# Patient Record
Sex: FEMALE | Race: WHITE | NOT HISPANIC OR LATINO | Employment: OTHER | URBAN - METROPOLITAN AREA
[De-identification: names, ages, dates, MRNs, and addresses within clinical notes are randomized per-mention and may not be internally consistent; named-entity substitution may affect disease eponyms.]

---

## 2018-12-01 ENCOUNTER — OFFICE VISIT (OUTPATIENT)
Dept: URGENT CARE | Facility: CLINIC | Age: 73
End: 2018-12-01
Payer: MEDICARE

## 2018-12-01 VITALS
WEIGHT: 135 LBS | BODY MASS INDEX: 19.99 KG/M2 | TEMPERATURE: 98 F | HEART RATE: 83 BPM | SYSTOLIC BLOOD PRESSURE: 131 MMHG | DIASTOLIC BLOOD PRESSURE: 76 MMHG | HEIGHT: 69 IN | OXYGEN SATURATION: 96 % | RESPIRATION RATE: 18 BRPM

## 2018-12-01 DIAGNOSIS — R35.0 URINARY FREQUENCY: Primary | ICD-10-CM

## 2018-12-01 LAB
BILIRUB UR QL STRIP: NEGATIVE
GLUCOSE UR QL STRIP: NEGATIVE
KETONES UR QL STRIP: NEGATIVE
LEUKOCYTE ESTERASE UR QL STRIP: NEGATIVE
PH, POC UA: 6 (ref 5–8)
POC BLOOD, URINE: NEGATIVE
POC NITRATES, URINE: NEGATIVE
PROT UR QL STRIP: NEGATIVE
SP GR UR STRIP: 1 (ref 1–1.03)
UROBILINOGEN UR STRIP-ACNC: NORMAL (ref 0.1–1.1)

## 2018-12-01 PROCEDURE — 81003 URINALYSIS AUTO W/O SCOPE: CPT | Mod: QW,S$GLB,, | Performed by: NURSE PRACTITIONER

## 2018-12-01 PROCEDURE — 99203 OFFICE O/P NEW LOW 30 MIN: CPT | Mod: 25,S$GLB,, | Performed by: NURSE PRACTITIONER

## 2018-12-01 RX ORDER — GLUCOSAMINE/CHONDRO SU A 500-400 MG
1 TABLET ORAL 3 TIMES DAILY
COMMUNITY

## 2018-12-01 NOTE — PROGRESS NOTES
"Subjective:       Patient ID: Cassidy Aguilar is a 73 y.o. female.    Vitals:  height is 5' 9" (1.753 m) and weight is 61.2 kg (135 lb). Her oral temperature is 97.9 °F (36.6 °C). Her blood pressure is 131/76 and her pulse is 83. Her respiration is 18 and oxygen saturation is 96%.     Chief Complaint: Urinary Frequency    Pt presents to clinic with c/o urinary frequency x2 days. Denies nausea, back pain, dysuria an hematuria.       Urinary Frequency    This is a new problem. The current episode started yesterday. The problem occurs intermittently. The problem has been waxing and waning. The quality of the pain is described as aching. The pain is at a severity of 0/10. The patient is experiencing no pain. There has been no fever. Associated symptoms include frequency. Pertinent negatives include no behavior changes, chills, discharge, flank pain, hematuria, hesitancy, nausea, urgency, vomiting, rash or withholding. She has tried nothing for the symptoms. The treatment provided no relief. Her past medical history is significant for hypertension and recurrent UTIs. There is no history of catheterization, genitourinary reflux or kidney stones.       Constitution: Negative for chills and fever.   Neck: Negative for painful lymph nodes.   Gastrointestinal: Negative for abdominal pain, nausea and vomiting.   Genitourinary: Positive for frequency. Negative for dysuria, urgency, urine decreased, flank pain, hematuria, history of kidney stones, painful menstruation, irregular menstruation, missed menses, heavy menstrual bleeding, ovarian cysts, genital trauma, vaginal pain, vaginal discharge, vaginal bleeding, vaginal sores, vaginal odor, painful intercourse, genital sore, painful ejaculation and pelvic pain.   Musculoskeletal: Negative for back pain.   Skin: Negative for rash and lesion.   Hematologic/Lymphatic: Negative for swollen lymph nodes.       Objective:      Physical Exam   Constitutional: She is oriented to person, " place, and time. She appears well-developed and well-nourished.   HENT:   Head: Normocephalic and atraumatic.   Right Ear: External ear normal.   Left Ear: External ear normal.   Nose: Nose normal.   Eyes: Lids are normal.   Neck: Trachea normal, normal range of motion and phonation normal. Neck supple.   Cardiovascular: Normal pulses.   Pulmonary/Chest: Effort normal.   Abdominal: Soft. Normal appearance and bowel sounds are normal. She exhibits no distension. There is no tenderness. There is no CVA tenderness.   Neurological: She is alert and oriented to person, place, and time.   Skin: Skin is warm, dry and intact.   Psychiatric: She has a normal mood and affect. Her speech is normal and behavior is normal. Cognition and memory are normal.   Nursing note and vitals reviewed.      Assessment:       1. Urinary frequency        Plan:         Urinary frequency  -     POCT Urinalysis, Dipstick, Automated, W/O Scope  -     Urine culture

## 2018-12-01 NOTE — PATIENT INSTRUCTIONS
Please arrange follow up with your primary medical clinic as soon as possible. You must understand that you've received an Urgent Care treatment only and that you may be released before all of your medical problems are known or treated. You, the patient, will arrange for follow up as instructed. If your symptoms worsen or fail to improve you should go to the Emergency Room.

## 2018-12-05 ENCOUNTER — TELEPHONE (OUTPATIENT)
Dept: URGENT CARE | Facility: CLINIC | Age: 73
End: 2018-12-05

## 2018-12-05 LAB
BACTERIA UR CULT: NO GROWTH
BACTERIA UR CULT: NORMAL

## 2018-12-05 NOTE — TELEPHONE ENCOUNTER
----- Message from Paty Kulkarni NP sent at 12/5/2018 10:54 AM CST -----  Please call the patient regarding her normal result. Urine culture negative. Follow up with PCP.

## 2021-04-26 ENCOUNTER — PATIENT MESSAGE (OUTPATIENT)
Dept: RESEARCH | Facility: HOSPITAL | Age: 76
End: 2021-04-26

## 2021-12-20 ENCOUNTER — TELEPHONE (OUTPATIENT)
Dept: HEMATOLOGY/ONCOLOGY | Facility: CLINIC | Age: 76
End: 2021-12-20
Payer: MEDICARE

## 2021-12-20 DIAGNOSIS — Z51.11 ENCOUNTER FOR ANTINEOPLASTIC CHEMOTHERAPY: Primary | ICD-10-CM

## 2021-12-22 ENCOUNTER — OFFICE VISIT (OUTPATIENT)
Dept: HEMATOLOGY/ONCOLOGY | Facility: CLINIC | Age: 76
End: 2021-12-22
Payer: MEDICARE

## 2021-12-22 ENCOUNTER — LAB VISIT (OUTPATIENT)
Dept: LAB | Facility: HOSPITAL | Age: 76
End: 2021-12-22
Attending: INTERNAL MEDICINE
Payer: MEDICARE

## 2021-12-22 ENCOUNTER — PATIENT MESSAGE (OUTPATIENT)
Dept: HEMATOLOGY/ONCOLOGY | Facility: CLINIC | Age: 76
End: 2021-12-22

## 2021-12-22 VITALS
WEIGHT: 119.5 LBS | OXYGEN SATURATION: 98 % | HEIGHT: 69 IN | SYSTOLIC BLOOD PRESSURE: 179 MMHG | RESPIRATION RATE: 18 BRPM | HEART RATE: 80 BPM | BODY MASS INDEX: 17.7 KG/M2 | DIASTOLIC BLOOD PRESSURE: 76 MMHG | TEMPERATURE: 98 F

## 2021-12-22 DIAGNOSIS — C91.10 CLL (CHRONIC LYMPHOCYTIC LEUKEMIA): ICD-10-CM

## 2021-12-22 DIAGNOSIS — Z51.11 ENCOUNTER FOR ANTINEOPLASTIC CHEMOTHERAPY: ICD-10-CM

## 2021-12-22 DIAGNOSIS — R59.0 LYMPHADENOPATHY, CERVICAL: ICD-10-CM

## 2021-12-22 DIAGNOSIS — K29.60 SUPERFICIAL GASTRITIS, PRESENCE OF BLEEDING UNSPECIFIED, UNSPECIFIED CHRONICITY: Primary | ICD-10-CM

## 2021-12-22 PROBLEM — D64.9 ANEMIA, UNSPECIFIED: Status: ACTIVE | Noted: 2021-12-22

## 2021-12-22 LAB
ALBUMIN SERPL BCP-MCNC: 4.4 G/DL (ref 3.5–5.2)
ALP SERPL-CCNC: 112 U/L (ref 55–135)
ALT SERPL W/O P-5'-P-CCNC: 24 U/L (ref 10–44)
ANION GAP SERPL CALC-SCNC: 6 MMOL/L (ref 8–16)
ANISOCYTOSIS BLD QL SMEAR: SLIGHT
AST SERPL-CCNC: 31 U/L (ref 10–40)
BASO STIPL BLD QL SMEAR: ABNORMAL
BASOPHILS NFR BLD: 0 % (ref 0–1.9)
BILIRUB SERPL-MCNC: 4.7 MG/DL (ref 0.1–1)
BUN SERPL-MCNC: 23 MG/DL (ref 8–23)
CALCIUM SERPL-MCNC: 9.8 MG/DL (ref 8.7–10.5)
CHLORIDE SERPL-SCNC: 96 MMOL/L (ref 95–110)
CO2 SERPL-SCNC: 26 MMOL/L (ref 23–29)
CREAT SERPL-MCNC: 0.7 MG/DL (ref 0.5–1.4)
DAT IGG-SP REAG RBC-IMP: NORMAL
DIFFERENTIAL METHOD: ABNORMAL
EOSINOPHIL NFR BLD: 0 % (ref 0–8)
ERYTHROCYTE [DISTWIDTH] IN BLOOD BY AUTOMATED COUNT: 21.9 % (ref 11.5–14.5)
EST. GFR  (AFRICAN AMERICAN): >60 ML/MIN/1.73 M^2
EST. GFR  (NON AFRICAN AMERICAN): >60 ML/MIN/1.73 M^2
GLUCOSE SERPL-MCNC: 160 MG/DL (ref 70–110)
HAPTOGLOB SERPL-MCNC: <10 MG/DL (ref 30–250)
HCT VFR BLD AUTO: 26.5 % (ref 37–48.5)
HGB BLD-MCNC: 8.9 G/DL (ref 12–16)
IMM GRANULOCYTES # BLD AUTO: ABNORMAL K/UL (ref 0–0.04)
IMM GRANULOCYTES NFR BLD AUTO: ABNORMAL % (ref 0–0.5)
LDH SERPL L TO P-CCNC: 448 U/L (ref 110–260)
LYMPHOCYTES NFR BLD: 88 % (ref 18–48)
MCH RBC QN AUTO: 33.2 PG (ref 27–31)
MCHC RBC AUTO-ENTMCNC: 33.6 G/DL (ref 32–36)
MCV RBC AUTO: 99 FL (ref 82–98)
MONOCYTES NFR BLD: 1 % (ref 4–15)
NEUTROPHILS NFR BLD: 11 % (ref 38–73)
NRBC BLD-RTO: 0 /100 WBC
PATH REV BLD -IMP: NORMAL
PLATELET # BLD AUTO: 288 K/UL (ref 150–450)
PLATELET BLD QL SMEAR: ABNORMAL
PMV BLD AUTO: 9.4 FL (ref 9.2–12.9)
POTASSIUM SERPL-SCNC: 4.8 MMOL/L (ref 3.5–5.1)
PROT SERPL-MCNC: 7.4 G/DL (ref 6–8.4)
RBC # BLD AUTO: 2.68 M/UL (ref 4–5.4)
SMUDGE CELLS BLD QL SMEAR: PRESENT
SODIUM SERPL-SCNC: 128 MMOL/L (ref 136–145)
WBC # BLD AUTO: 96.95 K/UL (ref 3.9–12.7)

## 2021-12-22 PROCEDURE — 99205 OFFICE O/P NEW HI 60 MIN: CPT | Mod: S$PBB,,, | Performed by: INTERNAL MEDICINE

## 2021-12-22 PROCEDURE — 86880 COOMBS TEST DIRECT: CPT | Performed by: INTERNAL MEDICINE

## 2021-12-22 PROCEDURE — 99999 PR PBB SHADOW E&M-EST. PATIENT-LVL IV: CPT | Mod: PBBFAC,,, | Performed by: INTERNAL MEDICINE

## 2021-12-22 PROCEDURE — 85007 BL SMEAR W/DIFF WBC COUNT: CPT | Performed by: INTERNAL MEDICINE

## 2021-12-22 PROCEDURE — 99214 OFFICE O/P EST MOD 30 MIN: CPT | Mod: PBBFAC | Performed by: INTERNAL MEDICINE

## 2021-12-22 PROCEDURE — 80053 COMPREHEN METABOLIC PANEL: CPT | Performed by: INTERNAL MEDICINE

## 2021-12-22 PROCEDURE — 83615 LACTATE (LD) (LDH) ENZYME: CPT | Performed by: INTERNAL MEDICINE

## 2021-12-22 PROCEDURE — 85027 COMPLETE CBC AUTOMATED: CPT | Performed by: INTERNAL MEDICINE

## 2021-12-22 PROCEDURE — 83010 ASSAY OF HAPTOGLOBIN QUANT: CPT | Performed by: INTERNAL MEDICINE

## 2021-12-22 PROCEDURE — 85060 BLOOD SMEAR INTERPRETATION: CPT | Mod: ,,, | Performed by: PATHOLOGY

## 2021-12-22 PROCEDURE — 36415 COLL VENOUS BLD VENIPUNCTURE: CPT | Performed by: INTERNAL MEDICINE

## 2021-12-22 PROCEDURE — 99205 PR OFFICE/OUTPT VISIT, NEW, LEVL V, 60-74 MIN: ICD-10-PCS | Mod: S$PBB,,, | Performed by: INTERNAL MEDICINE

## 2021-12-22 PROCEDURE — 85060 PATHOLOGIST REVIEW: ICD-10-PCS | Mod: ,,, | Performed by: PATHOLOGY

## 2021-12-22 PROCEDURE — 99999 PR PBB SHADOW E&M-EST. PATIENT-LVL IV: ICD-10-PCS | Mod: PBBFAC,,, | Performed by: INTERNAL MEDICINE

## 2021-12-22 RX ORDER — PANTOPRAZOLE SODIUM 20 MG/1
20 TABLET, DELAYED RELEASE ORAL DAILY
Qty: 30 TABLET | Refills: 1 | Status: SHIPPED | OUTPATIENT
Start: 2021-12-22 | End: 2021-12-27 | Stop reason: SINTOL

## 2021-12-22 RX ORDER — ATORVASTATIN CALCIUM 40 MG/1
TABLET, FILM COATED ORAL
COMMUNITY
Start: 2021-11-02

## 2021-12-22 RX ORDER — CHOLECALCIFEROL (VITAMIN D3) 25 MCG
1000 TABLET ORAL
COMMUNITY
End: 2023-05-31

## 2021-12-22 RX ORDER — ASPIRIN 81 MG/1
81 TABLET ORAL
COMMUNITY

## 2021-12-22 RX ORDER — PREDNISONE 20 MG/1
TABLET ORAL
COMMUNITY
Start: 2021-12-17 | End: 2022-02-07 | Stop reason: SDUPTHER

## 2021-12-22 RX ORDER — CLINDAMYCIN PHOSPHATE 10 MG/ML
SOLUTION TOPICAL 2 TIMES DAILY
COMMUNITY
Start: 2021-07-06 | End: 2023-05-31

## 2021-12-22 RX ORDER — LEVOTHYROXINE SODIUM 75 UG/1
TABLET ORAL
COMMUNITY
Start: 2021-12-01 | End: 2024-01-31 | Stop reason: SDUPTHER

## 2021-12-22 RX ORDER — ESTRADIOL 0.03 MG/D
PATCH, EXTENDED RELEASE TRANSDERMAL
COMMUNITY
Start: 2021-12-06

## 2021-12-22 RX ORDER — MULTIVITAMIN
1 TABLET ORAL DAILY
COMMUNITY

## 2021-12-22 RX ORDER — CARVEDILOL PHOSPHATE 20 MG/1
20 CAPSULE, EXTENDED RELEASE ORAL NIGHTLY
COMMUNITY
Start: 2021-11-03 | End: 2023-12-08 | Stop reason: DRUGHIGH

## 2021-12-22 RX ORDER — TELMISARTAN 80 MG/1
TABLET ORAL
COMMUNITY
Start: 2021-12-21 | End: 2024-01-03

## 2021-12-27 ENCOUNTER — PATIENT MESSAGE (OUTPATIENT)
Dept: HEMATOLOGY/ONCOLOGY | Facility: CLINIC | Age: 76
End: 2021-12-27

## 2021-12-27 ENCOUNTER — OFFICE VISIT (OUTPATIENT)
Dept: HEMATOLOGY/ONCOLOGY | Facility: CLINIC | Age: 76
End: 2021-12-27
Payer: MEDICARE

## 2021-12-27 ENCOUNTER — TELEPHONE (OUTPATIENT)
Dept: HEMATOLOGY/ONCOLOGY | Facility: CLINIC | Age: 76
End: 2021-12-27
Payer: MEDICARE

## 2021-12-27 ENCOUNTER — LAB VISIT (OUTPATIENT)
Dept: LAB | Facility: HOSPITAL | Age: 76
End: 2021-12-27
Payer: MEDICARE

## 2021-12-27 VITALS
OXYGEN SATURATION: 100 % | HEART RATE: 81 BPM | RESPIRATION RATE: 17 BRPM | WEIGHT: 122 LBS | DIASTOLIC BLOOD PRESSURE: 80 MMHG | BODY MASS INDEX: 18.07 KG/M2 | HEIGHT: 69 IN | SYSTOLIC BLOOD PRESSURE: 185 MMHG | TEMPERATURE: 99 F

## 2021-12-27 DIAGNOSIS — C91.10 CLL (CHRONIC LYMPHOCYTIC LEUKEMIA): ICD-10-CM

## 2021-12-27 DIAGNOSIS — D59.19 OTHER AUTOIMMUNE HEMOLYTIC ANEMIA: ICD-10-CM

## 2021-12-27 DIAGNOSIS — I10 HYPERTENSION, UNSPECIFIED TYPE: ICD-10-CM

## 2021-12-27 DIAGNOSIS — D84.9 IMMUNOSUPPRESSION: ICD-10-CM

## 2021-12-27 DIAGNOSIS — C91.10 CLL (CHRONIC LYMPHOCYTIC LEUKEMIA): Primary | ICD-10-CM

## 2021-12-27 DIAGNOSIS — Z79.52 LONG TERM SYSTEMIC STEROID USER: ICD-10-CM

## 2021-12-27 DIAGNOSIS — Z91.89 AT HIGH RISK OF TUMOR LYSIS SYNDROME: ICD-10-CM

## 2021-12-27 DIAGNOSIS — E87.1 HYPONATREMIA: ICD-10-CM

## 2021-12-27 LAB
ABO + RH BLD: NORMAL
ALBUMIN SERPL BCP-MCNC: 3.9 G/DL (ref 3.5–5.2)
ALP SERPL-CCNC: 104 U/L (ref 55–135)
ALT SERPL W/O P-5'-P-CCNC: 21 U/L (ref 10–44)
ANION GAP SERPL CALC-SCNC: 8 MMOL/L (ref 8–16)
ANISOCYTOSIS BLD QL SMEAR: SLIGHT
AST SERPL-CCNC: 19 U/L (ref 10–40)
BASO STIPL BLD QL SMEAR: ABNORMAL
BASOPHILS # BLD AUTO: ABNORMAL K/UL (ref 0–0.2)
BASOPHILS NFR BLD: 0 % (ref 0–1.9)
BILIRUB SERPL-MCNC: 3.5 MG/DL (ref 0.1–1)
BLD GP AB SCN CELLS X3 SERPL QL: NORMAL
BUN SERPL-MCNC: 19 MG/DL (ref 8–23)
CALCIUM SERPL-MCNC: 8.6 MG/DL (ref 8.7–10.5)
CHLORIDE SERPL-SCNC: 93 MMOL/L (ref 95–110)
CO2 SERPL-SCNC: 24 MMOL/L (ref 23–29)
CREAT SERPL-MCNC: 0.7 MG/DL (ref 0.5–1.4)
DIFFERENTIAL METHOD: ABNORMAL
EOSINOPHIL # BLD AUTO: ABNORMAL K/UL (ref 0–0.5)
EOSINOPHIL NFR BLD: 0 % (ref 0–8)
ERYTHROCYTE [DISTWIDTH] IN BLOOD BY AUTOMATED COUNT: 22.1 % (ref 11.5–14.5)
EST. GFR  (AFRICAN AMERICAN): >60 ML/MIN/1.73 M^2
EST. GFR  (NON AFRICAN AMERICAN): >60 ML/MIN/1.73 M^2
GLUCOSE SERPL-MCNC: 148 MG/DL (ref 70–110)
HAPTOGLOB SERPL-MCNC: <10 MG/DL (ref 30–250)
HCT VFR BLD AUTO: 25.3 % (ref 37–48.5)
HGB BLD-MCNC: 8.1 G/DL (ref 12–16)
IMM GRANULOCYTES # BLD AUTO: ABNORMAL K/UL (ref 0–0.04)
IMM GRANULOCYTES NFR BLD AUTO: ABNORMAL % (ref 0–0.5)
LDH SERPL L TO P-CCNC: 310 U/L (ref 110–260)
LDH SERPL L TO P-CCNC: 310 U/L (ref 110–260)
LYMPHOCYTES # BLD AUTO: ABNORMAL K/UL (ref 1–4.8)
LYMPHOCYTES NFR BLD: 83 % (ref 18–48)
MAGNESIUM SERPL-MCNC: 1.9 MG/DL (ref 1.6–2.6)
MCH RBC QN AUTO: 33.1 PG (ref 27–31)
MCHC RBC AUTO-ENTMCNC: 32 G/DL (ref 32–36)
MCV RBC AUTO: 103 FL (ref 82–98)
MONOCYTES # BLD AUTO: ABNORMAL K/UL (ref 0.3–1)
MONOCYTES NFR BLD: 2 % (ref 4–15)
NEUTROPHILS NFR BLD: 14 % (ref 38–73)
NEUTS BAND NFR BLD MANUAL: 1 %
NRBC BLD-RTO: 0 /100 WBC
PHOSPHATE SERPL-MCNC: 2.8 MG/DL (ref 2.7–4.5)
PLATELET # BLD AUTO: 249 K/UL (ref 150–450)
PMV BLD AUTO: 9.4 FL (ref 9.2–12.9)
POIKILOCYTOSIS BLD QL SMEAR: SLIGHT
POTASSIUM SERPL-SCNC: 4.2 MMOL/L (ref 3.5–5.1)
PROT SERPL-MCNC: 6.5 G/DL (ref 6–8.4)
RBC # BLD AUTO: 2.45 M/UL (ref 4–5.4)
SODIUM SERPL-SCNC: 125 MMOL/L (ref 136–145)
URATE SERPL-MCNC: 4.2 MG/DL (ref 2.4–5.7)
WBC # BLD AUTO: 106.8 K/UL (ref 3.9–12.7)

## 2021-12-27 PROCEDURE — 83735 ASSAY OF MAGNESIUM: CPT | Performed by: INTERNAL MEDICINE

## 2021-12-27 PROCEDURE — 99215 OFFICE O/P EST HI 40 MIN: CPT | Mod: S$PBB,,, | Performed by: INTERNAL MEDICINE

## 2021-12-27 PROCEDURE — 99213 OFFICE O/P EST LOW 20 MIN: CPT | Mod: PBBFAC | Performed by: INTERNAL MEDICINE

## 2021-12-27 PROCEDURE — 84100 ASSAY OF PHOSPHORUS: CPT | Performed by: INTERNAL MEDICINE

## 2021-12-27 PROCEDURE — 99999 PR PBB SHADOW E&M-EST. PATIENT-LVL III: CPT | Mod: PBBFAC,,, | Performed by: INTERNAL MEDICINE

## 2021-12-27 PROCEDURE — 86900 BLOOD TYPING SEROLOGIC ABO: CPT | Performed by: INTERNAL MEDICINE

## 2021-12-27 PROCEDURE — 80053 COMPREHEN METABOLIC PANEL: CPT | Performed by: INTERNAL MEDICINE

## 2021-12-27 PROCEDURE — 85007 BL SMEAR W/DIFF WBC COUNT: CPT | Performed by: INTERNAL MEDICINE

## 2021-12-27 PROCEDURE — 83010 ASSAY OF HAPTOGLOBIN QUANT: CPT | Performed by: INTERNAL MEDICINE

## 2021-12-27 PROCEDURE — 99215 PR OFFICE/OUTPT VISIT, EST, LEVL V, 40-54 MIN: ICD-10-PCS | Mod: S$PBB,,, | Performed by: INTERNAL MEDICINE

## 2021-12-27 PROCEDURE — 36415 COLL VENOUS BLD VENIPUNCTURE: CPT | Performed by: INTERNAL MEDICINE

## 2021-12-27 PROCEDURE — 99999 PR PBB SHADOW E&M-EST. PATIENT-LVL III: ICD-10-PCS | Mod: PBBFAC,,, | Performed by: INTERNAL MEDICINE

## 2021-12-27 PROCEDURE — 83615 LACTATE (LD) (LDH) ENZYME: CPT | Performed by: INTERNAL MEDICINE

## 2021-12-27 PROCEDURE — 84550 ASSAY OF BLOOD/URIC ACID: CPT | Performed by: INTERNAL MEDICINE

## 2021-12-27 PROCEDURE — 85027 COMPLETE CBC AUTOMATED: CPT | Performed by: INTERNAL MEDICINE

## 2021-12-27 RX ORDER — OMEPRAZOLE 20 MG/1
20 TABLET, DELAYED RELEASE ORAL DAILY
Qty: 30 TABLET | Refills: 2 | Status: SHIPPED | OUTPATIENT
Start: 2021-12-27 | End: 2023-12-06

## 2021-12-28 ENCOUNTER — TELEPHONE (OUTPATIENT)
Dept: HEMATOLOGY/ONCOLOGY | Facility: CLINIC | Age: 76
End: 2021-12-28
Payer: MEDICARE

## 2021-12-28 ENCOUNTER — PATIENT MESSAGE (OUTPATIENT)
Dept: HEMATOLOGY/ONCOLOGY | Facility: CLINIC | Age: 76
End: 2021-12-28
Payer: MEDICARE

## 2021-12-28 PROBLEM — I10 HYPERTENSION: Status: ACTIVE | Noted: 2021-12-28

## 2021-12-28 PROBLEM — D84.9 IMMUNOSUPPRESSION: Status: ACTIVE | Noted: 2021-12-28

## 2021-12-28 PROBLEM — Z91.89 AT HIGH RISK OF TUMOR LYSIS SYNDROME: Status: ACTIVE | Noted: 2021-12-28

## 2021-12-28 PROBLEM — E87.1 HYPONATREMIA: Status: ACTIVE | Noted: 2021-12-28

## 2021-12-28 RX ORDER — ATOVAQUONE 750 MG/5ML
1500 SUSPENSION ORAL DAILY
Qty: 300 ML | Refills: 3 | Status: SHIPPED | OUTPATIENT
Start: 2021-12-27 | End: 2022-02-07 | Stop reason: ALTCHOICE

## 2021-12-28 RX ORDER — ACYCLOVIR 400 MG/1
400 TABLET ORAL 2 TIMES DAILY
Qty: 60 TABLET | Refills: 11 | Status: SHIPPED | OUTPATIENT
Start: 2021-12-28 | End: 2023-01-06 | Stop reason: SDUPTHER

## 2021-12-28 RX ORDER — ALLOPURINOL 300 MG/1
300 TABLET ORAL 2 TIMES DAILY
Qty: 60 TABLET | Refills: 3 | Status: SHIPPED | OUTPATIENT
Start: 2021-12-28 | End: 2022-04-04

## 2021-12-29 DIAGNOSIS — C91.10 CLL (CHRONIC LYMPHOCYTIC LEUKEMIA): Primary | ICD-10-CM

## 2022-01-02 ENCOUNTER — PATIENT MESSAGE (OUTPATIENT)
Dept: HEMATOLOGY/ONCOLOGY | Facility: CLINIC | Age: 77
End: 2022-01-02
Payer: MEDICARE

## 2022-01-03 ENCOUNTER — LAB VISIT (OUTPATIENT)
Dept: LAB | Facility: HOSPITAL | Age: 77
End: 2022-01-03
Payer: MEDICARE

## 2022-01-03 ENCOUNTER — TELEPHONE (OUTPATIENT)
Dept: HEMATOLOGY/ONCOLOGY | Facility: CLINIC | Age: 77
End: 2022-01-03
Payer: MEDICARE

## 2022-01-03 DIAGNOSIS — C91.10 CLL (CHRONIC LYMPHOCYTIC LEUKEMIA): ICD-10-CM

## 2022-01-03 DIAGNOSIS — E87.1 HYPONATREMIA: ICD-10-CM

## 2022-01-03 LAB
ABO + RH BLD: NORMAL
ALBUMIN SERPL BCP-MCNC: 4.3 G/DL (ref 3.5–5.2)
ALP SERPL-CCNC: 98 U/L (ref 55–135)
ALT SERPL W/O P-5'-P-CCNC: 21 U/L (ref 10–44)
ANION GAP SERPL CALC-SCNC: 6 MMOL/L (ref 8–16)
ANISOCYTOSIS BLD QL SMEAR: SLIGHT
AST SERPL-CCNC: 21 U/L (ref 10–40)
BASOPHILS # BLD AUTO: ABNORMAL K/UL (ref 0–0.2)
BASOPHILS NFR BLD: 0 % (ref 0–1.9)
BILIRUB SERPL-MCNC: 4.7 MG/DL (ref 0.1–1)
BLD GP AB SCN CELLS X3 SERPL QL: NORMAL
BUN SERPL-MCNC: 18 MG/DL (ref 8–23)
CALCIUM SERPL-MCNC: 9.3 MG/DL (ref 8.7–10.5)
CHLORIDE SERPL-SCNC: 97 MMOL/L (ref 95–110)
CO2 SERPL-SCNC: 26 MMOL/L (ref 23–29)
CREAT SERPL-MCNC: 0.7 MG/DL (ref 0.5–1.4)
DIFFERENTIAL METHOD: ABNORMAL
EOSINOPHIL # BLD AUTO: ABNORMAL K/UL (ref 0–0.5)
EOSINOPHIL NFR BLD: 0 % (ref 0–8)
ERYTHROCYTE [DISTWIDTH] IN BLOOD BY AUTOMATED COUNT: 22.3 % (ref 11.5–14.5)
EST. GFR  (AFRICAN AMERICAN): >60 ML/MIN/1.73 M^2
EST. GFR  (NON AFRICAN AMERICAN): >60 ML/MIN/1.73 M^2
GLUCOSE SERPL-MCNC: 88 MG/DL (ref 70–110)
HAPTOGLOB SERPL-MCNC: <10 MG/DL (ref 30–250)
HBV CORE AB SERPL QL IA: NEGATIVE
HBV SURFACE AG SERPL QL IA: NEGATIVE
HCT VFR BLD AUTO: 28.4 % (ref 37–48.5)
HCV AB SERPL QL IA: NEGATIVE
HGB BLD-MCNC: 8.9 G/DL (ref 12–16)
HYPOCHROMIA BLD QL SMEAR: ABNORMAL
IMM GRANULOCYTES # BLD AUTO: ABNORMAL K/UL (ref 0–0.04)
IMM GRANULOCYTES NFR BLD AUTO: ABNORMAL % (ref 0–0.5)
LDH SERPL L TO P-CCNC: 292 U/L (ref 110–260)
LYMPHOCYTES # BLD AUTO: ABNORMAL K/UL (ref 1–4.8)
LYMPHOCYTES NFR BLD: 93 % (ref 18–48)
MAGNESIUM SERPL-MCNC: 1.9 MG/DL (ref 1.6–2.6)
MCH RBC QN AUTO: 33.5 PG (ref 27–31)
MCHC RBC AUTO-ENTMCNC: 31.3 G/DL (ref 32–36)
MCV RBC AUTO: 107 FL (ref 82–98)
MONOCYTES # BLD AUTO: ABNORMAL K/UL (ref 0.3–1)
MONOCYTES NFR BLD: 1 % (ref 4–15)
MYELOCYTES NFR BLD MANUAL: 1 %
NEUTROPHILS # BLD AUTO: ABNORMAL K/UL (ref 1.8–7.7)
NEUTROPHILS NFR BLD: 5 % (ref 38–73)
NRBC BLD-RTO: 0 /100 WBC
OSMOLALITY SERPL: 284 MOSM/KG (ref 275–295)
OVALOCYTES BLD QL SMEAR: ABNORMAL
PHOSPHATE SERPL-MCNC: 3.1 MG/DL (ref 2.7–4.5)
PLATELET # BLD AUTO: 244 K/UL (ref 150–450)
PMV BLD AUTO: 9.8 FL (ref 9.2–12.9)
POIKILOCYTOSIS BLD QL SMEAR: SLIGHT
POLYCHROMASIA BLD QL SMEAR: ABNORMAL
POTASSIUM SERPL-SCNC: 4.5 MMOL/L (ref 3.5–5.1)
PROT SERPL-MCNC: 6.9 G/DL (ref 6–8.4)
RBC # BLD AUTO: 2.66 M/UL (ref 4–5.4)
SODIUM SERPL-SCNC: 129 MMOL/L (ref 136–145)
URATE SERPL-MCNC: 4.3 MG/DL (ref 2.4–5.7)
WBC # BLD AUTO: 100.7 K/UL (ref 3.9–12.7)

## 2022-01-03 PROCEDURE — 85007 BL SMEAR W/DIFF WBC COUNT: CPT | Performed by: INTERNAL MEDICINE

## 2022-01-03 PROCEDURE — 83615 LACTATE (LD) (LDH) ENZYME: CPT | Performed by: INTERNAL MEDICINE

## 2022-01-03 PROCEDURE — 86803 HEPATITIS C AB TEST: CPT | Performed by: INTERNAL MEDICINE

## 2022-01-03 PROCEDURE — 83735 ASSAY OF MAGNESIUM: CPT | Performed by: INTERNAL MEDICINE

## 2022-01-03 PROCEDURE — 80053 COMPREHEN METABOLIC PANEL: CPT | Performed by: INTERNAL MEDICINE

## 2022-01-03 PROCEDURE — 85060 PATHOLOGIST REVIEW: ICD-10-PCS | Mod: ,,, | Performed by: PATHOLOGY

## 2022-01-03 PROCEDURE — 86901 BLOOD TYPING SEROLOGIC RH(D): CPT | Performed by: INTERNAL MEDICINE

## 2022-01-03 PROCEDURE — 83010 ASSAY OF HAPTOGLOBIN QUANT: CPT | Performed by: INTERNAL MEDICINE

## 2022-01-03 PROCEDURE — 83930 ASSAY OF BLOOD OSMOLALITY: CPT | Performed by: INTERNAL MEDICINE

## 2022-01-03 PROCEDURE — 84550 ASSAY OF BLOOD/URIC ACID: CPT | Performed by: INTERNAL MEDICINE

## 2022-01-03 PROCEDURE — 87340 HEPATITIS B SURFACE AG IA: CPT | Performed by: INTERNAL MEDICINE

## 2022-01-03 PROCEDURE — 85060 BLOOD SMEAR INTERPRETATION: CPT | Mod: ,,, | Performed by: PATHOLOGY

## 2022-01-03 PROCEDURE — 84100 ASSAY OF PHOSPHORUS: CPT | Performed by: INTERNAL MEDICINE

## 2022-01-03 PROCEDURE — 86704 HEP B CORE ANTIBODY TOTAL: CPT | Performed by: INTERNAL MEDICINE

## 2022-01-03 PROCEDURE — 85027 COMPLETE CBC AUTOMATED: CPT | Performed by: INTERNAL MEDICINE

## 2022-01-04 ENCOUNTER — TELEPHONE (OUTPATIENT)
Dept: HEMATOLOGY/ONCOLOGY | Facility: CLINIC | Age: 77
End: 2022-01-04
Payer: MEDICARE

## 2022-01-04 LAB — PATH REV BLD -IMP: NORMAL

## 2022-01-04 NOTE — TELEPHONE ENCOUNTER
I spoke to Ms. Aguilar regarding her concerns. I reviewed with her our indications for treatment by iwCLL criteria (steroid-refractory hemolytic anemia, rapid leukocytosis, weight loss). She verbalized understanding and agreement.    She has had side effects from high dose steroids and would like to decrease. We will decrease prednisone to 40mg daily (from 50mg daily). She still has evidence of hemolysis but hemoglobin is stable.       Bubba Wright MD  Hematology, Oncology, and Stem Cell Transplantation  [facility] and [facility]

## 2022-01-06 ENCOUNTER — PATIENT MESSAGE (OUTPATIENT)
Dept: HEMATOLOGY/ONCOLOGY | Facility: CLINIC | Age: 77
End: 2022-01-06
Payer: MEDICARE

## 2022-01-06 ENCOUNTER — TELEPHONE (OUTPATIENT)
Dept: HEMATOLOGY/ONCOLOGY | Facility: CLINIC | Age: 77
End: 2022-01-06
Payer: MEDICARE

## 2022-01-06 NOTE — TELEPHONE ENCOUNTER
Spoke with patient and reviewed appointments. Helped adjust appointment cycle 1 day 1 to the 11th per patient preference.

## 2022-01-06 NOTE — TELEPHONE ENCOUNTER
"----- Message from Gaby Haley sent at 1/6/2022  2:19 PM CST -----  Regarding: Consult/Advisory:  Name Of Caller: Cassidy    Contact Preference?: [phone]    What is the nature of the call?: pt is confused about infusion appts scheduled on 1/12, 1/13 and 1/19           Additional Notes:  "Thank you for all that you do for our patients'"     "

## 2022-01-10 ENCOUNTER — CLINICAL SUPPORT (OUTPATIENT)
Dept: HEMATOLOGY/ONCOLOGY | Facility: CLINIC | Age: 77
End: 2022-01-10
Payer: MEDICARE

## 2022-01-10 ENCOUNTER — OFFICE VISIT (OUTPATIENT)
Dept: HEMATOLOGY/ONCOLOGY | Facility: CLINIC | Age: 77
End: 2022-01-10
Payer: MEDICARE

## 2022-01-10 ENCOUNTER — LAB VISIT (OUTPATIENT)
Dept: LAB | Facility: HOSPITAL | Age: 77
End: 2022-01-10
Attending: INTERNAL MEDICINE
Payer: MEDICARE

## 2022-01-10 VITALS
OXYGEN SATURATION: 100 % | BODY MASS INDEX: 18.04 KG/M2 | SYSTOLIC BLOOD PRESSURE: 194 MMHG | RESPIRATION RATE: 16 BRPM | HEART RATE: 74 BPM | DIASTOLIC BLOOD PRESSURE: 82 MMHG | WEIGHT: 121.81 LBS | TEMPERATURE: 99 F | HEIGHT: 69 IN

## 2022-01-10 DIAGNOSIS — C91.10 CLL (CHRONIC LYMPHOCYTIC LEUKEMIA): Primary | ICD-10-CM

## 2022-01-10 DIAGNOSIS — Z91.89 AT HIGH RISK OF TUMOR LYSIS SYNDROME: ICD-10-CM

## 2022-01-10 DIAGNOSIS — C91.10 CLL (CHRONIC LYMPHOCYTIC LEUKEMIA): ICD-10-CM

## 2022-01-10 DIAGNOSIS — Z11.52 ENCOUNTER FOR SCREENING FOR COVID-19: ICD-10-CM

## 2022-01-10 DIAGNOSIS — D84.9 IMMUNOSUPPRESSION: ICD-10-CM

## 2022-01-10 DIAGNOSIS — Z79.52 LONG TERM SYSTEMIC STEROID USER: ICD-10-CM

## 2022-01-10 DIAGNOSIS — I10 HYPERTENSION, UNSPECIFIED TYPE: ICD-10-CM

## 2022-01-10 DIAGNOSIS — D59.19 OTHER AUTOIMMUNE HEMOLYTIC ANEMIA: ICD-10-CM

## 2022-01-10 DIAGNOSIS — E87.1 HYPONATREMIA: ICD-10-CM

## 2022-01-10 LAB
ABO + RH BLD: NORMAL
ALBUMIN SERPL BCP-MCNC: 4 G/DL (ref 3.5–5.2)
ALP SERPL-CCNC: 98 U/L (ref 55–135)
ALT SERPL W/O P-5'-P-CCNC: 24 U/L (ref 10–44)
ANION GAP SERPL CALC-SCNC: 6 MMOL/L (ref 8–16)
ANISOCYTOSIS BLD QL SMEAR: SLIGHT
AST SERPL-CCNC: 24 U/L (ref 10–40)
BASOPHILS # BLD AUTO: ABNORMAL K/UL (ref 0–0.2)
BASOPHILS NFR BLD: 0 % (ref 0–1.9)
BILIRUB SERPL-MCNC: 4.1 MG/DL (ref 0.1–1)
BLD GP AB SCN CELLS X3 SERPL QL: NORMAL
BUN SERPL-MCNC: 16 MG/DL (ref 8–23)
CALCIUM SERPL-MCNC: 9.2 MG/DL (ref 8.7–10.5)
CHLORIDE SERPL-SCNC: 97 MMOL/L (ref 95–110)
CO2 SERPL-SCNC: 26 MMOL/L (ref 23–29)
CREAT SERPL-MCNC: 0.7 MG/DL (ref 0.5–1.4)
DIFFERENTIAL METHOD: ABNORMAL
EOSINOPHIL # BLD AUTO: ABNORMAL K/UL (ref 0–0.5)
EOSINOPHIL NFR BLD: 0 % (ref 0–8)
ERYTHROCYTE [DISTWIDTH] IN BLOOD BY AUTOMATED COUNT: 21.7 % (ref 11.5–14.5)
EST. GFR  (AFRICAN AMERICAN): >60 ML/MIN/1.73 M^2
EST. GFR  (NON AFRICAN AMERICAN): >60 ML/MIN/1.73 M^2
GLUCOSE SERPL-MCNC: 109 MG/DL (ref 70–110)
HCT VFR BLD AUTO: 25.6 % (ref 37–48.5)
HGB BLD-MCNC: 8.4 G/DL (ref 12–16)
HYPOCHROMIA BLD QL SMEAR: ABNORMAL
IMM GRANULOCYTES # BLD AUTO: ABNORMAL K/UL (ref 0–0.04)
IMM GRANULOCYTES NFR BLD AUTO: ABNORMAL % (ref 0–0.5)
LDH SERPL L TO P-CCNC: 277 U/L (ref 110–260)
LYMPHOCYTES # BLD AUTO: ABNORMAL K/UL (ref 1–4.8)
LYMPHOCYTES NFR BLD: 92 % (ref 18–48)
MAGNESIUM SERPL-MCNC: 1.9 MG/DL (ref 1.6–2.6)
MCH RBC QN AUTO: 35.4 PG (ref 27–31)
MCHC RBC AUTO-ENTMCNC: 32.8 G/DL (ref 32–36)
MCV RBC AUTO: 108 FL (ref 82–98)
MONOCYTES # BLD AUTO: ABNORMAL K/UL (ref 0.3–1)
MONOCYTES NFR BLD: 0 % (ref 4–15)
NEUTROPHILS NFR BLD: 8 % (ref 38–73)
NRBC BLD-RTO: 0 /100 WBC
OVALOCYTES BLD QL SMEAR: ABNORMAL
PHOSPHATE SERPL-MCNC: 3.2 MG/DL (ref 2.7–4.5)
PLATELET # BLD AUTO: 215 K/UL (ref 150–450)
PMV BLD AUTO: 9.5 FL (ref 9.2–12.9)
POIKILOCYTOSIS BLD QL SMEAR: SLIGHT
POLYCHROMASIA BLD QL SMEAR: ABNORMAL
POTASSIUM SERPL-SCNC: 4.5 MMOL/L (ref 3.5–5.1)
PROT SERPL-MCNC: 6.5 G/DL (ref 6–8.4)
RBC # BLD AUTO: 2.37 M/UL (ref 4–5.4)
SARS-COV-2 RDRP RESP QL NAA+PROBE: NEGATIVE
SODIUM SERPL-SCNC: 129 MMOL/L (ref 136–145)
TARGETS BLD QL SMEAR: ABNORMAL
URATE SERPL-MCNC: 4 MG/DL (ref 2.4–5.7)
WBC # BLD AUTO: 83.99 K/UL (ref 3.9–12.7)

## 2022-01-10 PROCEDURE — 80053 COMPREHEN METABOLIC PANEL: CPT | Performed by: INTERNAL MEDICINE

## 2022-01-10 PROCEDURE — 85027 COMPLETE CBC AUTOMATED: CPT | Performed by: INTERNAL MEDICINE

## 2022-01-10 PROCEDURE — 99999 PR PBB SHADOW E&M-EST. PATIENT-LVL IV: ICD-10-PCS | Mod: PBBFAC,,, | Performed by: INTERNAL MEDICINE

## 2022-01-10 PROCEDURE — 99999 PR PBB SHADOW E&M-EST. PATIENT-LVL IV: CPT | Mod: PBBFAC,,, | Performed by: INTERNAL MEDICINE

## 2022-01-10 PROCEDURE — 83615 LACTATE (LD) (LDH) ENZYME: CPT | Performed by: INTERNAL MEDICINE

## 2022-01-10 PROCEDURE — 99214 OFFICE O/P EST MOD 30 MIN: CPT | Mod: PBBFAC | Performed by: INTERNAL MEDICINE

## 2022-01-10 PROCEDURE — 85007 BL SMEAR W/DIFF WBC COUNT: CPT | Performed by: INTERNAL MEDICINE

## 2022-01-10 PROCEDURE — 84100 ASSAY OF PHOSPHORUS: CPT | Performed by: INTERNAL MEDICINE

## 2022-01-10 PROCEDURE — U0002 COVID-19 LAB TEST NON-CDC: HCPCS | Performed by: INTERNAL MEDICINE

## 2022-01-10 PROCEDURE — 99215 PR OFFICE/OUTPT VISIT, EST, LEVL V, 40-54 MIN: ICD-10-PCS | Mod: S$PBB,,, | Performed by: INTERNAL MEDICINE

## 2022-01-10 PROCEDURE — 36415 COLL VENOUS BLD VENIPUNCTURE: CPT | Performed by: INTERNAL MEDICINE

## 2022-01-10 PROCEDURE — 83735 ASSAY OF MAGNESIUM: CPT | Performed by: INTERNAL MEDICINE

## 2022-01-10 PROCEDURE — 99215 OFFICE O/P EST HI 40 MIN: CPT | Mod: S$PBB,,, | Performed by: INTERNAL MEDICINE

## 2022-01-10 PROCEDURE — 86901 BLOOD TYPING SEROLOGIC RH(D): CPT | Performed by: INTERNAL MEDICINE

## 2022-01-10 PROCEDURE — 84550 ASSAY OF BLOOD/URIC ACID: CPT | Performed by: INTERNAL MEDICINE

## 2022-01-10 RX ORDER — ACETAMINOPHEN 325 MG/1
650 TABLET ORAL
Status: CANCELLED | OUTPATIENT
Start: 2022-01-11

## 2022-01-10 RX ORDER — HEPARIN 100 UNIT/ML
500 SYRINGE INTRAVENOUS
Status: CANCELLED | OUTPATIENT
Start: 2022-01-26

## 2022-01-10 RX ORDER — HEPARIN 100 UNIT/ML
500 SYRINGE INTRAVENOUS
Status: CANCELLED | OUTPATIENT
Start: 2022-01-19

## 2022-01-10 RX ORDER — ACETAMINOPHEN 325 MG/1
650 TABLET ORAL
Status: CANCELLED | OUTPATIENT
Start: 2022-01-26

## 2022-01-10 RX ORDER — SODIUM CHLORIDE 0.9 % (FLUSH) 0.9 %
10 SYRINGE (ML) INJECTION
Status: CANCELLED | OUTPATIENT
Start: 2022-01-26

## 2022-01-10 RX ORDER — HEPARIN 100 UNIT/ML
500 SYRINGE INTRAVENOUS
Status: CANCELLED | OUTPATIENT
Start: 2022-01-11

## 2022-01-10 RX ORDER — DIPHENHYDRAMINE HYDROCHLORIDE 50 MG/ML
50 INJECTION INTRAMUSCULAR; INTRAVENOUS ONCE AS NEEDED
Status: CANCELLED | OUTPATIENT
Start: 2022-01-26

## 2022-01-10 RX ORDER — EPINEPHRINE 0.3 MG/.3ML
0.3 INJECTION SUBCUTANEOUS ONCE AS NEEDED
Status: CANCELLED | OUTPATIENT
Start: 2022-01-12

## 2022-01-10 RX ORDER — EPINEPHRINE 0.3 MG/.3ML
0.3 INJECTION SUBCUTANEOUS ONCE AS NEEDED
Status: CANCELLED | OUTPATIENT
Start: 2022-01-19

## 2022-01-10 RX ORDER — SODIUM CHLORIDE 0.9 % (FLUSH) 0.9 %
10 SYRINGE (ML) INJECTION
Status: CANCELLED | OUTPATIENT
Start: 2022-01-11

## 2022-01-10 RX ORDER — ACETAMINOPHEN 325 MG/1
650 TABLET ORAL
Status: CANCELLED | OUTPATIENT
Start: 2022-01-12

## 2022-01-10 RX ORDER — ACETAMINOPHEN 325 MG/1
650 TABLET ORAL
Status: CANCELLED | OUTPATIENT
Start: 2022-01-19

## 2022-01-10 RX ORDER — DIPHENHYDRAMINE HYDROCHLORIDE 50 MG/ML
50 INJECTION INTRAMUSCULAR; INTRAVENOUS ONCE AS NEEDED
Status: CANCELLED | OUTPATIENT
Start: 2022-01-19

## 2022-01-10 RX ORDER — SODIUM CHLORIDE 0.9 % (FLUSH) 0.9 %
10 SYRINGE (ML) INJECTION
Status: CANCELLED | OUTPATIENT
Start: 2022-01-19

## 2022-01-10 RX ORDER — EPINEPHRINE 0.3 MG/.3ML
0.3 INJECTION SUBCUTANEOUS ONCE AS NEEDED
Status: CANCELLED | OUTPATIENT
Start: 2022-01-26

## 2022-01-10 RX ORDER — HEPARIN 100 UNIT/ML
500 SYRINGE INTRAVENOUS
Status: CANCELLED | OUTPATIENT
Start: 2022-01-12

## 2022-01-10 RX ORDER — SODIUM CHLORIDE 0.9 % (FLUSH) 0.9 %
10 SYRINGE (ML) INJECTION
Status: CANCELLED | OUTPATIENT
Start: 2022-01-12

## 2022-01-10 RX ORDER — DIPHENHYDRAMINE HYDROCHLORIDE 50 MG/ML
50 INJECTION INTRAMUSCULAR; INTRAVENOUS ONCE AS NEEDED
Status: CANCELLED | OUTPATIENT
Start: 2022-01-12

## 2022-01-10 NOTE — Clinical Note
1. Follow up on 1/26/22 with labs prior (CBC, CMP, Mg, phos, LDH, uric acid).  2. Please schedule infusion appointments for 2/9/22, 3/9/22, 4/6/22, 5/4/22, 6/1/22.

## 2022-01-10 NOTE — PROGRESS NOTES
Pharmacist Patient Education Note    Obinutizumab and venetoclax chemotherapy regimen was discussed with the patient and her son. Medication handouts for each agent were provided to the patient.    Administration instructions were discussed including obinutuzumab IV on day 1, 2, 8 and 15 of cycle 1 and then on day 1 of cycle 2 through 6 (28 days cycles); venetoclax ramp-up dosing PO daily with the first cycle followed by 400mg PO daily for an additional 11 cycles.    The following side effects were reviewed:   - myelosuppression including infection prevention and management  - fever   - fatigue  - infusion related reactions including chills, rigors, itching, flushing, headache, dizziness, and chest tightness/pain  - joint/bone and muscle pain  - tumor lysis syndrome including electrolyte changes and renal dysfunction  - mouth sores  - diarrhea/constipation  - edema  - decreased appetite    Education specifically for venetoclax will be completed by the Specialty Pharm[facility] prior to delivery to her home.    Drug-drug interactions:   - Obinutuzumab may increase the hypotensive effects of blood pressure medications. The patient was instructed to hold blood pressure medications prior to her obinutuzumab infusion if she experience hypotension during an infusion. Otherwise, it is safe to continue to take blood pressure medications as prescribed.  - Carvedilol increases the serum concentration of venetoclax. The recommendation is to decrease the venetoclax dose by at least 50% to reduce toxicity.    The patient concerns were addressed and all questions were answered.    Fouzia Pavon, PharmD, BCPS, [facility]  Clinical Pharmacy Specialist   BMT/Hematology Oncology  SpectraLink: [phone]

## 2022-01-10 NOTE — PROGRESS NOTES
Section of Hematology and Stem Cell Transplantation  Follow Up Visit     1/10/22    Referred by:  Shiv Watson MD    Primary Oncologic Diagnosis: CLL (chronic lymphocytic leukemia) [C91.10]    Oncologic History:    2016: First noted to have cervical lymphadenopathy. Excisional biopsy confirmed chronic lymphocytic leukemia. She did not have an indication for treatment; therefore, observation recommended by Dr. Kelley.    6/17/21: PET/CT noted enlarged bilateral cervical, supraclavicular, axillary, retroperitoneal, and pelvic lymph nodes. All nodes enlarged from the prior exam.   7/28/21: First noted to have mild anemia (Hgb ~11.5 to 10.8 g/dL). Continued monitoring.   8/3/21: Established care with Dr. Carnes.    11/1/21: On follow up with Dr. Carnes, she was noted to have worsening anemia - hemoglobin decreased to 8.6 g/dL. 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 Beta-2 microglobulin 3.19. CLL FISH revealed trisomy 12. Peripheral blood karyotype - 47,XX,+12[8]/47,idem,del(4)(p14p16)[3]/46,XX[9]. ZAP70 17% of cells.   12/14/21: She noted increased fatigue. Hemoglobin decreased to 6.2. Repeat labs confirmed decreased hemoglobin to 5.8 g/dL. Haptoglobin <1, . MICHELL Anti-IgG negative, MICHELL complement C3 positive.   12/17/21: Prednisone 60mg daily started in response to hemolytic anemia.   12/22/21: Initial visit at [facility] with Dr. Watson. WBC 96k, Hgb 8.9, Plts 288. MICHELL positive. , Hapto <10. Continued prednisone 60mg daily.    12/27/21: Initial visit with me. Prednisone decreased to 50mg daily.    1/4/22: Hemoglobin stable (~8.4 g/dL) with persistent hemolysis. Prednisone weaned to 40mg daily.     History of Present Ilness:   Cassidy Bryan) is a pleasant 76 y.o.female with a past medical history of hypertension and chronic lymphocytic leukemia who presents for follow up visit regarding CLL and steroid-refractory AIHA. She is doing well. She continues to play tennis. No recent fevers, chills,  night sweats, weight loss.    PAST MEDICAL HISTORY:   Past Medical History:   Diagnosis Date    CLL (chronic lymphocytic leukemia) 12/22/2021    Hypertension     Thyroid disease        PAST SURGICAL HISTORY:   Past Surgical History:   Procedure Laterality Date    ADENOIDECTOMY      COLONOSCOPY      HYSTERECTOMY      TONSILLECTOMY         PAST SOCIAL HISTORY:  Social History     Tobacco Use    Smoking status: Never Smoker    Smokeless tobacco: Never Used   Substance Use Topics    Alcohol use: Yes     Alcohol/week: 0.0 standard drinks     Comment: rare       FAMILY HISTORY:  History reviewed. No pertinent family history.    CURRENT MEDICATIONS:   Current Outpatient Medications   Medication Sig    acyclovir (ZOVIRAX) 400 MG tablet Take 1 tablet (400 mg total) by mouth 2 (two) times daily.    allopurinoL (ZYLOPRIM) 300 MG tablet Take 1 tablet (300 mg total) by mouth 2 (two) times daily.    amlodipine (NORVASC) 2.5 MG tablet Take 2.5 mg by mouth once daily.    aspirin (ECOTRIN) 81 MG EC tablet Take 81 mg by mouth.    atorvastatin (LIPITOR) 40 MG tablet TAKE ONE TABLET BY MOUTH once DAILY AT BEDTIME FOR cholesterol control    atovaquone (MEPRON) 750 mg/5 mL Susp Take 10 mLs (1,500 mg total) by mouth once daily.    CARVEDILOL PHOSPHATE 20 MG ORAL CM24 (COREG CR) 20 mg 24 hr capsule Take 20 mg by mouth nightly.    clindamycin (CLEOCIN T) 1 % Swab 2 (two) times a day.    ergocalciferol (ERGOCALCIFEROL) 50,000 unit Cap Take 50,000 Units by mouth every 7 days.    fluticasone (FLONASE) 50 mcg/actuation nasal spray 1 spray by Each Nare route once daily.    FLUZONE HIGH-DOSE 2018-19, PF, 180 mcg/0.5 mL vaccine ADM 0.5ML IM UTD    glucosamine-chondroitin 500-400 mg tablet Take 1 tablet by mouth 3 (three) times daily.    hydrocodone-acetaminophen 5-325mg (NORCO) 5-325 mg per tablet Take 1 or 2 tabs every 4 hours as needed.    multivitamin (THERAGRAN) per tablet Take 1 tablet by mouth once daily.     omeprazole (PRILOSEC OTC) 20 MG tablet Take 1 tablet (20 mg total) by mouth once daily.    predniSONE (DELTASONE) 20 MG tablet TAKE THREE TABLET (60mg) BY MOUTH daily    SYNTHROID 75 mcg tablet Take 1 tablet (75 mcg total) by mouth daily    telmisartan (MICARDIS) 80 MG Tab     telmisartan-hydrochlorothiazide (MICARDIS HCT) 40-12.5 mg per tablet Take 2 tablets by mouth once daily.     vitamin D (VITAMIN D3) 1000 units Tab Take 1,000 Units by mouth.    VIVELLE-DOT 0.025 mg/24 hr Place 1 patch onto the skin twice a week    venetoclax (VENCLEXTA) 100 mg Tab Take 400 mg by mouth once daily starting after completion of the starter pack. Take with food..    venetoclax 10 mg-50 mg- 100 mg DsPk Take 1 Package by mouth As instructed Take 20mg (two 10mg capsules) by mouth daily on days 22-28 of cycle 1. Take 50 mg by mouth daily on days 1-7 of cycle 2 Take 100 mg daily on days 8-14 of cycle 2 Take 200 mg daily on days 15-21 of cycle 2 Take 400 mg daily on days 22-28 of cycle 2 Take with food..     No current facility-administered medications for this visit.       ALLERGIES:   Review of patient's allergies indicates:   Allergen Reactions    Sulfa (sulfonamide antibiotics) Rash    [place] Diarrhea       Review of Systems:     Pertinent positives and negatives included in the HPI. Otherwise a complete review of systems is negative.    Physical Exam:     Vitals:    01/10/22 1051   BP: (!) 194/82   Pulse:    Resp:    Temp:      General: Appears well, NAD  HEENT: MMM, no OP lesions  Pulmonary: CTAB, no increased work of breathing, no W/R/C  Cardiovascular: S1S2 normal, RRR, no M/R/G  Abdominal: Soft, NT, ND, BS+, no HSM  Extremities: No C/C/E  Neurological: AAOx4, grossly normal, no focal deficits  Dermatologic: No appreciable rashes or lesions  Lymphatic: bilateral cervical adenopathy, no appreciable axillary or inguinal lymphadenopathy     ECOG Performance Status: (foot note - ECOG PS provided by [employer]cology [employer]) 1 - Symptomatic but completely ambulatory    Karnofsky Performance Score:  90%- Able to Carry on Normal Activity: Minor Symptoms of Disease    Labs:   Lab Results   Component Value Date    WBC 83.99 (HH) 01/10/2022    HGB 8.4 (L) 01/10/2022    HCT 25.6 (L) 01/10/2022     (H) 01/10/2022     01/10/2022       Lab Results   Component Value Date     (L) 01/10/2022    K 4.5 01/10/2022    CL 97 01/10/2022    CO2 26 01/10/2022    BUN 16 01/10/2022    CREATININE 0.7 01/10/2022    ALBUMIN 4.0 01/10/2022    BILITOT 4.1 (H) 01/10/2022    ALKPHOS 98 01/10/2022    AST 24 01/10/2022    ALT 24 01/10/2022       Imaging:   PET/CT 6/17/21  Luis Toussaint MD - 06/17/2021   COMPARISON: PET/CT 8/22/2016.   HISTORY: Lymphoma.   FINDINGS:   Head and neck: There is symmetric and physiologic distribution of radiotracer throughout the included brain parenchyma. There is no hemorrhage, hydrocephalus, or midline shift. There are innumerable bilateral enlarged FDG avid cervical lymph nodes. These have worsened from the prior exam. A representative left lower cervical lymph node best visualized on image 84 measures 19 mm compared with 10 mm previously with an SUV max of 1.8. There are enlarged left supraclavicular lymph nodes which were not present on the prior exam. A representative eran conglomerate measures 3.4 cm in diameter with an SUV max of 2.2.   CHEST: There are innumerable bilateral axillary and subpectoral lymph nodes which are not present on the prior exam. These demonstrate low-level FDG avidity. A representative left axillary nodule best visualized on image 114 measures 1.9 cm with an SUV max of 1.6. There are prominent paratracheal lymph nodes which are not pathologically enlarged by size criteria and demonstrate background FDG avidity, however were not present on the prior exam.   There is no FDG avid pulmonary nodule or mass. There is no pleural effusion. There is no pneumothorax.    Abdomen and pelvis: There is physiologic distribution of radiotracer throughout the liver, spleen, and collecting system. There are multiple enlarged bilateral iliac and periaortic retroperitoneal lymph nodes. A representative left periaortic lymph node best visualized on image 201 measures 2.0 cm in diameter with an SUV max of 2.0.   MUSCULOSKELETAL: There is no FDG avid lytic or blastic osseous lesion.     IMPRESSION:   Innumerable enlarged bilateral cervical, supraclavicular, axillary, retroperitoneal, and pelvic lymph nodes all which demonstrate low-level FDG avidity, however are enlarged from the prior exam and most consistent with recurrent disease.       Pathology:  Peripheral Blood Flow Cytometry (11/1/21):  Comment:      CD5+ B-CELL LYMPHOPROLIFERATIVE DISORDER (SEE COMMENT).     COMMENT: Clonal CD20+ B-cells are detected that coexpress   CD5, CD23, FMC7(dim) and moderate surface kappa light chain   and are negative for CD10, comprising 75% of all analyzed   cells.       Prognostication Tools:  CLL-IPI = 2, intermediate risk (79.4% survival after 5 years, 40% survival after 10 years, median  months)      Assessment and Plan:   Cassidy Bryan) is a pleasant 76 y.o.female with a past medical history of hypertension and chronic lymphocytic leukemia who presents for follow up visit regarding CLL.     1. Chronic lymphocytic leukemia, Binet stage C, Duarte stage III: She has had Duarte stage I disease since 2016. 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 By iwCLL criteria, her indications for treatment include steroid-refractory AIHA, lymphocytosis (>50% increase), and weight loss. After reviewing the various treatment options, we opted for obinutuzumab plus venetoclax because of the benefit of anti-CD20 MAB with AIHA plus fixed duration treatment (patient preference).   a. Proceed with cycle 1 of  obinutuzumab plus venetoclax.    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 We are slowly tapering off prednisone with plans for treatment with obinutuzumab in the near future (plus venetoclax).   a. Wean prednisone to 30mg daily starting 1/11/22. Will continue to wean 10mg per week as long as her hemoglobin remains stable.    3. Immunosuppression: She warrants PJP prophylaxis given prolonged steroid use and underlying hematologic malignancy. Sulfa allergy listed. Continue Mepron 1500mg daily for now until prednisone dose <20mg. Will start acyclovir 400mg BID with treatment.    4. At risk for tumor lysis syndrome: Will start prophylactic allopurinol 300mg BID with treatment.     5. Hyponatremia: Stable. Asymptomatic. Continue to monitor.     6. Hypertension: BP slightly elevated today likely due to steroids and stress. Will continue to wean prednisone. HTN managed by cardiology.    7. Follow up:   a. Labs then follow up on 1/26/22.  b. Will schedule remaining infusions.    Orders Placed:      Orders Placed This Encounter    venetoclax 10 mg-50 mg- 100 mg DsPk    venetoclax (VENCLEXTA) 100 mg Tab       Thank you for allowing me to partake in the care of this patient. If there are any questions, please do not hesitate to reach out.    Bubba Wright MD  Hematology, Oncology, and Stem Cell Transplantation  [facility] and [facility]

## 2022-01-11 ENCOUNTER — INFUSION (OUTPATIENT)
Dept: INFUSION THERAPY | Facility: HOSPITAL | Age: 77
End: 2022-01-11
Payer: MEDICARE

## 2022-01-11 VITALS
OXYGEN SATURATION: 100 % | DIASTOLIC BLOOD PRESSURE: 60 MMHG | TEMPERATURE: 98 F | HEART RATE: 61 BPM | SYSTOLIC BLOOD PRESSURE: 125 MMHG | RESPIRATION RATE: 18 BRPM | BODY MASS INDEX: 18.04 KG/M2 | WEIGHT: 121.81 LBS | HEIGHT: 69 IN

## 2022-01-11 DIAGNOSIS — C91.10 CLL (CHRONIC LYMPHOCYTIC LEUKEMIA): Primary | ICD-10-CM

## 2022-01-11 PROCEDURE — 96413 CHEMO IV INFUSION 1 HR: CPT

## 2022-01-11 PROCEDURE — 96415 CHEMO IV INFUSION ADDL HR: CPT

## 2022-01-11 PROCEDURE — 25000003 PHARM REV CODE 250: Performed by: INTERNAL MEDICINE

## 2022-01-11 PROCEDURE — 96367 TX/PROPH/DG ADDL SEQ IV INF: CPT

## 2022-01-11 PROCEDURE — 63600175 PHARM REV CODE 636 W HCPCS: Performed by: INTERNAL MEDICINE

## 2022-01-11 RX ORDER — ACETAMINOPHEN 325 MG/1
650 TABLET ORAL
Status: COMPLETED | OUTPATIENT
Start: 2022-01-11 | End: 2022-01-11

## 2022-01-11 RX ORDER — HEPARIN 100 UNIT/ML
500 SYRINGE INTRAVENOUS
Status: DISCONTINUED | OUTPATIENT
Start: 2022-01-11 | End: 2022-01-11 | Stop reason: HOSPADM

## 2022-01-11 RX ORDER — SODIUM CHLORIDE 0.9 % (FLUSH) 0.9 %
10 SYRINGE (ML) INJECTION
Status: DISCONTINUED | OUTPATIENT
Start: 2022-01-11 | End: 2022-01-11 | Stop reason: HOSPADM

## 2022-01-11 RX ADMIN — DIPHENHYDRAMINE HYDROCHLORIDE 50 MG: 50 INJECTION, SOLUTION INTRAMUSCULAR; INTRAVENOUS at 09:01

## 2022-01-11 RX ADMIN — SODIUM CHLORIDE: 0.9 INJECTION, SOLUTION INTRAVENOUS at 09:01

## 2022-01-11 RX ADMIN — OBINUTUZUMAB 100 MG: 1000 INJECTION, SOLUTION, CONCENTRATE INTRAVENOUS at 10:01

## 2022-01-11 RX ADMIN — DEXAMETHASONE SODIUM PHOSPHATE 20 MG: 4 INJECTION, SOLUTION INTRA-ARTICULAR; INTRALESIONAL; INTRAMUSCULAR; INTRAVENOUS; SOFT TISSUE at 10:01

## 2022-01-11 RX ADMIN — ACETAMINOPHEN 650 MG: 325 TABLET ORAL at 09:01

## 2022-01-11 NOTE — PLAN OF CARE
0900- Patient arrived to clinic for C1D1 Gazyva infusion. Labs and assessment appropriate for treatment. Orders reviewed and signed by MD Lucy. PIV started, blood return noted, flushes easily, NS infusing. Pre-meds started.

## 2022-01-12 ENCOUNTER — INFUSION (OUTPATIENT)
Dept: INFUSION THERAPY | Facility: HOSPITAL | Age: 77
End: 2022-01-12
Payer: MEDICARE

## 2022-01-12 ENCOUNTER — PATIENT MESSAGE (OUTPATIENT)
Dept: HEMATOLOGY/ONCOLOGY | Facility: CLINIC | Age: 77
End: 2022-01-12
Payer: MEDICARE

## 2022-01-12 VITALS — SYSTOLIC BLOOD PRESSURE: 127 MMHG | HEART RATE: 72 BPM | RESPIRATION RATE: 18 BRPM | DIASTOLIC BLOOD PRESSURE: 62 MMHG

## 2022-01-12 DIAGNOSIS — C91.10 CLL (CHRONIC LYMPHOCYTIC LEUKEMIA): Primary | ICD-10-CM

## 2022-01-12 PROCEDURE — 25000003 PHARM REV CODE 250: Performed by: INTERNAL MEDICINE

## 2022-01-12 PROCEDURE — 96367 TX/PROPH/DG ADDL SEQ IV INF: CPT

## 2022-01-12 PROCEDURE — 96415 CHEMO IV INFUSION ADDL HR: CPT

## 2022-01-12 PROCEDURE — 96413 CHEMO IV INFUSION 1 HR: CPT

## 2022-01-12 PROCEDURE — 63600175 PHARM REV CODE 636 W HCPCS: Mod: JG | Performed by: INTERNAL MEDICINE

## 2022-01-12 RX ORDER — HEPARIN 100 UNIT/ML
500 SYRINGE INTRAVENOUS
Status: DISCONTINUED | OUTPATIENT
Start: 2022-01-12 | End: 2022-01-12 | Stop reason: HOSPADM

## 2022-01-12 RX ORDER — DIPHENHYDRAMINE HYDROCHLORIDE 50 MG/ML
50 INJECTION INTRAMUSCULAR; INTRAVENOUS ONCE AS NEEDED
Status: DISCONTINUED | OUTPATIENT
Start: 2022-01-12 | End: 2022-01-12 | Stop reason: HOSPADM

## 2022-01-12 RX ORDER — ACETAMINOPHEN 325 MG/1
650 TABLET ORAL
Status: COMPLETED | OUTPATIENT
Start: 2022-01-12 | End: 2022-01-12

## 2022-01-12 RX ORDER — SODIUM CHLORIDE 0.9 % (FLUSH) 0.9 %
10 SYRINGE (ML) INJECTION
Status: DISCONTINUED | OUTPATIENT
Start: 2022-01-12 | End: 2022-01-12 | Stop reason: HOSPADM

## 2022-01-12 RX ORDER — EPINEPHRINE 0.3 MG/.3ML
0.3 INJECTION SUBCUTANEOUS ONCE AS NEEDED
Status: DISCONTINUED | OUTPATIENT
Start: 2022-01-12 | End: 2022-01-12 | Stop reason: HOSPADM

## 2022-01-12 RX ADMIN — DEXAMETHASONE SODIUM PHOSPHATE 20 MG: 4 INJECTION, SOLUTION INTRA-ARTICULAR; INTRALESIONAL; INTRAMUSCULAR; INTRAVENOUS; SOFT TISSUE at 11:01

## 2022-01-12 RX ADMIN — DIPHENHYDRAMINE HYDROCHLORIDE 50 MG: 50 INJECTION INTRAMUSCULAR; INTRAVENOUS at 12:01

## 2022-01-12 RX ADMIN — OBINUTUZUMAB 900 MG: 1000 INJECTION, SOLUTION, CONCENTRATE INTRAVENOUS at 12:01

## 2022-01-12 RX ADMIN — SODIUM CHLORIDE: 9 INJECTION, SOLUTION INTRAVENOUS at 11:01

## 2022-01-12 RX ADMIN — ACETAMINOPHEN 650 MG: 325 TABLET ORAL at 11:01

## 2022-01-12 NOTE — PLAN OF CARE
1750 Pt tolerated C1D2 Gazyva infusion well with no complaints or complications, VSS throughout treatment. Educated patient on medications administered including side effects, possible reactions, and chemotherapy precautions, verbalized understanding. Pt aware to call provider with any questions or concerns, aware of upcoming appts, ambulatory from clinic with steady gait, no distress noted.

## 2022-01-13 ENCOUNTER — SPECIALTY PHARMACY (OUTPATIENT)
Dept: PHARMACY | Facility: CLINIC | Age: 77
End: 2022-01-13
Payer: MEDICARE

## 2022-01-14 ENCOUNTER — PATIENT MESSAGE (OUTPATIENT)
Dept: HEMATOLOGY/ONCOLOGY | Facility: CLINIC | Age: 77
End: 2022-01-14
Payer: MEDICARE

## 2022-01-16 ENCOUNTER — PATIENT MESSAGE (OUTPATIENT)
Dept: HEMATOLOGY/ONCOLOGY | Facility: CLINIC | Age: 77
End: 2022-01-16
Payer: MEDICARE

## 2022-01-16 ENCOUNTER — NURSE TRIAGE (OUTPATIENT)
Dept: ADMINISTRATIVE | Facility: CLINIC | Age: 77
End: 2022-01-16
Payer: MEDICARE

## 2022-01-16 NOTE — TELEPHONE ENCOUNTER
"  Reason for Disposition   [1] Systolic BP  AND [2] taking blood pressure medications AND [3] NOT dizzy, lightheaded or weak    Additional Information   Negative: Shock suspected (e.g., cold/pale/clammy skin, too weak to stand, low BP, rapid pulse)   Negative: Fainted   Negative: Chest pain   Negative: Extra heart beats or heart is beating fast  (i.e., "palpitations")   Negative: Bleeding (e.g., vomiting blood, rectal bleeding or tarry stools, severe vaginal bleeding)(Exception: fainted from sight of small amount of blood; small cut or abrasion)   Negative: Started suddenly after an allergic medicine, an allergic food, or bee sting   Negative: Difficult to awaken or acting confused  (e.g., disoriented, slurred speech)   Negative: [1] Systolic BP < 90 AND [2] dizzy, lightheaded, or weak   Negative: Sounds like a life-threatening emergency to the triager   Negative: [1] Systolic BP < 80 AND [2] NOT dizzy, lightheaded or weak   Negative: Abdominal pain   Negative: Fever > 100.5 F (38.1 C)   Negative: Major surgery in the past month   Negative: [1] Drinking very little AND [2] dehydration suspected (e.g., no urine > 12 hours, very dry mouth, very lightheaded)   Negative: [1] Fall in systolic BP > 20 mm Hg from normal AND [2] dizzy, lightheaded, or weak   Negative: Patient sounds very sick or weak to the triager   Negative: [1] Systolic BP < 90 AND [2] NOT dizzy, lightheaded or weak    Answer Assessment - Initial Assessment Questions  1. BLOOD PRESSURE: "What is the blood pressure?" "Did you take at least two measurements 5 minutes apart?"    On BP meds.   2. ONSET: "When did you take your blood pressure?"  yest and today  3. HOW: "How did you obtain the blood pressure?" (e.g., visiting nurse, automatic home BP monitor)    Took infusion and concerned about side effects.   4. HISTORY: "Do you have a history of low blood pressure?" "What is your blood pressure normally?"     HBP   5. MEDICATIONS: " ""Are you taking any medications for blood pressure?" If yes: "Have they been changed recently?"    Took micardis and ER coreg at 0900  6. PULSE RATE: "Do you know what your pulse rate is?"      80 this afternoon.   7. OTHER SYMPTOMS: "Have you been sick recently?" "Have you had a recent injury?"    Pt states  Infusion for CLL. Caused anemia.    Protocols used:  LOW BLOOD PRESSURE-A-  pt called re possible side effects from infusion for CLL. pt states BP very low yest after playing tennis. Todays /56.    72 at   9am; then 117/59 69 at 9:30am.      pt states 127/57 HR=80 after lunch today. tried powerade   pt feels meds are causing pt to pass lot of urine. pt on pred. pt states once pred reduced to 20 mg , pt wants to know about renewing meds p Alovaquone. pt has enough for today but not sure if she has enough for the weekend. pt states its difficult to measure. nose blood on tissue went on most of the morning for about 3 hours.  127/57 HR= 80 at 230m . tried to contact cards pily bello heard back .spoke with dr gar re above. Warm transferred pt to speak with MD. Call back with questions     "

## 2022-01-16 NOTE — PROGRESS NOTES
Mrs. Aguilar called because of hypotension (80s/40s yesterday, 120s/50s today). She felt increased fatigue yesterday and lightheadedness. Today she feels back to her baseline. She is waiting to hear back from Dr. David (cardiology) regarding antihypertensive adjustments. I will defer management to Dr. David, but I agree she will likely need a dose reduction in her antihypertensive regimen.     I also addressed her questions earlier about a massage (ok from my standpoint), salmon (ok from my standpoint), and dental cleaning (ok as long as not cytopenic). She does not need to renew Atovaquone.

## 2022-01-18 ENCOUNTER — LAB VISIT (OUTPATIENT)
Dept: LAB | Facility: HOSPITAL | Age: 77
End: 2022-01-18
Attending: INTERNAL MEDICINE
Payer: MEDICARE

## 2022-01-18 DIAGNOSIS — C91.10 CLL (CHRONIC LYMPHOCYTIC LEUKEMIA): ICD-10-CM

## 2022-01-18 LAB
ABO + RH BLD: NORMAL
ALBUMIN SERPL BCP-MCNC: 3.6 G/DL (ref 3.5–5.2)
ALP SERPL-CCNC: 74 U/L (ref 55–135)
ALT SERPL W/O P-5'-P-CCNC: 20 U/L (ref 10–44)
ANION GAP SERPL CALC-SCNC: 5 MMOL/L (ref 8–16)
AST SERPL-CCNC: 17 U/L (ref 10–40)
BASOPHILS # BLD AUTO: 0.02 K/UL (ref 0–0.2)
BASOPHILS NFR BLD: 0.4 % (ref 0–1.9)
BILIRUB SERPL-MCNC: 3.8 MG/DL (ref 0.1–1)
BLD GP AB SCN CELLS X3 SERPL QL: NORMAL
BUN SERPL-MCNC: 16 MG/DL (ref 8–23)
CALCIUM SERPL-MCNC: 8.7 MG/DL (ref 8.7–10.5)
CHLORIDE SERPL-SCNC: 96 MMOL/L (ref 95–110)
CO2 SERPL-SCNC: 25 MMOL/L (ref 23–29)
CREAT SERPL-MCNC: 0.7 MG/DL (ref 0.5–1.4)
DIFFERENTIAL METHOD: ABNORMAL
EOSINOPHIL # BLD AUTO: 0.1 K/UL (ref 0–0.5)
EOSINOPHIL NFR BLD: 0.9 % (ref 0–8)
ERYTHROCYTE [DISTWIDTH] IN BLOOD BY AUTOMATED COUNT: 19.4 % (ref 11.5–14.5)
EST. GFR  (AFRICAN AMERICAN): >60 ML/MIN/1.73 M^2
EST. GFR  (NON AFRICAN AMERICAN): >60 ML/MIN/1.73 M^2
GLUCOSE SERPL-MCNC: 95 MG/DL (ref 70–110)
HCT VFR BLD AUTO: 23.4 % (ref 37–48.5)
HGB BLD-MCNC: 8 G/DL (ref 12–16)
IMM GRANULOCYTES # BLD AUTO: 0.09 K/UL (ref 0–0.04)
IMM GRANULOCYTES NFR BLD AUTO: 1.7 % (ref 0–0.5)
LDH SERPL L TO P-CCNC: 238 U/L (ref 110–260)
LYMPHOCYTES # BLD AUTO: 1.2 K/UL (ref 1–4.8)
LYMPHOCYTES NFR BLD: 22.9 % (ref 18–48)
MAGNESIUM SERPL-MCNC: 1.8 MG/DL (ref 1.6–2.6)
MCH RBC QN AUTO: 34.3 PG (ref 27–31)
MCHC RBC AUTO-ENTMCNC: 34.2 G/DL (ref 32–36)
MCV RBC AUTO: 100 FL (ref 82–98)
MONOCYTES # BLD AUTO: 0.2 K/UL (ref 0.3–1)
MONOCYTES NFR BLD: 4.2 % (ref 4–15)
NEUTROPHILS # BLD AUTO: 3.7 K/UL (ref 1.8–7.7)
NEUTROPHILS NFR BLD: 69.9 % (ref 38–73)
NRBC BLD-RTO: 0 /100 WBC
PHOSPHATE SERPL-MCNC: 2.8 MG/DL (ref 2.7–4.5)
PLATELET # BLD AUTO: 139 K/UL (ref 150–450)
PMV BLD AUTO: 9.1 FL (ref 9.2–12.9)
POTASSIUM SERPL-SCNC: 3.9 MMOL/L (ref 3.5–5.1)
PROT SERPL-MCNC: 6 G/DL (ref 6–8.4)
RBC # BLD AUTO: 2.33 M/UL (ref 4–5.4)
SODIUM SERPL-SCNC: 126 MMOL/L (ref 136–145)
URATE SERPL-MCNC: 1.9 MG/DL (ref 2.4–5.7)
WBC # BLD AUTO: 5.28 K/UL (ref 3.9–12.7)

## 2022-01-18 PROCEDURE — 80053 COMPREHEN METABOLIC PANEL: CPT | Performed by: INTERNAL MEDICINE

## 2022-01-18 PROCEDURE — 36415 COLL VENOUS BLD VENIPUNCTURE: CPT | Performed by: INTERNAL MEDICINE

## 2022-01-18 PROCEDURE — 83615 LACTATE (LD) (LDH) ENZYME: CPT | Performed by: INTERNAL MEDICINE

## 2022-01-18 PROCEDURE — 84100 ASSAY OF PHOSPHORUS: CPT | Performed by: INTERNAL MEDICINE

## 2022-01-18 PROCEDURE — 83735 ASSAY OF MAGNESIUM: CPT | Performed by: INTERNAL MEDICINE

## 2022-01-18 PROCEDURE — 84550 ASSAY OF BLOOD/URIC ACID: CPT | Performed by: INTERNAL MEDICINE

## 2022-01-18 PROCEDURE — 85025 COMPLETE CBC W/AUTO DIFF WBC: CPT | Performed by: INTERNAL MEDICINE

## 2022-01-18 PROCEDURE — 86901 BLOOD TYPING SEROLOGIC RH(D): CPT | Performed by: INTERNAL MEDICINE

## 2022-01-18 PROCEDURE — 86900 BLOOD TYPING SEROLOGIC ABO: CPT | Performed by: INTERNAL MEDICINE

## 2022-01-19 ENCOUNTER — INFUSION (OUTPATIENT)
Dept: INFUSION THERAPY | Facility: HOSPITAL | Age: 77
End: 2022-01-19
Payer: MEDICARE

## 2022-01-19 VITALS
DIASTOLIC BLOOD PRESSURE: 55 MMHG | HEIGHT: 69 IN | WEIGHT: 124.56 LBS | TEMPERATURE: 99 F | HEART RATE: 70 BPM | BODY MASS INDEX: 18.45 KG/M2 | SYSTOLIC BLOOD PRESSURE: 101 MMHG | RESPIRATION RATE: 18 BRPM

## 2022-01-19 DIAGNOSIS — C91.10 CLL (CHRONIC LYMPHOCYTIC LEUKEMIA): Primary | ICD-10-CM

## 2022-01-19 PROCEDURE — 25000003 PHARM REV CODE 250: Performed by: INTERNAL MEDICINE

## 2022-01-19 PROCEDURE — 96413 CHEMO IV INFUSION 1 HR: CPT

## 2022-01-19 PROCEDURE — 96415 CHEMO IV INFUSION ADDL HR: CPT

## 2022-01-19 PROCEDURE — 96367 TX/PROPH/DG ADDL SEQ IV INF: CPT

## 2022-01-19 PROCEDURE — 63600175 PHARM REV CODE 636 W HCPCS: Performed by: INTERNAL MEDICINE

## 2022-01-19 RX ORDER — DIPHENHYDRAMINE HYDROCHLORIDE 50 MG/ML
50 INJECTION INTRAMUSCULAR; INTRAVENOUS ONCE AS NEEDED
Status: DISCONTINUED | OUTPATIENT
Start: 2022-01-19 | End: 2022-01-19 | Stop reason: HOSPADM

## 2022-01-19 RX ORDER — EPINEPHRINE 0.3 MG/.3ML
0.3 INJECTION SUBCUTANEOUS ONCE AS NEEDED
Status: DISCONTINUED | OUTPATIENT
Start: 2022-01-19 | End: 2022-01-19 | Stop reason: HOSPADM

## 2022-01-19 RX ORDER — SODIUM CHLORIDE 0.9 % (FLUSH) 0.9 %
10 SYRINGE (ML) INJECTION
Status: DISCONTINUED | OUTPATIENT
Start: 2022-01-19 | End: 2022-01-19 | Stop reason: HOSPADM

## 2022-01-19 RX ORDER — ACETAMINOPHEN 325 MG/1
650 TABLET ORAL
Status: COMPLETED | OUTPATIENT
Start: 2022-01-19 | End: 2022-01-19

## 2022-01-19 RX ADMIN — DIPHENHYDRAMINE HYDROCHLORIDE 50 MG: 50 INJECTION, SOLUTION INTRAMUSCULAR; INTRAVENOUS at 09:01

## 2022-01-19 RX ADMIN — OBINUTUZUMAB 1000 MG: 1000 INJECTION, SOLUTION, CONCENTRATE INTRAVENOUS at 10:01

## 2022-01-19 RX ADMIN — SODIUM CHLORIDE: 0.9 INJECTION, SOLUTION INTRAVENOUS at 09:01

## 2022-01-19 RX ADMIN — ACETAMINOPHEN 650 MG: 325 TABLET ORAL at 09:01

## 2022-01-19 NOTE — NURSING
0915  Pt here for Gazyva infusion, accompanied by friend, no new complaints or concerns at present; discussed treatment plan for today, all questions answered and pt agrees to proceed

## 2022-01-19 NOTE — TELEPHONE ENCOUNTER
Therapy appropriate.     Venclexta requires a dose reduction due to DDI . Per PI, Venclexta should be dose reduced by 50% when prescribed concurrently with Carvedilol.      The new script will reflect the following:    Venclexta     Take 10 mg by mouth daily on days 22-28 of cycle 1.   Take 20 mg by mouth daily on days 1-7 of cycle 2.  Take 50 mg daily on days 8-14 of cycle 2. Take 100 mg daily on days 15-21 of cycle 2.   Take 200 mg daily on days 22-28 of cycle 2. Take with food.    Take 200 mg by mouth once daily starting after completion of the starter pack. Take with food.    Awaiting response. In the meantime, I will begin the PA process.     PA submitted for Venclexta 100 mg (Key: [id number])

## 2022-01-19 NOTE — PLAN OF CARE
1416  Infusion completed, pt tolerated well; pt instructed to remain well hydrated; discussed when to contact MD, when to report to ER; AVS declined, IB message sent to MD/ re: pt request for virtual MD visit on 1/26/22 at same time; pt verbalized understanding of all discussed

## 2022-01-20 ENCOUNTER — PATIENT MESSAGE (OUTPATIENT)
Dept: HEMATOLOGY/ONCOLOGY | Facility: CLINIC | Age: 77
End: 2022-01-20
Payer: MEDICARE

## 2022-01-20 DIAGNOSIS — D59.9 ACQUIRED HEMOLYTIC ANEMIA: Primary | ICD-10-CM

## 2022-01-20 RX ORDER — PREDNISONE 10 MG/1
20 TABLET ORAL DAILY
Qty: 90 TABLET | Refills: 0 | Status: SHIPPED | OUTPATIENT
Start: 2022-01-20 | End: 2022-02-07

## 2022-01-20 NOTE — TELEPHONE ENCOUNTER
PA approved Venclexta 100 mg copayment is 3,039.56  Venclexta starting pack is 973.25. PA reference number is [id number], approved  01/13/2022- until further notice    Current script may need to be adjusted due to DDI. Once the provider's office responds the [employer] message, then I will re adjudicate for a better estimated copayment.     Awaiting provider's response on dosage adjustment.

## 2022-01-21 ENCOUNTER — PATIENT MESSAGE (OUTPATIENT)
Dept: INFUSION THERAPY | Facility: HOSPITAL | Age: 77
End: 2022-01-21
Payer: MEDICARE

## 2022-01-24 ENCOUNTER — LAB VISIT (OUTPATIENT)
Dept: LAB | Facility: HOSPITAL | Age: 77
End: 2022-01-24
Attending: INTERNAL MEDICINE
Payer: MEDICARE

## 2022-01-24 DIAGNOSIS — C91.10 CLL (CHRONIC LYMPHOCYTIC LEUKEMIA): ICD-10-CM

## 2022-01-24 LAB
ALBUMIN SERPL BCP-MCNC: 3.8 G/DL (ref 3.5–5.2)
ALP SERPL-CCNC: 69 U/L (ref 55–135)
ALT SERPL W/O P-5'-P-CCNC: 26 U/L (ref 10–44)
ANION GAP SERPL CALC-SCNC: 7 MMOL/L (ref 8–16)
AST SERPL-CCNC: 29 U/L (ref 10–40)
BASOPHILS # BLD AUTO: 0.08 K/UL (ref 0–0.2)
BASOPHILS NFR BLD: 1.1 % (ref 0–1.9)
BILIRUB SERPL-MCNC: 3.2 MG/DL (ref 0.1–1)
BUN SERPL-MCNC: 13 MG/DL (ref 8–23)
CALCIUM SERPL-MCNC: 9.1 MG/DL (ref 8.7–10.5)
CHLORIDE SERPL-SCNC: 95 MMOL/L (ref 95–110)
CO2 SERPL-SCNC: 27 MMOL/L (ref 23–29)
CREAT SERPL-MCNC: 0.7 MG/DL (ref 0.5–1.4)
DIFFERENTIAL METHOD: ABNORMAL
EOSINOPHIL # BLD AUTO: 0.1 K/UL (ref 0–0.5)
EOSINOPHIL NFR BLD: 1 % (ref 0–8)
ERYTHROCYTE [DISTWIDTH] IN BLOOD BY AUTOMATED COUNT: 18.8 % (ref 11.5–14.5)
EST. GFR  (AFRICAN AMERICAN): >60 ML/MIN/1.73 M^2
EST. GFR  (NON AFRICAN AMERICAN): >60 ML/MIN/1.73 M^2
GLUCOSE SERPL-MCNC: 93 MG/DL (ref 70–110)
HCT VFR BLD AUTO: 27 % (ref 37–48.5)
HGB BLD-MCNC: 9.3 G/DL (ref 12–16)
IMM GRANULOCYTES # BLD AUTO: 0.06 K/UL (ref 0–0.04)
IMM GRANULOCYTES NFR BLD AUTO: 0.8 % (ref 0–0.5)
LDH SERPL L TO P-CCNC: 246 U/L (ref 110–260)
LYMPHOCYTES # BLD AUTO: 1.8 K/UL (ref 1–4.8)
LYMPHOCYTES NFR BLD: 24.6 % (ref 18–48)
MAGNESIUM SERPL-MCNC: 1.9 MG/DL (ref 1.6–2.6)
MCH RBC QN AUTO: 35 PG (ref 27–31)
MCHC RBC AUTO-ENTMCNC: 34.4 G/DL (ref 32–36)
MCV RBC AUTO: 102 FL (ref 82–98)
MONOCYTES # BLD AUTO: 0.5 K/UL (ref 0.3–1)
MONOCYTES NFR BLD: 7.3 % (ref 4–15)
NEUTROPHILS # BLD AUTO: 4.7 K/UL (ref 1.8–7.7)
NEUTROPHILS NFR BLD: 65.2 % (ref 38–73)
NRBC BLD-RTO: 0 /100 WBC
PHOSPHATE SERPL-MCNC: 4 MG/DL (ref 2.7–4.5)
PLATELET # BLD AUTO: 261 K/UL (ref 150–450)
PMV BLD AUTO: 8.4 FL (ref 9.2–12.9)
POTASSIUM SERPL-SCNC: 4.6 MMOL/L (ref 3.5–5.1)
PROT SERPL-MCNC: 6.2 G/DL (ref 6–8.4)
RBC # BLD AUTO: 2.66 M/UL (ref 4–5.4)
SODIUM SERPL-SCNC: 129 MMOL/L (ref 136–145)
URATE SERPL-MCNC: 2.1 MG/DL (ref 2.4–5.7)
WBC # BLD AUTO: 7.27 K/UL (ref 3.9–12.7)

## 2022-01-24 PROCEDURE — 84550 ASSAY OF BLOOD/URIC ACID: CPT | Performed by: INTERNAL MEDICINE

## 2022-01-24 PROCEDURE — 36415 COLL VENOUS BLD VENIPUNCTURE: CPT | Performed by: INTERNAL MEDICINE

## 2022-01-24 PROCEDURE — 80053 COMPREHEN METABOLIC PANEL: CPT | Performed by: INTERNAL MEDICINE

## 2022-01-24 PROCEDURE — 85025 COMPLETE CBC W/AUTO DIFF WBC: CPT | Performed by: INTERNAL MEDICINE

## 2022-01-24 PROCEDURE — 84100 ASSAY OF PHOSPHORUS: CPT | Performed by: INTERNAL MEDICINE

## 2022-01-24 PROCEDURE — 83615 LACTATE (LD) (LDH) ENZYME: CPT | Performed by: INTERNAL MEDICINE

## 2022-01-24 PROCEDURE — 83735 ASSAY OF MAGNESIUM: CPT | Performed by: INTERNAL MEDICINE

## 2022-01-25 ENCOUNTER — PATIENT MESSAGE (OUTPATIENT)
Dept: HEMATOLOGY/ONCOLOGY | Facility: CLINIC | Age: 77
End: 2022-01-25
Payer: MEDICARE

## 2022-01-25 NOTE — TELEPHONE ENCOUNTER
A total of 3 new scripts have been added to [facility] amb[facility] to reflect 50% dosage adjustment due to venclexta interaction with coreg.     Venclexta 10 mg     Take 10 mg by mouth daily on days 22-28 of cycle 1.   Take 20 mg by mouth daily on days 1-7 of cycle 2.  Take 50 mg daily on days 8-14 of cycle 2. Qty 56, 0 refills    Venclexta 100 mg   Take 100 mg daily on days 15-21 of cycle 2.   Take 200 mg daily on days 22-28 of cycle 2. Qty 21, 0 refills    Venclexta 100 mg   Take 200 mg by mouth once daily. Qty 60, 11 refills.       Venclexta PA approved. PA Case ID: [id number]  Adjudication indicates 319.06 copay for Venclexta 10 mg, $789.15 copay for Venclexta 100 mg.     Forwarding to  for review.

## 2022-01-26 ENCOUNTER — INFUSION (OUTPATIENT)
Dept: INFUSION THERAPY | Facility: HOSPITAL | Age: 77
End: 2022-01-26
Payer: MEDICARE

## 2022-01-26 ENCOUNTER — OFFICE VISIT (OUTPATIENT)
Dept: HEMATOLOGY/ONCOLOGY | Facility: CLINIC | Age: 77
End: 2022-01-26
Payer: MEDICARE

## 2022-01-26 VITALS
WEIGHT: 121.69 LBS | DIASTOLIC BLOOD PRESSURE: 65 MMHG | HEIGHT: 69 IN | RESPIRATION RATE: 18 BRPM | BODY MASS INDEX: 18.02 KG/M2 | TEMPERATURE: 98 F | HEART RATE: 77 BPM | SYSTOLIC BLOOD PRESSURE: 107 MMHG

## 2022-01-26 DIAGNOSIS — Z79.52 LONG TERM SYSTEMIC STEROID USER: ICD-10-CM

## 2022-01-26 DIAGNOSIS — E87.1 HYPONATREMIA: ICD-10-CM

## 2022-01-26 DIAGNOSIS — D84.9 IMMUNOSUPPRESSION: ICD-10-CM

## 2022-01-26 DIAGNOSIS — Z91.89 AT HIGH RISK OF TUMOR LYSIS SYNDROME: ICD-10-CM

## 2022-01-26 DIAGNOSIS — D59.9 ACQUIRED HEMOLYTIC ANEMIA: ICD-10-CM

## 2022-01-26 DIAGNOSIS — C91.10 CLL (CHRONIC LYMPHOCYTIC LEUKEMIA): Primary | ICD-10-CM

## 2022-01-26 DIAGNOSIS — I10 HYPERTENSION, UNSPECIFIED TYPE: ICD-10-CM

## 2022-01-26 DIAGNOSIS — D84.9 IMMUNOSUPPRESSED STATUS: ICD-10-CM

## 2022-01-26 PROCEDURE — 25000003 PHARM REV CODE 250: Performed by: INTERNAL MEDICINE

## 2022-01-26 PROCEDURE — 96415 CHEMO IV INFUSION ADDL HR: CPT

## 2022-01-26 PROCEDURE — 63600175 PHARM REV CODE 636 W HCPCS: Performed by: INTERNAL MEDICINE

## 2022-01-26 PROCEDURE — 99215 PR OFFICE/OUTPT VISIT, EST, LEVL V, 40-54 MIN: ICD-10-PCS | Mod: 95,,, | Performed by: INTERNAL MEDICINE

## 2022-01-26 PROCEDURE — 96367 TX/PROPH/DG ADDL SEQ IV INF: CPT

## 2022-01-26 PROCEDURE — 96413 CHEMO IV INFUSION 1 HR: CPT

## 2022-01-26 PROCEDURE — 99215 OFFICE O/P EST HI 40 MIN: CPT | Mod: 95,,, | Performed by: INTERNAL MEDICINE

## 2022-01-26 RX ORDER — SODIUM CHLORIDE 0.9 % (FLUSH) 0.9 %
10 SYRINGE (ML) INJECTION
Status: DISCONTINUED | OUTPATIENT
Start: 2022-01-26 | End: 2022-01-26 | Stop reason: HOSPADM

## 2022-01-26 RX ORDER — DIPHENHYDRAMINE HYDROCHLORIDE 50 MG/ML
50 INJECTION INTRAMUSCULAR; INTRAVENOUS ONCE AS NEEDED
Status: DISCONTINUED | OUTPATIENT
Start: 2022-01-26 | End: 2022-01-26 | Stop reason: HOSPADM

## 2022-01-26 RX ORDER — EPINEPHRINE 0.3 MG/.3ML
0.3 INJECTION SUBCUTANEOUS ONCE AS NEEDED
Status: DISCONTINUED | OUTPATIENT
Start: 2022-01-26 | End: 2022-01-26 | Stop reason: HOSPADM

## 2022-01-26 RX ORDER — ACETAMINOPHEN 325 MG/1
650 TABLET ORAL
Status: COMPLETED | OUTPATIENT
Start: 2022-01-26 | End: 2022-01-26

## 2022-01-26 RX ADMIN — DIPHENHYDRAMINE HYDROCHLORIDE 50 MG: 50 INJECTION, SOLUTION INTRAMUSCULAR; INTRAVENOUS at 11:01

## 2022-01-26 RX ADMIN — OBINUTUZUMAB 1000 MG: 1000 INJECTION, SOLUTION, CONCENTRATE INTRAVENOUS at 12:01

## 2022-01-26 RX ADMIN — ACETAMINOPHEN 650 MG: 325 TABLET ORAL at 11:01

## 2022-01-26 RX ADMIN — SODIUM CHLORIDE: 0.9 INJECTION, SOLUTION INTRAVENOUS at 11:01

## 2022-01-26 NOTE — PROGRESS NOTES
Section of Hematology and Stem Cell Transplantation  Follow Up Visit     The patient location is: [place], [place]  The chief complaint leading to consultation is: CLL    Visit type: audiovisual    Face to Face time with patient: 20  45 minutes of total time spent on the encounter, which includes face to face time and non-face to face time preparing to see the patient (eg, review of tests), Obtaining and/or reviewing separately obtained history, Documenting clinical information in the electronic or other health record, Independently interpreting results (not separately reported) and communicating results to the patient/family/caregiver, or Care coordination (not separately reported).    Each patient to whom he or she provides medical services by telemedicine is:  (1) informed of the relationship between the physician and patient and the respective role of any other health care provider with respect to management of the patient; and (2) notified that he or she may decline to receive medical services by telemedicine and may withdraw from such care at any time.    Notes:     Visit date: 1/26/22  Visit diagnosis: CLL (chronic lymphocytic leukemia) [C91.10]  Referred by:  Shiv Watson MD    Oncologic History:     Primary Oncologic Diagnosis: Chronic lymphocytic leukemia, Binet stage C, Duarte stage III     2016: First noted to have cervical lymphadenopathy. Excisional biopsy confirmed chronic lymphocytic leukemia. She did not have an indication for treatment; therefore, observation recommended by Dr. Kelley.    6/17/21: PET/CT noted enlarged bilateral cervical, supraclavicular, axillary, retroperitoneal, and pelvic lymph nodes. All nodes enlarged from the prior exam.   7/28/21: First noted to have mild anemia (Hgb ~11.5 to 10.8 g/dL). Continued monitoring.   8/3/21: Established care with Dr. Carnes.    11/1/21: On follow up with Dr. Carnes, she was noted to have worsening anemia - hemoglobin decreased to 8.6  g/dL. 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 Beta-2 microglobulin 3.19. CLL FISH revealed trisomy 12. Peripheral blood karyotype - 47,XX,+12[8]/47,idem,del(4)(p14p16)[3]/46,XX[9]. ZAP70 17% of cells.   12/14/21: She noted increased fatigue. Hemoglobin decreased to 6.2. Repeat labs confirmed decreased hemoglobin to 5.8 g/dL. Haptoglobin <1, . MICHELL Anti-IgG negative, MICHELL complement C3 positive.   12/17/21: Prednisone 60mg daily started in response to hemolytic anemia.   12/22/21: Initial visit at [facility] with Dr. Watson. WBC 96k, Hgb 8.9, Plts 288. MICHELL positive. , Hapto <10. Continued prednisone 60mg daily.    12/27/21: Initial visit with me. Prednisone decreased to 50mg daily.    1/4/22: Hemoglobin stable (~8.4 g/dL) with persistent hemolysis. Prednisone weaned to 40mg daily.    1/11/22: Cycle 1 day 1 of obinutuzumab plus venetoclax (starting day 22). Prednisone weaned to 30mg daily.    1/18/22: Hemoglobin improved. Prednisone weaned to 20mg daily.    History of Present Ilness:   Cassidy Aguilar (Cassidy) is a pleasant 76 y.o.female with a past medical history of hypertension and chronic lymphocytic leukemia who presents for follow up visit regarding CLL and steroid-refractory AIHA. She reports intermittent nose bleeds. Her BP has improved. She remains very active without significant limitations. No recent fevers, chills, night sweats, weight loss.    PAST MEDICAL HISTORY:   Past Medical History:   Diagnosis Date    CLL (chronic lymphocytic leukemia) 12/22/2021    Hypertension     Thyroid disease        PAST SURGICAL HISTORY:   Past Surgical History:   Procedure Laterality Date    ADENOIDECTOMY      COLONOSCOPY      HYSTERECTOMY      TONSILLECTOMY         PAST SOCIAL HISTORY:  Social History     Tobacco Use    Smoking status: Never Smoker    Smokeless tobacco: Never Used   Substance Use Topics    Alcohol use: Yes     Alcohol/week: 0.0 standard drinks     Comment: rare       FAMILY HISTORY:  History  reviewed. No pertinent family history.    CURRENT MEDICATIONS:   Current Outpatient Medications   Medication Sig    acyclovir (ZOVIRAX) 400 MG tablet Take 1 tablet (400 mg total) by mouth 2 (two) times daily.    allopurinoL (ZYLOPRIM) 300 MG tablet Take 1 tablet (300 mg total) by mouth 2 (two) times daily.    aspirin (ECOTRIN) 81 MG EC tablet Take 81 mg by mouth.    atorvastatin (LIPITOR) 40 MG tablet TAKE ONE TABLET BY MOUTH once DAILY AT BEDTIME FOR cholesterol control    atovaquone (MEPRON) 750 mg/5 mL Susp Take 10 mLs (1,500 mg total) by mouth once daily.    CARVEDILOL PHOSPHATE 20 MG ORAL CM24 (COREG CR) 20 mg 24 hr capsule Take 20 mg by mouth nightly.    clindamycin (CLEOCIN T) 1 % Swab 2 (two) times a day.    ergocalciferol (ERGOCALCIFEROL) 50,000 unit Cap Take 50,000 Units by mouth every 7 days.    fluticasone (FLONASE) 50 mcg/actuation nasal spray 1 spray by Each Nare route once daily.    FLUZONE HIGH-DOSE 2018-19, PF, 180 mcg/0.5 mL vaccine ADM 0.5ML IM UTD    glucosamine-chondroitin 500-400 mg tablet Take 1 tablet by mouth 3 (three) times daily.    hydrocodone-acetaminophen 5-325mg (NORCO) 5-325 mg per tablet Take 1 or 2 tabs every 4 hours as needed.    multivitamin (THERAGRAN) per tablet Take 1 tablet by mouth once daily.    omeprazole (PRILOSEC OTC) 20 MG tablet Take 1 tablet (20 mg total) by mouth once daily.    predniSONE (DELTASONE) 10 MG tablet Take 2 tablets (20 mg total) by mouth once daily.    predniSONE (DELTASONE) 20 MG tablet TAKE THREE TABLET (60mg) BY MOUTH daily    SYNTHROID 75 mcg tablet Take 1 tablet (75 mcg total) by mouth daily    telmisartan (MICARDIS) 80 MG Tab     telmisartan-hydrochlorothiazide (MICARDIS HCT) 40-12.5 mg per tablet Take 2 tablets by mouth once daily.     venetoclax (VENCLEXTA) 10 mg Tab Take 10 mg (1 tablet)  by mouth daily on days 22-28 of cycle 1.   Take 20 mg (2 tablets) by mouth daily on days 1-7 of cycle 2.  Take 50 mg (5 tablets) daily on  days 8-14 of cycle 2.    venetoclax (VENCLEXTA) 100 mg Tab Take 100 mg (1 tablet) by mouth daily  on days 15-21 of cycle 2.  Take 200 mg (2 tablets) by mouth daily on days 22-28 of cycle 2.    venetoclax (VENCLEXTA) 100 mg Tab Take 200 mg ( 2 tablets)  by mouth once daily.    vitamin D (VITAMIN D3) 1000 units Tab Take 1,000 Units by mouth.    VIVELLE-DOT 0.025 mg/24 hr Place 1 patch onto the skin twice a week     No current facility-administered medications for this visit.       ALLERGIES:   Review of patient's allergies indicates:   Allergen Reactions    Sulfa (sulfonamide antibiotics) Rash    [place] Diarrhea       Review of Systems:     Pertinent positives and negatives included in the HPI. Otherwise a complete review of systems is negative.    Physical Exam:     There were no vitals filed for this visit.    No vitals or exam - virtual visit.    ECOG Performance Status: (foot note - ECOG PS provided by Eastern Cooperative Oncology Group) 1 - Symptomatic but completely ambulatory    Karnofsky Performance Score:  90%- Able to Carry on Normal Activity: Minor Symptoms of Disease    Labs:   Lab Results   Component Value Date    WBC 7.27 01/24/2022    HGB 9.3 (L) 01/24/2022    HCT 27.0 (L) 01/24/2022     (H) 01/24/2022     01/24/2022       Lab Results   Component Value Date     (L) 01/24/2022    K 4.6 01/24/2022    CL 95 01/24/2022    CO2 27 01/24/2022    BUN 13 01/24/2022    CREATININE 0.7 01/24/2022    ALBUMIN 3.8 01/24/2022    BILITOT 3.2 (H) 01/24/2022    ALKPHOS 69 01/24/2022    AST 29 01/24/2022    ALT 26 01/24/2022       Imaging:   PET/CT 6/17/21  Luis Toussaint MD - 06/17/2021   COMPARISON: PET/CT 8/22/2016.   HISTORY: Lymphoma.   FINDINGS:   Head and neck: There is symmetric and physiologic distribution of radiotracer throughout the included brain parenchyma. There is no hemorrhage, hydrocephalus, or midline shift. There are innumerable bilateral enlarged FDG avid cervical  lymph nodes. These have worsened from the prior exam. A representative left lower cervical lymph node best visualized on image 84 measures 19 mm compared with 10 mm previously with an SUV max of 1.8. There are enlarged left supraclavicular lymph nodes which were not present on the prior exam. A representative eran conglomerate measures 3.4 cm in diameter with an SUV max of 2.2.   CHEST: There are innumerable bilateral axillary and subpectoral lymph nodes which are not present on the prior exam. These demonstrate low-level FDG avidity. A representative left axillary nodule best visualized on image 114 measures 1.9 cm with an SUV max of 1.6. There are prominent paratracheal lymph nodes which are not pathologically enlarged by size criteria and demonstrate background FDG avidity, however were not present on the prior exam.   There is no FDG avid pulmonary nodule or mass. There is no pleural effusion. There is no pneumothorax.   Abdomen and pelvis: There is physiologic distribution of radiotracer throughout the liver, spleen, and collecting system. There are multiple enlarged bilateral iliac and periaortic retroperitoneal lymph nodes. A representative left periaortic lymph node best visualized on image 201 measures 2.0 cm in diameter with an SUV max of 2.0.   MUSCULOSKELETAL: There is no FDG avid lytic or blastic osseous lesion.     IMPRESSION:   Innumerable enlarged bilateral cervical, supraclavicular, axillary, retroperitoneal, and pelvic lymph nodes all which demonstrate low-level FDG avidity, however are enlarged from the prior exam and most consistent with recurrent disease.       Pathology:  Peripheral Blood Flow Cytometry (11/1/21):  Comment:      CD5+ B-CELL LYMPHOPROLIFERATIVE DISORDER (SEE COMMENT).     COMMENT: Clonal CD20+ B-cells are detected that coexpress   CD5, CD23, FMC7(dim) and moderate surface kappa light chain   and are negative for CD10, comprising 75% of all analyzed   cells.        Prognostication Tools:  CLL-IPI = 2, intermediate risk (79.4% survival after 5 years, 40% survival after 10 years, median  months)      Assessment and Plan:   Cassidy Bryan) is a pleasant 76 y.o.female with a past medical history of hypertension and chronic lymphocytic leukemia who presents for follow up visit regarding CLL.     1. Chronic lymphocytic leukemia, Binet stage C, Duarte stage III: She has had Duarte stage I disease since 2016. 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 By iwCLL criteria, her indications for treatment include steroid-refractory AIHA, lymphocytosis (>50% increase), and weight loss. After reviewing the various treatment options, we opted for obinutuzumab plus venetoclax because of the benefit of anti-CD20 MAB with AIHA plus fixed duration treatment (patient preference).   a. Continue ramp up of cycle 1 of obinutuzumab plus venetoclax. Venetoclax will start on day 22.    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 We are slowly tapering off prednisone with plans for treatment with obinutuzumab in the near future (plus venetoclax). Prednisone last weaned to 20mg on 1/18/22. Hemoglobin improved since starting obinutuzumab.   a. Continue prednisone 20mg daily. If hemoglobin/hemolysis labs stable or improved on 1/31, will decrease prednisone to 10mg daily.   b. Continue to monitor LDH, haptoglobin, and retic with weekly labs.     3. Immunosuppression: Stopped PJP ppx as she is on lower doses of prednisone. Continue acyclovir 400mg BID with treatment.    4. At risk for tumor lysis syndrome: Allopurinol decreased to 300mg daily. Continue for now. Will consider stopping after cycle 2.    5. Hyponatremia: Stable. Asymptomatic. Continue to monitor.     6. Hypertension: She  reports improved BP control. Will continue to wean prednisone. HTN managed by cardiology.    7. Follow up: As below    Orders Placed:      Orders Placed This Encounter    Haptoglobin     Route Chart for Scheduling    BMT Chart Routing  Follow up with physician . 2/7/22 at 11am with labs just before (move lab appointment to 10 or 10:30)   Follow up with ERIKA    Labs CBC, CMP, LDH, uric acid and other   Lab interval:  Continue labs every Monday (CBC, CMP, Mg, Phos, uric acid, LDH, haptoglobin)   Imaging    Pharmacy appointment No pharmacy appointment needed      Other referrals No additional referrals needed       Treatment Plan Information   OP CLL VENETOCLAX OBINUTUZUMAB Q4W   Adonis Wright MD   Upcoming Treatment Dates - OP CLL VENETOCLAX OBINUTUZUMAB Q4W    1/26/2022       Pre-Medications       acetaminophen tablet 650 mg       diphenhydramine (BENADRYL) 50 mg in NS 50 mL IVPB       Chemotherapy       obinutuzumab (GAZYVA) 1,000 mg in sodium chloride 0.9% 250 mL chemo infusion  2/9/2022       Pre-Medications       ACETAMINOPHEN  325 MG ORAL TAB       DIPHENHYDRAMINE IV ORDERABLE       Chemotherapy       obinutuzumab (GAZYVA) 1,000 mg in sodium chloride 0.9% 250 mL chemo infusion  3/9/2022       Pre-Medications       ACETAMINOPHEN  325 MG ORAL TAB       DIPHENHYDRAMINE IV ORDERABLE       Chemotherapy       obinutuzumab (GAZYVA) 1,000 mg in sodium chloride 0.9% 250 mL chemo infusion  4/6/2022       Pre-Medications       ACETAMINOPHEN  325 MG ORAL TAB       DIPHENHYDRAMINE IV ORDERABLE       Chemotherapy       obinutuzumab (GAZYVA) 1,000 mg in sodium chloride 0.9% 250 mL chemo infusion    Thank you for allowing me to partake in the care of this patient. If there are any questions, please do not hesitate to reach out.    Bubba Wright MD  Hematology, Oncology, and Stem Cell Transplantation  [facility]

## 2022-01-26 NOTE — PLAN OF CARE
1604  Infusion completed, pt tolerated well w/ exception to intermittent leg cramps during treatment which were alleviated by ambulating around unit; pt instructed to increase hydration, to include electrolyte fluids; discussed when to contact MD, when to report to ER; AVS declined, pt verbalized understanding of all discussed and when to report next

## 2022-01-26 NOTE — NURSING
1105  Pt here for Gazyva infusion, no new complaints or concerns at present, reports episode of leg cramps immediately following last infusion, then resolved; discussed treatment plan for today, all questions answered and pt agrees to proceed

## 2022-01-27 ENCOUNTER — PATIENT MESSAGE (OUTPATIENT)
Dept: HEMATOLOGY/ONCOLOGY | Facility: CLINIC | Age: 77
End: 2022-01-27
Payer: MEDICARE

## 2022-01-31 ENCOUNTER — PATIENT MESSAGE (OUTPATIENT)
Dept: HEMATOLOGY/ONCOLOGY | Facility: CLINIC | Age: 77
End: 2022-01-31
Payer: MEDICARE

## 2022-01-31 ENCOUNTER — LAB VISIT (OUTPATIENT)
Dept: LAB | Facility: HOSPITAL | Age: 77
End: 2022-01-31
Payer: MEDICARE

## 2022-01-31 DIAGNOSIS — C91.10 CLL (CHRONIC LYMPHOCYTIC LEUKEMIA): ICD-10-CM

## 2022-01-31 DIAGNOSIS — D59.9 ACQUIRED HEMOLYTIC ANEMIA: ICD-10-CM

## 2022-01-31 LAB
ABO + RH BLD: NORMAL
ALBUMIN SERPL BCP-MCNC: 4 G/DL (ref 3.5–5.2)
ALP SERPL-CCNC: 71 U/L (ref 55–135)
ALT SERPL W/O P-5'-P-CCNC: 29 U/L (ref 10–44)
ANION GAP SERPL CALC-SCNC: 8 MMOL/L (ref 8–16)
AST SERPL-CCNC: 24 U/L (ref 10–40)
BASOPHILS # BLD AUTO: 0.08 K/UL (ref 0–0.2)
BASOPHILS NFR BLD: 1.2 % (ref 0–1.9)
BILIRUB SERPL-MCNC: 2.5 MG/DL (ref 0.1–1)
BLD GP AB SCN CELLS X3 SERPL QL: NORMAL
BUN SERPL-MCNC: 14 MG/DL (ref 8–23)
CALCIUM SERPL-MCNC: 9.3 MG/DL (ref 8.7–10.5)
CHLORIDE SERPL-SCNC: 99 MMOL/L (ref 95–110)
CO2 SERPL-SCNC: 28 MMOL/L (ref 23–29)
CREAT SERPL-MCNC: 0.6 MG/DL (ref 0.5–1.4)
DIFFERENTIAL METHOD: ABNORMAL
EOSINOPHIL # BLD AUTO: 0 K/UL (ref 0–0.5)
EOSINOPHIL NFR BLD: 0.4 % (ref 0–8)
ERYTHROCYTE [DISTWIDTH] IN BLOOD BY AUTOMATED COUNT: 17.3 % (ref 11.5–14.5)
EST. GFR  (AFRICAN AMERICAN): >60 ML/MIN/1.73 M^2
EST. GFR  (NON AFRICAN AMERICAN): >60 ML/MIN/1.73 M^2
GLUCOSE SERPL-MCNC: 74 MG/DL (ref 70–110)
HAPTOGLOB SERPL-MCNC: 43 MG/DL (ref 30–250)
HCT VFR BLD AUTO: 28.4 % (ref 37–48.5)
HGB BLD-MCNC: 10.2 G/DL (ref 12–16)
IMM GRANULOCYTES # BLD AUTO: 0.02 K/UL (ref 0–0.04)
IMM GRANULOCYTES NFR BLD AUTO: 0.3 % (ref 0–0.5)
LDH SERPL L TO P-CCNC: 208 U/L (ref 110–260)
LYMPHOCYTES # BLD AUTO: 2 K/UL (ref 1–4.8)
LYMPHOCYTES NFR BLD: 29.9 % (ref 18–48)
MAGNESIUM SERPL-MCNC: 2 MG/DL (ref 1.6–2.6)
MCH RBC QN AUTO: 38.5 PG (ref 27–31)
MCHC RBC AUTO-ENTMCNC: 35.9 G/DL (ref 32–36)
MCV RBC AUTO: 107 FL (ref 82–98)
MONOCYTES # BLD AUTO: 0.6 K/UL (ref 0.3–1)
MONOCYTES NFR BLD: 9 % (ref 4–15)
NEUTROPHILS # BLD AUTO: 4 K/UL (ref 1.8–7.7)
NEUTROPHILS NFR BLD: 59.2 % (ref 38–73)
NRBC BLD-RTO: 0 /100 WBC
PHOSPHATE SERPL-MCNC: 3.7 MG/DL (ref 2.7–4.5)
PLATELET # BLD AUTO: 238 K/UL (ref 150–450)
PMV BLD AUTO: 8.9 FL (ref 9.2–12.9)
POTASSIUM SERPL-SCNC: 3.9 MMOL/L (ref 3.5–5.1)
PROT SERPL-MCNC: 6.4 G/DL (ref 6–8.4)
RBC # BLD AUTO: 2.65 M/UL (ref 4–5.4)
SODIUM SERPL-SCNC: 135 MMOL/L (ref 136–145)
URATE SERPL-MCNC: 4.6 MG/DL (ref 2.4–5.7)
WBC # BLD AUTO: 6.76 K/UL (ref 3.9–12.7)

## 2022-01-31 PROCEDURE — 85025 COMPLETE CBC W/AUTO DIFF WBC: CPT | Performed by: INTERNAL MEDICINE

## 2022-01-31 PROCEDURE — 84550 ASSAY OF BLOOD/URIC ACID: CPT | Performed by: INTERNAL MEDICINE

## 2022-01-31 PROCEDURE — 86901 BLOOD TYPING SEROLOGIC RH(D): CPT | Performed by: INTERNAL MEDICINE

## 2022-01-31 PROCEDURE — 83010 ASSAY OF HAPTOGLOBIN QUANT: CPT | Performed by: INTERNAL MEDICINE

## 2022-01-31 PROCEDURE — 84100 ASSAY OF PHOSPHORUS: CPT | Performed by: INTERNAL MEDICINE

## 2022-01-31 PROCEDURE — 36415 COLL VENOUS BLD VENIPUNCTURE: CPT | Performed by: INTERNAL MEDICINE

## 2022-01-31 PROCEDURE — 83735 ASSAY OF MAGNESIUM: CPT | Performed by: INTERNAL MEDICINE

## 2022-01-31 PROCEDURE — 80053 COMPREHEN METABOLIC PANEL: CPT | Performed by: INTERNAL MEDICINE

## 2022-01-31 PROCEDURE — 83615 LACTATE (LD) (LDH) ENZYME: CPT | Performed by: INTERNAL MEDICINE

## 2022-02-01 ENCOUNTER — PATIENT MESSAGE (OUTPATIENT)
Dept: HEMATOLOGY/ONCOLOGY | Facility: CLINIC | Age: 77
End: 2022-02-01
Payer: MEDICARE

## 2022-02-03 DIAGNOSIS — D84.9 IMMUNOSUPPRESSED STATUS: ICD-10-CM

## 2022-02-03 NOTE — TELEPHONE ENCOUNTER
Received call from [facility] to check status. Pending BI and FA. copay $1100. Forwarding to team to expedite since pt needs to start therapy ASAP.

## 2022-02-05 DIAGNOSIS — D84.9 IMMUNOSUPPRESSED STATUS: ICD-10-CM

## 2022-02-07 ENCOUNTER — OFFICE VISIT (OUTPATIENT)
Dept: HEMATOLOGY/ONCOLOGY | Facility: CLINIC | Age: 77
End: 2022-02-07
Payer: MEDICARE

## 2022-02-07 ENCOUNTER — LAB VISIT (OUTPATIENT)
Dept: LAB | Facility: HOSPITAL | Age: 77
End: 2022-02-07
Attending: INTERNAL MEDICINE
Payer: MEDICARE

## 2022-02-07 VITALS
WEIGHT: 122.25 LBS | OXYGEN SATURATION: 98 % | BODY MASS INDEX: 18.11 KG/M2 | HEART RATE: 76 BPM | HEIGHT: 69 IN | TEMPERATURE: 99 F | SYSTOLIC BLOOD PRESSURE: 142 MMHG | DIASTOLIC BLOOD PRESSURE: 63 MMHG | RESPIRATION RATE: 16 BRPM

## 2022-02-07 DIAGNOSIS — D84.9 IMMUNOSUPPRESSION: ICD-10-CM

## 2022-02-07 DIAGNOSIS — R30.0 DYSURIA: ICD-10-CM

## 2022-02-07 DIAGNOSIS — I10 HYPERTENSION, UNSPECIFIED TYPE: ICD-10-CM

## 2022-02-07 DIAGNOSIS — R39.15 URGENCY OF URINATION: ICD-10-CM

## 2022-02-07 DIAGNOSIS — Z79.52 LONG TERM SYSTEMIC STEROID USER: ICD-10-CM

## 2022-02-07 DIAGNOSIS — C91.10 CLL (CHRONIC LYMPHOCYTIC LEUKEMIA): Primary | ICD-10-CM

## 2022-02-07 DIAGNOSIS — R30.0 DYSURIA: Primary | ICD-10-CM

## 2022-02-07 DIAGNOSIS — Z91.89 AT HIGH RISK OF TUMOR LYSIS SYNDROME: ICD-10-CM

## 2022-02-07 DIAGNOSIS — E87.1 HYPONATREMIA: ICD-10-CM

## 2022-02-07 DIAGNOSIS — C91.10 CLL (CHRONIC LYMPHOCYTIC LEUKEMIA): ICD-10-CM

## 2022-02-07 DIAGNOSIS — D59.9 ACQUIRED HEMOLYTIC ANEMIA: ICD-10-CM

## 2022-02-07 DIAGNOSIS — D84.9 IMMUNOSUPPRESSED STATUS: ICD-10-CM

## 2022-02-07 DIAGNOSIS — N30.00 ACUTE CYSTITIS WITHOUT HEMATURIA: ICD-10-CM

## 2022-02-07 LAB
ALBUMIN SERPL BCP-MCNC: 4 G/DL (ref 3.5–5.2)
ALP SERPL-CCNC: 75 U/L (ref 55–135)
ALT SERPL W/O P-5'-P-CCNC: 22 U/L (ref 10–44)
ANION GAP SERPL CALC-SCNC: 4 MMOL/L (ref 8–16)
AST SERPL-CCNC: 19 U/L (ref 10–40)
BACTERIA #/AREA URNS AUTO: ABNORMAL /HPF
BASOPHILS # BLD AUTO: 0.11 K/UL (ref 0–0.2)
BASOPHILS NFR BLD: 1.4 % (ref 0–1.9)
BILIRUB SERPL-MCNC: 2.5 MG/DL (ref 0.1–1)
BILIRUB UR QL STRIP: NEGATIVE
BUN SERPL-MCNC: 15 MG/DL (ref 8–23)
CALCIUM SERPL-MCNC: 9.4 MG/DL (ref 8.7–10.5)
CHLORIDE SERPL-SCNC: 98 MMOL/L (ref 95–110)
CLARITY UR REFRACT.AUTO: CLEAR
CO2 SERPL-SCNC: 30 MMOL/L (ref 23–29)
COLOR UR AUTO: YELLOW
CREAT SERPL-MCNC: 0.6 MG/DL (ref 0.5–1.4)
DIFFERENTIAL METHOD: ABNORMAL
EOSINOPHIL # BLD AUTO: 0.1 K/UL (ref 0–0.5)
EOSINOPHIL NFR BLD: 0.7 % (ref 0–8)
ERYTHROCYTE [DISTWIDTH] IN BLOOD BY AUTOMATED COUNT: 15.2 % (ref 11.5–14.5)
EST. GFR  (AFRICAN AMERICAN): >60 ML/MIN/1.73 M^2
EST. GFR  (NON AFRICAN AMERICAN): >60 ML/MIN/1.73 M^2
GLUCOSE SERPL-MCNC: 99 MG/DL (ref 70–110)
GLUCOSE UR QL STRIP: NEGATIVE
HAPTOGLOB SERPL-MCNC: 31 MG/DL (ref 30–250)
HCT VFR BLD AUTO: 30.5 % (ref 37–48.5)
HGB BLD-MCNC: 10.6 G/DL (ref 12–16)
HGB UR QL STRIP: ABNORMAL
IMM GRANULOCYTES # BLD AUTO: 0.03 K/UL (ref 0–0.04)
IMM GRANULOCYTES NFR BLD AUTO: 0.4 % (ref 0–0.5)
KETONES UR QL STRIP: NEGATIVE
LDH SERPL L TO P-CCNC: 213 U/L (ref 110–260)
LEUKOCYTE ESTERASE UR QL STRIP: ABNORMAL
LYMPHOCYTES # BLD AUTO: 0.7 K/UL (ref 1–4.8)
LYMPHOCYTES NFR BLD: 9.1 % (ref 18–48)
MAGNESIUM SERPL-MCNC: 1.9 MG/DL (ref 1.6–2.6)
MCH RBC QN AUTO: 35.7 PG (ref 27–31)
MCHC RBC AUTO-ENTMCNC: 34.8 G/DL (ref 32–36)
MCV RBC AUTO: 103 FL (ref 82–98)
MICROSCOPIC COMMENT: ABNORMAL
MONOCYTES # BLD AUTO: 0.5 K/UL (ref 0.3–1)
MONOCYTES NFR BLD: 6.7 % (ref 4–15)
NEUTROPHILS # BLD AUTO: 6.2 K/UL (ref 1.8–7.7)
NEUTROPHILS NFR BLD: 81.7 % (ref 38–73)
NITRITE UR QL STRIP: POSITIVE
NRBC BLD-RTO: 0 /100 WBC
PH UR STRIP: 7 [PH] (ref 5–8)
PHOSPHATE SERPL-MCNC: 4 MG/DL (ref 2.7–4.5)
PLATELET # BLD AUTO: 222 K/UL (ref 150–450)
PMV BLD AUTO: 9.2 FL (ref 9.2–12.9)
POTASSIUM SERPL-SCNC: 4.1 MMOL/L (ref 3.5–5.1)
PROT SERPL-MCNC: 6.5 G/DL (ref 6–8.4)
PROT UR QL STRIP: NEGATIVE
RBC # BLD AUTO: 2.97 M/UL (ref 4–5.4)
RBC #/AREA URNS AUTO: 2 /HPF (ref 0–4)
SODIUM SERPL-SCNC: 132 MMOL/L (ref 136–145)
SP GR UR STRIP: 1.01 (ref 1–1.03)
SQUAMOUS #/AREA URNS AUTO: 0 /HPF
URATE SERPL-MCNC: 2.5 MG/DL (ref 2.4–5.7)
URN SPEC COLLECT METH UR: ABNORMAL
WBC # BLD AUTO: 7.59 K/UL (ref 3.9–12.7)
WBC #/AREA URNS AUTO: 12 /HPF (ref 0–5)

## 2022-02-07 PROCEDURE — 87086 URINE CULTURE/COLONY COUNT: CPT | Performed by: INTERNAL MEDICINE

## 2022-02-07 PROCEDURE — 83735 ASSAY OF MAGNESIUM: CPT | Performed by: INTERNAL MEDICINE

## 2022-02-07 PROCEDURE — 84550 ASSAY OF BLOOD/URIC ACID: CPT | Performed by: INTERNAL MEDICINE

## 2022-02-07 PROCEDURE — 99215 OFFICE O/P EST HI 40 MIN: CPT | Mod: S$PBB,,, | Performed by: INTERNAL MEDICINE

## 2022-02-07 PROCEDURE — 84100 ASSAY OF PHOSPHORUS: CPT | Performed by: INTERNAL MEDICINE

## 2022-02-07 PROCEDURE — 80053 COMPREHEN METABOLIC PANEL: CPT | Performed by: INTERNAL MEDICINE

## 2022-02-07 PROCEDURE — 83010 ASSAY OF HAPTOGLOBIN QUANT: CPT | Performed by: INTERNAL MEDICINE

## 2022-02-07 PROCEDURE — 99999 PR PBB SHADOW E&M-EST. PATIENT-LVL IV: CPT | Mod: PBBFAC,,, | Performed by: INTERNAL MEDICINE

## 2022-02-07 PROCEDURE — 85025 COMPLETE CBC W/AUTO DIFF WBC: CPT | Performed by: INTERNAL MEDICINE

## 2022-02-07 PROCEDURE — 99215 PR OFFICE/OUTPT VISIT, EST, LEVL V, 40-54 MIN: ICD-10-PCS | Mod: S$PBB,,, | Performed by: INTERNAL MEDICINE

## 2022-02-07 PROCEDURE — 83615 LACTATE (LD) (LDH) ENZYME: CPT | Performed by: INTERNAL MEDICINE

## 2022-02-07 PROCEDURE — 81001 URINALYSIS AUTO W/SCOPE: CPT | Performed by: INTERNAL MEDICINE

## 2022-02-07 PROCEDURE — 99999 PR PBB SHADOW E&M-EST. PATIENT-LVL IV: ICD-10-PCS | Mod: PBBFAC,,, | Performed by: INTERNAL MEDICINE

## 2022-02-07 PROCEDURE — 87088 URINE BACTERIA CULTURE: CPT | Performed by: INTERNAL MEDICINE

## 2022-02-07 PROCEDURE — 99214 OFFICE O/P EST MOD 30 MIN: CPT | Mod: PBBFAC | Performed by: INTERNAL MEDICINE

## 2022-02-07 RX ORDER — ACETAMINOPHEN 325 MG/1
650 TABLET ORAL
Status: CANCELLED | OUTPATIENT
Start: 2022-02-09

## 2022-02-07 RX ORDER — SODIUM CHLORIDE 0.9 % (FLUSH) 0.9 %
10 SYRINGE (ML) INJECTION
Status: CANCELLED | OUTPATIENT
Start: 2022-02-09

## 2022-02-07 RX ORDER — PREDNISONE 10 MG/1
10 TABLET ORAL DAILY
Qty: 90 TABLET | Refills: 0 | Status: ON HOLD
Start: 2022-02-07 | End: 2022-04-27 | Stop reason: HOSPADM

## 2022-02-07 RX ORDER — DIPHENHYDRAMINE HYDROCHLORIDE 50 MG/ML
50 INJECTION INTRAMUSCULAR; INTRAVENOUS ONCE AS NEEDED
Status: CANCELLED | OUTPATIENT
Start: 2022-02-09

## 2022-02-07 RX ORDER — EPINEPHRINE 0.3 MG/.3ML
0.3 INJECTION SUBCUTANEOUS ONCE AS NEEDED
Status: CANCELLED | OUTPATIENT
Start: 2022-02-09

## 2022-02-07 RX ORDER — LEVOFLOXACIN 750 MG/1
750 TABLET ORAL DAILY
Qty: 5 TABLET | Refills: 0 | Status: SHIPPED | OUTPATIENT
Start: 2022-02-07 | End: 2022-02-12

## 2022-02-07 RX ORDER — HEPARIN 100 UNIT/ML
500 SYRINGE INTRAVENOUS
Status: CANCELLED | OUTPATIENT
Start: 2022-02-09

## 2022-02-07 NOTE — TELEPHONE ENCOUNTER
Notified patient that Venclexta is approved by insurance with a high copay and discussed FA options. Patient is aware total copay amount is $1100 through insurance. Patient  does not qualify for zara funding or  assistance and agreed that copay is affordable. Offered to look into CAGNO to possible reduce monthly copay amount. Patient voiced understanding and would like [facility] to look into CAGNO but she does not want this to delay her getting the medication and she will pay entire amount if not approved in time for shipment. Routing to assigned [facility] for initial consult.

## 2022-02-07 NOTE — TELEPHONE ENCOUNTER
[facility] to help reduce patient monthly copayment. Determination pending. Patient would like to pay full copay amount while CAGNO is pending.

## 2022-02-07 NOTE — TELEPHONE ENCOUNTER
BI COMPLETE: Venclexta 10 mg / 100 mg  MEDICARE PLAN: RX [employer]  Estimated copay: $319.06 / $789.15  Benefits Stage: Initial  [facility] IN NETWORK  Forwarding to FA

## 2022-02-08 ENCOUNTER — SPECIALTY PHARMACY (OUTPATIENT)
Dept: PHARMACY | Facility: CLINIC | Age: 77
End: 2022-02-08
Payer: MEDICARE

## 2022-02-08 DIAGNOSIS — D84.9 IMMUNOSUPPRESSED STATUS: ICD-10-CM

## 2022-02-08 NOTE — PROGRESS NOTES
Section of Hematology and Stem Cell Transplantation  Follow Up Visit     Visit date: 2/7/22  Visit diagnosis: Acute cystitis without hematuria [N30.00]  Referred by:  Shiv Watson MD    Oncologic History:     Primary Oncologic Diagnosis: Chronic lymphocytic leukemia, Binet stage C, Duarte stage III     2016: First noted to have cervical lymphadenopathy. Excisional biopsy confirmed chronic lymphocytic leukemia. She did not have an indication for treatment; therefore, observation recommended by Dr. Kelley.    6/17/21: PET/CT noted enlarged bilateral cervical, supraclavicular, axillary, retroperitoneal, and pelvic lymph nodes. All nodes enlarged from the prior exam.   7/28/21: First noted to have mild anemia (Hgb ~11.5 to 10.8 g/dL). Continued monitoring.   8/3/21: Established care with Dr. Carnes.    11/1/21: On follow up with Dr. Carnes, she was noted to have worsening anemia - hemoglobin decreased to 8.6 g/dL. 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 Beta-2 microglobulin 3.19. CLL FISH revealed trisomy 12. Peripheral blood karyotype - 47,XX,+12[8]/47,idem,del(4)(p14p16)[3]/46,XX[9]. ZAP70 17% of cells.   12/14/21: She noted increased fatigue. Hemoglobin decreased to 6.2. Repeat labs confirmed decreased hemoglobin to 5.8 g/dL. Haptoglobin <1, . MICHELL Anti-IgG negative, MICHELL complement C3 positive.   12/17/21: Prednisone 60mg daily started in response to hemolytic anemia.   12/22/21: Initial visit at [facility] with Dr. Watson. WBC 96k, Hgb 8.9, Plts 288. MICHELL positive. , Hapto <10. Continued prednisone 60mg daily.    12/27/21: Initial visit with me. Prednisone decreased to 50mg daily.    1/4/22: Hemoglobin stable (~8.4 g/dL) with persistent hemolysis. Prednisone weaned to 40mg daily.    1/11/22: Cycle 1 day 1 of obinutuzumab plus venetoclax (starting day 22). Prednisone weaned to 30mg daily.    1/18/22: Hemoglobin improved. Prednisone weaned to 20mg daily.   1/31/22: Prednisone weaned to 15mg  daily.    History of Present Ilness:   Cassidy Aguilar (Cassidy) is a pleasant 77 y.o.female with a past medical history of hypertension and chronic lymphocytic leukemia who presents for follow up visit regarding CLL and steroid-refractory AIHA. She is doing well today. She has not started venetoclax as she is waiting for FA. Endorses dysuria since this weekend.    PAST MEDICAL HISTORY:   Past Medical History:   Diagnosis Date    CLL (chronic lymphocytic leukemia) 12/22/2021    Hypertension     Thyroid disease        PAST SURGICAL HISTORY:   Past Surgical History:   Procedure Laterality Date    ADENOIDECTOMY      COLONOSCOPY      HYSTERECTOMY      TONSILLECTOMY         PAST SOCIAL HISTORY:  Social History     Tobacco Use    Smoking status: Never Smoker    Smokeless tobacco: Never Used   Substance Use Topics    Alcohol use: Yes     Alcohol/week: 0.0 standard drinks     Comment: rare       FAMILY HISTORY:  History reviewed. No pertinent family history.    CURRENT MEDICATIONS:   Current Outpatient Medications   Medication Sig    acyclovir (ZOVIRAX) 400 MG tablet Take 1 tablet (400 mg total) by mouth 2 (two) times daily.    allopurinoL (ZYLOPRIM) 300 MG tablet Take 1 tablet (300 mg total) by mouth 2 (two) times daily.    aspirin (ECOTRIN) 81 MG EC tablet Take 81 mg by mouth.    atorvastatin (LIPITOR) 40 MG tablet TAKE ONE TABLET BY MOUTH once DAILY AT BEDTIME FOR cholesterol control    CARVEDILOL PHOSPHATE 20 MG ORAL CM24 (COREG CR) 20 mg 24 hr capsule Take 20 mg by mouth nightly.    clindamycin (CLEOCIN T) 1 % Swab 2 (two) times a day.    ergocalciferol (ERGOCALCIFEROL) 50,000 unit Cap Take 50,000 Units by mouth every 7 days.    fluticasone (FLONASE) 50 mcg/actuation nasal spray 1 spray by Each Nare route once daily.    FLUZONE HIGH-DOSE 2018-19, PF, 180 mcg/0.5 mL vaccine ADM 0.5ML IM UTD    glucosamine-chondroitin 500-400 mg tablet Take 1 tablet by mouth 3 (three) times daily.     hydrocodone-acetaminophen 5-325mg (NORCO) 5-325 mg per tablet Take 1 or 2 tabs every 4 hours as needed.    multivitamin (THERAGRAN) per tablet Take 1 tablet by mouth once daily.    omeprazole (PRILOSEC OTC) 20 MG tablet Take 1 tablet (20 mg total) by mouth once daily.    SYNTHROID 75 mcg tablet Take 1 tablet (75 mcg total) by mouth daily    telmisartan (MICARDIS) 80 MG Tab     telmisartan-hydrochlorothiazide (MICARDIS HCT) 40-12.5 mg per tablet Take 2 tablets by mouth once daily.     venetoclax (VENCLEXTA) 10 mg Tab Take 10 mg (1 tablet)  by mouth daily on days 22-28 of cycle 1.   Take 20 mg (2 tablets) by mouth daily on days 1-7 of cycle 2.  Take 50 mg (5 tablets) daily on days 8-14 of cycle 2.    venetoclax (VENCLEXTA) 100 mg Tab Take 100 mg (1 tablet) by mouth daily  on days 15-21 of cycle 2.  Take 200 mg (2 tablets) by mouth daily on days 22-28 of cycle 2.    venetoclax (VENCLEXTA) 100 mg Tab Take 200 mg ( 2 tablets)  by mouth once daily.    vitamin D (VITAMIN D3) 1000 units Tab Take 1,000 Units by mouth.    VIVELLE-DOT 0.025 mg/24 hr Place 1 patch onto the skin twice a week    levoFLOXacin (LEVAQUIN) 750 MG tablet Take 1 tablet (750 mg total) by mouth once daily. for 5 days    predniSONE (DELTASONE) 10 MG tablet Take 1 tablet (10 mg total) by mouth once daily.     No current facility-administered medications for this visit.       ALLERGIES:   Review of patient's allergies indicates:   Allergen Reactions    Sulfa (sulfonamide antibiotics) Rash    [place] Diarrhea       Review of Systems:     Pertinent positives and negatives included in the HPI. Otherwise a complete review of systems is negative.    Physical Exam:     Vitals:    02/07/22 1110   BP: (!) 142/63   Pulse: 76   Resp: 16   Temp: 98.7 °F (37.1 °C)     General: Appears well, NAD  HEENT: MMM, no OP lesions  Pulmonary: CTAB, no increased work of breathing, no W/R/C  Cardiovascular: S1S2 normal, RRR, no M/R/G  Abdominal: Soft, NT, ND, BS+, no  HSM  Extremities: No C/C/E  Neurological: AAOx4, grossly normal, no focal deficits  Dermatologic: No appreciable rashes or lesions  Lymphatic: small bilateral cervical adenopathy, no appreciable axillary or inguinal lymphadenopathy     ECOG Performance Status: (foot note - ECOG PS provided by Eastern Cooperative Oncology Group) 1 - Symptomatic but completely ambulatory    Karnofsky Performance Score:  90%- Able to Carry on Normal Activity: Minor Symptoms of Disease    Labs:   Lab Results   Component Value Date    WBC 7.59 02/07/2022    HGB 10.6 (L) 02/07/2022    HCT 30.5 (L) 02/07/2022     (H) 02/07/2022     02/07/2022       Lab Results   Component Value Date     (L) 02/07/2022    K 4.1 02/07/2022    CL 98 02/07/2022    CO2 30 (H) 02/07/2022    BUN 15 02/07/2022    CREATININE 0.6 02/07/2022    ALBUMIN 4.0 02/07/2022    BILITOT 2.5 (H) 02/07/2022    ALKPHOS 75 02/07/2022    AST 19 02/07/2022    ALT 22 02/07/2022       Imaging:   PET/CT 6/17/21  Luis Toussaint MD - 06/17/2021   COMPARISON: PET/CT 8/22/2016.   HISTORY: Lymphoma.   FINDINGS:   Head and neck: There is symmetric and physiologic distribution of radiotracer throughout the included brain parenchyma. There is no hemorrhage, hydrocephalus, or midline shift. There are innumerable bilateral enlarged FDG avid cervical lymph nodes. These have worsened from the prior exam. A representative left lower cervical lymph node best visualized on image 84 measures 19 mm compared with 10 mm previously with an SUV max of 1.8. There are enlarged left supraclavicular lymph nodes which were not present on the prior exam. A representative eran conglomerate measures 3.4 cm in diameter with an SUV max of 2.2.   CHEST: There are innumerable bilateral axillary and subpectoral lymph nodes which are not present on the prior exam. These demonstrate low-level FDG avidity. A representative left axillary nodule best visualized on image 114 measures 1.9 cm with  an SUV max of 1.6. There are prominent paratracheal lymph nodes which are not pathologically enlarged by size criteria and demonstrate background FDG avidity, however were not present on the prior exam.   There is no FDG avid pulmonary nodule or mass. There is no pleural effusion. There is no pneumothorax.   Abdomen and pelvis: There is physiologic distribution of radiotracer throughout the liver, spleen, and collecting system. There are multiple enlarged bilateral iliac and periaortic retroperitoneal lymph nodes. A representative left periaortic lymph node best visualized on image 201 measures 2.0 cm in diameter with an SUV max of 2.0.   MUSCULOSKELETAL: There is no FDG avid lytic or blastic osseous lesion.     IMPRESSION:   Innumerable enlarged bilateral cervical, supraclavicular, axillary, retroperitoneal, and pelvic lymph nodes all which demonstrate low-level FDG avidity, however are enlarged from the prior exam and most consistent with recurrent disease.       Pathology:  Peripheral Blood Flow Cytometry (11/1/21):  Comment:      CD5+ B-CELL LYMPHOPROLIFERATIVE DISORDER (SEE COMMENT).     COMMENT: Clonal CD20+ B-cells are detected that coexpress   CD5, CD23, FMC7(dim) and moderate surface kappa light chain   and are negative for CD10, comprising 75% of all analyzed   cells.       Prognostication Tools:  CLL-IPI = 2, intermediate risk (79.4% survival after 5 years, 40% survival after 10 years, median  months)      Assessment and Plan:   Cassidy Bryan) is a pleasant 77 y.o.female with a past medical history of hypertension and chronic lymphocytic leukemia who presents for follow up visit regarding CLL.     1. Chronic lymphocytic leukemia, Binet stage C, Duarte stage III: She has had Duarte stage I disease since 2016. 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 By iwCLL criteria, her indications for  treatment include steroid-refractory AIHA, lymphocytosis (>50% increase), and weight loss. After reviewing the various treatment options, we opted for obinutuzumab plus venetoclax because of the benefit of anti-CD20 MAB with AIHA plus fixed duration treatment (patient preference).   a. Will proceed with cycle 2 of obinutuzumab. Start venetoclax as soon as she receives it.    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 We are slowly tapering off prednisone with plans for treatment with obinutuzumab in the near future (plus venetoclax). Prednisone last weaned to 15mg on 1/31/22. Hemoglobin improved and haptoglobin normalized since starting obinutuzumab.   a. Decrease prednisone to 10mg daily today.  b. Continue to monitor LDH, haptoglobin, and retic with weekly labs.     3. Immunosuppression: Continue acyclovir 400mg BID until 1 year post-treatment.    4. Acute cystitis: Dysuria started this weekend. Urinalysis concerning for UTI. Will start levofloxacin 750mg daily x5 days. Follow up culture and sensitivities.     5. At risk for tumor lysis syndrome: Allopurinol decreased to 300mg daily. Holding for now until she starts venetoclax. Will consider stopping after cycle 2.    6. Hyponatremia: Resolved. Continue to monitor.     7. Hypertension: She reports improved BP control. Will continue to wean prednisone. HTN managed by cardiology.    8. Follow up: As below    Orders Placed:      Orders Placed This Encounter    Urine culture    Urinalysis Microscopic    predniSONE (DELTASONE) 10 MG tablet    levoFLOXacin (LEVAQUIN) 750 MG tablet     Route Chart for Scheduling    BMT Chart Routing  Follow up with physician . Follow up prior to cycle 3 (3/7/22). Schedule chemo as listed below.   Follow up with ERIKA    Labs CBC, CMP, LDH, uric acid and other   Lab interval:  Continue labs every Monday (CBC, CMP, Mg, Phos, uric  acid, LDH, haptoglobin)   Imaging    Pharmacy appointment No pharmacy appointment needed      Other referrals No additional referrals needed       Treatment Plan Information   OP CLL VENETOCLAX OBINUTUZUMAB Q4W   Adonis Wright MD   Upcoming Treatment Dates - OP CLL VENETOCLAX OBINUTUZUMAB Q4W    2/9/2022       Pre-Medications       acetaminophen tablet 650 mg       diphenhydramine (BENADRYL) 50 mg in NS 50 mL IVPB       Chemotherapy       obinutuzumab (GAZYVA) 1,000 mg in sodium chloride 0.9% 250 mL chemo infusion  3/9/2022       Pre-Medications       acetaminophen tablet 650 mg       diphenhydramine (BENADRYL) 50 mg in NS 50 mL IVPB       Chemotherapy       obinutuzumab (GAZYVA) 1,000 mg in sodium chloride 0.9% 250 mL chemo infusion  4/6/2022       Pre-Medications       acetaminophen tablet 650 mg       diphenhydramine (BENADRYL) 50 mg in NS 50 mL IVPB       Chemotherapy       obinutuzumab (GAZYVA) 1,000 mg in sodium chloride 0.9% 250 mL chemo infusion  5/4/2022       Pre-Medications       acetaminophen tablet 650 mg       diphenhydramine (BENADRYL) 50 mg in NS 50 mL IVPB       Chemotherapy       obinutuzumab (GAZYVA) 1,000 mg in sodium chloride 0.9% 250 mL chemo infusion      Bubba Wright MD  Hematology, Oncology, and Stem Cell Transplantation  [facility]

## 2022-02-08 NOTE — TELEPHONE ENCOUNTER
Outgoing call to pt for initial consultation. Pt did not answer. LVM    If patient calls back and I am unavailable, then please schedule a good time when the patient will be available to speak with me.

## 2022-02-09 ENCOUNTER — PATIENT MESSAGE (OUTPATIENT)
Dept: HEMATOLOGY/ONCOLOGY | Facility: CLINIC | Age: 77
End: 2022-02-09
Payer: MEDICARE

## 2022-02-09 ENCOUNTER — INFUSION (OUTPATIENT)
Dept: INFUSION THERAPY | Facility: HOSPITAL | Age: 77
End: 2022-02-09
Payer: MEDICARE

## 2022-02-09 VITALS
WEIGHT: 123.44 LBS | SYSTOLIC BLOOD PRESSURE: 116 MMHG | HEART RATE: 70 BPM | BODY MASS INDEX: 18.28 KG/M2 | RESPIRATION RATE: 18 BRPM | HEIGHT: 69 IN | DIASTOLIC BLOOD PRESSURE: 68 MMHG | OXYGEN SATURATION: 100 % | TEMPERATURE: 98 F

## 2022-02-09 DIAGNOSIS — C91.10 CLL (CHRONIC LYMPHOCYTIC LEUKEMIA): Primary | ICD-10-CM

## 2022-02-09 PROCEDURE — 63600175 PHARM REV CODE 636 W HCPCS: Performed by: INTERNAL MEDICINE

## 2022-02-09 PROCEDURE — 25000003 PHARM REV CODE 250: Performed by: INTERNAL MEDICINE

## 2022-02-09 PROCEDURE — 96367 TX/PROPH/DG ADDL SEQ IV INF: CPT

## 2022-02-09 PROCEDURE — 96413 CHEMO IV INFUSION 1 HR: CPT

## 2022-02-09 PROCEDURE — 96415 CHEMO IV INFUSION ADDL HR: CPT

## 2022-02-09 RX ORDER — DIPHENHYDRAMINE HYDROCHLORIDE 50 MG/ML
50 INJECTION INTRAMUSCULAR; INTRAVENOUS ONCE AS NEEDED
Status: DISCONTINUED | OUTPATIENT
Start: 2022-02-09 | End: 2022-02-09 | Stop reason: HOSPADM

## 2022-02-09 RX ORDER — SODIUM CHLORIDE 0.9 % (FLUSH) 0.9 %
10 SYRINGE (ML) INJECTION
Status: DISCONTINUED | OUTPATIENT
Start: 2022-02-09 | End: 2022-02-09 | Stop reason: HOSPADM

## 2022-02-09 RX ORDER — EPINEPHRINE 0.3 MG/.3ML
0.3 INJECTION SUBCUTANEOUS ONCE AS NEEDED
Status: DISCONTINUED | OUTPATIENT
Start: 2022-02-09 | End: 2022-02-09 | Stop reason: HOSPADM

## 2022-02-09 RX ORDER — ACETAMINOPHEN 325 MG/1
650 TABLET ORAL
Status: COMPLETED | OUTPATIENT
Start: 2022-02-09 | End: 2022-02-09

## 2022-02-09 RX ORDER — HEPARIN 100 UNIT/ML
500 SYRINGE INTRAVENOUS
Status: DISCONTINUED | OUTPATIENT
Start: 2022-02-09 | End: 2022-02-09 | Stop reason: HOSPADM

## 2022-02-09 RX ADMIN — OBINUTUZUMAB 1000 MG: 1000 INJECTION, SOLUTION, CONCENTRATE INTRAVENOUS at 12:02

## 2022-02-09 RX ADMIN — ACETAMINOPHEN 650 MG: 325 TABLET ORAL at 11:02

## 2022-02-09 RX ADMIN — DIPHENHYDRAMINE HYDROCHLORIDE 50 MG: 50 INJECTION, SOLUTION INTRAMUSCULAR; INTRAVENOUS at 11:02

## 2022-02-09 RX ADMIN — SODIUM CHLORIDE: 9 INJECTION, SOLUTION INTRAVENOUS at 11:02

## 2022-02-09 NOTE — PLAN OF CARE
Pt admitted for C2 Gazyva. Labs reviewed and assessment performed. ( Pt had long conversation with specialty pharmacist to clarify po venetoclax to be taken at home). Pt offered no complaints. Pt tolerated treatment well. Plan of care reviewed and Pt discharged @ 15:40

## 2022-02-09 NOTE — TELEPHONE ENCOUNTER
LORETO approved AR in the amount of $125 for Venclexta copay. AR from received. Patient will be responsible for remaining copay amount of $975 and she confirmed that she is able to pay copay. Assigned [facility] notified.    [facility]O will be able to assist with one more fill after this month at the same amount. OCI may be an option to assist with future fills, however OCI is $1000 per lifetime and will be exhausted quickly. Will discuss option with patient in future if needed.

## 2022-02-09 NOTE — TELEPHONE ENCOUNTER
I  Specialty Pharmacy - Initial Clinical Assessment    Specialty Medication Orders Linked to Encounter    Flowsheet Row Most Recent Value   Medication #1 venetoclax (VENCLEXTA) 10 mg Tab (Order#[id number], Rx#[id number])   Medication #2 venetoclax (VENCLEXTA) 100 mg Tab (Order#[id number], Rx#[id number])   Medication #3 venetoclax (VENCLEXTA) 100 mg Tab (Order#[id number], Rx#[id number])        Subjective    Cassidy Aguilar is a 77 y.o. female, who is followed by the specialty pharmacy service for management and education.    Recent Encounters     Date Type Provider Description    02/08/2022 Specialty Pharmacy SHAMA CHAPA, Ashkan Initial Clinical Assessment    01/13/2022 Specialty Pharmacy SHAMA CHAPA, Ashkan Referral Authorization        Clinical call attempts since last clinical assessment   No call attempts found.     Today she received education before her first fill with [facility].    Current Outpatient Medications   Medication Sig Note    acyclovir (ZOVIRAX) 400 MG tablet Take 1 tablet (400 mg total) by mouth 2 (two) times daily.     allopurinoL (ZYLOPRIM) 300 MG tablet Take 1 tablet (300 mg total) by mouth 2 (two) times daily.     aspirin (ECOTRIN) 81 MG EC tablet Take 81 mg by mouth.     atorvastatin (LIPITOR) 40 MG tablet TAKE ONE TABLET BY MOUTH once DAILY AT BEDTIME FOR cholesterol control     CARVEDILOL PHOSPHATE 20 MG ORAL CM24 (COREG CR) 20 mg 24 hr capsule Take 20 mg by mouth nightly.     clindamycin (CLEOCIN T) 1 % Swab 2 (two) times a day. 2/9/2022: As needed    ergocalciferol (ERGOCALCIFEROL) 50,000 unit Cap Take 50,000 Units by mouth every 7 days. 2/9/2022: No longer taking    fluticasone (FLONASE) 50 mcg/actuation nasal spray 1 spray by Each Nare route once daily. 2/9/2022: As needed    FLUZONE HIGH-DOSE 2018-19, PF, 180 mcg/0.5 mL vaccine ADM 0.5ML IM UTD     glucosamine-chondroitin 500-400 mg tablet Take 1 tablet by mouth 3 (three) times daily.      hydrocodone-acetaminophen 5-325mg (NORCO) 5-325 mg per tablet Take 1 or 2 tabs every 4 hours as needed. 2/9/2022: No longer taking    levoFLOXacin (LEVAQUIN) 750 MG tablet Take 1 tablet (750 mg total) by mouth once daily. for 5 days     multivitamin (THERAGRAN) per tablet Take 1 tablet by mouth once daily.     omeprazole (PRILOSEC OTC) 20 MG tablet Take 1 tablet (20 mg total) by mouth once daily. 2/9/2022: No longer taking    predniSONE (DELTASONE) 10 MG tablet Take 1 tablet (10 mg total) by mouth once daily.     SYNTHROID 75 mcg tablet Take 1 tablet (75 mcg total) by mouth daily     telmisartan (MICARDIS) 80 MG Tab      telmisartan-hydrochlorothiazide (MICARDIS HCT) 40-12.5 mg per tablet Take 2 tablets by mouth once daily.  2/9/2022: No longer taking    venetoclax (VENCLEXTA) 10 mg Tab Take 10 mg (1 tablet)  by mouth daily on days 1-7 of cycle 2.   Take 20 mg (2 tablets) by mouth daily on days 8-14 of cycle 2.  Take 50 mg (5 tablets) daily on days 15-21 of cycle 2.     venetoclax (VENCLEXTA) 100 mg Tab Take 100 mg (1 tablet) by mouth daily  on days 22-28 of cycle 2.  Take 200 mg (2 tablets) by mouth daily on days 1-7 of cycle 3.     venetoclax (VENCLEXTA) 100 mg Tab Take 200 mg ( 2 tablets)  by mouth once daily.     vitamin D (VITAMIN D3) 1000 units Tab Take 1,000 Units by mouth.     VIVELLE-DOT 0.025 mg/24 hr Place 1 patch onto the skin twice a week    Last reviewed on 2/9/2022  1:08 PM by Jojo Patrick, PharmD    Review of patient's allergies indicates:   Allergen Reactions    Sulfa (sulfonamide antibiotics) Rash    [place] Diarrhea   Last reviewed on  2/9/2022 1:03 PM by Jojo Patrick    Drug Interactions    Drug interactions evaluated: yes  Clinically relevant drug interactions identified: yes   Interactions list:  Venetoclax / P-glycoprotein/ABCB1 Inhibitors- Carvedilol    Risk Rating D: Consider therapy modification  P-glycoprotein/ABCB1 Inhibitors may increase the serum concentration of  Venetoclax.   Reduce the venetoclax dose by at least 50% in patients requiring concomitant treatment with P-glycoprotein (P-gp) inhibitors.   Monitor patients receiving these combinations closely for evidence of excessive venetoclax effects (eg, hematologic toxicity, tumor lysis syndrome). Resume the previous venetoclax dose 2 to 3 days after P-gp inhibitor discontinuation.   Drug management plan:  Venetoclax / P-glycoprotein/ABCB1 Inhibitors- Carvedilol    Risk Rating D: Consider therapy modification  P-glycoprotein/ABCB1 Inhibitors may increase the serum concentration of Venetoclax.   Reduce the venetoclax dose by at least 50% in patients requiring concomitant treatment with P-glycoprotein (P-gp) inhibitors.   Monitor patients receiving these combinations closely for evidence of excessive venetoclax effects (eg, hematologic toxicity, tumor lysis syndrome). Resume the previous venetoclax dose 2 to 3 days after P-gp inhibitor discontinuation.   Provided the patient with educational material regarding drug interactions: not applicable         Adverse Effects    *All other systems reviewed and are negative       Assessment Questions - Documented Responses    Flowsheet Row Most Recent Value   Assessment    Medication Reconciliation completed for patient No   During the past 4 weeks, has patient missed any activities due to condition or medication? No   During the past 4 weeks, did patient have any of the following urgent care visits? None   Goals of Therapy Status Discussed (new start)   Welcome packet contents reviewed and discussed with patient? Yes   Assesment completed? Yes   Plan Therapy being initiated   Do you need to open a clinical intervention (i-vent)? No   Do you want to schedule first shipment? Yes   Medication #1 Assessment Info    Patient status New medication, New to [facility]   Is this medication appropriate for the patient? Yes   Is this medication effective? Not yet started   Medication #2 Assessment Info   "  Patient status New medication, New to [facility]   Is this medication appropriate for the patient? Yes   Is this medication effective? Not yet started   Medication #3 Assessment Info    Patient status New medication, New to [facility]   Is this medication appropriate for the patient? Yes   Is this medication effective? Not yet started        Refill Questions - Documented Responses    Flowsheet Row Most Recent Value   Patient Availability and HIPAA Verification    Does patient want to proceed with activity? Yes   HIPAA/medical authority confirmed? Yes   Relationship to patient of person spoken to? Self   Refill Screening Questions    When does the patient need to receive the medication? 02/10/22   Refill Delivery Questions    How will the patient receive the medication? Pickup   When does the patient need to receive the medication? 02/10/22   Shipping Address Home   Address in [facility] confirmed and updated if neccessary? Yes   Expected Copay ($) 983.21   Is the patient able to afford the medication copay? Yes   Payment Method new CC added to file   Days supply of Refill 28   Supplies needed? No supplies needed   Refill activity completed? Yes   Refill activity plan Refill scheduled   Shipment/Pickup Date: 02/09/22          Objective    She has a past medical history of CLL (chronic lymphocytic leukemia) (12/22/2021), Hypertension, and Thyroid disease.    Tried/failed medications: none    BP Readings from Last 4 Encounters:   02/09/22 132/68   02/07/22 (!) 142/63   01/26/22 107/65   01/19/22 (!) 101/55     Ht Readings from Last 4 Encounters:   02/09/22 5' 9" (1.753 m)   02/07/22 5' 9" (1.753 m)   01/26/22 5' 9" (1.753 m)   01/19/22 5' 9" (1.753 m)     Wt Readings from Last 4 Encounters:   02/09/22 56 kg (123 lb 7.3 oz)   02/07/22 55.4 kg (122 lb 3.9 oz)   01/26/22 55.2 kg (121 lb 11.1 oz)   01/19/22 56.5 kg (124 lb 9 oz)     Recent Labs   Lab Result Units 02/07/22  1017 01/31/22  1008 01/24/22  0948 01/24/22  0948 " 01/18/22  0921 01/18/22  0921   RBC M/uL 2.97 L 2.65 L  --  2.66 L  --  2.33 L   Hemoglobin g/dL 10.6 L 10.2 L  --  9.3 L  --  8.0 L   Hematocrit % 30.5 L 28.4 L  --  27.0 L  --  23.4 L   WBC K/uL 7.59 6.76  --  7.27  --  5.28   Gran # (ANC) K/uL 6.2 4.0   < > 4.7   < > 3.7   Gran % % 81.7 H 59.2  --  65.2   < > 69.9   Platelets K/uL 222 238  --  261  --  139 L   Sodium mmol/L 132 L 135 L  --  129 L  --  126 L   Potassium mmol/L 4.1 3.9  --  4.6  --  3.9   Chloride mmol/L 98 99  --  95  --  96   Glucose mg/dL 99 74  --  93  --  95   BUN mg/dL 15 14  --  13  --  16   Creatinine mg/dL 0.6 0.6  --  0.7  --  0.7   Calcium mg/dL 9.4 9.3  --  9.1  --  8.7   Total Protein g/dL 6.5 6.4  --  6.2  --  6.0   Albumin g/dL 4.0 4.0  --  3.8  --  3.6   Total Bilirubin mg/dL 2.5 H 2.5 H  --  3.2 H  --  3.8 H   Alkaline Phosphatase U/L 75 71  --  69  --  74   AST U/L 19 24  --  29  --  17   ALT U/L 22 29  --  26  --  20    < > = values in this interval not displayed.     The goals of cancer treatment include:  · Achieving remission of cancer, if possible  · Reducing tumor size and spread of cancer, if remission is not possible  · Minimizing pain and symptoms of the cancer  · Preventing infection and other complications of treatment  · Promoting adequate nutrition  · Encouraging proper hydration  · Improving or maintaining quality of life  · Maintaining optimal therapy adherence  · Minimizing and managing side effects    Goals of Therapy Status: Discussed (new start)    Assessment/Plan  Patient plans to start therapy on 02/10/22      Indication, dosage, appropriateness, effectiveness, safety and convenience of her specialty medication(s) were reviewed today.     Patient Counseling    Counseled the patient on the following: doses and administration discussed, safe handling, storage, and disposal discussed, possible adverse effects and management discussed, possible drug and prescription drug interactions discussed, possible drug and  OTC drug and food interactions discussed, lab monitoring and follow-up discussed, therapeutic rationale discussed, cost of medications and cost implications discussed, adherence and missed doses discussed, pharmacy contact information discussed       nitial Venclexta consult completed on 2/9/2022 . Patient plans to have someone  Venclexta from [facility] on today. She is currently receiving an infusion. $1108.21 copay, JONAHNO approved $125 of copay, so patient is responsible for $983.21. Patient plans to start Venclexta on 2/9/2022.     Dr. Wright would like you to start ramp up dosing on as soon as patient receives Venclexta. Today is day 1 of cycle 2.      Patient was counseled on the administration directions:    Venclexta 10 mg tablet:   Take 10 mg (1 tablet) by mouth daily on days 1-7 of cycle 2.   Take 20 mg (2 tablets) by mouth daily on days 8-14 of cycle 2.   Take 50 mg (5 tablets) daily on days 15-21 of cycle 2.       Venclexta 100 mg:   Take 100 mg (1 tablet) by mouth daily on days 22-28 of cycle 2.   Take 200 mg (2 tablets) by mouth daily on days 1-7 of cycle 3.      Venclexta 100 mg:   Take 200 mg (2 tablets) by mouth once daily.     Take with a meal and water at approximately the same time each day.      Avoid grapefruit products, [place] oranges or starfruit.    Do not chew, crush, or break the tablets.   If possible, patient was instructed tip the tablets from the RX bottle to the cap, and take directly from the cap to the mouth.  Patient may handle the medication with their hands if they wear with a latex or nitrile glove and wash their hands before and after handling the tablets.    Missed dose: Instruct patient to take a missed dose as soon as possible if it is less than 8 hours since the scheduled dose. If more than 8 hours have passed, skip the missed dose. If vomiting occurs after the dose, do not repeat the dose and resume a normal schedule.     Vomited dose: Do not administer a replacement  dose; wait until the next scheduled dose is due     Storage: Store at RT. Keep away from moisture and heat. Keep out of reach of others.     Patient was counseled on the following possible side effects, which include, but are not limited to:  Diarrhea, nausea, upper respiratory infection, fatigue, pneumonia, fatigue, peripheral edema, electrolyte abnormalities, increased risk of infection and bleeding.  Patient educated to prevent TLS (fast breakdown of cancer cells leading to irregular heartbeat and kidney failure) by increased amounts of water consumption - goal 6-8 glasses (about 56 oz/day).    DDIs:  Medication list reviewed, The follow interaction was discussed:      Venetoclax / P-glycoprotein/ABCB1 Inhibitors- Carvedilol    Risk Rating D: Consider therapy modification  P-glycoprotein/ABCB1 Inhibitors may increase the serum concentration of Venetoclax.   Reduce the venetoclax dose by at least 50% in patients requiring concomitant treatment with P-glycoprotein (P-gp) inhibitors.   Monitor patients receiving these combinations closely for evidence of excessive venetoclax effects (eg, hematologic toxicity, tumor lysis syndrome). Resume the previous venetoclax dose 2 to 3 days after P-gp inhibitor discontinuation.    Patient understands to report any medication changes to [facility] and provider    Patient was given an oral chemotherapy handout regarding safe disposal and handling of oral chemotherapy. Venclexta should never be flushed or thrown in the trash. Patient should contact [facility] or his provider regarding safe disposal of oral chemotherapy.       Patient confirmed understanding. Patient did not have additional questions.  Patient plans to start on 2/9/2022.     I will f/u with  in 1 week from start, [facility] to contact patient in 3 weeks for refills      Tasks added this encounter   No tasks added.   Tasks due within next 3 months   2/7/2022 - Clinical - Initial Assessment     SHAMA CHAPA, PharmD  [facility]  [address] [facility] [address]  Phone: [phone]  Fax: [phone]

## 2022-02-09 NOTE — TELEPHONE ENCOUNTER
Outgoing call to pt for initial consult. LVM     If patient calls back and I am unavailable, then please schedule a good time when the patient will be available to speak with me.

## 2022-02-12 LAB — BACTERIA UR CULT: ABNORMAL

## 2022-02-14 ENCOUNTER — TELEPHONE (OUTPATIENT)
Dept: HEMATOLOGY/ONCOLOGY | Facility: CLINIC | Age: 77
End: 2022-02-14
Payer: MEDICARE

## 2022-02-14 ENCOUNTER — LAB VISIT (OUTPATIENT)
Dept: LAB | Facility: HOSPITAL | Age: 77
End: 2022-02-14
Attending: INTERNAL MEDICINE
Payer: MEDICARE

## 2022-02-14 DIAGNOSIS — C91.10 CLL (CHRONIC LYMPHOCYTIC LEUKEMIA): ICD-10-CM

## 2022-02-14 DIAGNOSIS — D59.9 ACQUIRED HEMOLYTIC ANEMIA: ICD-10-CM

## 2022-02-14 DIAGNOSIS — R39.15 URGENCY OF URINATION: ICD-10-CM

## 2022-02-14 DIAGNOSIS — R30.0 DYSURIA: Primary | ICD-10-CM

## 2022-02-14 LAB
ALBUMIN SERPL BCP-MCNC: 3.9 G/DL (ref 3.5–5.2)
ALP SERPL-CCNC: 86 U/L (ref 55–135)
ALT SERPL W/O P-5'-P-CCNC: 20 U/L (ref 10–44)
ANION GAP SERPL CALC-SCNC: 8 MMOL/L (ref 8–16)
AST SERPL-CCNC: 20 U/L (ref 10–40)
BASOPHILS # BLD AUTO: 0.06 K/UL (ref 0–0.2)
BASOPHILS NFR BLD: 0.9 % (ref 0–1.9)
BILIRUB SERPL-MCNC: 1.8 MG/DL (ref 0.1–1)
BUN SERPL-MCNC: 16 MG/DL (ref 8–23)
CALCIUM SERPL-MCNC: 9.1 MG/DL (ref 8.7–10.5)
CHLORIDE SERPL-SCNC: 100 MMOL/L (ref 95–110)
CO2 SERPL-SCNC: 27 MMOL/L (ref 23–29)
CREAT SERPL-MCNC: 0.6 MG/DL (ref 0.5–1.4)
DIFFERENTIAL METHOD: ABNORMAL
EOSINOPHIL # BLD AUTO: 0 K/UL (ref 0–0.5)
EOSINOPHIL NFR BLD: 0.2 % (ref 0–8)
ERYTHROCYTE [DISTWIDTH] IN BLOOD BY AUTOMATED COUNT: 14.3 % (ref 11.5–14.5)
EST. GFR  (AFRICAN AMERICAN): >60 ML/MIN/1.73 M^2
EST. GFR  (NON AFRICAN AMERICAN): >60 ML/MIN/1.73 M^2
GLUCOSE SERPL-MCNC: 132 MG/DL (ref 70–110)
HAPTOGLOB SERPL-MCNC: 34 MG/DL (ref 30–250)
HCT VFR BLD AUTO: 28.9 % (ref 37–48.5)
HGB BLD-MCNC: 10.6 G/DL (ref 12–16)
IMM GRANULOCYTES # BLD AUTO: 0.04 K/UL (ref 0–0.04)
IMM GRANULOCYTES NFR BLD AUTO: 0.6 % (ref 0–0.5)
LDH SERPL L TO P-CCNC: 200 U/L (ref 110–260)
LYMPHOCYTES # BLD AUTO: 0.5 K/UL (ref 1–4.8)
LYMPHOCYTES NFR BLD: 7.7 % (ref 18–48)
MAGNESIUM SERPL-MCNC: 1.9 MG/DL (ref 1.6–2.6)
MCH RBC QN AUTO: 37.5 PG (ref 27–31)
MCHC RBC AUTO-ENTMCNC: 36.7 G/DL (ref 32–36)
MCV RBC AUTO: 102 FL (ref 82–98)
MONOCYTES # BLD AUTO: 0.2 K/UL (ref 0.3–1)
MONOCYTES NFR BLD: 2.3 % (ref 4–15)
NEUTROPHILS # BLD AUTO: 5.8 K/UL (ref 1.8–7.7)
NEUTROPHILS NFR BLD: 88.3 % (ref 38–73)
NRBC BLD-RTO: 0 /100 WBC
PHOSPHATE SERPL-MCNC: 4.3 MG/DL (ref 2.7–4.5)
PLATELET # BLD AUTO: 237 K/UL (ref 150–450)
PMV BLD AUTO: 9.2 FL (ref 9.2–12.9)
POTASSIUM SERPL-SCNC: 4.3 MMOL/L (ref 3.5–5.1)
PROT SERPL-MCNC: 6.4 G/DL (ref 6–8.4)
RBC # BLD AUTO: 2.83 M/UL (ref 4–5.4)
SODIUM SERPL-SCNC: 135 MMOL/L (ref 136–145)
URATE SERPL-MCNC: 2.7 MG/DL (ref 2.4–5.7)
WBC # BLD AUTO: 6.53 K/UL (ref 3.9–12.7)

## 2022-02-14 PROCEDURE — 85025 COMPLETE CBC W/AUTO DIFF WBC: CPT | Performed by: INTERNAL MEDICINE

## 2022-02-14 PROCEDURE — 83615 LACTATE (LD) (LDH) ENZYME: CPT | Performed by: INTERNAL MEDICINE

## 2022-02-14 PROCEDURE — 84550 ASSAY OF BLOOD/URIC ACID: CPT | Performed by: INTERNAL MEDICINE

## 2022-02-14 PROCEDURE — 80053 COMPREHEN METABOLIC PANEL: CPT | Performed by: INTERNAL MEDICINE

## 2022-02-14 PROCEDURE — 83010 ASSAY OF HAPTOGLOBIN QUANT: CPT | Performed by: INTERNAL MEDICINE

## 2022-02-14 PROCEDURE — 83735 ASSAY OF MAGNESIUM: CPT | Performed by: INTERNAL MEDICINE

## 2022-02-14 PROCEDURE — 84100 ASSAY OF PHOSPHORUS: CPT | Performed by: INTERNAL MEDICINE

## 2022-02-14 NOTE — TELEPHONE ENCOUNTER
"----- Message from Navya Rueda sent at 2/14/2022  8:54 AM CST -----  Regarding: Consult/Advisory:  Name Of Caller:  Cassidy    Contact Preference?:  [phone]           What is the nature of the call?:  Patient would like to know if some orders can be sent in for her to drop off a urine sample when she comes for her labs because she had a UTI and wanted to see if it had cleared up.             Additional Notes:  "Thank you for all that you do for our patients'"     "

## 2022-02-15 ENCOUNTER — PATIENT MESSAGE (OUTPATIENT)
Dept: HEMATOLOGY/ONCOLOGY | Facility: CLINIC | Age: 77
End: 2022-02-15
Payer: MEDICARE

## 2022-02-16 ENCOUNTER — INFUSION (OUTPATIENT)
Dept: INFUSION THERAPY | Facility: HOSPITAL | Age: 77
End: 2022-02-16
Attending: INTERNAL MEDICINE
Payer: MEDICARE

## 2022-02-16 VITALS
RESPIRATION RATE: 16 BRPM | TEMPERATURE: 98 F | OXYGEN SATURATION: 99 % | DIASTOLIC BLOOD PRESSURE: 76 MMHG | HEART RATE: 69 BPM | SYSTOLIC BLOOD PRESSURE: 106 MMHG

## 2022-02-16 DIAGNOSIS — D84.9 IMMUNOSUPPRESSION: ICD-10-CM

## 2022-02-16 DIAGNOSIS — D84.9 IMMUNOSUPPRESSED STATUS: Primary | ICD-10-CM

## 2022-02-16 PROCEDURE — M0220 HC INJECTION ADMIN, TIXAGEVIMAB-CILGAVIMAB, INCL POST ADMIN MONIT: HCPCS | Performed by: INTERNAL MEDICINE

## 2022-02-16 PROCEDURE — 63600175 PHARM REV CODE 636 W HCPCS: Performed by: INTERNAL MEDICINE

## 2022-02-16 RX ORDER — DIPHENHYDRAMINE HCL 25 MG
25 CAPSULE ORAL ONCE AS NEEDED
Status: CANCELLED | OUTPATIENT
Start: 2022-02-16

## 2022-02-16 RX ORDER — DIPHENHYDRAMINE HCL 25 MG
25 CAPSULE ORAL ONCE AS NEEDED
Status: DISCONTINUED | OUTPATIENT
Start: 2022-02-16 | End: 2022-02-16 | Stop reason: HOSPADM

## 2022-02-16 RX ORDER — ACETAMINOPHEN 325 MG/1
650 TABLET ORAL ONCE AS NEEDED
Status: DISCONTINUED | OUTPATIENT
Start: 2022-02-16 | End: 2022-02-16 | Stop reason: HOSPADM

## 2022-02-16 RX ORDER — EPINEPHRINE 0.3 MG/.3ML
0.3 INJECTION SUBCUTANEOUS
Status: CANCELLED | OUTPATIENT
Start: 2022-02-16

## 2022-02-16 RX ORDER — ONDANSETRON 4 MG/1
4 TABLET, ORALLY DISINTEGRATING ORAL ONCE AS NEEDED
Status: CANCELLED | OUTPATIENT
Start: 2022-02-16

## 2022-02-16 RX ORDER — ACETAMINOPHEN 325 MG/1
650 TABLET ORAL ONCE AS NEEDED
Status: CANCELLED | OUTPATIENT
Start: 2022-02-16

## 2022-02-16 RX ORDER — ONDANSETRON 4 MG/1
4 TABLET, ORALLY DISINTEGRATING ORAL ONCE AS NEEDED
Status: DISCONTINUED | OUTPATIENT
Start: 2022-02-16 | End: 2022-02-16 | Stop reason: HOSPADM

## 2022-02-16 RX ORDER — PREDNISONE 20 MG/1
40 TABLET ORAL ONCE AS NEEDED
Status: DISCONTINUED | OUTPATIENT
Start: 2022-02-16 | End: 2022-02-16 | Stop reason: HOSPADM

## 2022-02-16 RX ORDER — ALBUTEROL SULFATE 90 UG/1
2 AEROSOL, METERED RESPIRATORY (INHALATION) ONCE AS NEEDED
Status: CANCELLED | OUTPATIENT
Start: 2022-02-16

## 2022-02-16 RX ORDER — PREDNISONE 20 MG/1
40 TABLET ORAL ONCE AS NEEDED
Status: CANCELLED | OUTPATIENT
Start: 2022-02-16

## 2022-02-16 RX ORDER — ALBUTEROL SULFATE 90 UG/1
2 AEROSOL, METERED RESPIRATORY (INHALATION) ONCE AS NEEDED
Status: DISCONTINUED | OUTPATIENT
Start: 2022-02-16 | End: 2022-02-16 | Stop reason: HOSPADM

## 2022-02-16 RX ORDER — EPINEPHRINE 0.3 MG/.3ML
0.3 INJECTION SUBCUTANEOUS
Status: DISCONTINUED | OUTPATIENT
Start: 2022-02-16 | End: 2022-02-16 | Stop reason: HOSPADM

## 2022-02-16 RX ADMIN — Medication 150 MG: at 11:02

## 2022-02-17 ENCOUNTER — PATIENT MESSAGE (OUTPATIENT)
Dept: HEMATOLOGY/ONCOLOGY | Facility: CLINIC | Age: 77
End: 2022-02-17
Payer: MEDICARE

## 2022-02-21 ENCOUNTER — LAB VISIT (OUTPATIENT)
Dept: LAB | Facility: HOSPITAL | Age: 77
End: 2022-02-21
Attending: INTERNAL MEDICINE
Payer: MEDICARE

## 2022-02-21 ENCOUNTER — PATIENT MESSAGE (OUTPATIENT)
Dept: HEMATOLOGY/ONCOLOGY | Facility: CLINIC | Age: 77
End: 2022-02-21
Payer: MEDICARE

## 2022-02-21 DIAGNOSIS — C91.10 CLL (CHRONIC LYMPHOCYTIC LEUKEMIA): ICD-10-CM

## 2022-02-21 DIAGNOSIS — D59.9 ACQUIRED HEMOLYTIC ANEMIA: ICD-10-CM

## 2022-02-21 LAB
ALBUMIN SERPL BCP-MCNC: 4 G/DL (ref 3.5–5.2)
ALP SERPL-CCNC: 94 U/L (ref 55–135)
ALT SERPL W/O P-5'-P-CCNC: 21 U/L (ref 10–44)
ANION GAP SERPL CALC-SCNC: 7 MMOL/L (ref 8–16)
AST SERPL-CCNC: 21 U/L (ref 10–40)
BASOPHILS # BLD AUTO: 0.07 K/UL (ref 0–0.2)
BASOPHILS NFR BLD: 1.1 % (ref 0–1.9)
BILIRUB SERPL-MCNC: 2.6 MG/DL (ref 0.1–1)
BUN SERPL-MCNC: 15 MG/DL (ref 8–23)
CALCIUM SERPL-MCNC: 9.2 MG/DL (ref 8.7–10.5)
CHLORIDE SERPL-SCNC: 96 MMOL/L (ref 95–110)
CO2 SERPL-SCNC: 28 MMOL/L (ref 23–29)
CREAT SERPL-MCNC: 0.6 MG/DL (ref 0.5–1.4)
DIFFERENTIAL METHOD: ABNORMAL
EOSINOPHIL # BLD AUTO: 0.1 K/UL (ref 0–0.5)
EOSINOPHIL NFR BLD: 1.7 % (ref 0–8)
ERYTHROCYTE [DISTWIDTH] IN BLOOD BY AUTOMATED COUNT: 13.2 % (ref 11.5–14.5)
EST. GFR  (AFRICAN AMERICAN): >60 ML/MIN/1.73 M^2
EST. GFR  (NON AFRICAN AMERICAN): >60 ML/MIN/1.73 M^2
GLUCOSE SERPL-MCNC: 72 MG/DL (ref 70–110)
HAPTOGLOB SERPL-MCNC: 20 MG/DL (ref 30–250)
HCT VFR BLD AUTO: 33.4 % (ref 37–48.5)
HGB BLD-MCNC: 11.4 G/DL (ref 12–16)
IMM GRANULOCYTES # BLD AUTO: 0.03 K/UL (ref 0–0.04)
IMM GRANULOCYTES NFR BLD AUTO: 0.5 % (ref 0–0.5)
LDH SERPL L TO P-CCNC: 229 U/L (ref 110–260)
LYMPHOCYTES # BLD AUTO: 0.7 K/UL (ref 1–4.8)
LYMPHOCYTES NFR BLD: 10.1 % (ref 18–48)
MAGNESIUM SERPL-MCNC: 1.9 MG/DL (ref 1.6–2.6)
MCH RBC QN AUTO: 35 PG (ref 27–31)
MCHC RBC AUTO-ENTMCNC: 34.1 G/DL (ref 32–36)
MCV RBC AUTO: 103 FL (ref 82–98)
MONOCYTES # BLD AUTO: 0.6 K/UL (ref 0.3–1)
MONOCYTES NFR BLD: 9 % (ref 4–15)
NEUTROPHILS # BLD AUTO: 5.2 K/UL (ref 1.8–7.7)
NEUTROPHILS NFR BLD: 77.6 % (ref 38–73)
NRBC BLD-RTO: 0 /100 WBC
PHOSPHATE SERPL-MCNC: 4 MG/DL (ref 2.7–4.5)
PLATELET # BLD AUTO: 213 K/UL (ref 150–450)
PMV BLD AUTO: 9.7 FL (ref 9.2–12.9)
POTASSIUM SERPL-SCNC: 4 MMOL/L (ref 3.5–5.1)
PROT SERPL-MCNC: 6.5 G/DL (ref 6–8.4)
RBC # BLD AUTO: 3.26 M/UL (ref 4–5.4)
SODIUM SERPL-SCNC: 131 MMOL/L (ref 136–145)
URATE SERPL-MCNC: 2.9 MG/DL (ref 2.4–5.7)
WBC # BLD AUTO: 6.64 K/UL (ref 3.9–12.7)

## 2022-02-21 PROCEDURE — 84550 ASSAY OF BLOOD/URIC ACID: CPT | Performed by: INTERNAL MEDICINE

## 2022-02-21 PROCEDURE — 83010 ASSAY OF HAPTOGLOBIN QUANT: CPT | Performed by: INTERNAL MEDICINE

## 2022-02-21 PROCEDURE — 36415 COLL VENOUS BLD VENIPUNCTURE: CPT | Performed by: INTERNAL MEDICINE

## 2022-02-21 PROCEDURE — 84100 ASSAY OF PHOSPHORUS: CPT | Performed by: INTERNAL MEDICINE

## 2022-02-21 PROCEDURE — 83735 ASSAY OF MAGNESIUM: CPT | Performed by: INTERNAL MEDICINE

## 2022-02-21 PROCEDURE — 85025 COMPLETE CBC W/AUTO DIFF WBC: CPT | Performed by: INTERNAL MEDICINE

## 2022-02-21 PROCEDURE — 80053 COMPREHEN METABOLIC PANEL: CPT | Performed by: INTERNAL MEDICINE

## 2022-02-21 PROCEDURE — 83615 LACTATE (LD) (LDH) ENZYME: CPT | Performed by: INTERNAL MEDICINE

## 2022-02-24 ENCOUNTER — TELEPHONE (OUTPATIENT)
Dept: HEMATOLOGY/ONCOLOGY | Facility: CLINIC | Age: 77
End: 2022-02-24
Payer: MEDICARE

## 2022-02-24 ENCOUNTER — PATIENT MESSAGE (OUTPATIENT)
Dept: HEMATOLOGY/ONCOLOGY | Facility: CLINIC | Age: 77
End: 2022-02-24
Payer: MEDICARE

## 2022-02-24 DIAGNOSIS — M25.472 ANKLE SWELLING, LEFT: Primary | ICD-10-CM

## 2022-02-24 DIAGNOSIS — I82.492 ACUTE EMBOLISM AND THROMBOSIS OF OTHER SPECIFIED DEEP VEIN OF LEFT LOWER EXTREMITY: ICD-10-CM

## 2022-02-24 DIAGNOSIS — I82.4Z2 DEEP VEIN THROMBOSIS (DVT) OF DISTAL VEIN OF LEFT LOWER EXTREMITY, UNSPECIFIED CHRONICITY: ICD-10-CM

## 2022-02-24 NOTE — TELEPHONE ENCOUNTER
Spoke with pt and she is calling in regards to the photos that she sent in because Dr. Wright is requesting her to get an US to rule out a clot. Pt states that she is going to [place] this weekend and there is really no way she can make it for that time. She truly believes this is not a clot because she does not have any redness, warmth, or tenderness. Advised pt to monitor for redness/warmth/tenderness and let us know if it worsens with the swelling. She will elevate legs and use compression stockings to determine if that helps. Scheduled pt for Wednesday 3/2 for a potential ultrasound in the event symptoms worsen. Pt states that the photos really looked worse than the swelling actually is the swelling is really not that much and is mostly her bones. Pt also states that it is slightly swollen on both sides and really isn't unilateral. Notified pt that Dr. Wright also said the slight swelling bilaterally could be from the Prednisone.   Pt will continue to monitor and will let us know if anything changes or worsens. Advised pt that even if she is in [place] and it worsens she will need to make a visit if she develops any DVT symptoms. Pt verbalized understanding and will continue to monitor and keep us posted.

## 2022-02-24 NOTE — TELEPHONE ENCOUNTER
"----- Message from Cynthia Mcfarland sent at 2/24/2022  4:11 PM CST -----         Consult/Advisory:      Name Of Caller:Cassidy  Contact Preference?:[phone]      Provider Name: Adriana  Does patient feel the need to be seen today?no      What is the nature of the call?:She wants to speak with nurse but she did not want to give details      Additional Notes:  "Thank you for all that you do for our patients'"                           "

## 2022-03-02 ENCOUNTER — HOSPITAL ENCOUNTER (OUTPATIENT)
Dept: RADIOLOGY | Facility: OTHER | Age: 77
Discharge: HOME | End: 2022-03-02
Attending: INTERNAL MEDICINE
Payer: MEDICARE

## 2022-03-02 ENCOUNTER — PATIENT MESSAGE (OUTPATIENT)
Dept: HEMATOLOGY/ONCOLOGY | Facility: CLINIC | Age: 77
End: 2022-03-02
Payer: MEDICARE

## 2022-03-02 DIAGNOSIS — I82.492 ACUTE EMBOLISM AND THROMBOSIS OF OTHER SPECIFIED DEEP VEIN OF LEFT LOWER EXTREMITY: ICD-10-CM

## 2022-03-02 DIAGNOSIS — I82.4Z2 DEEP VEIN THROMBOSIS (DVT) OF DISTAL VEIN OF LEFT LOWER EXTREMITY, UNSPECIFIED CHRONICITY: ICD-10-CM

## 2022-03-02 PROCEDURE — 93971 EXTREMITY STUDY: CPT | Mod: 26,LT,, | Performed by: RADIOLOGY

## 2022-03-02 PROCEDURE — 93971 EXTREMITY STUDY: CPT | Mod: TC,LT

## 2022-03-02 PROCEDURE — 93971 US LOWER EXTREMITY VEINS LEFT: ICD-10-PCS | Mod: 26,LT,, | Performed by: RADIOLOGY

## 2022-03-07 ENCOUNTER — PATIENT MESSAGE (OUTPATIENT)
Dept: HEMATOLOGY/ONCOLOGY | Facility: CLINIC | Age: 77
End: 2022-03-07

## 2022-03-07 ENCOUNTER — LAB VISIT (OUTPATIENT)
Dept: LAB | Facility: HOSPITAL | Age: 77
End: 2022-03-07
Attending: INTERNAL MEDICINE
Payer: MEDICARE

## 2022-03-07 ENCOUNTER — OFFICE VISIT (OUTPATIENT)
Dept: HEMATOLOGY/ONCOLOGY | Facility: CLINIC | Age: 77
End: 2022-03-07
Payer: MEDICARE

## 2022-03-07 VITALS
RESPIRATION RATE: 16 BRPM | OXYGEN SATURATION: 100 % | BODY MASS INDEX: 18.43 KG/M2 | HEART RATE: 64 BPM | DIASTOLIC BLOOD PRESSURE: 69 MMHG | HEIGHT: 69 IN | SYSTOLIC BLOOD PRESSURE: 152 MMHG | WEIGHT: 124.44 LBS

## 2022-03-07 DIAGNOSIS — C91.10 CLL (CHRONIC LYMPHOCYTIC LEUKEMIA): ICD-10-CM

## 2022-03-07 DIAGNOSIS — M62.89 PELVIC FLOOR INSTABILITY: ICD-10-CM

## 2022-03-07 DIAGNOSIS — D59.19 OTHER AUTOIMMUNE HEMOLYTIC ANEMIA: ICD-10-CM

## 2022-03-07 DIAGNOSIS — D59.9 ACQUIRED HEMOLYTIC ANEMIA: ICD-10-CM

## 2022-03-07 DIAGNOSIS — C91.10 CLL (CHRONIC LYMPHOCYTIC LEUKEMIA): Primary | ICD-10-CM

## 2022-03-07 DIAGNOSIS — Z79.52 LONG TERM SYSTEMIC STEROID USER: ICD-10-CM

## 2022-03-07 DIAGNOSIS — D84.9 IMMUNOSUPPRESSION: ICD-10-CM

## 2022-03-07 DIAGNOSIS — E87.1 HYPONATREMIA: ICD-10-CM

## 2022-03-07 LAB
ALBUMIN SERPL BCP-MCNC: 4.1 G/DL (ref 3.5–5.2)
ALP SERPL-CCNC: 82 U/L (ref 55–135)
ALT SERPL W/O P-5'-P-CCNC: 19 U/L (ref 10–44)
ANION GAP SERPL CALC-SCNC: 10 MMOL/L (ref 8–16)
AST SERPL-CCNC: 23 U/L (ref 10–40)
BASOPHILS # BLD AUTO: 0.01 K/UL (ref 0–0.2)
BASOPHILS NFR BLD: 0.2 % (ref 0–1.9)
BILIRUB SERPL-MCNC: 3.4 MG/DL (ref 0.1–1)
BUN SERPL-MCNC: 14 MG/DL (ref 8–23)
CALCIUM SERPL-MCNC: 8.9 MG/DL (ref 8.7–10.5)
CHLORIDE SERPL-SCNC: 96 MMOL/L (ref 95–110)
CO2 SERPL-SCNC: 26 MMOL/L (ref 23–29)
CREAT SERPL-MCNC: 0.6 MG/DL (ref 0.5–1.4)
DIFFERENTIAL METHOD: ABNORMAL
EOSINOPHIL # BLD AUTO: 0.1 K/UL (ref 0–0.5)
EOSINOPHIL NFR BLD: 0.9 % (ref 0–8)
ERYTHROCYTE [DISTWIDTH] IN BLOOD BY AUTOMATED COUNT: 12.8 % (ref 11.5–14.5)
EST. GFR  (AFRICAN AMERICAN): >60 ML/MIN/1.73 M^2
EST. GFR  (NON AFRICAN AMERICAN): >60 ML/MIN/1.73 M^2
GLUCOSE SERPL-MCNC: 82 MG/DL (ref 70–110)
HAPTOGLOB SERPL-MCNC: <10 MG/DL (ref 30–250)
HCT VFR BLD AUTO: 31 % (ref 37–48.5)
HGB BLD-MCNC: 10.5 G/DL (ref 12–16)
IMM GRANULOCYTES # BLD AUTO: 0.08 K/UL (ref 0–0.04)
IMM GRANULOCYTES NFR BLD AUTO: 1.4 % (ref 0–0.5)
LDH SERPL L TO P-CCNC: 227 U/L (ref 110–260)
LYMPHOCYTES # BLD AUTO: 1.1 K/UL (ref 1–4.8)
LYMPHOCYTES NFR BLD: 19.8 % (ref 18–48)
MAGNESIUM SERPL-MCNC: 1.9 MG/DL (ref 1.6–2.6)
MCH RBC QN AUTO: 33.7 PG (ref 27–31)
MCHC RBC AUTO-ENTMCNC: 33.9 G/DL (ref 32–36)
MCV RBC AUTO: 99 FL (ref 82–98)
MONOCYTES # BLD AUTO: 0.7 K/UL (ref 0.3–1)
MONOCYTES NFR BLD: 12.3 % (ref 4–15)
NEUTROPHILS # BLD AUTO: 3.8 K/UL (ref 1.8–7.7)
NEUTROPHILS NFR BLD: 65.4 % (ref 38–73)
NRBC BLD-RTO: 0 /100 WBC
PHOSPHATE SERPL-MCNC: 4.1 MG/DL (ref 2.7–4.5)
PLATELET # BLD AUTO: 287 K/UL (ref 150–450)
PMV BLD AUTO: 9.7 FL (ref 9.2–12.9)
POTASSIUM SERPL-SCNC: 4.1 MMOL/L (ref 3.5–5.1)
PROT SERPL-MCNC: 6.5 G/DL (ref 6–8.4)
RBC # BLD AUTO: 3.12 M/UL (ref 4–5.4)
SODIUM SERPL-SCNC: 132 MMOL/L (ref 136–145)
URATE SERPL-MCNC: 3.1 MG/DL (ref 2.4–5.7)
WBC # BLD AUTO: 5.76 K/UL (ref 3.9–12.7)

## 2022-03-07 PROCEDURE — 99999 PR PBB SHADOW E&M-EST. PATIENT-LVL V: CPT | Mod: PBBFAC,,, | Performed by: INTERNAL MEDICINE

## 2022-03-07 PROCEDURE — 83615 LACTATE (LD) (LDH) ENZYME: CPT | Performed by: INTERNAL MEDICINE

## 2022-03-07 PROCEDURE — 84550 ASSAY OF BLOOD/URIC ACID: CPT | Performed by: INTERNAL MEDICINE

## 2022-03-07 PROCEDURE — 99999 PR PBB SHADOW E&M-EST. PATIENT-LVL V: ICD-10-PCS | Mod: PBBFAC,,, | Performed by: INTERNAL MEDICINE

## 2022-03-07 PROCEDURE — 85025 COMPLETE CBC W/AUTO DIFF WBC: CPT | Performed by: INTERNAL MEDICINE

## 2022-03-07 PROCEDURE — 99215 PR OFFICE/OUTPT VISIT, EST, LEVL V, 40-54 MIN: ICD-10-PCS | Mod: S$PBB,,, | Performed by: INTERNAL MEDICINE

## 2022-03-07 PROCEDURE — 99215 OFFICE O/P EST HI 40 MIN: CPT | Mod: S$PBB,,, | Performed by: INTERNAL MEDICINE

## 2022-03-07 PROCEDURE — 36415 COLL VENOUS BLD VENIPUNCTURE: CPT | Performed by: INTERNAL MEDICINE

## 2022-03-07 PROCEDURE — 84100 ASSAY OF PHOSPHORUS: CPT | Performed by: INTERNAL MEDICINE

## 2022-03-07 PROCEDURE — 99215 OFFICE O/P EST HI 40 MIN: CPT | Mod: PBBFAC | Performed by: INTERNAL MEDICINE

## 2022-03-07 PROCEDURE — 80053 COMPREHEN METABOLIC PANEL: CPT | Performed by: INTERNAL MEDICINE

## 2022-03-07 PROCEDURE — 83010 ASSAY OF HAPTOGLOBIN QUANT: CPT | Performed by: INTERNAL MEDICINE

## 2022-03-07 PROCEDURE — 83735 ASSAY OF MAGNESIUM: CPT | Performed by: INTERNAL MEDICINE

## 2022-03-07 RX ORDER — SODIUM CHLORIDE 0.9 % (FLUSH) 0.9 %
10 SYRINGE (ML) INJECTION
Status: CANCELLED | OUTPATIENT
Start: 2022-03-09

## 2022-03-07 RX ORDER — ACETAMINOPHEN 325 MG/1
650 TABLET ORAL
Status: CANCELLED | OUTPATIENT
Start: 2022-03-09

## 2022-03-07 RX ORDER — DIPHENHYDRAMINE HYDROCHLORIDE 50 MG/ML
50 INJECTION INTRAMUSCULAR; INTRAVENOUS ONCE AS NEEDED
Status: CANCELLED | OUTPATIENT
Start: 2022-03-09

## 2022-03-07 RX ORDER — EPINEPHRINE 0.3 MG/.3ML
0.3 INJECTION SUBCUTANEOUS ONCE AS NEEDED
Status: CANCELLED | OUTPATIENT
Start: 2022-03-09

## 2022-03-07 RX ORDER — HEPARIN 100 UNIT/ML
500 SYRINGE INTRAVENOUS
Status: CANCELLED | OUTPATIENT
Start: 2022-03-09

## 2022-03-07 NOTE — PROGRESS NOTES
Section of Hematology and Stem Cell Transplantation  Follow Up Visit     Visit date: 3/7/22  Visit diagnosis: CLL (chronic lymphocytic leukemia) [C91.10]  Referred by:  Shiv Watson MD    Oncologic History:     Primary Oncologic Diagnosis: Chronic lymphocytic leukemia, Binet stage C, Duarte stage III     2016: First noted to have cervical lymphadenopathy. Excisional biopsy confirmed chronic lymphocytic leukemia. She did not have an indication for treatment; therefore, observation recommended by Dr. Kelley.    6/17/21: PET/CT noted enlarged bilateral cervical, supraclavicular, axillary, retroperitoneal, and pelvic lymph nodes. All nodes enlarged from the prior exam.   7/28/21: First noted to have mild anemia (Hgb ~11.5 to 10.8 g/dL). Continued monitoring.   8/3/21: Established care with Dr. Carnes.    11/1/21: On follow up with Dr. Carnes, she was noted to have worsening anemia - hemoglobin decreased to 8.6 g/dL. 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 Beta-2 microglobulin 3.19. CLL FISH revealed trisomy 12. Peripheral blood karyotype - 47,XX,+12[8]/47,idem,del(4)(p14p16)[3]/46,XX[9]. ZAP70 17% of cells.   12/14/21: She noted increased fatigue. Hemoglobin decreased to 6.2. Repeat labs confirmed decreased hemoglobin to 5.8 g/dL. Haptoglobin <1, . MICHELL Anti-IgG negative, MICHELL complement C3 positive.   12/17/21: Prednisone 60mg daily started in response to hemolytic anemia.   12/22/21: Initial visit at [facility] with Dr. Watson. WBC 96k, Hgb 8.9, Plts 288. MICHELL positive. , Hapto <10. Continued prednisone 60mg daily.    12/27/21: Initial visit with me. Prednisone decreased to 50mg daily.    1/4/22: Hemoglobin stable (~8.4 g/dL) with persistent hemolysis. Prednisone weaned to 40mg daily.    1/11/22: Cycle 1 day 1 of obinutuzumab plus venetoclax (starting day 22). Prednisone weaned to 30mg daily.    1/18/22: Hemoglobin improved. Prednisone weaned to 20mg daily.   1/31/22: Prednisone weaned to 15mg daily.  Tapered to 10mg one week later.    2/15/22: Prednisone weaned to 5mg daily.     History of Present Ilness:   Cassidy Aguilar (Cassidy) is a pleasant 77 y.o.female with a past medical history of hypertension and chronic lymphocytic leukemia who presents for follow up visit regarding CLL and steroid-refractory AIHA. She reports increased muscle cramps recently. She is having some anxiety. She denies recent fevers, chills, night sweats, weight loss.    PAST MEDICAL HISTORY:   Past Medical History:   Diagnosis Date    CLL (chronic lymphocytic leukemia) 12/22/2021    Hypertension     Thyroid disease        PAST SURGICAL HISTORY:   Past Surgical History:   Procedure Laterality Date    ADENOIDECTOMY      COLONOSCOPY      HYSTERECTOMY      TONSILLECTOMY         PAST SOCIAL HISTORY:  Social History     Tobacco Use    Smoking status: Never Smoker    Smokeless tobacco: Never Used   Substance Use Topics    Alcohol use: Yes     Alcohol/week: 0.0 standard drinks     Comment: rare       FAMILY HISTORY:  History reviewed. No pertinent family history.    CURRENT MEDICATIONS:   Current Outpatient Medications   Medication Sig    acyclovir (ZOVIRAX) 400 MG tablet Take 1 tablet (400 mg total) by mouth 2 (two) times daily.    allopurinoL (ZYLOPRIM) 300 MG tablet Take 1 tablet (300 mg total) by mouth 2 (two) times daily.    aspirin (ECOTRIN) 81 MG EC tablet Take 81 mg by mouth.    atorvastatin (LIPITOR) 40 MG tablet TAKE ONE TABLET BY MOUTH once DAILY AT BEDTIME FOR cholesterol control    CARVEDILOL PHOSPHATE 20 MG ORAL CM24 (COREG CR) 20 mg 24 hr capsule Take 20 mg by mouth nightly.    clindamycin (CLEOCIN T) 1 % Swab 2 (two) times a day.    ergocalciferol (ERGOCALCIFEROL) 50,000 unit Cap Take 50,000 Units by mouth every 7 days.    fluticasone (FLONASE) 50 mcg/actuation nasal spray 1 spray by Each Nare route once daily.    FLUZONE HIGH-DOSE 2018-19, PF, 180 mcg/0.5 mL vaccine ADM 0.5ML IM UTD    glucosamine-chondroitin  500-400 mg tablet Take 1 tablet by mouth 3 (three) times daily.    multivitamin (THERAGRAN) per tablet Take 1 tablet by mouth once daily.    predniSONE (DELTASONE) 10 MG tablet Take 1 tablet (10 mg total) by mouth once daily.    SYNTHROID 75 mcg tablet Take 1 tablet (75 mcg total) by mouth daily    telmisartan (MICARDIS) 80 MG Tab     venetoclax (VENCLEXTA) 10 mg Tab Take 10 mg (1 tablet)  by mouth daily on days 1-7 of cycle 2.   Take 20 mg (2 tablets) by mouth daily on days 8-14 of cycle 2.  Take 50 mg (5 tablets) daily on days 15-21 of cycle 2.    venetoclax (VENCLEXTA) 100 mg Tab Take 100 mg (1 tablet) by mouth daily  on days 22-28 of cycle 2.  Take 200 mg (2 tablets) by mouth daily on days 1-7 of cycle 3.    venetoclax (VENCLEXTA) 100 mg Tab Take 200 mg ( 2 tablets)  by mouth once daily.    vitamin D (VITAMIN D3) 1000 units Tab Take 1,000 Units by mouth.    VIVELLE-DOT 0.025 mg/24 hr Place 1 patch onto the skin twice a week    hydrocodone-acetaminophen 5-325mg (NORCO) 5-325 mg per tablet Take 1 or 2 tabs every 4 hours as needed. (Patient not taking: Reported on 3/7/2022)    omeprazole (PRILOSEC OTC) 20 MG tablet Take 1 tablet (20 mg total) by mouth once daily. (Patient not taking: Reported on 3/7/2022)    telmisartan-hydrochlorothiazide (MICARDIS HCT) 40-12.5 mg per tablet Take 2 tablets by mouth once daily.      No current facility-administered medications for this visit.       ALLERGIES:   Review of patient's allergies indicates:   Allergen Reactions    Sulfa (sulfonamide antibiotics) Rash    [place] Diarrhea       Review of Systems:     Pertinent positives and negatives included in the HPI. Otherwise a complete review of systems is negative.    Physical Exam:     Vitals:    03/07/22 1114   BP: (!) 152/69   Pulse: 64   Resp: 16     General: Appears well, NAD  HEENT: MMM, no OP lesions  Pulmonary: CTAB, no increased work of breathing, no W/R/C  Cardiovascular: S1S2 normal, RRR, no M/R/G  Abdominal:  Soft, NT, ND, BS+, no HSM  Extremities: No C/C/E  Neurological: AAOx4, grossly normal, no focal deficits  Dermatologic: No appreciable rashes or lesions  Lymphatic: small bilateral cervical adenopathy, no appreciable axillary or inguinal lymphadenopathy     ECOG Performance Status: (foot note - ECOG PS provided by Eastern Cooperative Oncology Group) 1 - Symptomatic but completely ambulatory    Karnofsky Performance Score:  90%- Able to Carry on Normal Activity: Minor Symptoms of Disease    Labs:   Lab Results   Component Value Date    WBC 5.76 03/07/2022    HGB 10.5 (L) 03/07/2022    HCT 31.0 (L) 03/07/2022    MCV 99 (H) 03/07/2022     03/07/2022       Lab Results   Component Value Date     (L) 03/07/2022    K 4.1 03/07/2022    CL 96 03/07/2022    CO2 26 03/07/2022    BUN 14 03/07/2022    CREATININE 0.6 03/07/2022    ALBUMIN 4.1 03/07/2022    BILITOT 3.4 (H) 03/07/2022    ALKPHOS 82 03/07/2022    AST 23 03/07/2022    ALT 19 03/07/2022       Imaging:   PET/CT 6/17/21  Luis Toussaint MD - 06/17/2021   COMPARISON: PET/CT 8/22/2016.   HISTORY: Lymphoma.   FINDINGS:   Head and neck: There is symmetric and physiologic distribution of radiotracer throughout the included brain parenchyma. There is no hemorrhage, hydrocephalus, or midline shift. There are innumerable bilateral enlarged FDG avid cervical lymph nodes. These have worsened from the prior exam. A representative left lower cervical lymph node best visualized on image 84 measures 19 mm compared with 10 mm previously with an SUV max of 1.8. There are enlarged left supraclavicular lymph nodes which were not present on the prior exam. A representative eran conglomerate measures 3.4 cm in diameter with an SUV max of 2.2.   CHEST: There are innumerable bilateral axillary and subpectoral lymph nodes which are not present on the prior exam. These demonstrate low-level FDG avidity. A representative left axillary nodule best visualized on image 114  measures 1.9 cm with an SUV max of 1.6. There are prominent paratracheal lymph nodes which are not pathologically enlarged by size criteria and demonstrate background FDG avidity, however were not present on the prior exam.   There is no FDG avid pulmonary nodule or mass. There is no pleural effusion. There is no pneumothorax.   Abdomen and pelvis: There is physiologic distribution of radiotracer throughout the liver, spleen, and collecting system. There are multiple enlarged bilateral iliac and periaortic retroperitoneal lymph nodes. A representative left periaortic lymph node best visualized on image 201 measures 2.0 cm in diameter with an SUV max of 2.0.   MUSCULOSKELETAL: There is no FDG avid lytic or blastic osseous lesion.     IMPRESSION:   Innumerable enlarged bilateral cervical, supraclavicular, axillary, retroperitoneal, and pelvic lymph nodes all which demonstrate low-level FDG avidity, however are enlarged from the prior exam and most consistent with recurrent disease.       Pathology:  Peripheral Blood Flow Cytometry (11/1/21):  Comment:      CD5+ B-CELL LYMPHOPROLIFERATIVE DISORDER (SEE COMMENT).     COMMENT: Clonal CD20+ B-cells are detected that coexpress   CD5, CD23, FMC7(dim) and moderate surface kappa light chain   and are negative for CD10, comprising 75% of all analyzed   cells.       Prognostication Tools:  CLL-IPI = 2, intermediate risk (79.4% survival after 5 years, 40% survival after 10 years, median  months)      Assessment and Plan:   Cassidy Bryan) is a pleasant 77 y.o.female with a past medical history of hypertension and chronic lymphocytic leukemia who presents for follow up visit regarding CLL.     1. Chronic lymphocytic leukemia, Binet stage C, Duarte stage III: She has had Duarte stage I disease since 2016. 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 By iwCLL criteria,  her indications for treatment include steroid-refractory AIHA, lymphocytosis (>50% increase over 2 months - starting 11/2021), and weight loss. After reviewing the various treatment options, we opted for obinutuzumab plus venetoclax because of the benefit of anti-CD20 MAB with AIHA plus fixed duration treatment (patient preference).   a. Will proceed with cycle 3 of obinutuzumab. Continue venetoclax ramp up.    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 We are slowly tapering off prednisone with plans for treatment with obinutuzumab in the near future (plus venetoclax). Prednisone last weaned to 15mg on 1/31/22. Hemoglobin improved and haptoglobin normalized after starting obinutuzumab. Hemoglobin remains stable but haptoglobin declining. I will monitor for now and only increase prednisone if hemoglobin starts to decline.   a. Continue prednisone 5mg daily.   b. Continue to monitor LDH, haptoglobin, and retic with weekly labs.     3. Immunosuppression: Continue acyclovir 400mg BID until 1 year post-treatment.    4. At risk for tumor lysis syndrome: Stopped allopurinol. Continue to monitor uric acid.    5. Hyponatremia: Stable. Continue to monitor.     6. Hypertension: She reports improved BP control. HTN managed by cardiology.    7. Pelvic floor instability: Chronic issue for her. She was seeing PT at [facility] for pelvic floor strengthening, but her PT is retiring. Will place referral to [facility] for pelvic floor training.     8. Follow up: As below    Orders Placed:      Orders Placed This Encounter    Ambulatory referral/consult to Physical/Occupational Therapy     Route Chart for Scheduling    BMT Chart Routing      Follow up with physician . Follow up 4/4/22. Please schedule chemo as listed below.   Follow up with ERIKA    Labs CBC, CMP, LDH, uric acid and other   Lab interval:  Continue labs every Monday  (CBC, CMP, Mg, Phos, uric acid, LDH, haptoglobin, retic)   Imaging    Pharmacy appointment No pharmacy appointment needed      Other referrals No additional referrals needed       Treatment Plan Information   OP CLL VENETOCLAX OBINUTUZUMAB Q4W   Adonis Wright MD   Upcoming Treatment Dates - OP CLL VENETOCLAX OBINUTUZUMAB Q4W    3/9/2022       Pre-Medications       acetaminophen tablet 650 mg       diphenhydramine (BENADRYL) 50 mg in NS 50 mL IVPB       Chemotherapy       obinutuzumab (GAZYVA) 1,000 mg in sodium chloride 0.9% 250 mL chemo infusion  4/6/2022       Pre-Medications       acetaminophen tablet 650 mg       diphenhydramine (BENADRYL) 50 mg in NS 50 mL IVPB       Chemotherapy       obinutuzumab (GAZYVA) 1,000 mg in sodium chloride 0.9% 250 mL chemo infusion  5/4/2022       Pre-Medications       acetaminophen tablet 650 mg       diphenhydramine (BENADRYL) 50 mg in NS 50 mL IVPB       Chemotherapy       obinutuzumab (GAZYVA) 1,000 mg in sodium chloride 0.9% 250 mL chemo infusion  6/1/2022       Pre-Medications       acetaminophen tablet 650 mg       diphenhydramine (BENADRYL) 50 mg in NS 50 mL IVPB       Chemotherapy       obinutuzumab (GAZYVA) 1,000 mg in sodium chloride 0.9% 250 mL chemo infusion    Therapy Plan Information  diphenhydrAMINE capsule 25 mg  25 mg, Oral, PRN  predniSONE tablet 40 mg  40 mg, Oral, PRN  EPINEPHrine (EPIPEN) 0.3 mg/0.3 mL pen injection 0.3 mg  0.3 mg, Intramuscular, PRN  ondansetron disintegrating tablet 4 mg  4 mg, Oral, PRN  acetaminophen tablet 650 mg  650 mg, Oral, PRN  albuterol inhaler 2 puff  2 puff, Inhalation, PRN      Bubba Wright MD  Hematology, Oncology, and Stem Cell Transplantation  [facility]

## 2022-03-08 ENCOUNTER — SPECIALTY PHARMACY (OUTPATIENT)
Dept: PHARMACY | Facility: CLINIC | Age: 77
End: 2022-03-08
Payer: MEDICARE

## 2022-03-08 NOTE — TELEPHONE ENCOUNTER
Faxed AR request to [employer] to see if they can assist with $125 towards patients $1488.85 copay amount for her Venclexta refill. Determination pending.

## 2022-03-08 NOTE — TELEPHONE ENCOUNTER
Specialty Pharmacy - Refill Coordination    Specialty Medication Orders Linked to Encounter    Flowsheet Row Most Recent Value   Medication #1 venetoclax (VENCLEXTA) 100 mg Tab (Order#[id number], Rx#[id number])          Refill Questions - Documented Responses    Flowsheet Row Most Recent Value   Patient Availability and HIPAA Verification    Does patient want to proceed with activity? Yes   HIPAA/medical authority confirmed? Yes   Relationship to patient of person spoken to? Self   Refill Screening Questions    Changes to allergies? No   Changes to medications? No   New conditions since last clinic visit? No   Unplanned office visit, urgent care, ED, or hospital admission in the last 4 weeks? No   How does patient/caregiver feel medication is working? Good   Financial problems or insurance changes? No   How many doses of your specialty medications were missed in the last 4 weeks? 0   Would patient like to speak to a pharmacist? No   When does the patient need to receive the medication? 03/15/22   Refill Delivery Questions    How will the patient receive the medication? Delivery Nadya   When does the patient need to receive the medication? 03/15/22   Shipping Address Home   Address in [facility] confirmed and updated if neccessary? Yes   Expected Copay ($) 1488.85   Is the patient able to afford the medication copay? Yes   Payment Method CC on file   Days supply of Refill 30   Supplies needed? No supplies needed   Refill activity completed? Yes   Refill activity plan Refill scheduled   Shipment/Pickup Date: 03/11/22          Current Outpatient Medications   Medication Sig    acyclovir (ZOVIRAX) 400 MG tablet Take 1 tablet (400 mg total) by mouth 2 (two) times daily.    allopurinoL (ZYLOPRIM) 300 MG tablet Take 1 tablet (300 mg total) by mouth 2 (two) times daily.    aspirin (ECOTRIN) 81 MG EC tablet Take 81 mg by mouth.    atorvastatin (LIPITOR) 40 MG tablet TAKE ONE TABLET BY MOUTH once DAILY AT BEDTIME  FOR cholesterol control    CARVEDILOL PHOSPHATE 20 MG ORAL CM24 (COREG CR) 20 mg 24 hr capsule Take 20 mg by mouth nightly.    clindamycin (CLEOCIN T) 1 % Swab 2 (two) times a day.    ergocalciferol (ERGOCALCIFEROL) 50,000 unit Cap Take 50,000 Units by mouth every 7 days.    fluticasone (FLONASE) 50 mcg/actuation nasal spray 1 spray by Each Nare route once daily.    FLUZONE HIGH-DOSE 2018-19, PF, 180 mcg/0.5 mL vaccine ADM 0.5ML IM UTD    glucosamine-chondroitin 500-400 mg tablet Take 1 tablet by mouth 3 (three) times daily.    hydrocodone-acetaminophen 5-325mg (NORCO) 5-325 mg per tablet Take 1 or 2 tabs every 4 hours as needed. (Patient not taking: Reported on 3/7/2022)    multivitamin (THERAGRAN) per tablet Take 1 tablet by mouth once daily.    omeprazole (PRILOSEC OTC) 20 MG tablet Take 1 tablet (20 mg total) by mouth once daily. (Patient not taking: Reported on 3/7/2022)    predniSONE (DELTASONE) 10 MG tablet Take 1 tablet (10 mg total) by mouth once daily.    SYNTHROID 75 mcg tablet Take 1 tablet (75 mcg total) by mouth daily    telmisartan (MICARDIS) 80 MG Tab     telmisartan-hydrochlorothiazide (MICARDIS HCT) 40-12.5 mg per tablet Take 2 tablets by mouth once daily.     venetoclax (VENCLEXTA) 10 mg Tab Take 10 mg (1 tablet)  by mouth daily on days 1-7 of cycle 2.   Take 20 mg (2 tablets) by mouth daily on days 8-14 of cycle 2.  Take 50 mg (5 tablets) daily on days 15-21 of cycle 2.    venetoclax (VENCLEXTA) 100 mg Tab Take 100 mg (1 tablet) by mouth daily  on days 22-28 of cycle 2.  Take 200 mg (2 tablets) by mouth daily on days 1-7 of cycle 3.    venetoclax (VENCLEXTA) 100 mg Tab Take 200 mg ( 2 tablets)  by mouth once daily.    vitamin D (VITAMIN D3) 1000 units Tab Take 1,000 Units by mouth.    VIVELLE-DOT 0.025 mg/24 hr Place 1 patch onto the skin twice a week   Last reviewed on 3/7/2022 11:43 AM by Adonis Wright MD    Review of patient's allergies indicates:   Allergen Reactions     Sulfa (sulfonamide antibiotics) Rash    [place] Diarrhea    Last reviewed on  3/7/2022 11:43 AM by Adonis Wright      Tasks added this encounter   4/7/2022 - Refill Call (Auto Added)   Tasks due within next 3 months   5/3/2022 - Clinical - Follow Up Assesement (90 day)  2/17/2022 - 7 Day Post Start Touchbase     Zaida Chowdary - Specialty Pharmacy  [facility] [address]  [facility] [address]  Phone: [phone]  Fax: [phone]

## 2022-03-09 ENCOUNTER — PATIENT MESSAGE (OUTPATIENT)
Dept: HEMATOLOGY/ONCOLOGY | Facility: CLINIC | Age: 77
End: 2022-03-09
Payer: MEDICARE

## 2022-03-09 ENCOUNTER — INFUSION (OUTPATIENT)
Dept: INFUSION THERAPY | Facility: HOSPITAL | Age: 77
End: 2022-03-09
Payer: MEDICARE

## 2022-03-09 VITALS — HEART RATE: 72 BPM | SYSTOLIC BLOOD PRESSURE: 94 MMHG | RESPIRATION RATE: 18 BRPM | DIASTOLIC BLOOD PRESSURE: 52 MMHG

## 2022-03-09 DIAGNOSIS — C91.10 CLL (CHRONIC LYMPHOCYTIC LEUKEMIA): Primary | ICD-10-CM

## 2022-03-09 PROCEDURE — 96367 TX/PROPH/DG ADDL SEQ IV INF: CPT

## 2022-03-09 PROCEDURE — 96413 CHEMO IV INFUSION 1 HR: CPT

## 2022-03-09 PROCEDURE — 63600175 PHARM REV CODE 636 W HCPCS: Mod: JG | Performed by: INTERNAL MEDICINE

## 2022-03-09 PROCEDURE — 96415 CHEMO IV INFUSION ADDL HR: CPT

## 2022-03-09 PROCEDURE — 25000003 PHARM REV CODE 250: Performed by: INTERNAL MEDICINE

## 2022-03-09 RX ORDER — DIPHENHYDRAMINE HYDROCHLORIDE 50 MG/ML
50 INJECTION INTRAMUSCULAR; INTRAVENOUS ONCE AS NEEDED
Status: DISCONTINUED | OUTPATIENT
Start: 2022-03-09 | End: 2022-03-09 | Stop reason: HOSPADM

## 2022-03-09 RX ORDER — ACETAMINOPHEN 325 MG/1
650 TABLET ORAL
Status: COMPLETED | OUTPATIENT
Start: 2022-03-09 | End: 2022-03-09

## 2022-03-09 RX ORDER — EPINEPHRINE 0.3 MG/.3ML
0.3 INJECTION SUBCUTANEOUS ONCE AS NEEDED
Status: DISCONTINUED | OUTPATIENT
Start: 2022-03-09 | End: 2022-03-09 | Stop reason: HOSPADM

## 2022-03-09 RX ORDER — SODIUM CHLORIDE 0.9 % (FLUSH) 0.9 %
10 SYRINGE (ML) INJECTION
Status: DISCONTINUED | OUTPATIENT
Start: 2022-03-09 | End: 2022-03-09 | Stop reason: HOSPADM

## 2022-03-09 RX ORDER — HEPARIN 100 UNIT/ML
500 SYRINGE INTRAVENOUS
Status: DISCONTINUED | OUTPATIENT
Start: 2022-03-09 | End: 2022-03-09 | Stop reason: HOSPADM

## 2022-03-09 RX ADMIN — OBINUTUZUMAB 1000 MG: 1000 INJECTION, SOLUTION, CONCENTRATE INTRAVENOUS at 12:03

## 2022-03-09 RX ADMIN — SODIUM CHLORIDE: 0.9 INJECTION, SOLUTION INTRAVENOUS at 11:03

## 2022-03-09 RX ADMIN — DIPHENHYDRAMINE HYDROCHLORIDE 50 MG: 50 INJECTION, SOLUTION INTRAMUSCULAR; INTRAVENOUS at 11:03

## 2022-03-09 RX ADMIN — ACETAMINOPHEN 650 MG: 325 TABLET ORAL at 11:03

## 2022-03-09 NOTE — PLAN OF CARE
5718  Pt tolerated Gazyva infusion well with no complaints or complications, VSS throughout treatment. Educated patient on medications administered including side effects, possible reactions, and chemotherapy precautions, verbalized understanding. Pt aware to call provider with any questions or concerns, aware of upcoming appts, ambulatory from clinic with steady gait, no distress noted.

## 2022-03-10 ENCOUNTER — PATIENT MESSAGE (OUTPATIENT)
Dept: HEMATOLOGY/ONCOLOGY | Facility: CLINIC | Age: 77
End: 2022-03-10
Payer: MEDICARE

## 2022-03-14 ENCOUNTER — PATIENT MESSAGE (OUTPATIENT)
Dept: HEMATOLOGY/ONCOLOGY | Facility: CLINIC | Age: 77
End: 2022-03-14
Payer: MEDICARE

## 2022-03-14 ENCOUNTER — LAB VISIT (OUTPATIENT)
Dept: LAB | Facility: HOSPITAL | Age: 77
End: 2022-03-14
Attending: INTERNAL MEDICINE
Payer: MEDICARE

## 2022-03-14 DIAGNOSIS — D59.9 ACQUIRED HEMOLYTIC ANEMIA: ICD-10-CM

## 2022-03-14 DIAGNOSIS — C91.10 CLL (CHRONIC LYMPHOCYTIC LEUKEMIA): ICD-10-CM

## 2022-03-14 LAB
ALBUMIN SERPL BCP-MCNC: 4.1 G/DL (ref 3.5–5.2)
ALP SERPL-CCNC: 73 U/L (ref 55–135)
ALT SERPL W/O P-5'-P-CCNC: 16 U/L (ref 10–44)
ANION GAP SERPL CALC-SCNC: 7 MMOL/L (ref 8–16)
AST SERPL-CCNC: 20 U/L (ref 10–40)
BASOPHILS # BLD AUTO: 0.01 K/UL (ref 0–0.2)
BASOPHILS NFR BLD: 0.2 % (ref 0–1.9)
BILIRUB SERPL-MCNC: 3.1 MG/DL (ref 0.1–1)
BUN SERPL-MCNC: 13 MG/DL (ref 8–23)
CALCIUM SERPL-MCNC: 9.4 MG/DL (ref 8.7–10.5)
CHLORIDE SERPL-SCNC: 96 MMOL/L (ref 95–110)
CO2 SERPL-SCNC: 30 MMOL/L (ref 23–29)
CREAT SERPL-MCNC: 0.6 MG/DL (ref 0.5–1.4)
DIFFERENTIAL METHOD: ABNORMAL
EOSINOPHIL # BLD AUTO: 0 K/UL (ref 0–0.5)
EOSINOPHIL NFR BLD: 0.2 % (ref 0–8)
ERYTHROCYTE [DISTWIDTH] IN BLOOD BY AUTOMATED COUNT: 13.1 % (ref 11.5–14.5)
EST. GFR  (AFRICAN AMERICAN): >60 ML/MIN/1.73 M^2
EST. GFR  (NON AFRICAN AMERICAN): >60 ML/MIN/1.73 M^2
GLUCOSE SERPL-MCNC: 64 MG/DL (ref 70–110)
HAPTOGLOB SERPL-MCNC: <10 MG/DL (ref 30–250)
HCT VFR BLD AUTO: 29.2 % (ref 37–48.5)
HGB BLD-MCNC: 10.1 G/DL (ref 12–16)
IMM GRANULOCYTES # BLD AUTO: 0.15 K/UL (ref 0–0.04)
IMM GRANULOCYTES NFR BLD AUTO: 2.5 % (ref 0–0.5)
LDH SERPL L TO P-CCNC: 204 U/L (ref 110–260)
LYMPHOCYTES # BLD AUTO: 0.8 K/UL (ref 1–4.8)
LYMPHOCYTES NFR BLD: 12.8 % (ref 18–48)
MAGNESIUM SERPL-MCNC: 1.8 MG/DL (ref 1.6–2.6)
MCH RBC QN AUTO: 34.7 PG (ref 27–31)
MCHC RBC AUTO-ENTMCNC: 34.6 G/DL (ref 32–36)
MCV RBC AUTO: 100 FL (ref 82–98)
MONOCYTES # BLD AUTO: 0.8 K/UL (ref 0.3–1)
MONOCYTES NFR BLD: 13.8 % (ref 4–15)
NEUTROPHILS # BLD AUTO: 4.2 K/UL (ref 1.8–7.7)
NEUTROPHILS NFR BLD: 70.5 % (ref 38–73)
NRBC BLD-RTO: 0 /100 WBC
PHOSPHATE SERPL-MCNC: 4.2 MG/DL (ref 2.7–4.5)
PLATELET # BLD AUTO: 255 K/UL (ref 150–450)
PMV BLD AUTO: 10.2 FL (ref 9.2–12.9)
POTASSIUM SERPL-SCNC: 3.9 MMOL/L (ref 3.5–5.1)
PROT SERPL-MCNC: 6.4 G/DL (ref 6–8.4)
RBC # BLD AUTO: 2.91 M/UL (ref 4–5.4)
SODIUM SERPL-SCNC: 133 MMOL/L (ref 136–145)
URATE SERPL-MCNC: 4.3 MG/DL (ref 2.4–5.7)
WBC # BLD AUTO: 5.96 K/UL (ref 3.9–12.7)

## 2022-03-14 PROCEDURE — 84100 ASSAY OF PHOSPHORUS: CPT | Performed by: INTERNAL MEDICINE

## 2022-03-14 PROCEDURE — 83735 ASSAY OF MAGNESIUM: CPT | Performed by: INTERNAL MEDICINE

## 2022-03-14 PROCEDURE — 85025 COMPLETE CBC W/AUTO DIFF WBC: CPT | Performed by: INTERNAL MEDICINE

## 2022-03-14 PROCEDURE — 83010 ASSAY OF HAPTOGLOBIN QUANT: CPT | Performed by: INTERNAL MEDICINE

## 2022-03-14 PROCEDURE — 83615 LACTATE (LD) (LDH) ENZYME: CPT | Performed by: INTERNAL MEDICINE

## 2022-03-14 PROCEDURE — 84550 ASSAY OF BLOOD/URIC ACID: CPT | Performed by: INTERNAL MEDICINE

## 2022-03-14 PROCEDURE — 80053 COMPREHEN METABOLIC PANEL: CPT | Performed by: INTERNAL MEDICINE

## 2022-03-14 PROCEDURE — 36415 COLL VENOUS BLD VENIPUNCTURE: CPT | Performed by: INTERNAL MEDICINE

## 2022-03-15 NOTE — TELEPHONE ENCOUNTER
[employer] approved AR in the amount of $125 towards $1488.85 copay amount for patients Verzenio refill. AR form received.

## 2022-03-16 ENCOUNTER — INFUSION (OUTPATIENT)
Dept: INFUSION THERAPY | Facility: HOSPITAL | Age: 77
End: 2022-03-16
Attending: INTERNAL MEDICINE
Payer: MEDICARE

## 2022-03-16 VITALS
RESPIRATION RATE: 20 BRPM | OXYGEN SATURATION: 100 % | TEMPERATURE: 98 F | SYSTOLIC BLOOD PRESSURE: 102 MMHG | HEART RATE: 72 BPM | DIASTOLIC BLOOD PRESSURE: 76 MMHG

## 2022-03-16 DIAGNOSIS — D84.9 IMMUNOSUPPRESSION: Primary | ICD-10-CM

## 2022-03-16 PROCEDURE — M0220 HC INJECTION ADMIN, TIXAGEVIMAB-CILGAVIMAB, INCL POST ADMIN MONIT: HCPCS | Performed by: INTERNAL MEDICINE

## 2022-03-16 PROCEDURE — 63600175 PHARM REV CODE 636 W HCPCS: Performed by: INTERNAL MEDICINE

## 2022-03-16 RX ORDER — EPINEPHRINE 0.3 MG/.3ML
0.3 INJECTION SUBCUTANEOUS
Status: CANCELLED | OUTPATIENT
Start: 2022-03-16

## 2022-03-16 RX ORDER — PREDNISONE 20 MG/1
40 TABLET ORAL ONCE AS NEEDED
Status: DISCONTINUED | OUTPATIENT
Start: 2022-03-16 | End: 2022-03-16 | Stop reason: HOSPADM

## 2022-03-16 RX ORDER — ONDANSETRON 4 MG/1
4 TABLET, ORALLY DISINTEGRATING ORAL ONCE AS NEEDED
Status: CANCELLED | OUTPATIENT
Start: 2022-03-16

## 2022-03-16 RX ORDER — ACETAMINOPHEN 325 MG/1
650 TABLET ORAL ONCE AS NEEDED
Status: DISCONTINUED | OUTPATIENT
Start: 2022-03-16 | End: 2022-03-16 | Stop reason: HOSPADM

## 2022-03-16 RX ORDER — EPINEPHRINE 0.3 MG/.3ML
0.3 INJECTION SUBCUTANEOUS
Status: DISCONTINUED | OUTPATIENT
Start: 2022-03-16 | End: 2022-03-16 | Stop reason: HOSPADM

## 2022-03-16 RX ORDER — DIPHENHYDRAMINE HCL 25 MG
25 CAPSULE ORAL ONCE AS NEEDED
Status: CANCELLED | OUTPATIENT
Start: 2022-03-16

## 2022-03-16 RX ORDER — ONDANSETRON 4 MG/1
4 TABLET, ORALLY DISINTEGRATING ORAL ONCE AS NEEDED
Status: DISCONTINUED | OUTPATIENT
Start: 2022-03-16 | End: 2022-03-16 | Stop reason: HOSPADM

## 2022-03-16 RX ORDER — ACETAMINOPHEN 325 MG/1
650 TABLET ORAL ONCE AS NEEDED
Status: CANCELLED | OUTPATIENT
Start: 2022-03-16

## 2022-03-16 RX ORDER — DIPHENHYDRAMINE HCL 25 MG
25 CAPSULE ORAL ONCE AS NEEDED
Status: DISCONTINUED | OUTPATIENT
Start: 2022-03-16 | End: 2022-03-16 | Stop reason: HOSPADM

## 2022-03-16 RX ORDER — ALBUTEROL SULFATE 90 UG/1
2 AEROSOL, METERED RESPIRATORY (INHALATION) ONCE AS NEEDED
Status: DISCONTINUED | OUTPATIENT
Start: 2022-03-16 | End: 2022-03-16 | Stop reason: HOSPADM

## 2022-03-16 RX ORDER — PREDNISONE 20 MG/1
40 TABLET ORAL ONCE AS NEEDED
Status: CANCELLED | OUTPATIENT
Start: 2022-03-16

## 2022-03-16 RX ORDER — ALBUTEROL SULFATE 90 UG/1
2 AEROSOL, METERED RESPIRATORY (INHALATION) ONCE AS NEEDED
Status: CANCELLED | OUTPATIENT
Start: 2022-03-16

## 2022-03-16 RX ADMIN — Medication 150 MG: at 11:03

## 2022-03-21 ENCOUNTER — LAB VISIT (OUTPATIENT)
Dept: LAB | Facility: HOSPITAL | Age: 77
End: 2022-03-21
Attending: INTERNAL MEDICINE
Payer: MEDICARE

## 2022-03-21 DIAGNOSIS — C91.10 CLL (CHRONIC LYMPHOCYTIC LEUKEMIA): ICD-10-CM

## 2022-03-21 DIAGNOSIS — D59.9 ACQUIRED HEMOLYTIC ANEMIA: ICD-10-CM

## 2022-03-21 LAB
ALBUMIN SERPL BCP-MCNC: 4 G/DL (ref 3.5–5.2)
ALP SERPL-CCNC: 68 U/L (ref 55–135)
ALT SERPL W/O P-5'-P-CCNC: 17 U/L (ref 10–44)
ANION GAP SERPL CALC-SCNC: 7 MMOL/L (ref 8–16)
AST SERPL-CCNC: 19 U/L (ref 10–40)
BASOPHILS # BLD AUTO: 0 K/UL (ref 0–0.2)
BASOPHILS NFR BLD: 0 % (ref 0–1.9)
BILIRUB SERPL-MCNC: 2.9 MG/DL (ref 0.1–1)
BUN SERPL-MCNC: 14 MG/DL (ref 8–23)
CALCIUM SERPL-MCNC: 9.6 MG/DL (ref 8.7–10.5)
CHLORIDE SERPL-SCNC: 97 MMOL/L (ref 95–110)
CO2 SERPL-SCNC: 28 MMOL/L (ref 23–29)
CREAT SERPL-MCNC: 0.6 MG/DL (ref 0.5–1.4)
DIFFERENTIAL METHOD: ABNORMAL
EOSINOPHIL # BLD AUTO: 0 K/UL (ref 0–0.5)
EOSINOPHIL NFR BLD: 0 % (ref 0–8)
ERYTHROCYTE [DISTWIDTH] IN BLOOD BY AUTOMATED COUNT: 13.3 % (ref 11.5–14.5)
EST. GFR  (AFRICAN AMERICAN): >60 ML/MIN/1.73 M^2
EST. GFR  (NON AFRICAN AMERICAN): >60 ML/MIN/1.73 M^2
GLUCOSE SERPL-MCNC: 73 MG/DL (ref 70–110)
HAPTOGLOB SERPL-MCNC: <10 MG/DL (ref 30–250)
HCT VFR BLD AUTO: 29.9 % (ref 37–48.5)
HGB BLD-MCNC: 10 G/DL (ref 12–16)
IMM GRANULOCYTES # BLD AUTO: 0.05 K/UL (ref 0–0.04)
IMM GRANULOCYTES NFR BLD AUTO: 1.2 % (ref 0–0.5)
LDH SERPL L TO P-CCNC: 206 U/L (ref 110–260)
LYMPHOCYTES # BLD AUTO: 1.2 K/UL (ref 1–4.8)
LYMPHOCYTES NFR BLD: 29.1 % (ref 18–48)
MAGNESIUM SERPL-MCNC: 1.9 MG/DL (ref 1.6–2.6)
MCH RBC QN AUTO: 34.6 PG (ref 27–31)
MCHC RBC AUTO-ENTMCNC: 33.4 G/DL (ref 32–36)
MCV RBC AUTO: 104 FL (ref 82–98)
MONOCYTES # BLD AUTO: 0.9 K/UL (ref 0.3–1)
MONOCYTES NFR BLD: 21.5 % (ref 4–15)
NEUTROPHILS # BLD AUTO: 2 K/UL (ref 1.8–7.7)
NEUTROPHILS NFR BLD: 48.2 % (ref 38–73)
NRBC BLD-RTO: 0 /100 WBC
PHOSPHATE SERPL-MCNC: 4 MG/DL (ref 2.7–4.5)
PLATELET # BLD AUTO: 315 K/UL (ref 150–450)
PMV BLD AUTO: 9.8 FL (ref 9.2–12.9)
POTASSIUM SERPL-SCNC: 4 MMOL/L (ref 3.5–5.1)
PROT SERPL-MCNC: 6.3 G/DL (ref 6–8.4)
RBC # BLD AUTO: 2.89 M/UL (ref 4–5.4)
SODIUM SERPL-SCNC: 132 MMOL/L (ref 136–145)
URATE SERPL-MCNC: 4.1 MG/DL (ref 2.4–5.7)
WBC # BLD AUTO: 4.09 K/UL (ref 3.9–12.7)

## 2022-03-21 PROCEDURE — 85025 COMPLETE CBC W/AUTO DIFF WBC: CPT | Performed by: INTERNAL MEDICINE

## 2022-03-21 PROCEDURE — 80053 COMPREHEN METABOLIC PANEL: CPT | Performed by: INTERNAL MEDICINE

## 2022-03-21 PROCEDURE — 84550 ASSAY OF BLOOD/URIC ACID: CPT | Performed by: INTERNAL MEDICINE

## 2022-03-21 PROCEDURE — 83735 ASSAY OF MAGNESIUM: CPT | Performed by: INTERNAL MEDICINE

## 2022-03-21 PROCEDURE — 83010 ASSAY OF HAPTOGLOBIN QUANT: CPT | Performed by: INTERNAL MEDICINE

## 2022-03-21 PROCEDURE — 84100 ASSAY OF PHOSPHORUS: CPT | Performed by: INTERNAL MEDICINE

## 2022-03-21 PROCEDURE — 83615 LACTATE (LD) (LDH) ENZYME: CPT | Performed by: INTERNAL MEDICINE

## 2022-03-28 ENCOUNTER — LAB VISIT (OUTPATIENT)
Dept: LAB | Facility: HOSPITAL | Age: 77
End: 2022-03-28
Attending: INTERNAL MEDICINE
Payer: MEDICARE

## 2022-03-28 DIAGNOSIS — C91.10 CLL (CHRONIC LYMPHOCYTIC LEUKEMIA): ICD-10-CM

## 2022-03-28 DIAGNOSIS — D59.9 ACQUIRED HEMOLYTIC ANEMIA: ICD-10-CM

## 2022-03-28 LAB
ALBUMIN SERPL BCP-MCNC: 3.8 G/DL (ref 3.5–5.2)
ALP SERPL-CCNC: 83 U/L (ref 55–135)
ALT SERPL W/O P-5'-P-CCNC: 17 U/L (ref 10–44)
ANION GAP SERPL CALC-SCNC: 8 MMOL/L (ref 8–16)
AST SERPL-CCNC: 19 U/L (ref 10–40)
BASOPHILS # BLD AUTO: 0.02 K/UL (ref 0–0.2)
BASOPHILS NFR BLD: 0.7 % (ref 0–1.9)
BILIRUB SERPL-MCNC: 2.6 MG/DL (ref 0.1–1)
BUN SERPL-MCNC: 11 MG/DL (ref 8–23)
CALCIUM SERPL-MCNC: 9 MG/DL (ref 8.7–10.5)
CHLORIDE SERPL-SCNC: 99 MMOL/L (ref 95–110)
CO2 SERPL-SCNC: 29 MMOL/L (ref 23–29)
CREAT SERPL-MCNC: 0.6 MG/DL (ref 0.5–1.4)
DIFFERENTIAL METHOD: ABNORMAL
EOSINOPHIL # BLD AUTO: 0 K/UL (ref 0–0.5)
EOSINOPHIL NFR BLD: 0 % (ref 0–8)
ERYTHROCYTE [DISTWIDTH] IN BLOOD BY AUTOMATED COUNT: 13.2 % (ref 11.5–14.5)
EST. GFR  (AFRICAN AMERICAN): >60 ML/MIN/1.73 M^2
EST. GFR  (NON AFRICAN AMERICAN): >60 ML/MIN/1.73 M^2
GLUCOSE SERPL-MCNC: 78 MG/DL (ref 70–110)
HAPTOGLOB SERPL-MCNC: 10 MG/DL (ref 30–250)
HCT VFR BLD AUTO: 30.3 % (ref 37–48.5)
HGB BLD-MCNC: 10.4 G/DL (ref 12–16)
IMM GRANULOCYTES # BLD AUTO: 0.06 K/UL (ref 0–0.04)
IMM GRANULOCYTES NFR BLD AUTO: 2 % (ref 0–0.5)
LDH SERPL L TO P-CCNC: 185 U/L (ref 110–260)
LYMPHOCYTES # BLD AUTO: 0.6 K/UL (ref 1–4.8)
LYMPHOCYTES NFR BLD: 20.9 % (ref 18–48)
MAGNESIUM SERPL-MCNC: 1.8 MG/DL (ref 1.6–2.6)
MCH RBC QN AUTO: 34 PG (ref 27–31)
MCHC RBC AUTO-ENTMCNC: 34.3 G/DL (ref 32–36)
MCV RBC AUTO: 99 FL (ref 82–98)
MONOCYTES # BLD AUTO: 0.6 K/UL (ref 0.3–1)
MONOCYTES NFR BLD: 18.6 % (ref 4–15)
NEUTROPHILS # BLD AUTO: 1.8 K/UL (ref 1.8–7.7)
NEUTROPHILS NFR BLD: 57.8 % (ref 38–73)
NRBC BLD-RTO: 0 /100 WBC
PHOSPHATE SERPL-MCNC: 4.2 MG/DL (ref 2.7–4.5)
PLATELET # BLD AUTO: 239 K/UL (ref 150–450)
PMV BLD AUTO: 9.5 FL (ref 9.2–12.9)
POTASSIUM SERPL-SCNC: 3.8 MMOL/L (ref 3.5–5.1)
PROT SERPL-MCNC: 6.1 G/DL (ref 6–8.4)
RBC # BLD AUTO: 3.06 M/UL (ref 4–5.4)
SODIUM SERPL-SCNC: 136 MMOL/L (ref 136–145)
URATE SERPL-MCNC: 4 MG/DL (ref 2.4–5.7)
WBC # BLD AUTO: 3.06 K/UL (ref 3.9–12.7)

## 2022-03-28 PROCEDURE — 83615 LACTATE (LD) (LDH) ENZYME: CPT | Performed by: INTERNAL MEDICINE

## 2022-03-28 PROCEDURE — 36415 COLL VENOUS BLD VENIPUNCTURE: CPT | Performed by: INTERNAL MEDICINE

## 2022-03-28 PROCEDURE — 85025 COMPLETE CBC W/AUTO DIFF WBC: CPT | Performed by: INTERNAL MEDICINE

## 2022-03-28 PROCEDURE — 83735 ASSAY OF MAGNESIUM: CPT | Performed by: INTERNAL MEDICINE

## 2022-03-28 PROCEDURE — 84100 ASSAY OF PHOSPHORUS: CPT | Performed by: INTERNAL MEDICINE

## 2022-03-28 PROCEDURE — 83010 ASSAY OF HAPTOGLOBIN QUANT: CPT | Performed by: INTERNAL MEDICINE

## 2022-03-28 PROCEDURE — 84550 ASSAY OF BLOOD/URIC ACID: CPT | Performed by: INTERNAL MEDICINE

## 2022-03-28 PROCEDURE — 80053 COMPREHEN METABOLIC PANEL: CPT | Performed by: INTERNAL MEDICINE

## 2022-03-31 ENCOUNTER — CLINICAL SUPPORT (OUTPATIENT)
Dept: REHABILITATION | Facility: OTHER | Age: 77
End: 2022-03-31
Attending: INTERNAL MEDICINE
Payer: MEDICARE

## 2022-03-31 DIAGNOSIS — M62.89 PELVIC FLOOR INSTABILITY: ICD-10-CM

## 2022-03-31 DIAGNOSIS — R27.9 LACK OF COORDINATION: ICD-10-CM

## 2022-03-31 DIAGNOSIS — R53.1 WEAKNESS: ICD-10-CM

## 2022-03-31 PROCEDURE — 97161 PT EVAL LOW COMPLEX 20 MIN: CPT | Mod: PN

## 2022-03-31 PROCEDURE — 97110 THERAPEUTIC EXERCISES: CPT | Mod: PN

## 2022-04-04 ENCOUNTER — LAB VISIT (OUTPATIENT)
Dept: LAB | Facility: HOSPITAL | Age: 77
End: 2022-04-04
Attending: INTERNAL MEDICINE
Payer: MEDICARE

## 2022-04-04 ENCOUNTER — OFFICE VISIT (OUTPATIENT)
Dept: HEMATOLOGY/ONCOLOGY | Facility: CLINIC | Age: 77
End: 2022-04-04
Payer: MEDICARE

## 2022-04-04 VITALS
RESPIRATION RATE: 16 BRPM | DIASTOLIC BLOOD PRESSURE: 63 MMHG | BODY MASS INDEX: 18.26 KG/M2 | SYSTOLIC BLOOD PRESSURE: 132 MMHG | TEMPERATURE: 99 F | HEIGHT: 69 IN | WEIGHT: 123.25 LBS | OXYGEN SATURATION: 100 % | HEART RATE: 70 BPM

## 2022-04-04 DIAGNOSIS — C91.10 CLL (CHRONIC LYMPHOCYTIC LEUKEMIA): Primary | ICD-10-CM

## 2022-04-04 DIAGNOSIS — C91.10 CLL (CHRONIC LYMPHOCYTIC LEUKEMIA): ICD-10-CM

## 2022-04-04 DIAGNOSIS — Z79.52 LONG TERM SYSTEMIC STEROID USER: ICD-10-CM

## 2022-04-04 DIAGNOSIS — D59.19 OTHER AUTOIMMUNE HEMOLYTIC ANEMIA: ICD-10-CM

## 2022-04-04 DIAGNOSIS — D59.9 ACQUIRED HEMOLYTIC ANEMIA: ICD-10-CM

## 2022-04-04 DIAGNOSIS — D84.9 IMMUNOSUPPRESSION: ICD-10-CM

## 2022-04-04 LAB
ALBUMIN SERPL BCP-MCNC: 4.2 G/DL (ref 3.5–5.2)
ALP SERPL-CCNC: 71 U/L (ref 55–135)
ALT SERPL W/O P-5'-P-CCNC: 20 U/L (ref 10–44)
ANION GAP SERPL CALC-SCNC: 8 MMOL/L (ref 8–16)
AST SERPL-CCNC: 20 U/L (ref 10–40)
BASOPHILS # BLD AUTO: 0.04 K/UL (ref 0–0.2)
BASOPHILS NFR BLD: 0.8 % (ref 0–1.9)
BILIRUB SERPL-MCNC: 2.8 MG/DL (ref 0.1–1)
BUN SERPL-MCNC: 14 MG/DL (ref 8–23)
CALCIUM SERPL-MCNC: 9.3 MG/DL (ref 8.7–10.5)
CHLORIDE SERPL-SCNC: 97 MMOL/L (ref 95–110)
CO2 SERPL-SCNC: 30 MMOL/L (ref 23–29)
CREAT SERPL-MCNC: 0.6 MG/DL (ref 0.5–1.4)
DIFFERENTIAL METHOD: ABNORMAL
EOSINOPHIL # BLD AUTO: 0 K/UL (ref 0–0.5)
EOSINOPHIL NFR BLD: 0 % (ref 0–8)
ERYTHROCYTE [DISTWIDTH] IN BLOOD BY AUTOMATED COUNT: 12.9 % (ref 11.5–14.5)
EST. GFR  (AFRICAN AMERICAN): >60 ML/MIN/1.73 M^2
EST. GFR  (NON AFRICAN AMERICAN): >60 ML/MIN/1.73 M^2
GLUCOSE SERPL-MCNC: 85 MG/DL (ref 70–110)
HAPTOGLOB SERPL-MCNC: <10 MG/DL (ref 30–250)
HCT VFR BLD AUTO: 35.3 % (ref 37–48.5)
HGB BLD-MCNC: 12.1 G/DL (ref 12–16)
IMM GRANULOCYTES # BLD AUTO: 0.06 K/UL (ref 0–0.04)
IMM GRANULOCYTES NFR BLD AUTO: 1.2 % (ref 0–0.5)
LDH SERPL L TO P-CCNC: 199 U/L (ref 110–260)
LYMPHOCYTES # BLD AUTO: 1.1 K/UL (ref 1–4.8)
LYMPHOCYTES NFR BLD: 21.8 % (ref 18–48)
MAGNESIUM SERPL-MCNC: 2 MG/DL (ref 1.6–2.6)
MCH RBC QN AUTO: 34.4 PG (ref 27–31)
MCHC RBC AUTO-ENTMCNC: 34.3 G/DL (ref 32–36)
MCV RBC AUTO: 100 FL (ref 82–98)
MONOCYTES # BLD AUTO: 0.7 K/UL (ref 0.3–1)
MONOCYTES NFR BLD: 13.9 % (ref 4–15)
NEUTROPHILS # BLD AUTO: 3.1 K/UL (ref 1.8–7.7)
NEUTROPHILS NFR BLD: 62.3 % (ref 38–73)
NRBC BLD-RTO: 0 /100 WBC
PHOSPHATE SERPL-MCNC: 4.2 MG/DL (ref 2.7–4.5)
PLATELET # BLD AUTO: 206 K/UL (ref 150–450)
PMV BLD AUTO: 9.8 FL (ref 9.2–12.9)
POTASSIUM SERPL-SCNC: 4.1 MMOL/L (ref 3.5–5.1)
PROT SERPL-MCNC: 6.6 G/DL (ref 6–8.4)
RBC # BLD AUTO: 3.52 M/UL (ref 4–5.4)
SODIUM SERPL-SCNC: 135 MMOL/L (ref 136–145)
URATE SERPL-MCNC: 4.6 MG/DL (ref 2.4–5.7)
WBC # BLD AUTO: 4.9 K/UL (ref 3.9–12.7)

## 2022-04-04 PROCEDURE — 83010 ASSAY OF HAPTOGLOBIN QUANT: CPT | Performed by: INTERNAL MEDICINE

## 2022-04-04 PROCEDURE — 36415 COLL VENOUS BLD VENIPUNCTURE: CPT | Performed by: INTERNAL MEDICINE

## 2022-04-04 PROCEDURE — 99999 PR PBB SHADOW E&M-EST. PATIENT-LVL V: ICD-10-PCS | Mod: PBBFAC,,, | Performed by: INTERNAL MEDICINE

## 2022-04-04 PROCEDURE — 84550 ASSAY OF BLOOD/URIC ACID: CPT | Performed by: INTERNAL MEDICINE

## 2022-04-04 PROCEDURE — 85025 COMPLETE CBC W/AUTO DIFF WBC: CPT | Performed by: INTERNAL MEDICINE

## 2022-04-04 PROCEDURE — 99215 OFFICE O/P EST HI 40 MIN: CPT | Mod: PBBFAC | Performed by: INTERNAL MEDICINE

## 2022-04-04 PROCEDURE — 99215 PR OFFICE/OUTPT VISIT, EST, LEVL V, 40-54 MIN: ICD-10-PCS | Mod: S$PBB,,, | Performed by: INTERNAL MEDICINE

## 2022-04-04 PROCEDURE — 83735 ASSAY OF MAGNESIUM: CPT | Performed by: INTERNAL MEDICINE

## 2022-04-04 PROCEDURE — 83615 LACTATE (LD) (LDH) ENZYME: CPT | Performed by: INTERNAL MEDICINE

## 2022-04-04 PROCEDURE — 80053 COMPREHEN METABOLIC PANEL: CPT | Performed by: INTERNAL MEDICINE

## 2022-04-04 PROCEDURE — 99215 OFFICE O/P EST HI 40 MIN: CPT | Mod: S$PBB,,, | Performed by: INTERNAL MEDICINE

## 2022-04-04 PROCEDURE — 84100 ASSAY OF PHOSPHORUS: CPT | Performed by: INTERNAL MEDICINE

## 2022-04-04 PROCEDURE — 99999 PR PBB SHADOW E&M-EST. PATIENT-LVL V: CPT | Mod: PBBFAC,,, | Performed by: INTERNAL MEDICINE

## 2022-04-04 RX ORDER — HEPARIN 100 UNIT/ML
500 SYRINGE INTRAVENOUS
Status: CANCELLED | OUTPATIENT
Start: 2022-04-06

## 2022-04-04 RX ORDER — EPINEPHRINE 0.3 MG/.3ML
0.3 INJECTION SUBCUTANEOUS ONCE AS NEEDED
Status: CANCELLED | OUTPATIENT
Start: 2022-04-06

## 2022-04-04 RX ORDER — ACETAMINOPHEN 325 MG/1
650 TABLET ORAL
Status: CANCELLED | OUTPATIENT
Start: 2022-04-06

## 2022-04-04 RX ORDER — DIPHENHYDRAMINE HYDROCHLORIDE 50 MG/ML
50 INJECTION INTRAMUSCULAR; INTRAVENOUS ONCE AS NEEDED
Status: CANCELLED | OUTPATIENT
Start: 2022-04-06

## 2022-04-04 RX ORDER — SODIUM CHLORIDE 0.9 % (FLUSH) 0.9 %
10 SYRINGE (ML) INJECTION
Status: CANCELLED | OUTPATIENT
Start: 2022-04-06

## 2022-04-04 NOTE — PROGRESS NOTES
Section of Hematology and Stem Cell Transplantation  Follow Up Visit     Visit date: 4/4/22  Visit diagnosis: CLL (chronic lymphocytic leukemia) [C91.10]  Referred by:  Shiv Watson MD    Oncologic History:     Primary Oncologic Diagnosis: Chronic lymphocytic leukemia, Binet stage C, Duarte stage III     2016: First noted to have cervical lymphadenopathy. Excisional biopsy confirmed chronic lymphocytic leukemia. She did not have an indication for treatment; therefore, observation recommended by Dr. Kelley.    6/17/21: PET/CT noted enlarged bilateral cervical, supraclavicular, axillary, retroperitoneal, and pelvic lymph nodes. All nodes enlarged from the prior exam.   7/28/21: First noted to have mild anemia (Hgb ~11.5 to 10.8 g/dL). Continued monitoring.   8/3/21: Established care with Dr. Carnes.    11/1/21: On follow up with Dr. Carnes, she was noted to have worsening anemia - hemoglobin decreased to 8.6 g/dL. 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 Beta-2 microglobulin 3.19. CLL FISH revealed trisomy 12. Peripheral blood karyotype - 47,XX,+12[8]/47,idem,del(4)(p14p16)[3]/46,XX[9]. ZAP70 17% of cells.   12/14/21: She noted increased fatigue. Hemoglobin decreased to 6.2. Repeat labs confirmed decreased hemoglobin to 5.8 g/dL. Haptoglobin <1, . MICHELL Anti-IgG negative, MICHELL complement C3 positive.   12/17/21: Prednisone 60mg daily started in response to hemolytic anemia.   12/22/21: Initial visit at [facility] with Dr. Watson. WBC 96k, Hgb 8.9, Plts 288. MICHELL positive. , Hapto <10. Continued prednisone 60mg daily.    12/27/21: Initial visit with me. Prednisone decreased to 50mg daily.    1/4/22: Hemoglobin stable (~8.4 g/dL) with persistent hemolysis. Prednisone weaned to 40mg daily.    1/11/22: Cycle 1 day 1 of obinutuzumab plus venetoclax (starting day 22). Prednisone weaned to 30mg daily.    1/18/22: Hemoglobin improved. Prednisone weaned to 20mg daily.   1/31/22: Prednisone weaned to 15mg daily.  Tapered to 10mg one week later.    2/15/22: Prednisone weaned to 5mg daily.    4/4/22: Prednisone to 5mg every other day x2 weeks then stop.    History of Present Ilness:   Cassidy Aguilar (Cassidy) is a pleasant 77 y.o.female with a past medical history of hypertension and chronic lymphocytic leukemia who presents for follow up visit regarding CLL and steroid-refractory AIHA.  She reports she is doing well today.  She does not had any recent fevers, chills, night sweats, weight loss.    PAST MEDICAL HISTORY:   Past Medical History:   Diagnosis Date    CLL (chronic lymphocytic leukemia) 12/22/2021    Hypertension     Thyroid disease        PAST SURGICAL HISTORY:   Past Surgical History:   Procedure Laterality Date    ADENOIDECTOMY      COLONOSCOPY      HYSTERECTOMY      TONSILLECTOMY         PAST SOCIAL HISTORY:  Social History     Tobacco Use    Smoking status: Never Smoker    Smokeless tobacco: Never Used   Substance Use Topics    Alcohol use: Yes     Alcohol/week: 0.0 standard drinks     Comment: rare       FAMILY HISTORY:  History reviewed. No pertinent family history.    CURRENT MEDICATIONS:   Current Outpatient Medications   Medication Sig    acyclovir (ZOVIRAX) 400 MG tablet Take 1 tablet (400 mg total) by mouth 2 (two) times daily.    aspirin (ECOTRIN) 81 MG EC tablet Take 81 mg by mouth.    atorvastatin (LIPITOR) 40 MG tablet TAKE ONE TABLET BY MOUTH once DAILY AT BEDTIME FOR cholesterol control    calcium polycarbophil (FIBERCON ORAL) Take by mouth.    CARVEDILOL PHOSPHATE 20 MG ORAL CM24 (COREG CR) 20 mg 24 hr capsule Take 20 mg by mouth nightly.    ergocalciferol (ERGOCALCIFEROL) 50,000 unit Cap Take 50,000 Units by mouth every 7 days.    glucosamine-chondroitin 500-400 mg tablet Take 1 tablet by mouth 3 (three) times daily.    multivitamin (THERAGRAN) per tablet Take 1 tablet by mouth once daily.    predniSONE (DELTASONE) 10 MG tablet Take 1 tablet (10 mg total) by mouth once daily.     SYNTHROID 75 mcg tablet Take 1 tablet (75 mcg total) by mouth daily    telmisartan (MICARDIS) 80 MG Tab     venetoclax (VENCLEXTA) 100 mg Tab Take 200 mg (2 tablets)  by mouth once daily.    vitamin D (VITAMIN D3) 1000 units Tab Take 1,000 Units by mouth.    VIVELLE-DOT 0.025 mg/24 hr Place 1 patch onto the skin twice a week    clindamycin (CLEOCIN T) 1 % Swab 2 (two) times a day.    fluticasone (FLONASE) 50 mcg/actuation nasal spray 1 spray by Each Nare route once daily.    FLUZONE HIGH-DOSE 2018-19, PF, 180 mcg/0.5 mL vaccine ADM 0.5ML IM UTD    omeprazole (PRILOSEC OTC) 20 MG tablet Take 1 tablet (20 mg total) by mouth once daily. (Patient not taking: No sig reported)    venetoclax (VENCLEXTA) 10 mg Tab Take 10 mg (1 tablet)  by mouth daily on days 1-7 of cycle 2.   Take 20 mg (2 tablets) by mouth daily on days 8-14 of cycle 2.  Take 50 mg (5 tablets) daily on days 15-21 of cycle 2. (Patient not taking: Reported on 4/4/2022)    venetoclax (VENCLEXTA) 100 mg Tab Take 100 mg (1 tablet) by mouth daily  on days 22-28 of cycle 2.  Take 200 mg (2 tablets) by mouth daily on days 1-7 of cycle 3. (Patient not taking: Reported on 4/4/2022.)     No current facility-administered medications for this visit.       ALLERGIES:   Review of patient's allergies indicates:   Allergen Reactions    Sulfa (sulfonamide antibiotics) Rash    [place] Diarrhea       Review of Systems:     Pertinent positives and negatives included in the HPI. Otherwise a complete review of systems is negative.    Physical Exam:     Vitals:    04/04/22 1054   BP: 132/63   Pulse: 70   Resp: 16   Temp: 98.9 °F (37.2 °C)     General: Appears well, NAD  HEENT: MMM, no OP lesions  Pulmonary: CTAB, no increased work of breathing, no W/R/C  Cardiovascular: S1S2 normal, RRR, no M/R/G  Abdominal: Soft, NT, ND, BS+, no HSM  Extremities: No C/C/E  Neurological: AAOx4, grossly normal, no focal deficits  Dermatologic: No appreciable rashes or  lesions  Lymphatic: No appreciable cervical, axillary, or inguinal lymphadenopathy     ECOG Performance Status: (foot note - ECOG PS provided by Eastern Cooperative Oncology Group) 1 - Symptomatic but completely ambulatory    Karnofsky Performance Score:  90%- Able to Carry on Normal Activity: Minor Symptoms of Disease    Labs:   Lab Results   Component Value Date    WBC 4.90 04/04/2022    HGB 12.1 04/04/2022    HCT 35.3 (L) 04/04/2022     (H) 04/04/2022     04/04/2022       Lab Results   Component Value Date     (L) 04/04/2022    K 4.1 04/04/2022    CL 97 04/04/2022    CO2 30 (H) 04/04/2022    BUN 14 04/04/2022    CREATININE 0.6 04/04/2022    ALBUMIN 4.2 04/04/2022    BILITOT 2.8 (H) 04/04/2022    ALKPHOS 71 04/04/2022    AST 20 04/04/2022    ALT 20 04/04/2022       Imaging:   PET/CT 6/17/21  COMPARISON: PET/CT 8/22/2016.   HISTORY: Lymphoma.   FINDINGS:   Head and neck: There is symmetric and physiologic distribution of radiotracer throughout the included brain parenchyma. There is no hemorrhage, hydrocephalus, or midline shift. There are innumerable bilateral enlarged FDG avid cervical lymph nodes. These have worsened from the prior exam. A representative left lower cervical lymph node best visualized on image 84 measures 19 mm compared with 10 mm previously with an SUV max of 1.8. There are enlarged left supraclavicular lymph nodes which were not present on the prior exam. A representative eran conglomerate measures 3.4 cm in diameter with an SUV max of 2.2.   CHEST: There are innumerable bilateral axillary and subpectoral lymph nodes which are not present on the prior exam. These demonstrate low-level FDG avidity. A representative left axillary nodule best visualized on image 114 measures 1.9 cm with an SUV max of 1.6. There are prominent paratracheal lymph nodes which are not pathologically enlarged by size criteria and demonstrate background FDG avidity, however were not present on the  prior exam.   There is no FDG avid pulmonary nodule or mass. There is no pleural effusion. There is no pneumothorax.   Abdomen and pelvis: There is physiologic distribution of radiotracer throughout the liver, spleen, and collecting system. There are multiple enlarged bilateral iliac and periaortic retroperitoneal lymph nodes. A representative left periaortic lymph node best visualized on image 201 measures 2.0 cm in diameter with an SUV max of 2.0.   MUSCULOSKELETAL: There is no FDG avid lytic or blastic osseous lesion.     IMPRESSION:   Innumerable enlarged bilateral cervical, supraclavicular, axillary, retroperitoneal, and pelvic lymph nodes all which demonstrate low-level FDG avidity, however are enlarged from the prior exam and most consistent with recurrent disease.       Pathology:  Peripheral Blood Flow Cytometry (11/1/21):  Comment:      CD5+ B-CELL LYMPHOPROLIFERATIVE DISORDER (SEE COMMENT).     COMMENT: Clonal CD20+ B-cells are detected that coexpress   CD5, CD23, FMC7(dim) and moderate surface kappa light chain   and are negative for CD10, comprising 75% of all analyzed   cells.       Prognostication Tools:  CLL-IPI = 2, intermediate risk (79.4% survival after 5 years, 40% survival after 10 years, median  months)    Assessment and Plan:   Cassidy Bryan) is a pleasant 77 y.o.female with a past medical history of hypertension and chronic lymphocytic leukemia who presents for follow up visit regarding CLL.     1. Chronic lymphocytic leukemia, Binet stage C, Duarte stage III: She has had Duarte stage I disease since 2016. 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 By iwCLL criteria, her indications for treatment include steroid-refractory AIHA, lymphocytosis (>50% increase over 2 months - starting 11/2021), and weight loss. After reviewing the various treatment options, we opted for obinutuzumab  plus venetoclax because of the benefit of anti-CD20 MAB with AIHA plus fixed duration treatment (patient preference).   a. Will proceed with cycle 4 of obinutuzumab. Continue venetoclax ramp up.  b. Referral to psych oncology for supportive care.    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 Prednisone last weaned to 5mg on 2/15/22. Hemoglobin continues to improve, although haptoglobin remains low/undetectable.  Will continue to monitor closely for now and wean prednisone.  a. Decrease prednisone to 5mg every other day 14 days then stop.  b. Continue to monitor LDH, haptoglobin, and retic with weekly labs.     3. Immunosuppression: Continue acyclovir 400mg BID until 1 year post-treatment. She received 3 COVID vaccinations and 2 Evusheld infusions.  We discussed her 4th COVID vaccine which is indicated at this time.     4. Hyponatremia: Stable. Continue to monitor.     5. Hypertension: She reports improved BP control. HTN managed by cardiology.    6. Follow up: As below    Orders Placed:      Orders Placed This Encounter    Ambulatory referral/consult to Hematology/Oncology/Psychology     Route Chart for Scheduling    BMT Chart Routing      Follow up with physician 1 month. Follow up 5/1/22. Please schedule chemo for 5/4/22 and 6/1/22.   Follow up with ERIKA    Labs CBC, CMP, LDH, uric acid and other   Lab interval:  Continue labs every Monday (CBC, CMP, Mg, Phos, uric acid, LDH, haptoglobin, retic)   Imaging    Pharmacy appointment No pharmacy appointment needed      Other referrals No additional referrals needed       Treatment Plan Information   OP CLL VENETOCLAX OBINUTUZUMAB Q4W   Adonis Wright MD   Upcoming Treatment Dates - OP CLL VENETOCLAX OBINUTUZUMAB Q4W    4/6/2022       Pre-Medications       acetaminophen tablet 650 mg       diphenhydramine (BENADRYL) 50 mg in NS 50 mL IVPB       Chemotherapy        obinutuzumab (GAZYVA) 1,000 mg in sodium chloride 0.9% 250 mL chemo infusion  5/4/2022       Pre-Medications       acetaminophen tablet 650 mg       diphenhydramine (BENADRYL) 50 mg in NS 50 mL IVPB       Chemotherapy       obinutuzumab (GAZYVA) 1,000 mg in sodium chloride 0.9% 250 mL chemo infusion  6/1/2022       Pre-Medications       acetaminophen tablet 650 mg       diphenhydramine (BENADRYL) 50 mg in NS 50 mL IVPB       Chemotherapy       obinutuzumab (GAZYVA) 1,000 mg in sodium chloride 0.9% 250 mL chemo infusion    Therapy Plan Information  EVUSHELD (TIXAGEVIMAB/CILGAVIMAB)  diphenhydrAMINE capsule 25 mg  25 mg, Oral, PRN  predniSONE tablet 40 mg  40 mg, Oral, PRN  EPINEPHrine (EPIPEN) 0.3 mg/0.3 mL pen injection 0.3 mg  0.3 mg, Intramuscular, PRN  ondansetron disintegrating tablet 4 mg  4 mg, Oral, PRN  acetaminophen tablet 650 mg  650 mg, Oral, PRN  albuterol inhaler 2 puff  2 puff, Inhalation, PRN    EVUSHELD (TIXAGEVIMAB/CILGAVIMAB)  diphenhydrAMINE capsule 25 mg  25 mg, Oral, PRN  predniSONE tablet 40 mg  40 mg, Oral, PRN  EPINEPHrine (EPIPEN) 0.3 mg/0.3 mL pen injection 0.3 mg  0.3 mg, Intramuscular, PRN  ondansetron disintegrating tablet 4 mg  4 mg, Oral, PRN  acetaminophen tablet 650 mg  650 mg, Oral, PRN  albuterol inhaler 2 puff  2 puff, Inhalation, PRN      Bubba Wright MD  Hematology, Oncology, and Stem Cell Transplantation  [facility] and [facility]

## 2022-04-05 NOTE — PLAN OF CARE
[facility]   Pelvic Health Physical Therapy Initial Evaluation     Date: 3/31/2022   Name: Cassidy Aguilar  Clinic Number: [id number]    Therapy Diagnosis:   Encounter Diagnoses   Name Primary?    Pelvic floor instability     Weakness     Lack of coordination      Physician: Adonis Wright MD    Physician Orders: PT Eval and Treat   Medical Diagnosis from Referral: M62.89 (ICD-10-CM) - Pelvic floor instability   Evaluation Date: 3/31/2022  Authorization Period Expiration: 3/7/2023  Plan of Care Expiration: 2022  Progress Note Due: 2022  Visit # / Visits authorized:    FOTO: Issued Visit #: 1     Precautions: Standard and cancer    Time In: 3:15 pm (late arrival)  Time Out: 4:00 pm  Total Appointment Time (timed & untimed codes): 45 minutes    SUBJECTIVE     Date of onset: 6 months ago    History of current condition - Cassidy reports: She had prolapse in  and had a hysterectomy. Things were fine for 5 years and then she started having some issues with urination and leakage. She started using pessary. Still had ADITHYA with cough/sneeze. Resolved itself overtime. A couple years ago she started having urinary urgency. She has eliminated some things from her diet which helped. She started having urgency and frequency again 6 months ago. She didn't feel like she was completely emptying and has to return to toilet soon after voiding. Sometimes urge comes on so suddenly she barely makes it to the toilet. She started taking more fiber to help with urgent bowel movements. Colonoscopy revealed she could benefit from PF strengthening.     OB/GYN History:  vaginally  Sexually active? No  Pain with vaginal exams, intercourse or tampon use? No  Regular menstrual cycle: No  Birth Control: none    Bladder/Bowel History:    Frequency of urination:   Daytime: over 8x/day           Nighttime: 1-3x/night   Difficulty initiating urine stream: No   Urine stream: strong 90% of the  time   Complete emptying: No   Bladder leakage: Yes   Frequency of incidents: rarely   Amount leaked (urine): stream starts before she can get her pants down   Form of protection: panty liner   Number of pads required in 24 hours: 1-2x/day due to fecal matter   Urinary Urgency: Yes, Able to delay the urge for at least 5 minute(s).   Frequency of bowel movements: 3-4 times a day   Difficulty initiating BM: No   Quality/Shape of BM: [place] Stool Chart type type 3-4 and sometimes type 1   Does Patient Feel Empty after BM? No   Fiber Supplements or Laxative Use? Yes- fibercon (2 tsp 2x/day)   Colon leakage: No   Frequency of incidents: n/a   Amount leaked (bowels): none    Pain: no pain  Location: pelvic floor   Current 0/10, worst 0/10, best 0/10   Description: n/a  Aggravating Factors/Activities that cause symptoms: n/a   Easing Factors: n/a    Imaging none    Prior Therapy: 2 bouts of pelvic PT  Social History: lives alone  Current exercise: plays tennis 3-4 times per week  Occupation: Patient works as an  and mostly works sitting at desk.  Prior Level of Function: I with ADLs  Current Level of Function: experiences urinary urgency and frequency    Types of fluid intake: 64oz of water, 1 cup of coffee with milk, 1 cup of juice, 1 glass of milk at night  Diet: well rounded diet   Habitus: well developed, well nourished  Abuse/Neglect: No     Patients goals: To improve urinary urgency and frequency     Medical History: Cassidy  has a past medical history of CLL (chronic lymphocytic leukemia) (12/22/2021), Hypertension, and Thyroid disease.     Surgical History: Cassidy Aguilar  has a past surgical history that includes Hysterectomy; Colonoscopy; Adenoidectomy; and Tonsillectomy.    Medications: Cassidy has a current medication list which includes the following prescription(s): acyclovir, allopurinol, aspirin, atorvastatin, carvedilol phosphate 20 mg oral cm24, clindamycin, ergocalciferol, fluticasone  propionate, fluzone high-dose 2018-19 (pf), glucosamine-chondroitin, hydrocodone-acetaminophen, multivitamin, omeprazole, prednisone, synthroid, telmisartan, telmisartan-hydrochlorothiazide, venclexta, venetoclax, venetoclax, vitamin d, and vivelle-dot.    Allergies:   Review of patient's allergies indicates:   Allergen Reactions    Sulfa (sulfonamide antibiotics) Rash    [place] Diarrhea        OBJECTIVE     See EMR under MEDIA for written consent provided 3/31/2022  Chaperone: declined    ORTHO SCREEN-deferred secondary to time constraints    VAGINAL PELVIC FLOOR EXAM    EXTERNAL ASSESSMENT  Introitus: gaping  Skin condition: WNL  Scarring: none   Sensation: WNL   Pain: none  Voluntary contraction: visible lift  Voluntary relaxation: visible drop  Involuntary contraction: bulge  Bearing down: bulge  Perineal descent: present  Comments: Cystocele present at introitus      INTERNAL ASSESSMENT  Pain: none   Sensation: able to localized pressure appropriately   Vaginal vault: roomy   Muscle Bulk: atrophy   Muscle Power: 2+/5  Muscle Endurance: 10 sec  # Reps To Fatigue: 4    Fast Contractions in 10 seconds: 5 (several more with incomplete relaxation)     Quality of contraction: slow relaxation   Specificity: WNL   Coordination: WNL   Prolapse check: Grade 2 cystocele  Comments: pessary present during internal exam    Limitation/Restriction for FOTO Urinary Problem Survey    Therapist reviewed FOTO scores for Cassidy Aguilar on 3/31/2022.   FOTO documents entered into EPIC - see Media section.    Limitation Score: 40%       TREATMENT     Total Treatment time (time-based codes) separate from Evaluation: 10 minutes     Therapeutic Exercise to improve endurance for 10 minutes including:  Discussed double voiding techniques and bladder irritants      PATIENT EDUCATION AND HOME EXERCISES     Education provided:   general anatomy/physiology of urinary/ bowel  system, benefits of treatment and risks of treatment were discussed  with the patient. Additionally, bladder irritants, anatomy/physiology of pelvic floor and double voiding techniques were reviewed.     Written Home Exercises provided: no exercises provided today.  Exercises were reviewed and Cassidy was able to demonstrate them prior to the end of the session. Cassidy demonstrated good  understanding of the education provided. See EMR under Patient Instructions for exercises provided during therapy sessions.    ASSESSMENT     Cassidy is a 77 y.o. female referred to outpatient Physical Therapy with a medical diagnosis of M62.89 (ICD-10-CM) - Pelvic floor instability. Pt presents s/s consistent with urinary urgency and frequency and urinary incontinence. Pt's impairments include poor coordination of pelvic floor muscles and pelvic floor muscle weakness. Due to these impairments pt experiences urinary urgency and frequency secondary to incomplete voiding. Pt will benefit from skilled PT focused on improving pelvic floor muscle strength and coordination so that she no longer experiences urinary incontinence.    Patient prognosis is Good.   Patient will benefit from skilled outpatient Physical Therapy to address the deficits stated above and in the chart below, provide patient/family education, and to maximize patient's level of independence.     Plan of care discussed with patient: Yes  Patient's spiritual, cultural and educational needs considered and patient is agreeable to the plan of care and goals as stated below:     Anticipated Barriers for therapy: none    Medical Necessity is demonstrated by the following:  History  Co-morbidities and personal factors that may impact the plan of care Co-morbidities   cancer, HTN, thyroid disease    Personal Factors  no deficits     low   Examination  Body structures and functions, activity limitations and participation restrictions that may impact the plan of care Body Regions/Systems/Functions:  decreased pelvic muscle strength, decreased endurance  "of the pelvic muscles, increased frequency of urination and poor coordination of pelvic floor muscles during ADL's leading to urinary or fecal leakage     Activity Limitations:  delaying urge to urinate, full bladder emptying and incontinence with ADLs    Participation Restrictions:  all ADLs/iADLs uninterrupted by urinary incontinence/urgency/frequency and social activities with friends/family    Activity limitations:   Learning and applying knowledge  no deficits    General Tasks and Commands  no deficits    Communication  no deficits    Mobility  no deficits    Self care  no deficits    Domestic Life  no deficits    Interactions/Relationships  no deficits    Life Areas  no deficits    Community and Social Life  community life  recreation and leisure       low   Clinical Presentation Stable with unchanging characteristics low   Decision Making/ Complexity Score: low     Goals:  Short Term Goals: 6 weeks   Pt to perform "the knack" prior to coughing, laughing or sneezing to decrease risk of incontinence.  Pt to report a decrease in pad usage to no more than 1 a day to demonstrate improving pelvic floor function needed for continence.  Pt to demonstrate being able to correctly and consistently perform a kegel which is needed  to increase pelvic floor muscle coordination and strength needed for continence.  Pt to report being able to transfer from sitting to standing without leakage of urine.  Pt to report being able to sneeze without incontinence demonstrating improved pelvic floor strength and coordination to improve confidence in social situations    Long Term Goals: 12 weeks   Pt to be discharged with home plan for carry over after discharge.    Pt to be able to perform a 10 second kegel x 10 reps to demonstrate improving strength and endurance needed for continence.  Pt to report a decrease in pad usage to 0 pads a day to demonstrate improving pelvic floor muscle controls as evidenced by decreased episodes of " incontinence needed to improve confidence in social situations.  Pt to report elimination of incontinence with ADLs to demonstrate improved pelvic floor muscle strength and coordination.  Pt to be able to delay the urge to urinate at least 30 minutes with a strong urge to urinate in order to make it to the bathroom without leaking.  Pt to report no longer feeling the need to urinate just in case when shopping or participating in social activities to demonstrate improving pelvic floor and bladder control.    PLAN     Plan of care Certification: 3/31/2022 to 6/23/2022.    Outpatient Physical Therapy 1 time(s) every week(s) for 12 weeks to include the following interventions: Manual Therapy, Moist Heat/ Ice, Neuromuscular Re-ed, Patient Education, Self Care, Therapeutic Activities, Therapeutic Exercise, Ultrasound and Biofeedback.     Next visit: urge suppression techniques, review kegels, proximal hip and core strengthening    Sarina Saravia, PT      I CERTIFY THE NEED FOR THESE SERVICES FURNISHED UNDER THIS PLAN OF TREATMENT AND WHILE UNDER MY CARE   Physician's comments:     Physician's Signature: ___________________________________________________

## 2022-04-05 NOTE — PATIENT INSTRUCTIONS
Home Exercise Program: 04/05/2022      BLADDER HEALTH      WHAT IS CONSIDERED NORMAL?  The average bladder can hold about 2 cups of urine before it needs to be emptied.  The normal range of voiding urine is 6 to 8 times during a 24 hour period, or every 3-4 hours. As we get older, our bladder capacity can get smaller and we may need to pass urine more frequently but usually not more than every 2 hours.  Urine should flow easily without discomfort in a good, steady stream until the bladder is empty. No pushing or straining is necessary to empty the bladder.  An urge is a normal signal that you feel as the bladder stretches to fill with urine. Urges can be felt even if the bladder is not full. Urges are not commands to go to the toilet, merely a signal and can be controlled.    WHAT ARE GOOD BLADDER HABITS?  Take your time when emptying your bladder. Dont strain or push to empty your bladder. Make sure you empty your bladder completely each time you pass urine.  Dont wait too long - consistently ignoring the urge to go (waiting more than 4 hours between toileting) or urinating too infrequently may be convenient but not healthy for your bladder. You can actually overstretch your bladder beyond its normal size.   Dont go too often - avoid going to the toilet just in case (more often than every 2 hours). It is usually not necessary to go when you feel the first urge. Try to go only when your bladder is full. Dont let your bladder control your life.    DIET CAN AFFECT THE BLADDER  Maintain a healthy fluid intake. Many people decrease their intake of liquids in hopes that they will need to urinate less frequently or have less urinary leakage. While a decrease in liquid intake does result in a decrease in the volume of urine, the smaller amount of urine may be more highly concentrated. Highly concentrated, dark yellow urine is irritating to the bladder surface and may actually cause you to go to the bathroom more  frequently. It also encourages the growth of bacteria, which may lead to infections resulting in incontinence.  Depending on your body size and environment, drink 4-8 cups (8 oz each) of fluid per day, spread throughout the entire day, unless otherwise advised by your doctor.    Avoid of use the following acidic food/beverages in moderation:  Alcoholic beverages    Tomato-based products  Vinegar  Coffee (regular and decaf)  Tea (regular and decaf)  Citrus fruits and juices  Spicy foods, jackson  Caffeinated beverages & soda   Milk  Food colorings and flavorings  Artificial sweeteners  Chocolate    Try using these dietary substitutions:  Water: grape juice, apple juice  Fruit: pears, apricots, papaya, watermelon  Coffee: KAVA®, Postum®, Yamel®, Kaffree Lorida®  Tea: Non-citrus herbal, sun-brewed tea  Vitamin C: Calcium carbonate co-buffered with calcium ascorbate    Avoid constipation by maintaining a balanced diet of dietary fiber. Typical dietary recommendations for fiber are between 25-35 grams per day. You should discuss your fiber needs with your physician, pharmacist or nutritionist.    Stop smoking. Smoking is irritating to the bladder surface and is associated with bladder cancer. In addition, the coughing associated with smoking may lead to increased incontinent episodes.       DOUBLE VOIDING - Double voiding is a technique that may assist the bladder to empty more effectively when  urine is left in the bladder. It involves passing  urine more than once each time that you go to  the toilet. This makes sure that the bladder is  completely empty.    Here are 3 strategies you can try to fully empty your bladder.  You do not have to do all of these things every single time. Find which ones work best for you.     Check to make sure your pelvic floor is relaxed   Do a body scan - make sure your legs, buttocks, and abdominals are relaxed.  Take a couple deep, slow breaths to encourage your pelvic floor muscles to DROP  (ie. Try Diaphragmatic Breathing).  Gently apply pressure over your bladder.  Change your pelvic position: lean forward, rock your pelvis forward and backward, stand up then sit back down.     Wait at least 30 seconds to 1 minute to see if a second urine stream begins.     Do not stop your urine stream or push/strain!

## 2022-04-06 ENCOUNTER — PATIENT MESSAGE (OUTPATIENT)
Dept: HEMATOLOGY/ONCOLOGY | Facility: CLINIC | Age: 77
End: 2022-04-06
Payer: MEDICARE

## 2022-04-06 ENCOUNTER — INFUSION (OUTPATIENT)
Dept: INFUSION THERAPY | Facility: HOSPITAL | Age: 77
End: 2022-04-06
Payer: MEDICARE

## 2022-04-06 VITALS
DIASTOLIC BLOOD PRESSURE: 52 MMHG | RESPIRATION RATE: 18 BRPM | TEMPERATURE: 99 F | SYSTOLIC BLOOD PRESSURE: 96 MMHG | OXYGEN SATURATION: 99 % | HEART RATE: 66 BPM

## 2022-04-06 DIAGNOSIS — C91.10 CLL (CHRONIC LYMPHOCYTIC LEUKEMIA): Primary | ICD-10-CM

## 2022-04-06 PROCEDURE — 96367 TX/PROPH/DG ADDL SEQ IV INF: CPT

## 2022-04-06 PROCEDURE — 63600175 PHARM REV CODE 636 W HCPCS: Mod: JG | Performed by: INTERNAL MEDICINE

## 2022-04-06 PROCEDURE — 25000003 PHARM REV CODE 250: Performed by: INTERNAL MEDICINE

## 2022-04-06 PROCEDURE — 96413 CHEMO IV INFUSION 1 HR: CPT

## 2022-04-06 PROCEDURE — 96415 CHEMO IV INFUSION ADDL HR: CPT

## 2022-04-06 RX ORDER — EPINEPHRINE 0.3 MG/.3ML
0.3 INJECTION SUBCUTANEOUS ONCE AS NEEDED
Status: DISCONTINUED | OUTPATIENT
Start: 2022-04-06 | End: 2022-04-06 | Stop reason: HOSPADM

## 2022-04-06 RX ORDER — ACETAMINOPHEN 325 MG/1
650 TABLET ORAL
Status: COMPLETED | OUTPATIENT
Start: 2022-04-06 | End: 2022-04-06

## 2022-04-06 RX ORDER — SODIUM CHLORIDE 0.9 % (FLUSH) 0.9 %
10 SYRINGE (ML) INJECTION
Status: DISCONTINUED | OUTPATIENT
Start: 2022-04-06 | End: 2022-04-06 | Stop reason: HOSPADM

## 2022-04-06 RX ORDER — HEPARIN 100 UNIT/ML
500 SYRINGE INTRAVENOUS
Status: DISCONTINUED | OUTPATIENT
Start: 2022-04-06 | End: 2022-04-06 | Stop reason: HOSPADM

## 2022-04-06 RX ORDER — DIPHENHYDRAMINE HYDROCHLORIDE 50 MG/ML
50 INJECTION INTRAMUSCULAR; INTRAVENOUS ONCE AS NEEDED
Status: DISCONTINUED | OUTPATIENT
Start: 2022-04-06 | End: 2022-04-06 | Stop reason: HOSPADM

## 2022-04-06 RX ADMIN — SODIUM CHLORIDE: 0.9 INJECTION, SOLUTION INTRAVENOUS at 11:04

## 2022-04-06 RX ADMIN — ACETAMINOPHEN 650 MG: 325 TABLET ORAL at 11:04

## 2022-04-06 RX ADMIN — DIPHENHYDRAMINE HYDROCHLORIDE 50 MG: 50 INJECTION, SOLUTION INTRAMUSCULAR; INTRAVENOUS at 11:04

## 2022-04-06 RX ADMIN — OBINUTUZUMAB 1000 MG: 1000 INJECTION, SOLUTION, CONCENTRATE INTRAVENOUS at 12:04

## 2022-04-06 NOTE — PLAN OF CARE
1615 Pt tolerated Gazyva infusion well today, no complaints or complications,. VSS through duration of treatment. Pt aware to call provider with any questions or concerns, plan of care reviewed, aware of upcoming appts. Pt ambulatory from clinic with steady gait, no distress noted.

## 2022-04-07 ENCOUNTER — CLINICAL SUPPORT (OUTPATIENT)
Dept: REHABILITATION | Facility: OTHER | Age: 77
End: 2022-04-07
Payer: MEDICARE

## 2022-04-07 DIAGNOSIS — R27.9 LACK OF COORDINATION: ICD-10-CM

## 2022-04-07 DIAGNOSIS — R53.1 WEAKNESS: Primary | ICD-10-CM

## 2022-04-07 PROCEDURE — 97110 THERAPEUTIC EXERCISES: CPT | Mod: PN

## 2022-04-07 PROCEDURE — 97530 THERAPEUTIC ACTIVITIES: CPT | Mod: PN

## 2022-04-07 PROCEDURE — 97140 MANUAL THERAPY 1/> REGIONS: CPT | Mod: PN

## 2022-04-07 NOTE — PROGRESS NOTES
"  Pelvic Health Physical Therapy   Treatment Note     Name: Cassidy Aguilar  Clinic Number: [id number]    Therapy Diagnosis:   Encounter Diagnoses   Name Primary?    Weakness Yes    Lack of coordination      Physician: Adonis Wright MD    Visit Date: 4/7/2022    Physician Orders: PT Eval and Treat   Medical Diagnosis from Referral: M62.89 (ICD-10-CM) - Pelvic floor instability   Evaluation Date: 3/31/2022  Authorization Period Expiration: 6/1/2022  Plan of Care Expiration: 6/23/2022  Progress Note Due: 4/30/2022  Visit # / Visits authorized: 1/ 20 (2 total visits)  FOTO: Issued Visit #: 1     Time In: 2:05 pm  Time Out: 3:00 pm  Total Billable Time: 55 minutes    Precautions: Standard and cancer    Subjective     Pt reports: Her leakage has been pretty good except for one day when she had a coughing attack. She had a decent amount of leakage when it happened (enough to soak through pad). Her pessary keeps slipping out of place. Her OBGYN has ordered another size. She plans to follow up to find out if it has come in yet.  She was compliant with home exercise program.  Response to previous treatment: no adverse response reported  Functional change: less urinary leakage    Pain: 0/10  Location: pelvic floor    Objective     Cassidy received therapeutic exercises to develop  strength for 35 minutes including:   Hip adduction with kegel 2x10 5"  Hip abduction with kegel GTB 2x10 5"  Bridges 2x10 B  Clamshells 2x10 B  SLR 2x10 B    Cassidy received the following manual therapy techniques: to develop extensibility for 00 minutes including:       Cassidy participated in neuromuscular re-education activities to develop Coordination for 10 minutes including:   Pt given verbal feedback from PT via internal palpation for kegels    Cassidy participated in dynamic functional therapeutic activities to improve functional performance for 10  minutes, including:  Pt educated on urge suppression techniques- practiced in clinic      Home " "Exercises Provided and Patient Education Provided     Education provided:   - urge suppression techniques  Discussed progression of plan of care with patient; educated pt in activity modification; reviewed HEP with pt. Pt demonstrated and verbalized understanding of all instruction and was provided with a handout of HEP (see Patient Instructions).  - updated HEP    Written Home Exercises Provided: yes.  Exercises were reviewed and Cassidy was able to demonstrate them prior to the end of the session.  Cassidy demonstrated good  understanding of the education provided.     See EMR under Patient Instructions for exercises provided 4/7/2022.    Assessment     Pt presents to PT with improved urinary leakage. Pt demonstrated good ability to perform a kegel. Pt practiced urge suppression techniques in the clinic with good success. Pt required verbal cueing to fully drop pelvic floor when performing quick flicks. Pt will continue to benefit from skilled PT focused in improving pelvic floor muscle coordination and strength so that she can make it to restroom without leakage.  Cassidy Is progressing well towards her goals.   Pt prognosis is Good.     Pt will continue to benefit from skilled outpatient physical therapy to address the deficits listed in the problem list box on initial evaluation, provide pt/family education and to maximize pt's level of independence in the home and community environment.     Pt's spiritual, cultural and educational needs considered and pt agreeable to plan of care and goals.     Anticipated barriers to physical therapy: none    Goals:   Short Term Goals: 6 weeks   Pt to perform "the knack" prior to coughing, laughing or sneezing to decrease risk of incontinence. (progressing, not met)  Pt to report a decrease in pad usage to no more than 1 a day to demonstrate improving pelvic floor function needed for continence. (progressing, not met)  Pt to demonstrate being able to correctly and consistently " perform a kegel which is needed  to increase pelvic floor muscle coordination and strength needed for continence. (progressing, not met)  Pt to report being able to transfer from sitting to standing without leakage of urine. (progressing, not met)  Pt to report being able to sneeze without incontinence demonstrating improved pelvic floor strength and coordination to improve confidence in social situations. (progressing, not met)     Long Term Goals: 12 weeks   Pt to be discharged with home plan for carry over after discharge. (progressing, not met)    Pt to be able to perform a 10 second kegel x 10 reps to demonstrate improving strength and endurance needed for continence. (progressing, not met)  Pt to report a decrease in pad usage to 0 pads a day to demonstrate improving pelvic floor muscle controls as evidenced by decreased episodes of incontinence needed to improve confidence in social situations. (progressing, not met)  Pt to report elimination of incontinence with ADLs to demonstrate improved pelvic floor muscle strength and coordination. (progressing, not met)  Pt to be able to delay the urge to urinate at least 30 minutes with a strong urge to urinate in order to make it to the bathroom without leaking. (progressing, not met)  Pt to report no longer feeling the need to urinate just in case when shopping or participating in social activities to demonstrate improving pelvic floor and bladder control. (progressing, not met)    Plan     Next visit: ask about success of urge suppression technique, continue to progress strengthening exercises as tolerated    Sarina Saravia, PT

## 2022-04-07 NOTE — PATIENT INSTRUCTIONS
CONTROLLING URINARY / FECAL URGENCY    What to do when you experience a strong urge to urinate or defecate:     FIRST  Stop activity, stand quietly or sit down. Try to stay very still to maintain control. Avoid rushing to the toilet.    SECOND Begin Quick Flicks (1 second LIFT of pelvic floor muscles, 4 second DROP). Pelvic floor contractions send a message to the bladder to relax and hold urine.     THIRD Relax. Do not rush to the toilet. Take a deep belly or diaphragmatic breath and let it out slowly. Let the urge to urinate pass by using distraction techniques and positive thoughts. Try not to think about going to the bathroom.     FINALLY If the urge returns, repeat the above steps to regain control. When you feel the urge subside, walk normally to the bathroom. You can urinate once the urge has subsided.        The urge feeling strikes!   Stop, breathe, and be still and then begin Quick Flicks     Do Not rush to the toilet.     Think positively and distract yourself.           Clamshells   - Start laying on your side, feet together and knees bent.   - Keep your feet together as you rotate your hip to bring your knee up. Don't let your pelvis rock forward.   - Repeat 2 sets of 10 on each side.     Kegels - start this laying down so that your pelvic floor doesn't have to work against gravity. Start by taking a deep breath in, then as you exhale draw your pelvic floor closed and lift like you were stopping the flow of urine. Hold this contraction as strong as you can for 3 seconds. Release to a fully relaxed position. Exhale like you are blowing out birthday candles while performing a Kegel to avoid holding your breath and increasing pressure on your abdomen/pelvic floor  Perform 2 sets of 10 reps, 1 times per day.      ADDUCTION    Sit or lie down with hips and knees bent with feet flat on the floor. Place a rolled up towel, ball, or pillow between your knees and press your knees together so that you squeeze the  object firmly. Do 50% of a full contraction.    Hold for 5 seconds. Repeat 10 times. Do 2 set per day.      ABDUCTION / EXTERNAL ROTATION    Sit or lie down on your back with your knees bent and feet together flat on the floor. Place an elastic band around your knees. Separate your knees, then bring them back together slowly.    Hold for 5 seconds. Repeat 10 times. Do 2 sets per day.        Lay on mat with knees bent. Tuck pelvis and lift hips up off table.  Do 2 times and perform 10 sets.     STRAIGHT LEG RAISE - SLR    While lying on your back, raise up your leg with a straight knee.  Keep the opposite knee bent with the foot planted on the ground.  Complete 2 sets of 10 on each side.

## 2022-04-08 ENCOUNTER — SPECIALTY PHARMACY (OUTPATIENT)
Dept: PHARMACY | Facility: CLINIC | Age: 77
End: 2022-04-08
Payer: MEDICARE

## 2022-04-08 NOTE — TELEPHONE ENCOUNTER
Specialty Pharmacy - Refill Coordination    Specialty Medication Orders Linked to Encounter    Flowsheet Row Most Recent Value   Medication #1 venetoclax (VENCLEXTA) 100 mg Tab (Order#[id number], Rx#[id number])          Refill Questions - Documented Responses    Flowsheet Row Most Recent Value   Patient Availability and HIPAA Verification    Does patient want to proceed with activity? Yes   HIPAA/medical authority confirmed? Yes   Relationship to patient of person spoken to? Self   Refill Screening Questions    Changes to allergies? No   Changes to medications? No   New conditions since last clinic visit? No   Unplanned office visit, urgent care, ED, or hospital admission in the last 4 weeks? No   How does patient/caregiver feel medication is working? Good   Financial problems or insurance changes? No   How many doses of your specialty medications were missed in the last 4 weeks? 0   Would patient like to speak to a pharmacist? No   When does the patient need to receive the medication? 04/15/22   Refill Delivery Questions    How will the patient receive the medication? Delivery Nadya   When does the patient need to receive the medication? 04/15/22   Shipping Address Home   Address in [facility] confirmed and updated if neccessary? Yes   Expected Copay ($) 341.57   Is the patient able to afford the medication copay? Yes   Payment Method CC on file   Days supply of Refill 30   Supplies needed? No supplies needed   Refill activity completed? Yes   Refill activity plan Refill scheduled   Shipment/Pickup Date: 04/12/22          Current Outpatient Medications   Medication Sig    acyclovir (ZOVIRAX) 400 MG tablet Take 1 tablet (400 mg total) by mouth 2 (two) times daily.    aspirin (ECOTRIN) 81 MG EC tablet Take 81 mg by mouth.    atorvastatin (LIPITOR) 40 MG tablet TAKE ONE TABLET BY MOUTH once DAILY AT BEDTIME FOR cholesterol control    calcium polycarbophil (FIBERCON ORAL) Take by mouth.    CARVEDILOL  PHOSPHATE 20 MG ORAL CM24 (COREG CR) 20 mg 24 hr capsule Take 20 mg by mouth nightly.    clindamycin (CLEOCIN T) 1 % Swab 2 (two) times a day.    ergocalciferol (ERGOCALCIFEROL) 50,000 unit Cap Take 50,000 Units by mouth every 7 days.    fluticasone (FLONASE) 50 mcg/actuation nasal spray 1 spray by Each Nare route once daily.    FLUZONE HIGH-DOSE 2018-19, PF, 180 mcg/0.5 mL vaccine ADM 0.5ML IM UTD    glucosamine-chondroitin 500-400 mg tablet Take 1 tablet by mouth 3 (three) times daily.    multivitamin (THERAGRAN) per tablet Take 1 tablet by mouth once daily.    omeprazole (PRILOSEC OTC) 20 MG tablet Take 1 tablet (20 mg total) by mouth once daily. (Patient not taking: No sig reported)    predniSONE (DELTASONE) 10 MG tablet Take 1 tablet (10 mg total) by mouth once daily.    SYNTHROID 75 mcg tablet Take 1 tablet (75 mcg total) by mouth daily    telmisartan (MICARDIS) 80 MG Tab     venetoclax (VENCLEXTA) 10 mg Tab Take 10 mg (1 tablet)  by mouth daily on days 1-7 of cycle 2.   Take 20 mg (2 tablets) by mouth daily on days 8-14 of cycle 2.  Take 50 mg (5 tablets) daily on days 15-21 of cycle 2. (Patient not taking: Reported on 4/4/2022)    venetoclax (VENCLEXTA) 100 mg Tab Take 100 mg (1 tablet) by mouth daily  on days 22-28 of cycle 2.  Take 200 mg (2 tablets) by mouth daily on days 1-7 of cycle 3. (Patient not taking: Reported on 4/4/2022.)    venetoclax (VENCLEXTA) 100 mg Tab Take 200 mg (2 tablets)  by mouth once daily.    vitamin D (VITAMIN D3) 1000 units Tab Take 1,000 Units by mouth.    VIVELLE-DOT 0.025 mg/24 hr Place 1 patch onto the skin twice a week   Last reviewed on 4/4/2022  1:56 PM by Adonis Wright MD    Review of patient's allergies indicates:   Allergen Reactions    Sulfa (sulfonamide antibiotics) Rash    [place] Diarrhea    Last reviewed on  4/6/2022 11:16 AM by Iris Giordano      Tasks added this encounter   5/8/2022 - Refill Call (Auto Added)   Tasks due within next 3  months   5/3/2022 - Clinical - Follow Up Assesement (90 day)     [facility]  [address]  [facility] [address]  Phone: [phone]  Fax: [phone]

## 2022-04-08 NOTE — TELEPHONE ENCOUNTER
Outgoing call regarding Venclexta. Pt stated she just wanted us to let her know when we can set up her refill and charge her card then give her notigication when its coming. I explained to pt we have to get her permission every time. She stated Im giving you my permission to do it automatically every month and I informed her ill inform the [facility] and my supervisor and she said okay.

## 2022-04-11 ENCOUNTER — LAB VISIT (OUTPATIENT)
Dept: LAB | Facility: HOSPITAL | Age: 77
End: 2022-04-11
Attending: INTERNAL MEDICINE
Payer: MEDICARE

## 2022-04-11 DIAGNOSIS — D59.9 ACQUIRED HEMOLYTIC ANEMIA: ICD-10-CM

## 2022-04-11 DIAGNOSIS — C91.10 CLL (CHRONIC LYMPHOCYTIC LEUKEMIA): ICD-10-CM

## 2022-04-11 LAB
ALBUMIN SERPL BCP-MCNC: 4.2 G/DL (ref 3.5–5.2)
ALP SERPL-CCNC: 72 U/L (ref 55–135)
ALT SERPL W/O P-5'-P-CCNC: 20 U/L (ref 10–44)
ANION GAP SERPL CALC-SCNC: 8 MMOL/L (ref 8–16)
AST SERPL-CCNC: 21 U/L (ref 10–40)
BASOPHILS # BLD AUTO: 0.03 K/UL (ref 0–0.2)
BASOPHILS NFR BLD: 1 % (ref 0–1.9)
BILIRUB SERPL-MCNC: 2.4 MG/DL (ref 0.1–1)
BUN SERPL-MCNC: 12 MG/DL (ref 8–23)
CALCIUM SERPL-MCNC: 9.4 MG/DL (ref 8.7–10.5)
CHLORIDE SERPL-SCNC: 100 MMOL/L (ref 95–110)
CO2 SERPL-SCNC: 29 MMOL/L (ref 23–29)
CREAT SERPL-MCNC: 0.6 MG/DL (ref 0.5–1.4)
DIFFERENTIAL METHOD: ABNORMAL
EOSINOPHIL # BLD AUTO: 0 K/UL (ref 0–0.5)
EOSINOPHIL NFR BLD: 0.3 % (ref 0–8)
ERYTHROCYTE [DISTWIDTH] IN BLOOD BY AUTOMATED COUNT: 12.6 % (ref 11.5–14.5)
EST. GFR  (AFRICAN AMERICAN): >60 ML/MIN/1.73 M^2
EST. GFR  (NON AFRICAN AMERICAN): >60 ML/MIN/1.73 M^2
GLUCOSE SERPL-MCNC: 77 MG/DL (ref 70–110)
HAPTOGLOB SERPL-MCNC: <10 MG/DL (ref 30–250)
HCT VFR BLD AUTO: 33.4 % (ref 37–48.5)
HGB BLD-MCNC: 11.6 G/DL (ref 12–16)
IMM GRANULOCYTES # BLD AUTO: 0 K/UL (ref 0–0.04)
IMM GRANULOCYTES NFR BLD AUTO: 0 % (ref 0–0.5)
LDH SERPL L TO P-CCNC: 172 U/L (ref 110–260)
LYMPHOCYTES # BLD AUTO: 1.3 K/UL (ref 1–4.8)
LYMPHOCYTES NFR BLD: 46.2 % (ref 18–48)
MAGNESIUM SERPL-MCNC: 1.9 MG/DL (ref 1.6–2.6)
MCH RBC QN AUTO: 34.4 PG (ref 27–31)
MCHC RBC AUTO-ENTMCNC: 34.7 G/DL (ref 32–36)
MCV RBC AUTO: 99 FL (ref 82–98)
MONOCYTES # BLD AUTO: 0.6 K/UL (ref 0.3–1)
MONOCYTES NFR BLD: 19.3 % (ref 4–15)
NEUTROPHILS # BLD AUTO: 1 K/UL (ref 1.8–7.7)
NEUTROPHILS NFR BLD: 33.2 % (ref 38–73)
NRBC BLD-RTO: 0 /100 WBC
PHOSPHATE SERPL-MCNC: 4 MG/DL (ref 2.7–4.5)
PLATELET # BLD AUTO: 157 K/UL (ref 150–450)
PMV BLD AUTO: 10 FL (ref 9.2–12.9)
POTASSIUM SERPL-SCNC: 4.2 MMOL/L (ref 3.5–5.1)
PROT SERPL-MCNC: 6.5 G/DL (ref 6–8.4)
RBC # BLD AUTO: 3.37 M/UL (ref 4–5.4)
SODIUM SERPL-SCNC: 137 MMOL/L (ref 136–145)
URATE SERPL-MCNC: 4.1 MG/DL (ref 2.4–5.7)
WBC # BLD AUTO: 2.9 K/UL (ref 3.9–12.7)

## 2022-04-11 PROCEDURE — 84550 ASSAY OF BLOOD/URIC ACID: CPT | Performed by: INTERNAL MEDICINE

## 2022-04-11 PROCEDURE — 36415 COLL VENOUS BLD VENIPUNCTURE: CPT | Performed by: INTERNAL MEDICINE

## 2022-04-11 PROCEDURE — 83010 ASSAY OF HAPTOGLOBIN QUANT: CPT | Performed by: INTERNAL MEDICINE

## 2022-04-11 PROCEDURE — 80053 COMPREHEN METABOLIC PANEL: CPT | Performed by: INTERNAL MEDICINE

## 2022-04-11 PROCEDURE — 83735 ASSAY OF MAGNESIUM: CPT | Performed by: INTERNAL MEDICINE

## 2022-04-11 PROCEDURE — 83615 LACTATE (LD) (LDH) ENZYME: CPT | Performed by: INTERNAL MEDICINE

## 2022-04-11 PROCEDURE — 85025 COMPLETE CBC W/AUTO DIFF WBC: CPT | Performed by: INTERNAL MEDICINE

## 2022-04-11 PROCEDURE — 84100 ASSAY OF PHOSPHORUS: CPT | Performed by: INTERNAL MEDICINE

## 2022-04-12 ENCOUNTER — TELEPHONE (OUTPATIENT)
Dept: PHARMACY | Facility: CLINIC | Age: 77
End: 2022-04-12
Payer: MEDICARE

## 2022-04-12 NOTE — TELEPHONE ENCOUNTER
Incoming call from patient requesting to speak to a supervisor in regards to recurring refill and shipping issues for Venclexta.     Patient states that she has informed [facility] to bill credit card each month, but she continues to receive numerous calls from [facility] as well as asked several refill questions each time of which none of the information had changed since the last fill.    Patient also reports that delivery instructions for delivery jeanette were not transmitted for last delivery. Advised that supervisors are unavailable at the moment. Patient requested a return call at the preferred number on file.

## 2022-04-13 ENCOUNTER — PATIENT MESSAGE (OUTPATIENT)
Dept: HEMATOLOGY/ONCOLOGY | Facility: CLINIC | Age: 77
End: 2022-04-13
Payer: MEDICARE

## 2022-04-18 ENCOUNTER — LAB VISIT (OUTPATIENT)
Dept: LAB | Facility: HOSPITAL | Age: 77
End: 2022-04-18
Attending: INTERNAL MEDICINE
Payer: MEDICARE

## 2022-04-18 ENCOUNTER — PATIENT MESSAGE (OUTPATIENT)
Dept: HEMATOLOGY/ONCOLOGY | Facility: CLINIC | Age: 77
End: 2022-04-18
Payer: MEDICARE

## 2022-04-18 ENCOUNTER — PATIENT MESSAGE (OUTPATIENT)
Dept: ADMINISTRATIVE | Facility: OTHER | Age: 77
End: 2022-04-18
Payer: MEDICARE

## 2022-04-18 DIAGNOSIS — D59.9 ACQUIRED HEMOLYTIC ANEMIA: ICD-10-CM

## 2022-04-18 DIAGNOSIS — C91.10 CLL (CHRONIC LYMPHOCYTIC LEUKEMIA): ICD-10-CM

## 2022-04-18 LAB
ALBUMIN SERPL BCP-MCNC: 3.9 G/DL (ref 3.5–5.2)
ALP SERPL-CCNC: 68 U/L (ref 55–135)
ALT SERPL W/O P-5'-P-CCNC: 22 U/L (ref 10–44)
ANION GAP SERPL CALC-SCNC: 8 MMOL/L (ref 8–16)
AST SERPL-CCNC: 20 U/L (ref 10–40)
BASOPHILS # BLD AUTO: 0.05 K/UL (ref 0–0.2)
BASOPHILS NFR BLD: 1.4 % (ref 0–1.9)
BILIRUB SERPL-MCNC: 2.5 MG/DL (ref 0.1–1)
BUN SERPL-MCNC: 12 MG/DL (ref 8–23)
CALCIUM SERPL-MCNC: 9.2 MG/DL (ref 8.7–10.5)
CHLORIDE SERPL-SCNC: 99 MMOL/L (ref 95–110)
CO2 SERPL-SCNC: 28 MMOL/L (ref 23–29)
CREAT SERPL-MCNC: 0.6 MG/DL (ref 0.5–1.4)
DIFFERENTIAL METHOD: ABNORMAL
EOSINOPHIL # BLD AUTO: 0 K/UL (ref 0–0.5)
EOSINOPHIL NFR BLD: 0.3 % (ref 0–8)
ERYTHROCYTE [DISTWIDTH] IN BLOOD BY AUTOMATED COUNT: 12.3 % (ref 11.5–14.5)
EST. GFR  (AFRICAN AMERICAN): >60 ML/MIN/1.73 M^2
EST. GFR  (NON AFRICAN AMERICAN): >60 ML/MIN/1.73 M^2
GLUCOSE SERPL-MCNC: 81 MG/DL (ref 70–110)
HAPTOGLOB SERPL-MCNC: <10 MG/DL (ref 30–250)
HCT VFR BLD AUTO: 29.6 % (ref 37–48.5)
HGB BLD-MCNC: 10.1 G/DL (ref 12–16)
IMM GRANULOCYTES # BLD AUTO: 0.01 K/UL (ref 0–0.04)
IMM GRANULOCYTES NFR BLD AUTO: 0.3 % (ref 0–0.5)
LDH SERPL L TO P-CCNC: 226 U/L (ref 110–260)
LYMPHOCYTES # BLD AUTO: 1.6 K/UL (ref 1–4.8)
LYMPHOCYTES NFR BLD: 46.2 % (ref 18–48)
MAGNESIUM SERPL-MCNC: 1.9 MG/DL (ref 1.6–2.6)
MCH RBC QN AUTO: 33.1 PG (ref 27–31)
MCHC RBC AUTO-ENTMCNC: 34.1 G/DL (ref 32–36)
MCV RBC AUTO: 97 FL (ref 82–98)
MONOCYTES # BLD AUTO: 0.8 K/UL (ref 0.3–1)
MONOCYTES NFR BLD: 23.9 % (ref 4–15)
NEUTROPHILS # BLD AUTO: 1 K/UL (ref 1.8–7.7)
NEUTROPHILS NFR BLD: 27.9 % (ref 38–73)
NRBC BLD-RTO: 0 /100 WBC
PHOSPHATE SERPL-MCNC: 4 MG/DL (ref 2.7–4.5)
PLATELET # BLD AUTO: 230 K/UL (ref 150–450)
PMV BLD AUTO: 9.8 FL (ref 9.2–12.9)
POTASSIUM SERPL-SCNC: 4.1 MMOL/L (ref 3.5–5.1)
PROT SERPL-MCNC: 6.3 G/DL (ref 6–8.4)
RBC # BLD AUTO: 3.05 M/UL (ref 4–5.4)
SODIUM SERPL-SCNC: 135 MMOL/L (ref 136–145)
URATE SERPL-MCNC: 3.8 MG/DL (ref 2.4–5.7)
WBC # BLD AUTO: 3.51 K/UL (ref 3.9–12.7)

## 2022-04-18 PROCEDURE — 83615 LACTATE (LD) (LDH) ENZYME: CPT | Performed by: INTERNAL MEDICINE

## 2022-04-18 PROCEDURE — 80053 COMPREHEN METABOLIC PANEL: CPT | Performed by: INTERNAL MEDICINE

## 2022-04-18 PROCEDURE — 83010 ASSAY OF HAPTOGLOBIN QUANT: CPT | Performed by: INTERNAL MEDICINE

## 2022-04-18 PROCEDURE — 83735 ASSAY OF MAGNESIUM: CPT | Performed by: INTERNAL MEDICINE

## 2022-04-18 PROCEDURE — 84550 ASSAY OF BLOOD/URIC ACID: CPT | Performed by: INTERNAL MEDICINE

## 2022-04-18 PROCEDURE — 84100 ASSAY OF PHOSPHORUS: CPT | Performed by: INTERNAL MEDICINE

## 2022-04-18 PROCEDURE — 85025 COMPLETE CBC W/AUTO DIFF WBC: CPT | Performed by: INTERNAL MEDICINE

## 2022-04-18 NOTE — PROGRESS NOTES
"  Pelvic Health Physical Therapy   Treatment Note     Name: Cassidy Aguilar  Clinic Number: [id number]    Therapy Diagnosis:   Encounter Diagnoses   Name Primary?    Weakness Yes    Lack of coordination      Physician: Adonis Wright MD    Visit Date: 4/19/2022    Physician Orders: PT Eval and Treat   Medical Diagnosis from Referral: M62.89 (ICD-10-CM) - Pelvic floor instability   Evaluation Date: 3/31/2022  Authorization Period Expiration: 6/1/2022  Plan of Care Expiration: 6/23/2022  Progress Note Due: 4/30/2022  Visit # / Visits authorized: 2/ 20  FOTO: Issued Visit #: 1     Time In: 1410  Time Out: 1500  Total Billable Time: 50 minutes    Precautions: Standard and cancer    Subjective     Pt reports: previously seeing Martha at [facility] for UI. She states that ADITHYA and urinary urgency have improved significantly. Pt reports that she is trying to use urge suppression strategies. Pt states that she picked up new pessary and that it feels more supportive but continues to slip into a vertical position.    She was compliant with home exercise program.  Response to previous treatment: no adverse response reported  Functional change: less urinary leakage, decreased urgency     Pain: 0/10  Location: pelvic floor    Objective     Cassidy received therapeutic exercises to develop  strength for 19 minutes including:   Hip adduction ball squeeze w/ kegel 2x10 5"  Hip abduction w/ GTB 2x10 5"  Bridges 2x10 B  Clamshells 2x10 B  SLR 2x10 B  Sit to stand w/ kegel - 2x10     Cassidy participated in neuromuscular re-education activities to develop Coordination for 23 minutes including:   Kegel edu and practice.  Increased time spent on edu on proper technique.  Pt improves with practice and verbal cues.    Cassidy participated in dynamic functional therapeutic activities to improve functional performance for 8 minutes, including:  Reviewed urge suppression strategies with pt demonstrating in clinic    Home Exercises Provided and " "Patient Education Provided     Education provided:   - urge suppression techniques  Discussed progression of plan of care with patient; educated pt in activity modification; reviewed HEP with pt. Pt demonstrated and verbalized understanding of all instruction and was provided with a handout of HEP (see Patient Instructions).  - updated HEP    Written Home Exercises Provided: yes.  Exercises were reviewed and Cassidy was able to demonstrate them prior to the end of the session.  Cassidy demonstrated good  understanding of the education provided.     See EMR under Patient Instructions for exercises provided 4/19/22.    Assessment     Pt presents to PT with primary complaint of urinary urgency and prolapse. Reviewed urge suppression strategies with pt demonstrating good understanding of technique. Intravaginal assessment reveals good PFM strength, however decreased endurance and coordination as evidenced by poor ability to relax fully after performing a contraction. Noted poor positioning of pessary with visible cystocele during treatment. Pt requires verbal cueing for correct performance of exercise. Discussed importance of compliance with HEP in order to maximize therapeutic outcomes with pt verbalizing understanding.    Cassidy Is progressing well towards her goals.   Pt prognosis is Good.     Pt will continue to benefit from skilled outpatient physical therapy to address the deficits listed in the problem list box on initial evaluation, provide pt/family education and to maximize pt's level of independence in the home and community environment.     Pt's spiritual, cultural and educational needs considered and pt agreeable to plan of care and goals.     Anticipated barriers to physical therapy: none    Goals:   Short Term Goals: 6 weeks   Pt to perform "the knack" prior to coughing, laughing or sneezing to decrease risk of incontinence. (progressing, not met)  Pt to report a decrease in pad usage to no more than 1 a day " to demonstrate improving pelvic floor function needed for continence. (progressing, not met)  Pt to demonstrate being able to correctly and consistently perform a kegel which is needed  to increase pelvic floor muscle coordination and strength needed for continence. (progressing, not met)  Pt to report being able to transfer from sitting to standing without leakage of urine. (progressing, not met)  Pt to report being able to sneeze without incontinence demonstrating improved pelvic floor strength and coordination to improve confidence in social situations. (progressing, not met)     Long Term Goals: 12 weeks   Pt to be discharged with home plan for carry over after discharge. (progressing, not met)    Pt to be able to perform a 10 second kegel x 10 reps to demonstrate improving strength and endurance needed for continence. (progressing, not met)  Pt to report a decrease in pad usage to 0 pads a day to demonstrate improving pelvic floor muscle controls as evidenced by decreased episodes of incontinence needed to improve confidence in social situations. (progressing, not met)  Pt to report elimination of incontinence with ADLs to demonstrate improved pelvic floor muscle strength and coordination. (progressing, not met)  Pt to be able to delay the urge to urinate at least 30 minutes with a strong urge to urinate in order to make it to the bathroom without leaking. (progressing, not met)  Pt to report no longer feeling the need to urinate just in case when shopping or participating in social activities to demonstrate improving pelvic floor and bladder control. (progressing, not met)    Plan   Plan of care Certification: 3/31/2022 to 6/23/2022.     Outpatient Physical Therapy 1 time(s) every week(s) for 12 weeks to include the following interventions: Manual Therapy, Moist Heat/ Ice, Neuromuscular Re-ed, Patient Education, Self Care, Therapeutic Activities, Therapeutic Exercise, Ultrasound and Biofeedback.    Continue to  progress strengthening exercises as tolerated    Anna Kimura, PT

## 2022-04-19 ENCOUNTER — CLINICAL SUPPORT (OUTPATIENT)
Dept: REHABILITATION | Facility: OTHER | Age: 77
End: 2022-04-19
Payer: MEDICARE

## 2022-04-19 DIAGNOSIS — R27.9 LACK OF COORDINATION: ICD-10-CM

## 2022-04-19 DIAGNOSIS — R53.1 WEAKNESS: Primary | ICD-10-CM

## 2022-04-19 PROCEDURE — 97530 THERAPEUTIC ACTIVITIES: CPT

## 2022-04-19 PROCEDURE — 97112 NEUROMUSCULAR REEDUCATION: CPT

## 2022-04-19 PROCEDURE — 97110 THERAPEUTIC EXERCISES: CPT

## 2022-04-19 NOTE — PATIENT INSTRUCTIONS
"CONTROLLING URINARY / FECAL URGENCY    What to do when you experience a strong urge to urinate or defecate:     FIRST  Stop activity, stand quietly or sit down. Try to stay very still to maintain control. Avoid rushing to the toilet.    SECOND Begin Quick Flicks (1 second LIFT of pelvic floor muscles, 4 second DROP). Pelvic floor contractions send a message to the bladder to relax and hold urine.     THIRD Relax. Do not rush to the toilet. Take a deep belly or diaphragmatic breath and let it out slowly. Let the urge to urinate pass by using distraction techniques and positive thoughts. Try not to think about going to the bathroom.     FINALLY If the urge returns, repeat the above steps to regain control. When you feel the urge subside, walk normally to the bathroom. You can urinate once the urge has subsided.        The urge feeling strikes!   Stop, breathe, and be still and then begin Quick Flicks     Do Not rush to the toilet.     Think positively and distract yourself.       KEGELS    1. Sit or lie comfortably with legs and buttocks relaxed.  2. Squeeze and LIFT the muscles that stop the flow of urine and passage of gas.  Keep legs and buttock muscles quiet.  3. Hold lift for 5 seconds without holding breath.  4. Release and DROP the pelvic floor muscles for 10 seconds.  5. Repeat 15 times, 2 sets per day. Spread throughout the day.    No Kegels while urinating!      HIP ADDUCTION KNEE SQUEEZE     With ball or folded pillow between knees, tighten deep core then squeeze knees together and hold 5 seconds. Do 2 sets of 10.    BRIDGING W/ KEGEL    While lying on your back with knees bent, tighten your pelvic floor, squeeze your buttocks and then raise your buttocks off the floor/bed as creating a "Bridge" with your body. Hold for 3-5 seconds and then lower your back flat on the ground. Release the Kegel. Repeat 10 times, twice a day.    HIP ABDUCTION WITH BAND    Lie down on your back with your knees bent. Place an " elastic band around your knees and then pull your knees apart. Return your knees together and repeat. Perform 2 sets of 10 repetitions.     CLAMSHELLS    Lie on side with knees bent and top of pelvic rolled slightly forward. Keeping ankles together lift top knee without pelvis rolling backwards. Do 2 sets of 10 on each side.     STRAIGHT LEG RAISE - SLR    While lying on your back, raise up your leg with a straight knee.  Keep the opposite knee bent with the foot planted on the ground. Complete 2 sets of 10 on each side.    SIT TO STAND W/ KEGEL    Start by scooting close to the front of the chair. Next, initiate a Kegel and then lean forward at your trunk and rise to standing without using your hands to push off from the chair or other object. Release the Kegel and return to seated. Repeat 10 times, twice a day.

## 2022-04-25 NOTE — PROGRESS NOTES
"  Pelvic Health Physical Therapy   Treatment Note     Name: Cassidy Aguilar  Clinic Number: [id number]    Therapy Diagnosis:   Encounter Diagnoses   Name Primary?    Weakness Yes    Lack of coordination      Physician: Adonis Wright MD    Visit Date: 4/28/2022    Physician Orders: PT Eval and Treat   Medical Diagnosis from Referral: M62.89 (ICD-10-CM) - Pelvic floor instability   Evaluation Date: 3/31/2022  Authorization Period Expiration: 6/1/2022  Plan of Care Expiration: 6/23/2022  Visit # / Visits authorized: 3/ 20  FOTO: Issued Visit #: 1    Time In: 1303  Time Out: 1348  Total Billable Time: 45 minutes    Precautions: Standard and cancer    Subjective     Pt reports: having GI bug on Tuesday and was running a fever. She reports UI increased while staying overnight at the hospital and having saline drip. She states that she was still able to practice kegels despite this.     She was compliant with home exercise program.  Response to previous treatment: no adverse response reported  Functional change: decreased UI, decreased urgency     Pain: 0/10  Location: pelvic floor    Objective     Cassidy received therapeutic exercises to develop strength for 18 minutes including:   Hip adduction ball squeeze w/ kegel 2x10 5"  Hip abduction w/ GTB 2x10 5"  Bridges 2x10 B  Clamshells 2x10 B  SLR 2x10 B  Sit to stand w/ kegel - 2x10     Cassidy participated in neuromuscular re-education activities to develop Coordination for 27 minutes including:   Kegel edu and practice.  Increased time spent on edu on proper technique. Practiced kegel endurance holds and quick flicks. Pt improves with practice and verbal cues.    Cassidy participated in dynamic functional therapeutic activities to improve functional performance for 00 minutes, including:  Reviewed urge suppression strategies with pt demonstrating in clinic    Home Exercises Provided and Patient Education Provided     Education provided:   - bladder retraining and urge " "suppression techniques  Discussed progression of plan of care with patient; educated pt in activity modification; reviewed HEP with pt. Pt demonstrated and verbalized understanding of all instruction and was provided with a handout of HEP (see Patient Instructions).  - updated HEP    Written Home Exercises Provided: Patient instructed to cont prior HEP.  Exercises were reviewed and Cassidy was able to demonstrate them prior to the end of the session.  Cassidy demonstrated good  understanding of the education provided.     See EMR under Patient Instructions for exercises provided prior visit.    Assessment     Pt demonstrates improved coordination with complete activation/deactivation of PFM this visit. Noted poor positioning of pessary with visible cystocele during treatment. Pt able to remove and reinsert pessary into proper positioning. Continued previous exercise program due to pt being unable to practice HEP because of hospitalization. Pt reports feeling well at end of session.     Cassidy Is progressing well towards her goals.   Pt prognosis is Good.     Pt will continue to benefit from skilled outpatient physical therapy to address the deficits listed in the problem list box on initial evaluation, provide pt/family education and to maximize pt's level of independence in the home and community environment.     Pt's spiritual, cultural and educational needs considered and pt agreeable to plan of care and goals.     Anticipated barriers to physical therapy: none    Goals:   Short Term Goals: 6 weeks   Pt to perform "the knack" prior to coughing, laughing or sneezing to decrease risk of incontinence. (progressing, not met)  Pt to report a decrease in pad usage to no more than 1 a day to demonstrate improving pelvic floor function needed for continence. (progressing, not met)  Pt to demonstrate being able to correctly and consistently perform a kegel which is needed  to increase pelvic floor muscle coordination and " strength needed for continence. (progressing, not met)  Pt to report being able to transfer from sitting to standing without leakage of urine. (progressing, not met)  Pt to report being able to sneeze without incontinence demonstrating improved pelvic floor strength and coordination to improve confidence in social situations. (progressing, not met)     Long Term Goals: 12 weeks   Pt to be discharged with home plan for carry over after discharge. (progressing, not met)    Pt to be able to perform a 10 second kegel x 10 reps to demonstrate improving strength and endurance needed for continence. (progressing, not met)  Pt to report a decrease in pad usage to 0 pads a day to demonstrate improving pelvic floor muscle controls as evidenced by decreased episodes of incontinence needed to improve confidence in social situations. (progressing, not met)  Pt to report elimination of incontinence with ADLs to demonstrate improved pelvic floor muscle strength and coordination. (progressing, not met)  Pt to be able to delay the urge to urinate at least 30 minutes with a strong urge to urinate in order to make it to the bathroom without leaking. (progressing, not met)  Pt to report no longer feeling the need to urinate just in case when shopping or participating in social activities to demonstrate improving pelvic floor and bladder control. (progressing, not met)    Plan   Plan of care Certification: 3/31/2022 to 6/23/2022.     Outpatient Physical Therapy 1 time(s) every week(s) for 12 weeks to include the following interventions: Manual Therapy, Moist Heat/ Ice, Neuromuscular Re-ed, Patient Education, Self Care, Therapeutic Activities, Therapeutic Exercise, Ultrasound and Biofeedback.    Continue to progress strengthening exercises as tolerated    Anna Kimura, PT

## 2022-04-26 ENCOUNTER — HOSPITAL ENCOUNTER (INPATIENT)
Facility: HOSPITAL | Age: 77
LOS: 1 days | Discharge: HOME | DRG: 392 | End: 2022-04-27
Attending: EMERGENCY MEDICINE | Admitting: INTERNAL MEDICINE
Payer: MEDICARE

## 2022-04-26 ENCOUNTER — TELEPHONE (OUTPATIENT)
Dept: HEMATOLOGY/ONCOLOGY | Facility: CLINIC | Age: 77
End: 2022-04-26
Payer: MEDICARE

## 2022-04-26 DIAGNOSIS — E87.1 HYPONATREMIA: Primary | ICD-10-CM

## 2022-04-26 DIAGNOSIS — R11.0 NAUSEA: ICD-10-CM

## 2022-04-26 DIAGNOSIS — R50.9 FEVER: ICD-10-CM

## 2022-04-26 DIAGNOSIS — C91.10 CLL (CHRONIC LYMPHOCYTIC LEUKEMIA): Primary | ICD-10-CM

## 2022-04-26 DIAGNOSIS — R07.9 CHEST PAIN: ICD-10-CM

## 2022-04-26 PROBLEM — R17 ELEVATED BILIRUBIN: Status: ACTIVE | Noted: 2022-04-26

## 2022-04-26 PROBLEM — E03.9 HYPOTHYROIDISM: Status: ACTIVE | Noted: 2022-04-26

## 2022-04-26 PROBLEM — R19.7 DIARRHEA: Status: ACTIVE | Noted: 2022-04-26

## 2022-04-26 LAB
BACTERIA #/AREA URNS AUTO: ABNORMAL /HPF
BILIRUB DIRECT SERPL-MCNC: 0.4 MG/DL (ref 0.1–0.3)
BILIRUB UR QL STRIP: NEGATIVE
CLARITY UR REFRACT.AUTO: ABNORMAL
COLOR UR AUTO: YELLOW
CTP QC/QA: YES
CTP QC/QA: YES
GLUCOSE UR QL STRIP: NEGATIVE
HGB UR QL STRIP: ABNORMAL
KETONES UR QL STRIP: NEGATIVE
LACTATE SERPL-SCNC: 0.9 MMOL/L (ref 0.5–2.2)
LACTATE SERPL-SCNC: 1.8 MMOL/L (ref 0.5–2.2)
LEUKOCYTE ESTERASE UR QL STRIP: NEGATIVE
MICROSCOPIC COMMENT: ABNORMAL
NITRITE UR QL STRIP: NEGATIVE
OSMOLALITY SERPL: 271 MOSM/KG (ref 275–295)
PH UR STRIP: 6 [PH] (ref 5–8)
POC MOLECULAR INFLUENZA A AGN: NEGATIVE
POC MOLECULAR INFLUENZA B AGN: NEGATIVE
PROCALCITONIN SERPL IA-MCNC: 0.15 NG/ML
PROT UR QL STRIP: NEGATIVE
RBC #/AREA URNS AUTO: 5 /HPF (ref 0–4)
SARS-COV-2 RDRP RESP QL NAA+PROBE: NEGATIVE
SODIUM SERPL-SCNC: 125 MMOL/L (ref 136–145)
SODIUM UR-SCNC: 71 MMOL/L (ref 20–250)
SP GR UR STRIP: 1.01 (ref 1–1.03)
SQUAMOUS #/AREA URNS AUTO: 3 /HPF
TSH SERPL DL<=0.005 MIU/L-ACNC: 0.89 UIU/ML (ref 0.4–4)
URN SPEC COLLECT METH UR: ABNORMAL
WBC #/AREA URNS AUTO: 2 /HPF (ref 0–5)

## 2022-04-26 PROCEDURE — 84300 ASSAY OF URINE SODIUM: CPT | Performed by: PHYSICIAN ASSISTANT

## 2022-04-26 PROCEDURE — 96365 THER/PROPH/DIAG IV INF INIT: CPT

## 2022-04-26 PROCEDURE — 83930 ASSAY OF BLOOD OSMOLALITY: CPT | Performed by: EMERGENCY MEDICINE

## 2022-04-26 PROCEDURE — 25000003 PHARM REV CODE 250: Performed by: PHYSICIAN ASSISTANT

## 2022-04-26 PROCEDURE — 87040 BLOOD CULTURE FOR BACTERIA: CPT | Mod: 59 | Performed by: PHYSICIAN ASSISTANT

## 2022-04-26 PROCEDURE — U0002 COVID-19 LAB TEST NON-CDC: HCPCS | Performed by: PHYSICIAN ASSISTANT

## 2022-04-26 PROCEDURE — 27000207 HC ISOLATION

## 2022-04-26 PROCEDURE — 84295 ASSAY OF SERUM SODIUM: CPT | Mod: 59 | Performed by: EMERGENCY MEDICINE

## 2022-04-26 PROCEDURE — 20600001 HC STEP DOWN PRIVATE ROOM

## 2022-04-26 PROCEDURE — 81001 URINALYSIS AUTO W/SCOPE: CPT | Performed by: PHYSICIAN ASSISTANT

## 2022-04-26 PROCEDURE — 96361 HYDRATE IV INFUSION ADD-ON: CPT | Mod: 59

## 2022-04-26 PROCEDURE — 99285 EMERGENCY DEPT VISIT HI MDM: CPT | Mod: CS,,, | Performed by: PHYSICIAN ASSISTANT

## 2022-04-26 PROCEDURE — 84145 PROCALCITONIN (PCT): CPT | Performed by: PHYSICIAN ASSISTANT

## 2022-04-26 PROCEDURE — 96360 HYDRATION IV INFUSION INIT: CPT | Mod: 59

## 2022-04-26 PROCEDURE — 96366 THER/PROPH/DIAG IV INF ADDON: CPT

## 2022-04-26 PROCEDURE — 83605 ASSAY OF LACTIC ACID: CPT | Performed by: PHYSICIAN ASSISTANT

## 2022-04-26 PROCEDURE — 93010 ELECTROCARDIOGRAM REPORT: CPT | Mod: ,,, | Performed by: INTERNAL MEDICINE

## 2022-04-26 PROCEDURE — 93005 ELECTROCARDIOGRAM TRACING: CPT

## 2022-04-26 PROCEDURE — 93010 EKG 12-LEAD: ICD-10-PCS | Mod: ,,, | Performed by: INTERNAL MEDICINE

## 2022-04-26 PROCEDURE — 96375 TX/PRO/DX INJ NEW DRUG ADDON: CPT

## 2022-04-26 PROCEDURE — 25000003 PHARM REV CODE 250: Performed by: STUDENT IN AN ORGANIZED HEALTH CARE EDUCATION/TRAINING PROGRAM

## 2022-04-26 PROCEDURE — 99285 PR EMERGENCY DEPT VISIT,LEVEL V: ICD-10-PCS | Mod: CS,,, | Performed by: PHYSICIAN ASSISTANT

## 2022-04-26 PROCEDURE — 99285 EMERGENCY DEPT VISIT HI MDM: CPT | Mod: 25

## 2022-04-26 PROCEDURE — 82248 BILIRUBIN DIRECT: CPT | Performed by: EMERGENCY MEDICINE

## 2022-04-26 PROCEDURE — 63600175 PHARM REV CODE 636 W HCPCS: Performed by: STUDENT IN AN ORGANIZED HEALTH CARE EDUCATION/TRAINING PROGRAM

## 2022-04-26 PROCEDURE — 87502 INFLUENZA DNA AMP PROBE: CPT

## 2022-04-26 PROCEDURE — 63600175 PHARM REV CODE 636 W HCPCS: Performed by: PHYSICIAN ASSISTANT

## 2022-04-26 PROCEDURE — 83935 ASSAY OF URINE OSMOLALITY: CPT | Performed by: PHYSICIAN ASSISTANT

## 2022-04-26 PROCEDURE — 84443 ASSAY THYROID STIM HORMONE: CPT | Performed by: EMERGENCY MEDICINE

## 2022-04-26 RX ORDER — CARVEDILOL 6.25 MG/1
6.25 TABLET ORAL 2 TIMES DAILY
Status: DISCONTINUED | OUTPATIENT
Start: 2022-04-26 | End: 2022-04-27 | Stop reason: HOSPADM

## 2022-04-26 RX ORDER — KETOROLAC TROMETHAMINE 30 MG/ML
10 INJECTION, SOLUTION INTRAMUSCULAR; INTRAVENOUS
Status: COMPLETED | OUTPATIENT
Start: 2022-04-26 | End: 2022-04-26

## 2022-04-26 RX ORDER — NALOXONE HCL 0.4 MG/ML
0.02 VIAL (ML) INJECTION
Status: DISCONTINUED | OUTPATIENT
Start: 2022-04-26 | End: 2022-04-27 | Stop reason: HOSPADM

## 2022-04-26 RX ORDER — ONDANSETRON 8 MG/1
8 TABLET, ORALLY DISINTEGRATING ORAL EVERY 8 HOURS PRN
Status: DISCONTINUED | OUTPATIENT
Start: 2022-04-26 | End: 2022-04-27 | Stop reason: HOSPADM

## 2022-04-26 RX ORDER — LEVOTHYROXINE SODIUM 75 UG/1
75 TABLET ORAL
Status: DISCONTINUED | OUTPATIENT
Start: 2022-04-27 | End: 2022-04-27 | Stop reason: HOSPADM

## 2022-04-26 RX ORDER — ERGOCALCIFEROL 1.25 MG/1
50000 CAPSULE ORAL
Status: DISCONTINUED | OUTPATIENT
Start: 2022-04-27 | End: 2022-04-27 | Stop reason: HOSPADM

## 2022-04-26 RX ORDER — SODIUM CHLORIDE 0.9 % (FLUSH) 0.9 %
10 SYRINGE (ML) INJECTION EVERY 12 HOURS PRN
Status: DISCONTINUED | OUTPATIENT
Start: 2022-04-26 | End: 2022-04-27 | Stop reason: HOSPADM

## 2022-04-26 RX ORDER — ASPIRIN 81 MG/1
81 TABLET ORAL DAILY
Status: DISCONTINUED | OUTPATIENT
Start: 2022-04-27 | End: 2022-04-27 | Stop reason: HOSPADM

## 2022-04-26 RX ORDER — VANCOMYCIN HCL IN 5 % DEXTROSE 1G/250ML
20 PLASTIC BAG, INJECTION (ML) INTRAVENOUS
Status: COMPLETED | OUTPATIENT
Start: 2022-04-26 | End: 2022-04-26

## 2022-04-26 RX ORDER — ONDANSETRON 8 MG/1
8 TABLET, ORALLY DISINTEGRATING ORAL EVERY 12 HOURS PRN
Qty: 30 TABLET | Refills: 1 | Status: SHIPPED | OUTPATIENT
Start: 2022-04-26 | End: 2023-04-26

## 2022-04-26 RX ORDER — CEFEPIME HYDROCHLORIDE 1 G/50ML
2 INJECTION, SOLUTION INTRAVENOUS
Status: DISCONTINUED | OUTPATIENT
Start: 2022-04-27 | End: 2022-04-27

## 2022-04-26 RX ORDER — CEFEPIME HYDROCHLORIDE 1 G/50ML
2 INJECTION, SOLUTION INTRAVENOUS
Status: COMPLETED | OUTPATIENT
Start: 2022-04-26 | End: 2022-04-26

## 2022-04-26 RX ORDER — GLUCAGON 1 MG
1 KIT INJECTION
Status: DISCONTINUED | OUTPATIENT
Start: 2022-04-26 | End: 2022-04-27 | Stop reason: HOSPADM

## 2022-04-26 RX ORDER — TALC
6 POWDER (GRAM) TOPICAL NIGHTLY PRN
Status: DISCONTINUED | OUTPATIENT
Start: 2022-04-26 | End: 2022-04-27 | Stop reason: HOSPADM

## 2022-04-26 RX ORDER — ACYCLOVIR 200 MG/1
400 CAPSULE ORAL 2 TIMES DAILY
Status: DISCONTINUED | OUTPATIENT
Start: 2022-04-26 | End: 2022-04-27 | Stop reason: HOSPADM

## 2022-04-26 RX ORDER — ENOXAPARIN SODIUM 100 MG/ML
40 INJECTION SUBCUTANEOUS EVERY 24 HOURS
Status: DISCONTINUED | OUTPATIENT
Start: 2022-04-26 | End: 2022-04-27 | Stop reason: HOSPADM

## 2022-04-26 RX ORDER — IBUPROFEN 200 MG
16 TABLET ORAL
Status: DISCONTINUED | OUTPATIENT
Start: 2022-04-26 | End: 2022-04-27 | Stop reason: HOSPADM

## 2022-04-26 RX ORDER — MAGNESIUM SULFATE HEPTAHYDRATE 40 MG/ML
2 INJECTION, SOLUTION INTRAVENOUS ONCE
Status: COMPLETED | OUTPATIENT
Start: 2022-04-26 | End: 2022-04-27

## 2022-04-26 RX ORDER — IBUPROFEN 200 MG
24 TABLET ORAL
Status: DISCONTINUED | OUTPATIENT
Start: 2022-04-26 | End: 2022-04-27 | Stop reason: HOSPADM

## 2022-04-26 RX ORDER — VALSARTAN 160 MG/1
320 TABLET ORAL DAILY
Status: DISCONTINUED | OUTPATIENT
Start: 2022-04-27 | End: 2022-04-27 | Stop reason: HOSPADM

## 2022-04-26 RX ORDER — ACETAMINOPHEN 325 MG/1
650 TABLET ORAL
Status: COMPLETED | OUTPATIENT
Start: 2022-04-26 | End: 2022-04-26

## 2022-04-26 RX ORDER — ATORVASTATIN CALCIUM 20 MG/1
40 TABLET, FILM COATED ORAL NIGHTLY
Status: DISCONTINUED | OUTPATIENT
Start: 2022-04-26 | End: 2022-04-27 | Stop reason: HOSPADM

## 2022-04-26 RX ADMIN — CARVEDILOL 6.25 MG: 6.25 TABLET, FILM COATED ORAL at 09:04

## 2022-04-26 RX ADMIN — CEFEPIME HYDROCHLORIDE 2 G: 2 INJECTION, SOLUTION INTRAVENOUS at 07:04

## 2022-04-26 RX ADMIN — ACETAMINOPHEN 650 MG: 325 TABLET ORAL at 05:04

## 2022-04-26 RX ADMIN — SODIUM CHLORIDE, SODIUM LACTATE, POTASSIUM CHLORIDE, AND CALCIUM CHLORIDE 1686 ML: .6; .31; .03; .02 INJECTION, SOLUTION INTRAVENOUS at 05:04

## 2022-04-26 RX ADMIN — ATORVASTATIN CALCIUM 40 MG: 40 TABLET, FILM COATED ORAL at 09:04

## 2022-04-26 RX ADMIN — KETOROLAC TROMETHAMINE 10 MG: 30 INJECTION, SOLUTION INTRAMUSCULAR at 07:04

## 2022-04-26 RX ADMIN — VANCOMYCIN HYDROCHLORIDE 1000 MG: 1 INJECTION, POWDER, LYOPHILIZED, FOR SOLUTION INTRAVENOUS at 05:04

## 2022-04-26 RX ADMIN — ACYCLOVIR 400 MG: 200 CAPSULE ORAL at 09:04

## 2022-04-26 RX ADMIN — ENOXAPARIN SODIUM 40 MG: 100 INJECTION SUBCUTANEOUS at 11:04

## 2022-04-26 RX ADMIN — MAGNESIUM SULFATE 2 G: 2 INJECTION INTRAVENOUS at 11:04

## 2022-04-26 NOTE — TELEPHONE ENCOUNTER
"----- Message from Cynthia Mcfarland sent at 4/26/2022 10:05 AM CDT -----         Consult/Advisory:      Name Of Caller:Cassidy  Contact Preference?:[phone]      Provider Name:Adriana   Does patient feel the need to be seen today?no      What is the nature of the call?:She states that she started vomiting after she spoke with you. Please call       Additional Notes:  "Thank you for all that you do for our patients'"                           "

## 2022-04-26 NOTE — TELEPHONE ENCOUNTER
"----- Message from Cynthia Mcfarland sent at 4/26/2022  9:21 AM CDT -----         Consult/Advisory:      Name Of Caller:Cassidy  Contact Preference?:[phone]      Provider Name: Adriana   Does patient feel the need to be seen today?unsure      What is the nature of the call?:Pt states that she has diarrhea and she do not feel good. Please call      Additional Notes:  "Thank you for all that you do for our patients'"                           "

## 2022-04-26 NOTE — TELEPHONE ENCOUNTER
pudding consistency- hard and loose- started last night- 4 or 5 times- no stomach cramping- no fever- home covid test- a couple of times that felt like she might throw up but the bathroom made it feel better- no claminess or cold sweats-  hasnt taken medication- just got home from traveling- had food in airport lounge and on airplane- stomach was upset yesterday after having noodles- from [facility], no recent antibiotic use- drinking powerade and diluting with water. Going to try to eat small breakfast now.

## 2022-04-26 NOTE — TELEPHONE ENCOUNTER
----- Message from Kelley Salamanca sent at 4/26/2022 12:55 PM CDT -----  Regarding: PT Advice  Type:  Needs Medical Advice    Who Called: pt  Would the patient rather a call back or a response via [facility]? #call  Best Call Back Number: [phone]  Additional Information: pt has fever

## 2022-04-26 NOTE — TELEPHONE ENCOUNTER
Spoke with patient. Advised nausea medication was called in. Patient stated she threw up and is now feeling a little better.  Denies fever on phone

## 2022-04-26 NOTE — ED PROVIDER NOTES
Encounter Date: 4/26/2022       History     Chief Complaint   Patient presents with    Fever Post Chemo     Sent from clinic     77-year-old female past medical history CLL on chemotherapy, hypertension thyroid disease who presents to the emergency department with chief complaint of fever, nausea, vomiting, diarrhea that began last night.  Reports multiple episodes on non bloody diarrhea and non bloody emesis. Called her oncology office and was instructed to come to the ED for evaluation. Denies HA, nasal congestion, sore throat, cough, cp, sob, abd pain, dysuria, hematuria. Does endorse recent travel over the weekend but denies known sick contacts. Vaccinated for COVID. Denies other worsening or alleviating factors.         Review of patient's allergies indicates:   Allergen Reactions    Sulfa (sulfonamide antibiotics) Rash    [place] Diarrhea     Past Medical History:   Diagnosis Date    CLL (chronic lymphocytic leukemia) 12/22/2021    Hypertension     Thyroid disease      Past Surgical History:   Procedure Laterality Date    ADENOIDECTOMY      COLONOSCOPY      HYSTERECTOMY      TONSILLECTOMY       No family history on file.  Social History     Tobacco Use    Smoking status: Never Smoker    Smokeless tobacco: Never Used   Substance Use Topics    Alcohol use: Yes     Alcohol/week: 0.0 standard drinks     Comment: rare     Review of Systems   Constitutional: Positive for fever.   HENT: Negative for sore throat.    Respiratory: Negative for shortness of breath.    Cardiovascular: Negative for chest pain.   Gastrointestinal: Positive for diarrhea, nausea and vomiting.   Genitourinary: Negative for dysuria.   Musculoskeletal: Negative for back pain.   Skin: Negative for rash.   Allergic/Immunologic: Positive for immunocompromised state.   Neurological: Negative for weakness.   Hematological: Does not bruise/bleed easily.       Physical Exam     Initial Vitals [04/26/22 1623]   BP Pulse Resp Temp SpO2   (!)  122/59 81 18 (!) 101.7 °F (38.7 °C) 97 %      MAP       --         Physical Exam    Nursing note and vitals reviewed.  Constitutional: She appears well-developed and well-nourished. She is not diaphoretic. No distress.   HENT:   Head: Normocephalic and atraumatic.   Mouth/Throat: Oropharynx is clear and moist.   Eyes: Conjunctivae and EOM are normal. Pupils are equal, round, and reactive to light.   Neck: Neck supple.   Normal range of motion.  Cardiovascular: Normal rate, regular rhythm, normal heart sounds and intact distal pulses. Exam reveals no gallop and no friction rub.    No murmur heard.  Pulmonary/Chest: Breath sounds normal. She has no wheezes. She has no rhonchi. She has no rales.   Abdominal: Abdomen is soft. Bowel sounds are normal. There is no abdominal tenderness. There is no rebound and no guarding.   Musculoskeletal:         General: Normal range of motion.      Cervical back: Normal range of motion and neck supple.     Neurological: She is alert and oriented to person, place, and time. She has normal strength. No cranial nerve deficit or sensory deficit. GCS score is 15. GCS eye subscore is 4. GCS verbal subscore is 5. GCS motor subscore is 6.   Skin: Skin is warm and dry. Capillary refill takes less than 2 seconds.   Psychiatric: She has a normal mood and affect. Her behavior is normal. Judgment and thought content normal.         ED Course   Procedures  Labs Reviewed   URINALYSIS, REFLEX TO URINE CULTURE - Abnormal; Notable for the following components:       Result Value    Appearance, UA Hazy (*)     Occult Blood UA 1+ (*)     All other components within normal limits    Narrative:     Specimen Source->Urine   URINALYSIS MICROSCOPIC - Abnormal; Notable for the following components:    RBC, UA 5 (*)     All other components within normal limits    Narrative:     Specimen Source->Urine   CULTURE, BLOOD   CULTURE, BLOOD   LACTIC ACID, PLASMA   PROCALCITONIN   LACTIC ACID, PLASMA   SARS-COV-2 RDRP  GENE   POCT INFLUENZA A/B MOLECULAR     EKG Readings: (Independently Interpreted)   Initial Reading: No STEMI. Rhythm: Normal Sinus Rhythm. Heart Rate: 79.       Imaging Results          X-Ray Chest AP Portable (Final result)  Result time 04/26/22 17:27:13    Final result by Vincent Henriquez MD (04/26/22 17:27:13)                 Impression:      1. No acute cardiopulmonary process.      Electronically signed by: Vincent Henriquez MD  Date:    04/26/2022  Time:    17:27             Narrative:    EXAMINATION:  XR CHEST AP PORTABLE    CLINICAL HISTORY:  Sepsis;    TECHNIQUE:  Single frontal view of the chest was performed.    COMPARISON:  None    FINDINGS:  The cardiomediastinal silhouette is not enlarged noting calcification of the aorta..  There is no pleural effusion.  The trachea is midline.  The lungs are symmetrically expanded bilaterally without evidence of acute parenchymal process. No large focal consolidation seen.  There is no pneumothorax.  The osseous structures are remarkable for degenerative changes.  The stomach is distended with gas..                              X-Rays:   Independently Interpreted Readings:   Chest X-Ray: Normal heart size.  No infiltrates.  No acute abnormalities.     Medications   lactated ringers bolus 1,686 mL (0 mL/kg × 56.2 kg Intravenous Stopped 4/26/22 1914)   vancomycin in dextrose 5 % 1 gram/250 mL IVPB 1,000 mg (0 mg/kg × 56.2 kg Intravenous Stopped 4/26/22 1922)   cefepime in dextrose 5 % IVPB 2 g (2 g Intravenous New Bag 4/26/22 1922)   acetaminophen tablet 650 mg (650 mg Oral Given 4/26/22 1731)   ketorolac injection 9.999 mg (9.999 mg Intravenous Given 4/26/22 1919)     Medical Decision Making:   History:   Old Medical Records: I decided to obtain old medical records.  Initial Assessment:   Emergent evaluation of a 77-year-old female who presents to the emergency department with chief complaint of fever, nausea, vomiting, diarrhea that began yesterday.  Patient is  afebrile, hemodynamically stable, nontoxic appearing.  Patient has a history of CLL and is currently on chemotherapy.  Will initiate septic workup to include labs, imaging, EKG, fluids, broad-spectrum antibiotics, and continue to monitor.  Differential Diagnosis:   Differential diagnosis includes but isn't limited to gastroenteritis, colitis, COVID-19, influenza, pneumonia, urinary tract infection, pyelonephritis.  Independently Interpreted Test(s):   I have ordered and independently interpreted X-rays - see prior notes.  I have ordered and independently interpreted EKG Reading(s) - see prior notes  Clinical Tests:   Lab Tests: Ordered and Reviewed  Radiological Study: Ordered and Reviewed  Medical Tests: Ordered and Reviewed  ED Management:  Patient had lab work done prior to arrival to the emergency department.  There were no hematologic derangements.  She was hyponatremic to 127, which is a significant decrease from labs drawn 8 days prior.  COVID swab negative.  Influenza negative.  Lactate 1.8.  Procalcitonin 0.15.  UA without infection.  No clear source of infection thus far. Patient's temperature has increased to 102.4 despite 650 mg Tylenol. Will administer Toradol. Will consult heme/onc for admission. Patient will be admitted to their service. Discussed plan with patient. All questions answered.   The patient's history, physical exam, and plan of care was discussed with and agreed upon with my supervising physician.                         Clinical Impression:   Final diagnoses:  [R50.9] Fever  [E87.1] Hyponatremia (Primary)          ED Disposition Condition    Admit               Vicki Qureshi PA-C  04/26/22 2021

## 2022-04-27 ENCOUNTER — TELEPHONE (OUTPATIENT)
Dept: HEMATOLOGY/ONCOLOGY | Facility: CLINIC | Age: 77
End: 2022-04-27
Payer: MEDICARE

## 2022-04-27 VITALS
BODY MASS INDEX: 18.94 KG/M2 | TEMPERATURE: 99 F | HEART RATE: 72 BPM | SYSTOLIC BLOOD PRESSURE: 116 MMHG | WEIGHT: 127.88 LBS | HEIGHT: 69 IN | RESPIRATION RATE: 18 BRPM | DIASTOLIC BLOOD PRESSURE: 59 MMHG | OXYGEN SATURATION: 95 %

## 2022-04-27 PROBLEM — D59.10 AUTOIMMUNE HEMOLYTIC ANEMIA: Status: ACTIVE | Noted: 2021-12-27

## 2022-04-27 LAB
ALBUMIN SERPL BCP-MCNC: 3.5 G/DL (ref 3.5–5.2)
ALP SERPL-CCNC: 55 U/L (ref 55–135)
ALT SERPL W/O P-5'-P-CCNC: 16 U/L (ref 10–44)
ANION GAP SERPL CALC-SCNC: 10 MMOL/L (ref 8–16)
AST SERPL-CCNC: 28 U/L (ref 10–40)
BASOPHILS # BLD AUTO: 0.01 K/UL (ref 0–0.2)
BASOPHILS NFR BLD: 0.3 % (ref 0–1.9)
BILIRUB SERPL-MCNC: 4.4 MG/DL (ref 0.1–1)
BUN SERPL-MCNC: 9 MG/DL (ref 8–23)
CALCIUM SERPL-MCNC: 8.5 MG/DL (ref 8.7–10.5)
CHLORIDE SERPL-SCNC: 100 MMOL/L (ref 95–110)
CO2 SERPL-SCNC: 23 MMOL/L (ref 23–29)
CREAT SERPL-MCNC: 0.6 MG/DL (ref 0.5–1.4)
DAT IGG-SP REAG RBC-IMP: NORMAL
DIFFERENTIAL METHOD: ABNORMAL
EOSINOPHIL # BLD AUTO: 0 K/UL (ref 0–0.5)
EOSINOPHIL NFR BLD: 0 % (ref 0–8)
ERYTHROCYTE [DISTWIDTH] IN BLOOD BY AUTOMATED COUNT: 12.9 % (ref 11.5–14.5)
EST. GFR  (AFRICAN AMERICAN): >60 ML/MIN/1.73 M^2
EST. GFR  (NON AFRICAN AMERICAN): >60 ML/MIN/1.73 M^2
GLUCOSE SERPL-MCNC: 116 MG/DL (ref 70–110)
HCT VFR BLD AUTO: 28.6 % (ref 37–48.5)
HGB BLD-MCNC: 10.1 G/DL (ref 12–16)
IGA SERPL-MCNC: 46 MG/DL (ref 40–350)
IGG SERPL-MCNC: 771 MG/DL (ref 650–1600)
IGM SERPL-MCNC: 127 MG/DL (ref 50–300)
IMM GRANULOCYTES # BLD AUTO: 0 K/UL (ref 0–0.04)
IMM GRANULOCYTES NFR BLD AUTO: 0 % (ref 0–0.5)
LDH SERPL L TO P-CCNC: 210 U/L (ref 110–260)
LYMPHOCYTES # BLD AUTO: 0.6 K/UL (ref 1–4.8)
LYMPHOCYTES NFR BLD: 17 % (ref 18–48)
MAGNESIUM SERPL-MCNC: 2.1 MG/DL (ref 1.6–2.6)
MCH RBC QN AUTO: 33.7 PG (ref 27–31)
MCHC RBC AUTO-ENTMCNC: 35.3 G/DL (ref 32–36)
MCV RBC AUTO: 95 FL (ref 82–98)
MONOCYTES # BLD AUTO: 0.4 K/UL (ref 0.3–1)
MONOCYTES NFR BLD: 9.7 % (ref 4–15)
NEUTROPHILS # BLD AUTO: 2.7 K/UL (ref 1.8–7.7)
NEUTROPHILS NFR BLD: 73 % (ref 38–73)
NRBC BLD-RTO: 0 /100 WBC
OSMOLALITY UR: 131 MOSM/KG (ref 50–1200)
OSMOLALITY UR: 606 MOSM/KG (ref 50–1200)
PHOSPHATE SERPL-MCNC: 3.3 MG/DL (ref 2.7–4.5)
PLATELET # BLD AUTO: 207 K/UL (ref 150–450)
PMV BLD AUTO: 9.5 FL (ref 9.2–12.9)
POTASSIUM SERPL-SCNC: 4.1 MMOL/L (ref 3.5–5.1)
PROT SERPL-MCNC: 5.4 G/DL (ref 6–8.4)
RBC # BLD AUTO: 3 M/UL (ref 4–5.4)
RETICS/RBC NFR AUTO: 4.5 % (ref 0.5–2.5)
SODIUM SERPL-SCNC: 133 MMOL/L (ref 136–145)
SODIUM UR-SCNC: 20 MMOL/L (ref 20–250)
WBC # BLD AUTO: 3.71 K/UL (ref 3.9–12.7)
WBC #/AREA STL HPF: NORMAL /[HPF]

## 2022-04-27 PROCEDURE — 94761 N-INVAS EAR/PLS OXIMETRY MLT: CPT

## 2022-04-27 PROCEDURE — 85045 AUTOMATED RETICULOCYTE COUNT: CPT | Performed by: NURSE PRACTITIONER

## 2022-04-27 PROCEDURE — 80053 COMPREHEN METABOLIC PANEL: CPT | Performed by: STUDENT IN AN ORGANIZED HEALTH CARE EDUCATION/TRAINING PROGRAM

## 2022-04-27 PROCEDURE — 25000003 PHARM REV CODE 250: Performed by: STUDENT IN AN ORGANIZED HEALTH CARE EDUCATION/TRAINING PROGRAM

## 2022-04-27 PROCEDURE — 36415 COLL VENOUS BLD VENIPUNCTURE: CPT | Performed by: NURSE PRACTITIONER

## 2022-04-27 PROCEDURE — 84300 ASSAY OF URINE SODIUM: CPT | Performed by: STUDENT IN AN ORGANIZED HEALTH CARE EDUCATION/TRAINING PROGRAM

## 2022-04-27 PROCEDURE — 36415 COLL VENOUS BLD VENIPUNCTURE: CPT | Performed by: STUDENT IN AN ORGANIZED HEALTH CARE EDUCATION/TRAINING PROGRAM

## 2022-04-27 PROCEDURE — 97165 OT EVAL LOW COMPLEX 30 MIN: CPT

## 2022-04-27 PROCEDURE — 84100 ASSAY OF PHOSPHORUS: CPT | Performed by: STUDENT IN AN ORGANIZED HEALTH CARE EDUCATION/TRAINING PROGRAM

## 2022-04-27 PROCEDURE — 99223 PR INITIAL HOSPITAL CARE,LEVL III: ICD-10-PCS | Mod: AI,,, | Performed by: INTERNAL MEDICINE

## 2022-04-27 PROCEDURE — 86157 COLD AGGLUTININ TITER: CPT | Performed by: NURSE PRACTITIONER

## 2022-04-27 PROCEDURE — 87209 SMEAR COMPLEX STAIN: CPT | Performed by: STUDENT IN AN ORGANIZED HEALTH CARE EDUCATION/TRAINING PROGRAM

## 2022-04-27 PROCEDURE — 89055 LEUKOCYTE ASSESSMENT FECAL: CPT | Performed by: STUDENT IN AN ORGANIZED HEALTH CARE EDUCATION/TRAINING PROGRAM

## 2022-04-27 PROCEDURE — 82784 ASSAY IGA/IGD/IGG/IGM EACH: CPT | Performed by: NURSE PRACTITIONER

## 2022-04-27 PROCEDURE — 83615 LACTATE (LD) (LDH) ENZYME: CPT | Performed by: NURSE PRACTITIONER

## 2022-04-27 PROCEDURE — 99223 1ST HOSP IP/OBS HIGH 75: CPT | Mod: AI,,, | Performed by: INTERNAL MEDICINE

## 2022-04-27 PROCEDURE — 63600175 PHARM REV CODE 636 W HCPCS: Performed by: STUDENT IN AN ORGANIZED HEALTH CARE EDUCATION/TRAINING PROGRAM

## 2022-04-27 PROCEDURE — 25000003 PHARM REV CODE 250: Performed by: NURSE PRACTITIONER

## 2022-04-27 PROCEDURE — 97161 PT EVAL LOW COMPLEX 20 MIN: CPT

## 2022-04-27 PROCEDURE — 86880 COOMBS TEST DIRECT: CPT | Performed by: NURSE PRACTITIONER

## 2022-04-27 PROCEDURE — 83735 ASSAY OF MAGNESIUM: CPT | Performed by: STUDENT IN AN ORGANIZED HEALTH CARE EDUCATION/TRAINING PROGRAM

## 2022-04-27 PROCEDURE — 85025 COMPLETE CBC W/AUTO DIFF WBC: CPT | Performed by: STUDENT IN AN ORGANIZED HEALTH CARE EDUCATION/TRAINING PROGRAM

## 2022-04-27 PROCEDURE — 83935 ASSAY OF URINE OSMOLALITY: CPT | Performed by: STUDENT IN AN ORGANIZED HEALTH CARE EDUCATION/TRAINING PROGRAM

## 2022-04-27 RX ORDER — FOLIC ACID 1 MG/1
1 TABLET ORAL DAILY
Status: DISCONTINUED | OUTPATIENT
Start: 2022-04-27 | End: 2022-04-27 | Stop reason: HOSPADM

## 2022-04-27 RX ORDER — LANOLIN ALCOHOL/MO/W.PET/CERES
800 CREAM (GRAM) TOPICAL ONCE
Status: COMPLETED | OUTPATIENT
Start: 2022-04-27 | End: 2022-04-27

## 2022-04-27 RX ORDER — FOLIC ACID 1 MG/1
1 TABLET ORAL DAILY
Qty: 30 TABLET | Refills: 3 | Status: SHIPPED | OUTPATIENT
Start: 2022-04-28 | End: 2022-04-28 | Stop reason: SDUPTHER

## 2022-04-27 RX ADMIN — ASPIRIN 81 MG: 81 TABLET, COATED ORAL at 09:04

## 2022-04-27 RX ADMIN — ACYCLOVIR 400 MG: 200 CAPSULE ORAL at 09:04

## 2022-04-27 RX ADMIN — LEVOTHYROXINE SODIUM 75 MCG: 75 TABLET ORAL at 05:04

## 2022-04-27 RX ADMIN — FOLIC ACID 1 MG: 1 TABLET ORAL at 11:04

## 2022-04-27 RX ADMIN — VALSARTAN 320 MG: 160 TABLET, FILM COATED ORAL at 09:04

## 2022-04-27 RX ADMIN — CARVEDILOL 6.25 MG: 6.25 TABLET, FILM COATED ORAL at 09:04

## 2022-04-27 RX ADMIN — Medication 800 MG: at 11:04

## 2022-04-27 RX ADMIN — ERGOCALCIFEROL 50000 UNITS: 1.25 CAPSULE ORAL at 09:04

## 2022-04-27 RX ADMIN — CEFEPIME HYDROCHLORIDE 2 G: 2 INJECTION, SOLUTION INTRAVENOUS at 03:04

## 2022-04-27 NOTE — ED NOTES
Telemetry Verification   Patient placed on Telemetry Box  Verified with War Room  Box # [id number]   Monitor Tech    Rate 73   Rhythm Normal sinus

## 2022-04-27 NOTE — PLAN OF CARE
Plan of care reviewed with patient this shift. VS stable and remained afebrile through the night. Maintaining saturations on room air. IV Cefepime and IV Magnesium. Stool sample collected for stool studies. Telemetry audible. Good, yellow output. All questions and concerns addressed. Will continue to monitor.

## 2022-04-27 NOTE — ASSESSMENT & PLAN NOTE
- Na+ 127 on admission (baseline 132-137) in the setting of diarrhea, vomiting.    - urine studies normal  - TSH normal  - received 1.6L LR sepsis IVF resuscitation   - Na+ improved to 133 today with resolution of vomiting and diarrhea

## 2022-04-27 NOTE — ASSESSMENT & PLAN NOTE
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    - continue acyclovir ppx   - given evidence on hemolysis on labs, may need to consider resuming steroids on an outpatient basis

## 2022-04-27 NOTE — SUBJECTIVE & OBJECTIVE
Subjective:     Interval History: Admitted yesterday evening with fever, nausea and vomiting, and diarrhea. Temp of 102.4 in the ED. Was in [place] over the weekend for a [employer] and was around many people including children. Infection w/u neg thus far. Suspect viral gastroenteritis. Labs suggestive of hemolysis. 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 Will check LDH, reticulocytes, direct deepali, and cold agglutinin titer. May need to consider restarting steroids on an outpatient basis. Also checked immunoglobulins and wnl. Starting folic acid supplement. Discussed starting PJP ppx given CLL treatment with venetoclax. Given sulfa allergy, will defer medication choice to Dr. Wright. Was started on cefepime on admission, but stopped as viral source for symptoms is suspected at this time. Will discharge home later today if she continues to be afebrile off abx and infection w/u continues to be neg. Patient states that she is feeling much better today.    Objective:     Vital Signs (Most Recent):  Temp: 98.7 °F (37.1 °C) (04/27/22 1102)  Pulse: 72 (04/27/22 1102)  Resp: 18 (04/27/22 1102)  BP: 134/60 (04/27/22 1102)  SpO2: 95 % (04/27/22 1102) Vital Signs (24h Range):  Temp:  [97.8 °F (36.6 °C)-102.4 °F (39.1 °C)] 98.7 °F (37.1 °C)  Pulse:  [64-86] 72  Resp:  [14-21] 18  SpO2:  [95 %-99 %] 95 %  BP: (118-166)/(56-70) 134/60     Weight: 58 kg (127 lb 13.9 oz)  Body mass index is 18.88 kg/m².  Body surface area is 1.68 meters squared.    ECOG SCORE           [unfilled]    Intake/Output - Last 3 Shifts         04/25 0700 04/26 0659 04/26 0700 04/27 0659 04/27 0700 04/28 0659    P.O.  350 120    Total Intake(mL/kg)  350 (6) 120 (2.1)    Urine (mL/kg/hr)  1600 600 (1.8)    Stool  0     Total Output  1600 600    Net  -1250 -480           Stool Occurrence  2 x             Physical Exam  Constitutional:       Appearance: She is well-developed.   HENT:      Head: Normocephalic and  atraumatic.      Mouth/Throat:      Pharynx: No oropharyngeal exudate.   Eyes:      Conjunctiva/sclera: Conjunctivae normal.      Pupils: Pupils are equal, round, and reactive to light.   Cardiovascular:      Rate and Rhythm: Normal rate and regular rhythm.      Heart sounds: Normal heart sounds. No murmur heard.  Pulmonary:      Effort: Pulmonary effort is normal.      Breath sounds: Normal breath sounds.   Abdominal:      General: Bowel sounds are normal. There is no distension.      Palpations: Abdomen is soft.      Tenderness: There is no abdominal tenderness.   Musculoskeletal:         General: No deformity. Normal range of motion.      Cervical back: Normal range of motion and neck supple.   Skin:     General: Skin is warm and dry.      Findings: No erythema or rash.   Neurological:      Mental Status: She is alert and oriented to person, place, and time.   Psychiatric:         Behavior: Behavior normal.         Thought Content: Thought content normal.         Judgment: Judgment normal.       Significant Labs:   CBC:   Recent Labs   Lab 04/26/22  1544 04/27/22  0742   WBC 7.81 3.71*   HGB 11.1* 10.1*   HCT 32.3* 28.6*    207    and CMP:   Recent Labs   Lab 04/26/22  1544 04/26/22  2203 04/27/22  0742   * 125* 133*   K 3.7  --  4.1   CL 93*  --  100   CO2 26  --  23   *  --  116*   BUN 12  --  9   CREATININE 0.6  --  0.6   CALCIUM 8.5*  --  8.5*   PROT 6.0  --  5.4*   ALBUMIN 3.8  --  3.5   BILITOT 3.5*  --  4.4*   ALKPHOS 64  --  55   AST 22  --  28   ALT 16  --  16   ANIONGAP 8  --  10   EGFRNONAA >60.0  --  >60.0       Diagnostic Results:  I have reviewed all pertinent imaging results/findings within the past 24 hours.

## 2022-04-27 NOTE — CONSULTS
BMT Service  Consult Note    Consult received and acknowledged.  Patient will be admitted to BMT for further evaluation.  Full H&P to follow.    Lance Mccray MD  Internal Medicine PGY2  BMT Service

## 2022-04-27 NOTE — PT/OT/SLP EVAL
"Occupational Therapy   Evaluation and Discharge Note    Name: Cassidy Aguilar  MRN: [id number]  Admitting Diagnosis:  Fever   Recent Surgery: * No surgery found *      Recommendations:     Discharge Recommendations: home  Discharge Equipment Recommendations:  none  Barriers to discharge:  None    Assessment:     Cassidy Aguilar is a 77 y.o. female with a medical diagnosis of Fever. At this time, patient is functioning at their prior level of function and does not require further acute OT services.     Plan:     During this hospitalization, patient does not require further acute OT services.  Please re-consult if situation changes.    · Plan of Care Reviewed with: patient    Subjective     Chief Complaint: "I don't really need OT"  "Tell the PT Martha how much I appreciate her getting me the paper"   Patient/Family Comments/goals: Get better, go home    Occupational Profile:  Living Environment: Pt lives alone in 2SH home  Previous level of function: Independent in ADLs/functional mobility  Equipment Used at home:  none  Assistance upon Discharge: community    Pain/Comfort:  · Pain Rating 1: 0/10  · Pain Rating Post-Intervention 1: 0/10    Patients cultural, spiritual, Moravian conflicts given the current situation: no    Objective:     Communicated with: SOULEYMANE Lara prior to session.  Patient found HOB elevated with no active lines upon OT entry to room.    General Precautions: Standard, fall   Orthopedic Precautions:N/A   Braces: N/A  Respiratory Status: Room air     Occupational Performance:    Bed Mobility:    · Independent across all bed mobility     Functional Mobility/Transfers:  · Independent in transfers across a variety of surfaces in hospital room  · Functional mobility: Pt engaging in functional mobility to simulate household/community distances utilizing modified independence in order to maximize functional activity tolerance and standing balance required for engagement in occupations of choice.     Activities " of Daily Living:  · Declined this date    Cognitive/Visual Perceptual:  Cognitive/Psychosocial Skills:     -       Oriented to: AOx4   -       Follows Commands/attention:Follows multistep  commands  -       Communication: clear/fluent  -       Memory: No Deficits noted  -       Safety awareness/insight to disability: intact   -       Mood/Affect/Coping skills/emotional control: Appropriate to situation  Visual/Perceptual:      -Intact      Physical Exam:  Balance:    -       Intact  Postural examination/scapula alignment:    -       No postural abnormalities identified  Skin integrity: Visible skin intact  Edema:  None noted  Sensation:    -       Intact  Motor Planning:    -       Intact  Dominant hand:    -       Right  Upper Extremity Range of Motion:   WFL  Upper Extremity Strength:  WFL   Strength:  WFL  Fine Motor Coordination:    -       Intact  Gross motor coordination:   WFL  Neurological:    -       Intact    AMPAC 6 Click ADL:  AMPAC Total Score: 24    Treatment & Education:   Pt educated on role of OT   Pt/caregiver verbalized understanding and expressed no further concerns/questions.  Education:    Patient left HOB elevated with call button in reach and RN notified    GOALS:   Multidisciplinary Problems     Occupational Therapy Goals     Not on file                History:     Past Medical History:   Diagnosis Date    CLL (chronic lymphocytic leukemia) 12/22/2021    Hypertension     Hypothyroidism 4/26/2022    Thyroid disease        Past Surgical History:   Procedure Laterality Date    ADENOIDECTOMY      COLONOSCOPY      HYSTERECTOMY      TONSILLECTOMY         Time Tracking:     OT Date of Treatment: 04/27/22  OT Start Time: 1143  OT Stop Time: 1149  OT Total Time (min): 6 min    Billable Minutes:Evaluation 6    4/27/2022

## 2022-04-27 NOTE — ASSESSMENT & PLAN NOTE
Na 127 on admit (baseline 132-137) in the setting of diarrhea, vomiting.      - serum osm, urine osm, urine sodium ordered  - TSH ordered  - received 1.6L LR sepsis IVF resuscitation   - sodium q4h

## 2022-04-27 NOTE — H&P
[facility] Dept  Hematology/Oncology  H&P    Patient Name: Cassidy Aguilar  MRN: [id number]  Admission Date: 4/26/2022  Code Status: Full Code   Attending Provider: Sally Ayala MD  Primary Care Physician: Didier Ramirez MD  Principal Problem:Fever    Subjective:     HPI: Ms. Cassidy Aguilar is a 77 y.o. female with CLL, steroid-refractory AIHA, HTN who presents today with fever, nausea, vomiting, diarrhea.  Followed by Dr Wright.  Reports fever of 100 starting last night while at home.  Associated with nausea, 1 episode of NBNB vomiting, and 4-5 episodes of NB diarrhea.  Also with chills, fatigue, general weakness.  Poor appetite, has been forcing herself to drink gatorade.  Recently returned from [place], [place] for family [employer] over the weekend, was around a few children but no known sick contacts.  No new foods, medications.  Denies recent antibiotic usage.  Denies night sweats, headache, chest pain, cough, shortness of breath, abdominal pain, dysuria, urinary frequency, changes in urination.  Denies tobacco, recreational drug use; rare alcohol use. Lives alone.      Oncology History  · 2016: First noted to have cervical lymphadenopathy. Excisional biopsy confirmed chronic lymphocytic leukemia. She did not have an indication for treatment; therefore, observation recommended by Dr. Kelley.   · 6/17/21: PET/CT noted enlarged bilateral cervical, supraclavicular, axillary, retroperitoneal, and pelvic lymph nodes. All nodes enlarged from the prior exam.  · 7/28/21: First noted to have mild anemia (Hgb ~11.5 to 10.8 g/dL). Continued monitoring.  · 8/3/21: Established care with Dr. Carnes.   · 11/1/21: On follow up with Dr. Carnes, she was noted to have worsening anemia - hemoglobin decreased to 8.6 g/dL. 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 Beta-2 microglobulin 3.19. CLL FISH revealed trisomy 12. Peripheral blood karyotype - 47,XX,+12[8]/47,idem,del(4)(p14p16)[3]/46,XX[9]. ZAP70 17% of cells.  · 12/14/21: She  noted increased fatigue. Hemoglobin decreased to 6.2. Repeat labs confirmed decreased hemoglobin to 5.8 g/dL. Haptoglobin <1, . MICHELL Anti-IgG negative, MICHELL complement C3 positive.  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  · 12/22/21: Initial visit at [facility] with Dr. Watson. WBC 96k, Hgb 8.9, Plts 288. MICHELL positive. , Hapto <10. Continued prednisone 60mg daily.   · 12/27/21: Initial visit with me. Prednisone decreased to 50mg daily.   · 1/4/22: Hemoglobin stable (~8.4 g/dL) with persistent hemolysis. Prednisone weaned to 40mg daily.   · 1/11/22: Cycle 1 day 1 of obinutuzumab plus venetoclax (starting day 22). Prednisone weaned to 30mg daily.   · 1/18/22: Hemoglobin improved. Prednisone weaned to 20mg daily.  · 1/31/22: Prednisone weaned to 15mg daily. Tapered to 10mg one week later.   · 2/15/22: Prednisone weaned to 5mg daily.   · 4/4/22: Prednisone to 5mg every other day x2 weeks then stop.       Oncology Treatment Plan:   OP CLL VENETOCLAX OBINUTUZUMAB Q4W    Medications:  Continuous Infusions:  Scheduled Meds:   acyclovir  400 mg Oral BID    [START ON 4/27/2022] aspirin  81 mg Oral Daily    atorvastatin  40 mg Oral QHS    carvediloL  6.25 mg Oral BID    enoxaparin  40 mg Subcutaneous Daily    [START ON 4/27/2022] ergocalciferol  50,000 Units Oral Q7 Days    [START ON 4/27/2022] levothyroxine  75 mcg Oral Before breakfast    [START ON 4/27/2022] valsartan  320 mg Oral Daily     PRN Meds:dextrose 10%, dextrose 10%, glucagon (human recombinant), glucose, glucose, melatonin, naloxone, ondansetron, sodium chloride 0.9%     Review of patient's allergies indicates:   Allergen Reactions    Sulfa (sulfonamide antibiotics) Rash    [place] Diarrhea        Past Medical History:   Diagnosis Date    CLL (chronic lymphocytic leukemia) 12/22/2021    Hypertension     Thyroid disease      Past Surgical History:   Procedure Laterality Date    ADENOIDECTOMY       COLONOSCOPY      HYSTERECTOMY      TONSILLECTOMY       Family History    None       Tobacco Use    Smoking status: Never Smoker    Smokeless tobacco: Never Used   Substance and Sexual Activity    Alcohol use: Yes     Alcohol/week: 0.0 standard drinks     Comment: rare    Drug use: Not on file    Sexual activity: Not on file       Review of Systems   Constitutional:  Positive for activity change, appetite change, fatigue and fever. Negative for unexpected weight change.   HENT:  Negative for congestion and sore throat.    Eyes:  Negative for visual disturbance.   Respiratory:  Negative for cough, shortness of breath, wheezing and stridor.    Cardiovascular:  Negative for chest pain and palpitations.   Gastrointestinal:  Positive for diarrhea, nausea and vomiting. Negative for abdominal pain, anal bleeding, blood in stool and constipation.   Genitourinary:  Negative for dysuria and frequency.   Musculoskeletal:  Negative for arthralgias.   Neurological:  Positive for weakness. Negative for dizziness and headaches.   Psychiatric/Behavioral:  Negative for agitation.    Objective:     Vital Signs (Most Recent):  Temp: 98.8 °F (37.1 °C) (04/26/22 2047)  Pulse: 70 (04/26/22 2032)  Resp: 14 (04/26/22 2032)  BP: (!) 122/56 (04/26/22 2032)  SpO2: 97 % (04/26/22 2032)   Vital Signs (24h Range):  Temp:  [98.8 °F (37.1 °C)-102.4 °F (39.1 °C)] 98.8 °F (37.1 °C)  Pulse:  [70-86] 70  Resp:  [14-21] 14  SpO2:  [95 %-99 %] 97 %  BP: (122-166)/(56-70) 122/56     Weight: 56.2 kg (124 lb)  Body mass index is 18.31 kg/m².  Body surface area is 1.65 meters squared.    No intake or output data in the 24 hours ending 04/26/22 2150    Physical Exam  Constitutional:       General: She is not in acute distress.     Appearance: Normal appearance. She is ill-appearing. She is not toxic-appearing.   HENT:      Nose: Nose normal.      Mouth/Throat:      Mouth: Mucous membranes are dry.   Eyes:      Pupils: Pupils are equal, round, and  reactive to light.   Cardiovascular:      Rate and Rhythm: Normal rate and regular rhythm.      Pulses: Normal pulses.      Heart sounds: No murmur heard.    No friction rub. No gallop.   Pulmonary:      Effort: Pulmonary effort is normal. No respiratory distress.      Breath sounds: Normal breath sounds. No stridor. No wheezing, rhonchi or rales.   Abdominal:      General: Abdomen is flat. Bowel sounds are normal. There is no distension.      Palpations: Abdomen is soft. There is no mass.      Tenderness: There is abdominal tenderness (mild generalized). There is no guarding or rebound.   Musculoskeletal:      Cervical back: Normal range of motion.      Right lower leg: No edema.      Left lower leg: No edema.   Skin:     Capillary Refill: Capillary refill takes less than 2 seconds.      Findings: No lesion or rash.   Neurological:      General: No focal deficit present.      Mental Status: She is alert and oriented to person, place, and time.   Psychiatric:         Mood and Affect: Mood normal.         Behavior: Behavior normal.       Significant Labs:   CBC:   Recent Labs   Lab 04/26/22  1544   WBC 7.81   HGB 11.1*   HCT 32.3*       and CMP:   Recent Labs   Lab 04/26/22  1544   *   K 3.7   CL 93*   CO2 26   *   BUN 12   CREATININE 0.6   CALCIUM 8.5*   PROT 6.0   ALBUMIN 3.8   BILITOT 3.5*   ALKPHOS 64   AST 22   ALT 16   ANIONGAP 8   EGFRNONAA >60.0       Diagnostic Results:  I have reviewed all pertinent imaging results/findings within the past 24 hours.    Assessment/Plan:     * Fever  77F CLL, steroid-refractory AIHA, HTN who presents today with fever, multiple episodes of diarrhea, nausea, vomiting.  No known sick contacts, but recent travel to [place] for family gathering.  Poor PO intake.  Tmax 102.4 in ED, mildly hypertensive but otherwise HDS.  Labs remarkable for 7.81, hgb 11.1, Na 127, Cl 93.  Lactate, procal wnl, flu/covid negative.  CXR without acute process.  UA unremarkable.      - received one dose vanc/cefepime in ED  - will continue cefepime  - received 1.6L LR, hold further resuscitation until repeat sodium  - follow up blood cultures  - follow up stool studies, c diff  - contact precautions for possible c diff    Diarrhea  Multiple episodes of loose/watery diarrhea over 24h.  No abx use, no sick contacts.      - c diff, stool wbc, o&p ordered  - contact precautions    Hyponatremia  Na 127 on admit (baseline 132-137) in the setting of diarrhea, vomiting.      - serum osm, urine osm, urine sodium ordered  - TSH ordered  - received 1.6L LR sepsis IVF resuscitation   - sodium q4h    Hypothyroidism  - continue home synthroid 75 mcg    Nausea  Improved on admission.    - zofran prn    Weakness  - PT/OT consulted    Hypertension  On home coreg 20 mg qd, telmisartan 80 mg.  -150 on admit.    - continue home antihypertensives    Long term systemic steroid user  Previously on steroid taper for AIHA, last dose approximately 10 days ago.      - continue acyclovir ppx     CLL (chronic lymphocytic leukemia)  S/p 4 cycles venetoclax obinutuzumab.  Cycle 5 on 5/4/2022.  See above for oncologic history.     - hold venetoclax for now, resume in AM if appropriate      Lance Mccray MD  Hematology/Oncology  [facility]

## 2022-04-27 NOTE — ASSESSMENT & PLAN NOTE
Multiple episodes of loose/watery diarrhea over 24h.  No abx use, no sick contacts.      - c diff, stool wbc, o&p ordered  - contact precautions

## 2022-04-27 NOTE — NURSING
Patient prepared for discharge . PIVs removed and wrapped . AVS reviewed with patient Folic acid will be delivered by [facility] pharmacy delivery. Friend will pick her up to transport home . Waiting on  Wheel chair transport at his time , VSS and afebrile

## 2022-04-27 NOTE — ASSESSMENT & PLAN NOTE
From Dr. Wright's most recent clinic note:  · 2016: First noted to have cervical lymphadenopathy. Excisional biopsy confirmed chronic lymphocytic leukemia. She did not have an indication for treatment; therefore, observation recommended by Dr. Kelley.   · 6/17/21: PET/CT noted enlarged bilateral cervical, supraclavicular, axillary, retroperitoneal, and pelvic lymph nodes. All nodes enlarged from the prior exam.  · 7/28/21: First noted to have mild anemia (Hgb ~11.5 to 10.8 g/dL). Continued monitoring.  · 8/3/21: Established care with Dr. Carnes.   · 11/1/21: On follow up with Dr. Carnes, she was noted to have worsening anemia - hemoglobin decreased to 8.6 g/dL. 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 Beta-2 microglobulin 3.19. CLL FISH revealed trisomy 12. Peripheral blood karyotype - 47,XX,+12[8]/47,idem,del(4)(p14p16)[3]/46,XX[9]. ZAP70 17% of cells.  · 12/14/21: She noted increased fatigue. Hemoglobin decreased to 6.2. Repeat labs confirmed decreased hemoglobin to 5.8 g/dL. Haptoglobin <1, . MICHELL Anti-IgG negative, MICHELL complement C3 positive.  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  · 12/22/21: Initial visit at [facility] with Dr. Watson. WBC 96k, Hgb 8.9, Plts 288. MICHELL positive. , Hapto <10. Continued prednisone 60mg daily.   · 12/27/21: Initial visit with me. Prednisone decreased to 50mg daily.   · 1/4/22: Hemoglobin stable (~8.4 g/dL) with persistent hemolysis. Prednisone weaned to 40mg daily.   · 1/11/22: Cycle 1 day 1 of obinutuzumab plus venetoclax (starting day 22). Prednisone weaned to 30mg daily.   · 1/18/22: Hemoglobin improved. Prednisone weaned to 20mg daily.  · 1/31/22: Prednisone weaned to 15mg daily. Tapered to 10mg one week later.   · 2/15/22: Prednisone weaned to 5mg daily.   · 4/4/22: Prednisone to 5mg every other day x2 weeks then stop.    S/p 4 cycles venetoclax obinutuzumab.  Cycle 5 due 5/4/2022.  See above for oncologic history.      - holding  venetoclax as home med is currently not available. Will resume on discharge.  - checked immunoglobulins and wnl  - may need to consider PJP ppx on an outpatient basis. Patient has sulfa allergy.

## 2022-04-27 NOTE — ASSESSMENT & PLAN NOTE
- Multiple episodes of loose/watery diarrhea over 24h prior to admission.  No abx use, no known sick contacts, but was at a [facility] over the weekend and was around many people, including children.  - c diff, stool wbc, o&p ordered but have not been collected as no longer having diarrhea  - suspect viral gastroenteritis

## 2022-04-27 NOTE — ASSESSMENT & PLAN NOTE
S/p 4 cycles venetoclax obinutuzumab.  Cycle 5 on 5/4/2022.  See above for oncologic history.     - hold venetoclax for now, resume in AM if appropriate

## 2022-04-27 NOTE — PROGRESS NOTES
[facility]cology ([facility])  Hematology  Bone Marrow Transplant  Progress Note    Patient Name: Cassidy Aguilar  Admission Date: 4/26/2022  Hospital Length of Stay: 1 days  Code Status: Full Code    Subjective:     Interval History: Admitted yesterday evening with fever, nausea and vomiting, and diarrhea. Temp of 102.4 in the ED. Was in [place] over the weekend for a [employer] and was around many people including children. Infection w/u neg thus far. Suspect viral gastroenteritis. Labs suggestive of hemolysis. 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 Will check LDH, reticulocytes, direct deepali, and cold agglutinin titer. May need to consider restarting steroids on an outpatient basis. Also checked immunoglobulins and wnl. Starting folic acid supplement. Discussed starting PJP ppx given CLL treatment with venetoclax. Given sulfa allergy, will defer medication choice to Dr. Wright. Was started on cefepime on admission, but stopped as viral source for symptoms is suspected at this time. Will discharge home later today if she continues to be afebrile off abx and infection w/u continues to be neg. Patient states that she is feeling much better today.    Objective:     Vital Signs (Most Recent):  Temp: 98.7 °F (37.1 °C) (04/27/22 1102)  Pulse: 72 (04/27/22 1102)  Resp: 18 (04/27/22 1102)  BP: 134/60 (04/27/22 1102)  SpO2: 95 % (04/27/22 1102) Vital Signs (24h Range):  Temp:  [97.8 °F (36.6 °C)-102.4 °F (39.1 °C)] 98.7 °F (37.1 °C)  Pulse:  [64-86] 72  Resp:  [14-21] 18  SpO2:  [95 %-99 %] 95 %  BP: (118-166)/(56-70) 134/60     Weight: 58 kg (127 lb 13.9 oz)  Body mass index is 18.88 kg/m².  Body surface area is 1.68 meters squared.    ECOG SCORE           [unfilled]    Intake/Output - Last 3 Shifts         04/25 0700 04/26 0659 04/26 0700 04/27 0659 04/27 0700  04/28 0659    P.O.  350 120    Total Intake(mL/kg)  350 (6) 120 (2.1)    Urine (mL/kg/hr)  1600 600 (1.8)    Stool  0     Total  Output  1600 600    Net  -1250 -480           Stool Occurrence  2 x             Physical Exam  Constitutional:       Appearance: She is well-developed.   HENT:      Head: Normocephalic and atraumatic.      Mouth/Throat:      Pharynx: No oropharyngeal exudate.   Eyes:      Conjunctiva/sclera: Conjunctivae normal.      Pupils: Pupils are equal, round, and reactive to light.   Cardiovascular:      Rate and Rhythm: Normal rate and regular rhythm.      Heart sounds: Normal heart sounds. No murmur heard.  Pulmonary:      Effort: Pulmonary effort is normal.      Breath sounds: Normal breath sounds.   Abdominal:      General: Bowel sounds are normal. There is no distension.      Palpations: Abdomen is soft.      Tenderness: There is no abdominal tenderness.   Musculoskeletal:         General: No deformity. Normal range of motion.      Cervical back: Normal range of motion and neck supple.   Skin:     General: Skin is warm and dry.      Findings: No erythema or rash.   Neurological:      Mental Status: She is alert and oriented to person, place, and time.   Psychiatric:         Behavior: Behavior normal.         Thought Content: Thought content normal.         Judgment: Judgment normal.       Significant Labs:   CBC:   Recent Labs   Lab 04/26/22  1544 04/27/22  0742   WBC 7.81 3.71*   HGB 11.1* 10.1*   HCT 32.3* 28.6*    207    and CMP:   Recent Labs   Lab 04/26/22  1544 04/26/22  2203 04/27/22  0742   * 125* 133*   K 3.7  --  4.1   CL 93*  --  100   CO2 26  --  23   *  --  116*   BUN 12  --  9   CREATININE 0.6  --  0.6   CALCIUM 8.5*  --  8.5*   PROT 6.0  --  5.4*   ALBUMIN 3.8  --  3.5   BILITOT 3.5*  --  4.4*   ALKPHOS 64  --  55   AST 22  --  28   ALT 16  --  16   ANIONGAP 8  --  10   EGFRNONAA >60.0  --  >60.0       Diagnostic Results:  I have reviewed all pertinent imaging results/findings within the past 24 hours.    Assessment/Plan:     * Fever  77F CLL, steroid-refractory AIHA, HTN who presents  today with fever, multiple episodes of diarrhea, nausea, vomiting.  No known sick contacts, but recent travel to [place] for family gathering.  Poor PO intake.  Tmax 102.4 in ED, mildly hypertensive but otherwise HDS.  Labs remarkable for 7.81, hgb 11.1, Na 127, Cl 93.  Lactate, procal wnl, flu/covid negative.  CXR without acute process.  UA unremarkable.      - received one dose vanc/cefepime in ED. Cefepime continued but stopped this morning as viral source is suspected.  - received 1.6L LR, hold further resuscitation until repeat sodium  - blood cultures with ngtd  - stool studies, c diff pending collection, but patient no longer having diarrhea      Hypothyroidism  - continue home synthroid 75 mcg    Elevated bilirubin  - t-bili up-trending, and direct bili elevated at 0.4  - suspect hemolysis given history of autoimmune hemolytic anemia and recent completion of steroid taper  - checking hemolysis labs    Nausea  - improved. Continue zofran prn.  - suspect viral gastroenteritis.    Diarrhea  - Multiple episodes of loose/watery diarrhea over 24h prior to admission.  No abx use, no known sick contacts, but was at a [facility] over the weekend and was around many people, including children.  - c diff, stool wbc, o&p ordered but have not been collected as no longer having diarrhea  - suspect viral gastroenteritis      Weakness  - PT/OT consulted    Hyponatremia  - Na+ 127 on admission (baseline 132-137) in the setting of diarrhea, vomiting.    - urine studies normal  - TSH normal  - received 1.6L LR sepsis IVF resuscitation   - Na+ improved to 133 today with resolution of vomiting and diarrhea    Hypertension  - on home coreg 20 mg qd, telmisartan 80 mg.  -150 on admit.  - continue home antihypertensives      Long term systemic steroid user  - previously on steroid taper for AIHA, last dose approximately 10 days ago.    - continue acyclovir ppx   - given evidence on hemolysis on labs, may need to consider  resuming steroids on an outpatient basis    Autoimmune hemolytic anemia  - follows with Dr. Wright for this  - recently completed a steroid taper, and current labs suggestive of hemolysis. Checking LDH, reticulocytes, direct deepali, and cold agglutinin titer  - started folic acid  - will defer to Dr. Wright for outpatient treatment. She will see him on 5/2/22.      CLL (chronic lymphocytic leukemia)  From Dr. Wright's most recent clinic note:  · 2016: First noted to have cervical lymphadenopathy. Excisional biopsy confirmed chronic lymphocytic leukemia. She did not have an indication for treatment; therefore, observation recommended by Dr. Kelley.   · 6/17/21: PET/CT noted enlarged bilateral cervical, supraclavicular, axillary, retroperitoneal, and pelvic lymph nodes. All nodes enlarged from the prior exam.  · 7/28/21: First noted to have mild anemia (Hgb ~11.5 to 10.8 g/dL). Continued monitoring.  · 8/3/21: Established care with Dr. Carnes.   · 11/1/21: On follow up with Dr. Carnes, she was noted to have worsening anemia - hemoglobin decreased to 8.6 g/dL. 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 Beta-2 microglobulin 3.19. CLL FISH revealed trisomy 12. Peripheral blood karyotype - 47,XX,+12[8]/47,idem,del(4)(p14p16)[3]/46,XX[9]. ZAP70 17% of cells.  · 12/14/21: She noted increased fatigue. Hemoglobin decreased to 6.2. Repeat labs confirmed decreased hemoglobin to 5.8 g/dL. Haptoglobin <1, . MICHELL Anti-IgG negative, MICHELL complement C3 positive.  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  · 12/22/21: Initial visit at [facility] with Dr. Watson. WBC 96k, Hgb 8.9, Plts 288. MICHELL positive. , Hapto <10. Continued prednisone 60mg daily.   · 12/27/21: Initial visit with me. Prednisone decreased to 50mg daily.   · 1/4/22: Hemoglobin stable (~8.4 g/dL) with persistent hemolysis. Prednisone weaned to 40mg daily.   · 1/11/22: Cycle 1 day 1 of obinutuzumab plus venetoclax (starting day 22).  Prednisone weaned to 30mg daily.   · 1/18/22: Hemoglobin improved. Prednisone weaned to 20mg daily.  · 1/31/22: Prednisone weaned to 15mg daily. Tapered to 10mg one week later.   · 2/15/22: Prednisone weaned to 5mg daily.   · 4/4/22: Prednisone to 5mg every other day x2 weeks then stop.    S/p 4 cycles venetoclax obinutuzumab.  Cycle 5 due 5/4/2022.  See above for oncologic history.      - holding venetoclax as home med is currently not available. Will resume on discharge.  - checked immunoglobulins and wnl  - may need to consider PJP ppx on an outpatient basis. Patient has sulfa allergy.        VTE Risk Mitigation (From admission, onward)         Ordered     enoxaparin injection 40 mg  Daily         04/26/22 2116     IP VTE HIGH RISK PATIENT  Once         04/26/22 2116     Place sequential compression device  Until discontinued         04/26/22 2116                Disposition: Inpatient.    Anika Baltazar, NP  Bone Marrow Transplant  [facility] - Oncology ([facility])

## 2022-04-27 NOTE — ASSESSMENT & PLAN NOTE
- t-bili up-trending, and direct bili elevated at 0.4  - suspect hemolysis given history of autoimmune hemolytic anemia and recent completion of steroid taper  - checking hemolysis labs

## 2022-04-27 NOTE — ASSESSMENT & PLAN NOTE
- follows with Dr. Wright for this  - recently completed a steroid taper, and current labs suggestive of hemolysis. Checking LDH, reticulocytes, direct deepali, and cold agglutinin titer  - started folic acid  - will defer to Dr. Wright for outpatient treatment. She will see him on 5/2/22.

## 2022-04-27 NOTE — SUBJECTIVE & OBJECTIVE
Oncology Treatment Plan:   OP CLL VENETOCLAX OBINUTUZUMAB Q4W    Medications:  Continuous Infusions:  Scheduled Meds:   acyclovir  400 mg Oral BID    [START ON 4/27/2022] aspirin  81 mg Oral Daily    atorvastatin  40 mg Oral QHS    carvediloL  6.25 mg Oral BID    enoxaparin  40 mg Subcutaneous Daily    [START ON 4/27/2022] ergocalciferol  50,000 Units Oral Q7 Days    [START ON 4/27/2022] levothyroxine  75 mcg Oral Before breakfast    [START ON 4/27/2022] valsartan  320 mg Oral Daily     PRN Meds:dextrose 10%, dextrose 10%, glucagon (human recombinant), glucose, glucose, melatonin, naloxone, ondansetron, sodium chloride 0.9%     Review of patient's allergies indicates:   Allergen Reactions    Sulfa (sulfonamide antibiotics) Rash    [place] Diarrhea        Past Medical History:   Diagnosis Date    CLL (chronic lymphocytic leukemia) 12/22/2021    Hypertension     Thyroid disease      Past Surgical History:   Procedure Laterality Date    ADENOIDECTOMY      COLONOSCOPY      HYSTERECTOMY      TONSILLECTOMY       Family History    None       Tobacco Use    Smoking status: Never Smoker    Smokeless tobacco: Never Used   Substance and Sexual Activity    Alcohol use: Yes     Alcohol/week: 0.0 standard drinks     Comment: rare    Drug use: Not on file    Sexual activity: Not on file       Review of Systems   Constitutional:  Positive for activity change, appetite change, fatigue and fever. Negative for unexpected weight change.   HENT:  Negative for congestion and sore throat.    Eyes:  Negative for visual disturbance.   Respiratory:  Negative for cough, shortness of breath, wheezing and stridor.    Cardiovascular:  Negative for chest pain and palpitations.   Gastrointestinal:  Positive for diarrhea, nausea and vomiting. Negative for abdominal pain, anal bleeding, blood in stool and constipation.   Genitourinary:  Negative for dysuria and frequency.   Musculoskeletal:  Negative for arthralgias.   Neurological:  Positive for  weakness. Negative for dizziness and headaches.   Psychiatric/Behavioral:  Negative for agitation.    Objective:     Vital Signs (Most Recent):  Temp: 98.8 °F (37.1 °C) (04/26/22 2047)  Pulse: 70 (04/26/22 2032)  Resp: 14 (04/26/22 2032)  BP: (!) 122/56 (04/26/22 2032)  SpO2: 97 % (04/26/22 2032)   Vital Signs (24h Range):  Temp:  [98.8 °F (37.1 °C)-102.4 °F (39.1 °C)] 98.8 °F (37.1 °C)  Pulse:  [70-86] 70  Resp:  [14-21] 14  SpO2:  [95 %-99 %] 97 %  BP: (122-166)/(56-70) 122/56     Weight: 56.2 kg (124 lb)  Body mass index is 18.31 kg/m².  Body surface area is 1.65 meters squared.    No intake or output data in the 24 hours ending 04/26/22 2150    Physical Exam  Constitutional:       General: She is not in acute distress.     Appearance: Normal appearance. She is ill-appearing. She is not toxic-appearing.   HENT:      Nose: Nose normal.      Mouth/Throat:      Mouth: Mucous membranes are dry.   Eyes:      Pupils: Pupils are equal, round, and reactive to light.   Cardiovascular:      Rate and Rhythm: Normal rate and regular rhythm.      Pulses: Normal pulses.      Heart sounds: No murmur heard.    No friction rub. No gallop.   Pulmonary:      Effort: Pulmonary effort is normal. No respiratory distress.      Breath sounds: Normal breath sounds. No stridor. No wheezing, rhonchi or rales.   Abdominal:      General: Abdomen is flat. Bowel sounds are normal. There is no distension.      Palpations: Abdomen is soft. There is no mass.      Tenderness: There is abdominal tenderness (mild generalized). There is no guarding or rebound.   Musculoskeletal:      Cervical back: Normal range of motion.      Right lower leg: No edema.      Left lower leg: No edema.   Skin:     Capillary Refill: Capillary refill takes less than 2 seconds.      Findings: No lesion or rash.   Neurological:      General: No focal deficit present.      Mental Status: She is alert and oriented to person, place, and time.   Psychiatric:         Mood and  Affect: Mood normal.         Behavior: Behavior normal.       Significant Labs:   CBC:   Recent Labs   Lab 04/26/22  1544   WBC 7.81   HGB 11.1*   HCT 32.3*       and CMP:   Recent Labs   Lab 04/26/22  1544   *   K 3.7   CL 93*   CO2 26   *   BUN 12   CREATININE 0.6   CALCIUM 8.5*   PROT 6.0   ALBUMIN 3.8   BILITOT 3.5*   ALKPHOS 64   AST 22   ALT 16   ANIONGAP 8   EGFRNONAA >60.0       Diagnostic Results:  I have reviewed all pertinent imaging results/findings within the past 24 hours.

## 2022-04-27 NOTE — ASSESSMENT & PLAN NOTE
Previously on steroid taper for AIHA, last dose approximately 10 days ago.      - continue acyclovir ppx

## 2022-04-27 NOTE — PT/OT/SLP EVAL
"Physical Therapy Evaluation and Discharge Note    Patient Name:  Cassidy Aguilar   MRN:  [id number]    Recommendations:     Discharge Recommendations:  home   Discharge Equipment Recommendations: none   Barriers to discharge: None    Assessment:     Cassidy Aguilar is a 77 y.o. female admitted with a medical diagnosis of Fever. Patient is independent with all functional mobility at this time and is at or near their functional baseline and is able to demonstrate independent functional mobility without any concerns at this time. As a result, patient is without further acute care PT needs at this time. Please re-consult if pt has a change in status, will sign off.    Recent Surgery: * No surgery found *      Plan:     During this hospitalization, patient does not require further acute PT services.  Please re-consult if situation changes.      Subjective     Chief Complaint: "I really was not expecting to stay here a night"  Patient/Family Comments/goals: d/c home  Pain/Comfort:  · Pain Rating 1: 0/10    Patients cultural, spiritual, Jainism conflicts given the current situation: no    Living Environment: Pt lives alone in a two story house with a few [place], bedroom upstairs  Prior level of function: independent without AD, driving  Support available upon discharge: family  Equipment owned: none    Objective:     Communicated with RN prior to session.  Patient found ambulatory in room/vincent with  (no active lines)  upon PT entry to room.     General Precautions: Standard, fall   Orthopedic Precautions:N/A   Braces: N/A     Exams:  · RLE ROM: WFL  · RLE Strength: WFL  · LLE ROM: WFL  · LLE Strength: WFL    Functional Mobility:  · Transfers: independent  · Gait: Patient ambulated within room and bathroom without AD independently, appropriate gait mechanics and safety awareness   · Balance: intact, no overt LOB noted      Therapeutic Activities and Exercises:   Education provided re: d/c planning, PT POC, safety in mobility. Pt " verbalizes understanding.     AM-PAC 6 CLICK MOBILITY  Total Score:24     Patient left ambulatory in room/vincent with all lines intact, call button in reach and RN present.    GOALS:   Multidisciplinary Problems     Physical Therapy Goals     Not on file                History:     Past Medical History:   Diagnosis Date    CLL (chronic lymphocytic leukemia) 12/22/2021    Hypertension     Hypothyroidism 4/26/2022    Thyroid disease        Past Surgical History:   Procedure Laterality Date    ADENOIDECTOMY      COLONOSCOPY      HYSTERECTOMY      TONSILLECTOMY         Time Tracking:     PT Received On: 04/27/22  PT Start Time: 1051     PT Stop Time: 1058  PT Total Time (min): 7 min     Billable Minutes: Evaluation 1 unit      Martha Moran, PT  04/27/2022

## 2022-04-27 NOTE — TELEPHONE ENCOUNTER
"----- Message from Mickey Frost sent at 4/27/2022  4:39 PM CDT -----  Consult/Advisory:          Name Of Caller: Self      Contact Preference?: [phone]        Provider Name: Adriana      Does patient feel the need to be seen today? No      What is the nature of the call?: Calling to speak w/ nurse about if she'll be able to be discharged today or not.          Additional Notes:  "Thank you for all that you do for our patients"      "

## 2022-04-27 NOTE — ASSESSMENT & PLAN NOTE
77F CLL, steroid-refractory AIHA, HTN who presents today with fever, multiple episodes of diarrhea, nausea, vomiting.  No known sick contacts, but recent travel to [place] for family gathering.  Poor PO intake.  Tmax 102.4 in ED, mildly hypertensive but otherwise HDS.  Labs remarkable for 7.81, hgb 11.1, Na 127, Cl 93.  Lactate, procal wnl, flu/covid negative.  CXR without acute process.  UA unremarkable.     - received one dose vanc/cefepime in ED  - will continue cefepime  - received 1.6L LR, hold further resuscitation until repeat sodium  - follow up blood cultures  - follow up stool studies, c diff  - contact precautions for possible c diff

## 2022-04-27 NOTE — HOSPITAL COURSE
04/27/2022: Admitted yesterday evening with fever, nausea and vomiting, and diarrhea. Temp of 102.4 in the ED. Was in [place] over the weekend for a [employer] and was around many people including children. Infection w/u neg thus far. Suspect viral gastroenteritis. Labs suggestive of hemolysis. 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 Will check LDH, reticulocytes, direct deepali, and cold agglutinin titer. May need to consider restarting steroids on an outpatient basis. Also checked immunoglobulins and wnl. Starting folic acid supplement. Discussed starting PJP ppx given CLL treatment with venetoclax. Given sulfa allergy, will defer medication choice to Dr. Wright. Was started on cefepime on admission, but stopped as viral source for symptoms is suspected at this time. Will discharge home later today if she continues to be afebrile off abx and infection w/u continues to be neg. Patient states that she is feeling much better today.

## 2022-04-27 NOTE — HPI
Ms. Cassidy Aguilar is a 77 y.o. female with CLL, steroid-refractory AIHA, HTN who presents today with fever, nausea, vomiting, diarrhea.  Followed by Dr Wright.  Reports fever of 100 starting last night while at home.  Associated with nausea, 1 episode of NBNB vomiting, and 4-5 episodes of NB diarrhea.  Also with chills, fatigue, general weakness.  Poor appetite, has been forcing herself to drink gatorade.  Recently returned from [place], [place] for family [employer] over the weekend, was around a few children but no known sick contacts.  No new foods, medications.  Denies recent antibiotic usage.  Denies night sweats, headache, chest pain, cough, shortness of breath, abdominal pain, dysuria, urinary frequency, changes in urination.  Denies tobacco, recreational drug use; rare alcohol use. Lives alone.      Oncology History  2016: First noted to have cervical lymphadenopathy. Excisional biopsy confirmed chronic lymphocytic leukemia. She did not have an indication for treatment; therefore, observation recommended by Dr. Kelley.   6/17/21: PET/CT noted enlarged bilateral cervical, supraclavicular, axillary, retroperitoneal, and pelvic lymph nodes. All nodes enlarged from the prior exam.  7/28/21: First noted to have mild anemia (Hgb ~11.5 to 10.8 g/dL). Continued monitoring.  8/3/21: Established care with Dr. Carnes.   11/1/21: On follow up with Dr. Carnes, she was noted to have worsening anemia - hemoglobin decreased to 8.6 g/dL. 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 Beta-2 microglobulin 3.19. CLL FISH revealed trisomy 12. Peripheral blood karyotype - 47,XX,+12[8]/47,idem,del(4)(p14p16)[3]/46,XX[9]. ZAP70 17% of cells.  12/14/21: She noted increased fatigue. Hemoglobin decreased to 6.2. Repeat labs confirmed decreased hemoglobin to 5.8 g/dL. Haptoglobin <1, . MCIHELL Anti-IgG negative, MICHELL complement C3 positive.  12/17/21: Prednisone 60mg daily started in response to hemolytic anemia.  12/22/21: Initial visit at  [facility] with Dr. Watson. WBC 96k, Hgb 8.9, Plts 288. MICHELL positive. , Hapto <10. Continued prednisone 60mg daily.   12/27/21: Initial visit with me. Prednisone decreased to 50mg daily.   1/4/22: Hemoglobin stable (~8.4 g/dL) with persistent hemolysis. Prednisone weaned to 40mg daily.   1/11/22: Cycle 1 day 1 of obinutuzumab plus venetoclax (starting day 22). Prednisone weaned to 30mg daily.   1/18/22: Hemoglobin improved. Prednisone weaned to 20mg daily.  1/31/22: Prednisone weaned to 15mg daily. Tapered to 10mg one week later.   2/15/22: Prednisone weaned to 5mg daily.   4/4/22: Prednisone to 5mg every other day x2 weeks then stop.

## 2022-04-27 NOTE — ASSESSMENT & PLAN NOTE
77F CLL, steroid-refractory AIHA, HTN who presents today with fever, multiple episodes of diarrhea, nausea, vomiting.  No known sick contacts, but recent travel to [place] for family gathering.  Poor PO intake.  Tmax 102.4 in ED, mildly hypertensive but otherwise HDS.  Labs remarkable for 7.81, hgb 11.1, Na 127, Cl 93.  Lactate, procal wnl, flu/covid negative.  CXR without acute process.  UA unremarkable.      - received one dose vanc/cefepime in ED. Cefepime continued but stopped this morning as viral source is suspected.  - received 1.6L LR, hold further resuscitation until repeat sodium  - blood cultures with ngtd  - stool studies, c diff pending collection, but patient no longer having diarrhea

## 2022-04-28 ENCOUNTER — CLINICAL SUPPORT (OUTPATIENT)
Dept: REHABILITATION | Facility: OTHER | Age: 77
End: 2022-04-28
Payer: MEDICARE

## 2022-04-28 DIAGNOSIS — R27.9 LACK OF COORDINATION: ICD-10-CM

## 2022-04-28 DIAGNOSIS — E53.8 FOLIC ACID DEFICIENCY: Primary | ICD-10-CM

## 2022-04-28 DIAGNOSIS — R53.1 WEAKNESS: Primary | ICD-10-CM

## 2022-04-28 PROCEDURE — 97112 NEUROMUSCULAR REEDUCATION: CPT

## 2022-04-28 PROCEDURE — 97110 THERAPEUTIC EXERCISES: CPT

## 2022-04-28 RX ORDER — FOLIC ACID 1 MG/1
1 TABLET ORAL DAILY
Qty: 30 TABLET | Refills: 3 | Status: SHIPPED | OUTPATIENT
Start: 2022-04-28 | End: 2022-05-24 | Stop reason: SDUPTHER

## 2022-04-28 NOTE — TELEPHONE ENCOUNTER
----- Message from Gaby Haley sent at 4/28/2022  9:17 AM CDT -----  Name of caller: Divina    Pharmacy name and phone number: [facility] [phone]    What do they need to clarify: awaiting prescription for Folic Acid

## 2022-04-28 NOTE — DISCHARGE SUMMARY
Myles Chowdary - Oncology ([facility])  Hematology  Bone Marrow Transplant  Discharge Summary      Patient Name: Cassidy Aguilar  MRN: [id number]  Admission Date: 4/26/2022  Hospital Length of Stay: 1 days  Discharge Date and Time: 4/27/2022  6:10 PM  Attending Physician: No att. providers found   Discharging Provider: Anika Baltazar NP  Primary Care Provider: Didier Ramirez MD    HPI: No notes on file    * No surgery found *     Hospital Course: Admitted 4/26/22 with fever, nausea and vomiting, and diarrhea. Temp of 102.4 in the ED. Was in [place] over the weekend for a [employer] and was around many people, including children. Infection w/u neg at time of discharge. Suspect viral gastroenteritis. Admission labs suggestive of hemolysis. 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 Checked LDH, reticulocytes, direct deepali, and cold agglutinin titer inpatient. May need to consider restarting steroids on an outpatient basis. Also checked immunoglobulins and wnl. Started folic acid supplement. Discussed starting PJP ppx given CLL treatment with venetoclax. Due to sulfa allergy, will defer medication choice to Dr. Wright. Symptoms resolved quickly during admission, and patient discharged home 5/27/22. She will f/u with Dr. Wright on 5/2/22.    Fever  77F CLL, steroid-refractory AIHA, HTN who presents today with fever, multiple episodes of diarrhea, nausea, vomiting.  No known sick contacts, but recent travel to [place] for family gathering.  Poor PO intake.  Tmax 102.4 in ED, mildly hypertensive but otherwise HDS.  Labs remarkable for 7.81, hgb 11.1, Na 127, Cl 93.  Lactate, procal wnl, flu/covid negative.  CXR without acute process.  UA unremarkable.      - received one dose vanc/cefepime in ED. Cefepime continued but stopped this morning as viral source is suspected.  - received 1.6L LR, hold further resuscitation until repeat sodium  - blood cultures with ngtd  - stool studies, c diff pending  collection, but patient no longer having diarrhea      Hypothyroidism  - continue home synthroid 75 mcg     Elevated bilirubin  - t-bili up-trending, and direct bili elevated at 0.4  - suspect hemolysis given history of autoimmune hemolytic anemia and recent completion of steroid taper  - checking hemolysis labs     Nausea  - improved. Continue zofran prn.  - suspect viral gastroenteritis.     Diarrhea  - Multiple episodes of loose/watery diarrhea over 24h prior to admission.  No abx use, no known sick contacts, but was at a [facility] over the weekend and was around many people, including children.  - c diff, stool wbc, o&p ordered but have not been collected as no longer having diarrhea  - suspect viral gastroenteritis      Weakness  - PT/OT consulted     Hyponatremia  - Na+ 127 on admission (baseline 132-137) in the setting of diarrhea, vomiting.    - urine studies normal  - TSH normal  - received 1.6L LR sepsis IVF resuscitation   - Na+ improved to 133 today with resolution of vomiting and diarrhea     Hypertension  - on home coreg 20 mg qd, telmisartan 80 mg.  -150 on admit.  - continue home antihypertensives      Long term systemic steroid user  - previously on steroid taper for AIHA, last dose approximately 10 days ago.    - continue acyclovir ppx   - given evidence on hemolysis on labs, may need to consider resuming steroids on an outpatient basis     Autoimmune hemolytic anemia  - follows with Dr. Wright for this  - recently completed a steroid taper, and current labs suggestive of hemolysis. Checking LDH, reticulocytes, direct deepali, and cold agglutinin titer  - started folic acid  - will defer to Dr. Wright for outpatient treatment. She will see him on 5/2/22.      CLL (chronic lymphocytic leukemia)  From Dr. Wright's most recent clinic note:  · 2016: First noted to have cervical lymphadenopathy. Excisional biopsy confirmed chronic lymphocytic leukemia. She did not have an indication for  treatment; therefore, observation recommended by Dr. Kelley.   · 6/17/21: PET/CT noted enlarged bilateral cervical, supraclavicular, axillary, retroperitoneal, and pelvic lymph nodes. All nodes enlarged from the prior exam.  · 7/28/21: First noted to have mild anemia (Hgb ~11.5 to 10.8 g/dL). Continued monitoring.  · 8/3/21: Established care with Dr. Carnes.   · 11/1/21: On follow up with Dr. Carnes, she was noted to have worsening anemia - hemoglobin decreased to 8.6 g/dL. 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 Beta-2 microglobulin 3.19. CLL FISH revealed trisomy 12. Peripheral blood karyotype - 47,XX,+12[8]/47,idem,del(4)(p14p16)[3]/46,XX[9]. ZAP70 17% of cells.  · 12/14/21: She noted increased fatigue. Hemoglobin decreased to 6.2. Repeat labs confirmed decreased hemoglobin to 5.8 g/dL. Haptoglobin <1, . MICHELL Anti-IgG negative, MICHELL complement C3 positive.  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  · 12/22/21: Initial visit at [facility] with Dr. Watson. WBC 96k, Hgb 8.9, Plts 288. MICHELL positive. , Hapto <10. Continued prednisone 60mg daily.   · 12/27/21: Initial visit with me. Prednisone decreased to 50mg daily.   · 1/4/22: Hemoglobin stable (~8.4 g/dL) with persistent hemolysis. Prednisone weaned to 40mg daily.   · 1/11/22: Cycle 1 day 1 of obinutuzumab plus venetoclax (starting day 22). Prednisone weaned to 30mg daily.   · 1/18/22: Hemoglobin improved. Prednisone weaned to 20mg daily.  · 1/31/22: Prednisone weaned to 15mg daily. Tapered to 10mg one week later.   · 2/15/22: Prednisone weaned to 5mg daily.   · 4/4/22: Prednisone to 5mg every other day x2 weeks then stop.     S/p 4 cycles venetoclax obinutuzumab.  Cycle 5 due 5/4/2022.  See above for oncologic history.      - holding venetoclax as home med is currently not available. Will resume on discharge.  - checked immunoglobulins and wnl  - may need to consider PJP ppx on an outpatient basis. Patient has sulfa  allergy.    Goals of Care Treatment Preferences:  Code Status: Full Code    Living Will: Yes          Consults (From admission, onward)        Status Ordering Provider     Inpatient consult to Hematology/Oncology  Once        Provider:  (Not yet assigned)    Completed EARL FERNÁNDEZ          Significant Diagnostic Studies: Labs:   CMP   Recent Labs   Lab 04/26/22  1544 04/26/22  2203 04/27/22  0742   * 125* 133*   K 3.7  --  4.1   CL 93*  --  100   CO2 26  --  23   *  --  116*   BUN 12  --  9   CREATININE 0.6  --  0.6   CALCIUM 8.5*  --  8.5*   PROT 6.0  --  5.4*   ALBUMIN 3.8  --  3.5   BILITOT 3.5*  --  4.4*   ALKPHOS 64  --  55   AST 22  --  28   ALT 16  --  16   ANIONGAP 8  --  10   ESTGFRAFRICA >60.0  --  >60.0   EGFRNONAA >60.0  --  >60.0    and CBC   Recent Labs   Lab 04/26/22  1544 04/27/22  0742   WBC 7.81 3.71*   HGB 11.1* 10.1*   HCT 32.3* 28.6*    207       Pending Diagnostic Studies:     Procedure Component Value Units Date/Time    Cold agglutinin titer [[id number]] Collected: 04/27/22 1044    Order Status: Sent Lab Status: In process Updated: 04/27/22 1102    Specimen: Blood     Stool Exam-Ova,Cysts,Parasites [[id number]] Collected: 04/27/22 0602    Order Status: Sent Lab Status: In process Updated: 04/27/22 0731    Specimen: Stool         Final Active Diagnoses:    Diagnosis Date Noted POA    PRINCIPAL PROBLEM:  Fever [R50.9] 04/26/2022 Yes    Diarrhea [R19.7] 04/26/2022 Yes    Nausea [R11.0] 04/26/2022 Yes    Elevated bilirubin [R17] 04/26/2022 Yes    Hypothyroidism [E03.9] 04/26/2022 Yes    Weakness [R53.1] 03/31/2022 Yes    Hyponatremia [E87.1] 12/28/2021 Yes    Hypertension [I10] 12/28/2021 Yes    Long term systemic steroid user [Z79.52] 12/27/2021 Not Applicable    Autoimmune hemolytic anemia [D59.10] 12/27/2021 Yes    CLL (chronic lymphocytic leukemia) [C91.10] 12/22/2021 Yes      Problems Resolved During this Admission:      Discharged Condition:  stable    Disposition: Home or Self Care    Follow Up:    Patient Instructions:      Diet Adult Regular     Notify your health care provider if you experience any of the following:  temperature >100.4     Notify your health care provider if you experience any of the following:  persistent nausea and vomiting or diarrhea     Notify your health care provider if you experience any of the following:  severe uncontrolled pain     Notify your health care provider if you experience any of the following:  redness, tenderness, or signs of infection (pain, swelling, redness, odor or green/yellow discharge around incision site)     Notify your health care provider if you experience any of the following:  difficulty breathing or increased cough     Notify your health care provider if you experience any of the following:  severe persistent headache     Notify your health care provider if you experience any of the following:  persistent dizziness, light-headedness, or visual disturbances     Notify your health care provider if you experience any of the following:  increased confusion or weakness     Activity as tolerated     Medications:  Reconciled Home Medications:      Medication List      START taking these medications    folic acid 1 MG tablet  Commonly known as: FOLVITE  Take 1 tablet (1 mg total) by mouth once daily.        CONTINUE taking these medications    acyclovir 400 MG tablet  Commonly known as: ZOVIRAX  Take 1 tablet (400 mg total) by mouth 2 (two) times daily.     aspirin 81 MG EC tablet  Commonly known as: ECOTRIN  Take 81 mg by mouth.     atorvastatin 40 MG tablet  Commonly known as: LIPITOR  TAKE ONE TABLET BY MOUTH once DAILY AT BEDTIME FOR cholesterol control     CARVEDILOL PHOSPHATE 20 MG ORAL CM24 20 mg 24 hr capsule  Commonly known as: COREG CR  Take 20 mg by mouth nightly.     clindamycin 1 % Swab  Commonly known as: CLEOCIN T  2 (two) times a day.     ergocalciferol 50,000 unit Cap  Commonly known as:  ERGOCALCIFEROL  Take 50,000 Units by mouth every 7 days.     FIBERCON ORAL  Take by mouth.     fluticasone propionate 50 mcg/actuation nasal spray  Commonly known as: FLONASE  1 spray by Each Nare route once daily.     FLUZONE HIGH-DOSE 2018-19 (PF) 180 mcg/0.5 mL vaccine  Generic drug: influenza  ADM 0.5ML IM UTD     glucosamine-chondroitin 500-400 mg tablet  Take 1 tablet by mouth 3 (three) times daily.     multivitamin per tablet  Commonly known as: THERAGRAN  Take 1 tablet by mouth once daily.     ondansetron 8 MG Tbdl  Commonly known as: ZOFRAN-ODT  Take 1 tablet (8 mg total) by mouth every 12 (twelve) hours as needed (nausea).     SYNTHROID 75 MCG tablet  Generic drug: levothyroxine  Take 1 tablet (75 mcg total) by mouth daily     telmisartan 80 MG Tab  Commonly known as: MICARDIS     * VENCLEXTA 10 mg Tab  Generic drug: venetoclax  Take 10 mg (1 tablet)  by mouth daily on days 1-7 of cycle 2.   Take 20 mg (2 tablets) by mouth daily on days 8-14 of cycle 2.  Take 50 mg (5 tablets) daily on days 15-21 of cycle 2.     * VENCLEXTA 100 mg Tab  Generic drug: venetoclax  Take 100 mg (1 tablet) by mouth daily  on days 22-28 of cycle 2.  Take 200 mg (2 tablets) by mouth daily on days 1-7 of cycle 3.     * VENCLEXTA 100 mg Tab  Generic drug: venetoclax  Take 200 mg (2 tablets)  by mouth once daily.     vitamin D 1000 units Tab  Commonly known as: VITAMIN D3  Take 1,000 Units by mouth.     VIVELLE-DOT 0.025 mg/24 hr  Generic drug: estradioL  Place 1 patch onto the skin twice a week         * This list has 3 medication(s) that are the same as other medications prescribed for you. Read the directions carefully, and ask your doctor or other care provider to review them with you.            STOP taking these medications    omeprazole 20 MG tablet  Commonly known as: PriLOSEC OTC     predniSONE 10 MG tablet  Commonly known as: ELIZABETH Baltazar NP  Bone Marrow Transplant  [facility] - Oncology ([facility])

## 2022-04-29 ENCOUNTER — TELEPHONE (OUTPATIENT)
Dept: INFUSION THERAPY | Facility: HOSPITAL | Age: 77
End: 2022-04-29
Payer: MEDICARE

## 2022-04-29 LAB
CA TITR SERPL: NORMAL TITER
O+P STL MICRO: NORMAL

## 2022-05-01 LAB
BACTERIA BLD CULT: NORMAL
BACTERIA BLD CULT: NORMAL

## 2022-05-02 ENCOUNTER — OFFICE VISIT (OUTPATIENT)
Dept: HEMATOLOGY/ONCOLOGY | Facility: CLINIC | Age: 77
End: 2022-05-02
Payer: MEDICARE

## 2022-05-02 ENCOUNTER — TELEPHONE (OUTPATIENT)
Dept: INFUSION THERAPY | Facility: HOSPITAL | Age: 77
End: 2022-05-02
Payer: MEDICARE

## 2022-05-02 ENCOUNTER — LAB VISIT (OUTPATIENT)
Dept: LAB | Facility: HOSPITAL | Age: 77
End: 2022-05-02
Attending: INTERNAL MEDICINE
Payer: MEDICARE

## 2022-05-02 VITALS
TEMPERATURE: 99 F | HEART RATE: 71 BPM | DIASTOLIC BLOOD PRESSURE: 73 MMHG | HEIGHT: 69 IN | WEIGHT: 122.13 LBS | OXYGEN SATURATION: 99 % | RESPIRATION RATE: 16 BRPM | SYSTOLIC BLOOD PRESSURE: 129 MMHG | BODY MASS INDEX: 18.09 KG/M2

## 2022-05-02 DIAGNOSIS — D59.12 COLD AGGLUTININ DISEASE: ICD-10-CM

## 2022-05-02 DIAGNOSIS — C91.10 CLL (CHRONIC LYMPHOCYTIC LEUKEMIA): Primary | ICD-10-CM

## 2022-05-02 DIAGNOSIS — C91.10 CLL (CHRONIC LYMPHOCYTIC LEUKEMIA): ICD-10-CM

## 2022-05-02 DIAGNOSIS — E87.1 HYPONATREMIA: ICD-10-CM

## 2022-05-02 DIAGNOSIS — D84.9 IMMUNOSUPPRESSION: ICD-10-CM

## 2022-05-02 DIAGNOSIS — D59.9 ACQUIRED HEMOLYTIC ANEMIA: ICD-10-CM

## 2022-05-02 LAB
ALBUMIN SERPL BCP-MCNC: 4.2 G/DL (ref 3.5–5.2)
ALP SERPL-CCNC: 77 U/L (ref 55–135)
ALT SERPL W/O P-5'-P-CCNC: 26 U/L (ref 10–44)
ANION GAP SERPL CALC-SCNC: 5 MMOL/L (ref 8–16)
ANISOCYTOSIS BLD QL SMEAR: SLIGHT
AST SERPL-CCNC: 21 U/L (ref 10–40)
BASOPHILS # BLD AUTO: 0.04 K/UL (ref 0–0.2)
BASOPHILS NFR BLD: 0.7 % (ref 0–1.9)
BILIRUB SERPL-MCNC: 2.5 MG/DL (ref 0.1–1)
BUN SERPL-MCNC: 11 MG/DL (ref 8–23)
CALCIUM SERPL-MCNC: 9.2 MG/DL (ref 8.7–10.5)
CHLORIDE SERPL-SCNC: 98 MMOL/L (ref 95–110)
CO2 SERPL-SCNC: 30 MMOL/L (ref 23–29)
CREAT SERPL-MCNC: 0.6 MG/DL (ref 0.5–1.4)
DIFFERENTIAL METHOD: ABNORMAL
EOSINOPHIL # BLD AUTO: 0 K/UL (ref 0–0.5)
EOSINOPHIL NFR BLD: 0.2 % (ref 0–8)
ERYTHROCYTE [DISTWIDTH] IN BLOOD BY AUTOMATED COUNT: 12.6 % (ref 11.5–14.5)
EST. GFR  (AFRICAN AMERICAN): >60 ML/MIN/1.73 M^2
EST. GFR  (NON AFRICAN AMERICAN): >60 ML/MIN/1.73 M^2
GLUCOSE SERPL-MCNC: 84 MG/DL (ref 70–110)
HAPTOGLOB SERPL-MCNC: <10 MG/DL (ref 30–250)
HCT VFR BLD AUTO: 32.2 % (ref 37–48.5)
HGB BLD-MCNC: 11 G/DL (ref 12–16)
HYPOCHROMIA BLD QL SMEAR: ABNORMAL
IMM GRANULOCYTES # BLD AUTO: 0.02 K/UL (ref 0–0.04)
IMM GRANULOCYTES NFR BLD AUTO: 0.4 % (ref 0–0.5)
LDH SERPL L TO P-CCNC: 190 U/L (ref 110–260)
LYMPHOCYTES # BLD AUTO: 1.4 K/UL (ref 1–4.8)
LYMPHOCYTES NFR BLD: 25.4 % (ref 18–48)
MAGNESIUM SERPL-MCNC: 1.9 MG/DL (ref 1.6–2.6)
MCH RBC QN AUTO: 33.3 PG (ref 27–31)
MCHC RBC AUTO-ENTMCNC: 34.2 G/DL (ref 32–36)
MCV RBC AUTO: 98 FL (ref 82–98)
MONOCYTES # BLD AUTO: 0.5 K/UL (ref 0.3–1)
MONOCYTES NFR BLD: 9.9 % (ref 4–15)
NEUTROPHILS # BLD AUTO: 3.4 K/UL (ref 1.8–7.7)
NEUTROPHILS NFR BLD: 63.4 % (ref 38–73)
NRBC BLD-RTO: 0 /100 WBC
OVALOCYTES BLD QL SMEAR: ABNORMAL
PHOSPHATE SERPL-MCNC: 4.6 MG/DL (ref 2.7–4.5)
PLATELET # BLD AUTO: 286 K/UL (ref 150–450)
PMV BLD AUTO: 10 FL (ref 9.2–12.9)
POIKILOCYTOSIS BLD QL SMEAR: SLIGHT
POLYCHROMASIA BLD QL SMEAR: ABNORMAL
POTASSIUM SERPL-SCNC: 4 MMOL/L (ref 3.5–5.1)
PROT SERPL-MCNC: 6.6 G/DL (ref 6–8.4)
RBC # BLD AUTO: 3.3 M/UL (ref 4–5.4)
RETICS/RBC NFR AUTO: 2.7 % (ref 0.5–2.5)
SODIUM SERPL-SCNC: 133 MMOL/L (ref 136–145)
URATE SERPL-MCNC: 4.3 MG/DL (ref 2.4–5.7)
WBC # BLD AUTO: 5.36 K/UL (ref 3.9–12.7)

## 2022-05-02 PROCEDURE — 83010 ASSAY OF HAPTOGLOBIN QUANT: CPT | Performed by: INTERNAL MEDICINE

## 2022-05-02 PROCEDURE — 99214 OFFICE O/P EST MOD 30 MIN: CPT | Mod: PBBFAC | Performed by: INTERNAL MEDICINE

## 2022-05-02 PROCEDURE — 84100 ASSAY OF PHOSPHORUS: CPT | Performed by: INTERNAL MEDICINE

## 2022-05-02 PROCEDURE — 85025 COMPLETE CBC W/AUTO DIFF WBC: CPT | Performed by: INTERNAL MEDICINE

## 2022-05-02 PROCEDURE — 85045 AUTOMATED RETICULOCYTE COUNT: CPT | Performed by: INTERNAL MEDICINE

## 2022-05-02 PROCEDURE — 80053 COMPREHEN METABOLIC PANEL: CPT | Performed by: INTERNAL MEDICINE

## 2022-05-02 PROCEDURE — 83735 ASSAY OF MAGNESIUM: CPT | Performed by: INTERNAL MEDICINE

## 2022-05-02 PROCEDURE — 99999 PR PBB SHADOW E&M-EST. PATIENT-LVL IV: CPT | Mod: PBBFAC,,, | Performed by: INTERNAL MEDICINE

## 2022-05-02 PROCEDURE — 84550 ASSAY OF BLOOD/URIC ACID: CPT | Performed by: INTERNAL MEDICINE

## 2022-05-02 PROCEDURE — 99999 PR PBB SHADOW E&M-EST. PATIENT-LVL IV: ICD-10-PCS | Mod: PBBFAC,,, | Performed by: INTERNAL MEDICINE

## 2022-05-02 PROCEDURE — 36415 COLL VENOUS BLD VENIPUNCTURE: CPT | Performed by: INTERNAL MEDICINE

## 2022-05-02 PROCEDURE — 83615 LACTATE (LD) (LDH) ENZYME: CPT | Performed by: INTERNAL MEDICINE

## 2022-05-02 PROCEDURE — 99215 OFFICE O/P EST HI 40 MIN: CPT | Mod: S$PBB,,, | Performed by: INTERNAL MEDICINE

## 2022-05-02 PROCEDURE — 99215 PR OFFICE/OUTPT VISIT, EST, LEVL V, 40-54 MIN: ICD-10-PCS | Mod: S$PBB,,, | Performed by: INTERNAL MEDICINE

## 2022-05-02 RX ORDER — DIPHENHYDRAMINE HYDROCHLORIDE 50 MG/ML
50 INJECTION INTRAMUSCULAR; INTRAVENOUS ONCE AS NEEDED
Status: CANCELLED | OUTPATIENT
Start: 2022-05-04

## 2022-05-02 RX ORDER — EPINEPHRINE 0.3 MG/.3ML
0.3 INJECTION SUBCUTANEOUS ONCE AS NEEDED
Status: CANCELLED | OUTPATIENT
Start: 2022-05-04

## 2022-05-02 RX ORDER — SODIUM CHLORIDE 0.9 % (FLUSH) 0.9 %
10 SYRINGE (ML) INJECTION
Status: CANCELLED | OUTPATIENT
Start: 2022-05-04

## 2022-05-02 RX ORDER — ACETAMINOPHEN 325 MG/1
650 TABLET ORAL
Status: CANCELLED | OUTPATIENT
Start: 2022-05-04

## 2022-05-02 RX ORDER — HEPARIN 100 UNIT/ML
500 SYRINGE INTRAVENOUS
Status: CANCELLED | OUTPATIENT
Start: 2022-05-04

## 2022-05-02 RX ORDER — SULFACETAMIDE SODIUM 100 MG/ML
1 LOTION TOPICAL 2 TIMES DAILY
COMMUNITY
Start: 2022-04-12

## 2022-05-02 NOTE — PROGRESS NOTES
Section of Hematology and Stem Cell Transplantation  Follow Up Visit     Visit date: 5/2/22  Visit diagnosis: CLL (chronic lymphocytic leukemia) [C91.10]  Referred by:  Shiv Watson MD    Oncologic History:     Primary Oncologic Diagnosis: Chronic lymphocytic leukemia, Binet stage C, Duarte stage III; cold agglutinin disease      2016: First noted to have cervical lymphadenopathy. Excisional biopsy confirmed chronic lymphocytic leukemia. She did not have an indication for treatment; therefore, observation recommended by Dr. Kelley.    6/17/21: PET/CT noted enlarged bilateral cervical, supraclavicular, axillary, retroperitoneal, and pelvic lymph nodes. All nodes enlarged from the prior exam.   7/28/21: First noted to have mild anemia (Hgb ~11.5 to 10.8 g/dL). Continued monitoring.   8/3/21: Established care with Dr. Carnes.    11/1/21: On follow up with Dr. Carnes, she was noted to have worsening anemia - hemoglobin decreased to 8.6 g/dL. 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 Beta-2 microglobulin 3.19. CLL FISH revealed trisomy 12. Peripheral blood karyotype - 47,XX,+12[8]/47,idem,del(4)(p14p16)[3]/46,XX[9]. ZAP70 17% of cells.   12/14/21: She noted increased fatigue. Hemoglobin decreased to 6.2. Repeat labs confirmed decreased hemoglobin to 5.8 g/dL. Haptoglobin <1, . MICHELL Anti-IgG negative, MICHELL complement C3 positive.   12/17/21: Prednisone 60mg daily started in response to hemolytic anemia.   12/22/21: Initial visit at [facility] with Dr. Watson. WBC 96k, Hgb 8.9, Plts 288. MICHELL positive. , Hapto <10. Continued prednisone 60mg daily.    12/27/21: Initial visit with me. Prednisone decreased to 50mg daily.    1/4/22: Hemoglobin stable (~8.4 g/dL) with persistent hemolysis. Prednisone weaned to 40mg daily.    1/11/22: Cycle 1 day 1 of obinutuzumab plus venetoclax (starting day 22). Prednisone weaned to 30mg daily.    1/18/22: Hemoglobin improved. Prednisone weaned to 20mg daily.   1/31/22:  Prednisone weaned to 15mg daily. Tapered to 10mg one week later.    2/15/22: Prednisone weaned to 5mg daily.    4/4/22: Prednisone to 5mg every other day x2 weeks then stop.   4/27/22: Cold agglutinin titer positive (>1:512).    History of Present Ilness:   Cassidy Aguilar (Cassidy) is a pleasant 77 y.o.female with a past medical history of hypertension and chronic lymphocytic leukemia who presents for follow up visit regarding CLL and steroid-refractory AIHA.  She was recently admitted for fever, and the infectious workup was unremarkable.  Her fevers resolved soon after admission.  She states that since discharge she has done very well.  Of note during her admission she was also noted to have a positive cold agglutinin titer (>1:512).    PAST MEDICAL HISTORY:   Past Medical History:   Diagnosis Date    CLL (chronic lymphocytic leukemia) 12/22/2021    Hypertension     Hypothyroidism 4/26/2022    Thyroid disease        PAST SURGICAL HISTORY:   Past Surgical History:   Procedure Laterality Date    ADENOIDECTOMY      COLONOSCOPY      HYSTERECTOMY      TONSILLECTOMY         PAST SOCIAL HISTORY:  Social History     Tobacco Use    Smoking status: Never Smoker    Smokeless tobacco: Never Used   Substance Use Topics    Alcohol use: Yes     Alcohol/week: 0.0 standard drinks     Comment: rare       FAMILY HISTORY:  History reviewed. No pertinent family history.    CURRENT MEDICATIONS:   Current Outpatient Medications   Medication Sig    acyclovir (ZOVIRAX) 400 MG tablet Take 1 tablet (400 mg total) by mouth 2 (two) times daily.    aspirin (ECOTRIN) 81 MG EC tablet Take 81 mg by mouth.    atorvastatin (LIPITOR) 40 MG tablet TAKE ONE TABLET BY MOUTH once DAILY AT BEDTIME FOR cholesterol control    calcium polycarbophil (FIBERCON ORAL) Take by mouth.    CARVEDILOL PHOSPHATE 20 MG ORAL CM24 (COREG CR) 20 mg 24 hr capsule Take 20 mg by mouth nightly.    clindamycin (CLEOCIN T) 1 % Swab 2 (two) times a day.     fluticasone (FLONASE) 50 mcg/actuation nasal spray 1 spray by Each Nare route once daily.    FLUZONE HIGH-DOSE 2018-19, PF, 180 mcg/0.5 mL vaccine ADM 0.5ML IM UTD    folic acid (FOLVITE) 1 MG tablet Take 1 tablet (1 mg total) by mouth once daily.    glucosamine-chondroitin 500-400 mg tablet Take 1 tablet by mouth 3 (three) times daily.    multivitamin (THERAGRAN) per tablet Take 1 tablet by mouth once daily.    ondansetron (ZOFRAN-ODT) 8 MG TbDL Take 1 tablet (8 mg total) by mouth every 12 (twelve) hours as needed (nausea).    sulfacetamide sodium, acne, 10 % Susp Apply 1 application topically 2 (two) times daily. Apply to affected area    SYNTHROID 75 mcg tablet Take 1 tablet (75 mcg total) by mouth daily    telmisartan (MICARDIS) 80 MG Tab     venetoclax (VENCLEXTA) 100 mg Tab Take 200 mg (2 tablets)  by mouth once daily.    vitamin D (VITAMIN D3) 1000 units Tab Take 1,000 Units by mouth.    VIVELLE-DOT 0.025 mg/24 hr Place 1 patch onto the skin twice a week    ergocalciferol (ERGOCALCIFEROL) 50,000 unit Cap Take 50,000 Units by mouth every 7 days.    venetoclax (VENCLEXTA) 10 mg Tab Take 10 mg (1 tablet)  by mouth daily on days 1-7 of cycle 2.   Take 20 mg (2 tablets) by mouth daily on days 8-14 of cycle 2.  Take 50 mg (5 tablets) daily on days 15-21 of cycle 2. (Patient not taking: No sig reported)    venetoclax (VENCLEXTA) 100 mg Tab Take 100 mg (1 tablet) by mouth daily  on days 22-28 of cycle 2.  Take 200 mg (2 tablets) by mouth daily on days 1-7 of cycle 3. (Patient not taking: No sig reported)     No current facility-administered medications for this visit.       ALLERGIES:   Review of patient's allergies indicates:   Allergen Reactions    Sulfa (sulfonamide antibiotics) Rash    [place] Diarrhea       Review of Systems:     Pertinent positives and negatives included in the HPI. Otherwise a complete review of systems is negative.    Physical Exam:     Vitals:    05/02/22 1054   BP: 129/73    Pulse: 71   Resp: 16   Temp: 98.5 °F (36.9 °C)     General: Appears well, NAD  HEENT: MMM, no OP lesions  Pulmonary: CTAB, no increased work of breathing, no W/R/C  Cardiovascular: S1S2 normal, RRR, no M/R/G  Abdominal: Soft, NT, ND, BS+, no HSM  Extremities: No C/C/E  Neurological: AAOx4, grossly normal, no focal deficits  Dermatologic: No appreciable rashes or lesions  Lymphatic: No appreciable cervical, axillary, or inguinal lymphadenopathy     ECOG Performance Status: (foot note - ECOG PS provided by Eastern Cooperative Oncology Group) 1 - Symptomatic but completely ambulatory    Karnofsky Performance Score:  90%- Able to Carry on Normal Activity: Minor Symptoms of Disease    Labs:   Lab Results   Component Value Date    WBC 5.36 05/02/2022    HGB 11.0 (L) 05/02/2022    HCT 32.2 (L) 05/02/2022    MCV 98 05/02/2022     05/02/2022       Lab Results   Component Value Date     (L) 05/02/2022    K 4.0 05/02/2022    CL 98 05/02/2022    CO2 30 (H) 05/02/2022    BUN 11 05/02/2022    CREATININE 0.6 05/02/2022    ALBUMIN 4.2 05/02/2022    BILITOT 2.5 (H) 05/02/2022    ALKPHOS 77 05/02/2022    AST 21 05/02/2022    ALT 26 05/02/2022       Imaging:   PET/CT 6/17/21  COMPARISON: PET/CT 8/22/2016.   HISTORY: Lymphoma.   FINDINGS:   Head and neck: There is symmetric and physiologic distribution of radiotracer throughout the included brain parenchyma. There is no hemorrhage, hydrocephalus, or midline shift. There are innumerable bilateral enlarged FDG avid cervical lymph nodes. These have worsened from the prior exam. A representative left lower cervical lymph node best visualized on image 84 measures 19 mm compared with 10 mm previously with an SUV max of 1.8. There are enlarged left supraclavicular lymph nodes which were not present on the prior exam. A representative eran conglomerate measures 3.4 cm in diameter with an SUV max of 2.2.   CHEST: There are innumerable bilateral axillary and subpectoral lymph  nodes which are not present on the prior exam. These demonstrate low-level FDG avidity. A representative left axillary nodule best visualized on image 114 measures 1.9 cm with an SUV max of 1.6. There are prominent paratracheal lymph nodes which are not pathologically enlarged by size criteria and demonstrate background FDG avidity, however were not present on the prior exam.   There is no FDG avid pulmonary nodule or mass. There is no pleural effusion. There is no pneumothorax.   Abdomen and pelvis: There is physiologic distribution of radiotracer throughout the liver, spleen, and collecting system. There are multiple enlarged bilateral iliac and periaortic retroperitoneal lymph nodes. A representative left periaortic lymph node best visualized on image 201 measures 2.0 cm in diameter with an SUV max of 2.0.   MUSCULOSKELETAL: There is no FDG avid lytic or blastic osseous lesion.     IMPRESSION:   Innumerable enlarged bilateral cervical, supraclavicular, axillary, retroperitoneal, and pelvic lymph nodes all which demonstrate low-level FDG avidity, however are enlarged from the prior exam and most consistent with recurrent disease.       Pathology:  Peripheral Blood Flow Cytometry (11/1/21):  Comment:      CD5+ B-CELL LYMPHOPROLIFERATIVE DISORDER (SEE COMMENT).     COMMENT: Clonal CD20+ B-cells are detected that coexpress   CD5, CD23, FMC7(dim) and moderate surface kappa light chain   and are negative for CD10, comprising 75% of all analyzed   cells.       Prognostication Tools:  CLL-IPI = 2, intermediate risk (79.4% survival after 5 years, 40% survival after 10 years, median  months)    Assessment and Plan:   Cassidy RODRIGUEZ Aguilar OrenCassidy) is a pleasant 77 y.o.female with a past medical history of hypertension and chronic lymphocytic leukemia who presents for follow up visit regarding CLL.     1. Chronic lymphocytic leukemia, Binet stage C, Duarte stage III: She has had Duarte stage I disease since 2016. 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 In January 2022, she was started on treatment for CLL due to steroid-refractory hemolytic anemia with obinutuzumab plus venetoclax because of the benefit of anti-CD20 MAB with AIHA plus fixed duration treatment (patient preference).  With the recent discovery of cold agglutinin disease, it is unclear if she is having any benefit from venetoclax in conjunction with obinutuzumab given persistent hemolysis and cold agglutinin titer.  It would be reasonable to continue obinutuzumab for treatment of secondary CAD and monitor.   a. Will proceed with cycle 5 of obinutuzumab. Stop venetoclax.    2. 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  Initially treated with prednisone in December with stabilization of blood counts but persistent hemolysis.  Her hemoglobin has improved since starting obinutuzumab; however, she continues to have a low degree of hemolysis. Consider sutimlimab if relapse of [place].  a. Obinutuzumab as above.  b. Continue to monitor LDH, haptoglobin, and retic with weekly labs.   c. Continue folic acid.    3. Immunosuppression: Continue acyclovir 400mg BID until 1 year post-treatment. She received 3 COVID vaccinations and 2 Evusheld infusions.  We discussed her 4th COVID vaccine which is indicated at this time.     4. Hyponatremia: Stable. Continue to monitor.     5. Hypertension: She reports improved BP control. HTN managed by cardiology.    6. Follow up: As below    Orders Placed:         Route Chart for Scheduling    BMT Chart Routing      Follow up with physician 1 month. Follow up 5/30/22. Please schedule chemo for 6/1/22.   Follow up with ERIKA    Labs CBC, CMP, LDH, uric acid and other   Lab interval:  Continue labs every Monday (CBC, CMP, Mg, Phos, uric acid, LDH, haptoglobin, retic)   Imaging    Pharmacy appointment No pharmacy appointment needed       Other referrals No additional referrals needed       Treatment Plan Information   OP CLL VENETOCLAX OBINUTUZUMAB Q4W   Adonis Wright MD   Upcoming Treatment Dates - OP CLL VENETOCLAX OBINUTUZUMAB Q4W    5/4/2022       Pre-Medications       acetaminophen tablet 650 mg       diphenhydramine (BENADRYL) 50 mg in NS 50 mL IVPB       Chemotherapy       obinutuzumab (GAZYVA) 1,000 mg in sodium chloride 0.9% 250 mL chemo infusion  6/1/2022       Pre-Medications       acetaminophen tablet 650 mg       diphenhydramine (BENADRYL) 50 mg in NS 50 mL IVPB       Chemotherapy       obinutuzumab (GAZYVA) 1,000 mg in sodium chloride 0.9% 250 mL chemo infusion    Therapy Plan Information  EVUSHELD (TIXAGEVIMAB/CILGAVIMAB)  diphenhydrAMINE capsule 25 mg  25 mg, Oral, PRN  predniSONE tablet 40 mg  40 mg, Oral, PRN  EPINEPHrine (EPIPEN) 0.3 mg/0.3 mL pen injection 0.3 mg  0.3 mg, Intramuscular, PRN  ondansetron disintegrating tablet 4 mg  4 mg, Oral, PRN  acetaminophen tablet 650 mg  650 mg, Oral, PRN  albuterol inhaler 2 puff  2 puff, Inhalation, PRN    EVUSHELD (TIXAGEVIMAB/CILGAVIMAB)  diphenhydrAMINE capsule 25 mg  25 mg, Oral, PRN  predniSONE tablet 40 mg  40 mg, Oral, PRN  EPINEPHrine (EPIPEN) 0.3 mg/0.3 mL pen injection 0.3 mg  0.3 mg, Intramuscular, PRN  ondansetron disintegrating tablet 4 mg  4 mg, Oral, PRN  acetaminophen tablet 650 mg  650 mg, Oral, PRN  albuterol inhaler 2 puff  2 puff, Inhalation, PRN      Bubba Wright MD  Hematology, Oncology, and Stem Cell Transplantation  [facility]

## 2022-05-02 NOTE — PROGRESS NOTES
"  Pelvic Health Physical Therapy   Treatment Note     Name: Cassidy Aguilar  Clinic Number: [id number]    Therapy Diagnosis:   Encounter Diagnoses   Name Primary?    Weakness Yes    Lack of coordination      Physician: Adonis Wright MD    Visit Date: 5/5/2022    Physician Orders: PT Eval and Treat   Medical Diagnosis from Referral: M62.89 (ICD-10-CM) - Pelvic floor instability   Evaluation Date: 3/31/2022  Authorization Period Expiration: 6/1/2022  Plan of Care Expiration: 6/23/2022  Visit # / Visits authorized: 4/ 20    Time In: 1411  Time Out: 1500  Total Billable Time: 49 minutes    Precautions: Standard and cancer    Subjective     Pt reports: performing HEP 2-3x since last visit. She states that she is performing kegels daily. Pt reports that UI has decreased, states that she has not had any incidents of leaking since last visit. She states that urinary urgency has decreased as well.     She was compliant with home exercise program.  Response to previous treatment: no adverse response reported  Functional change: decreased UI, decreased urgency     Pain: 0/10  Location: pelvic floor    Objective     Cassidy received therapeutic exercises to develop strength for 00 minutes including:   Hip adduction ball squeeze w/ kegel 2x10 5"  Hip abduction w/ GTB 2x10 5"  Bridges 2x10 B  Clamshells 2x10 B  SLR 2x10 B  Sit to stand w/ kegel - 2x10     Cassidy participated in neuromuscular re-education activities to develop Coordination for 49 minutes including:   SEMG EVALUATION:   PF Electrode placement: external perineal  Pt. Position: supine, sitting and standing    SEMG Finding: poor holding ability and slow return to baseline  No skin irritation, erythema, or other adverse effects observed from electrode placement.    Treatment: Worked on pelvic floor muscle endurance and strength training and coordination using sEMG assist.    BASELINE  Position: supine  Resting tone: 2.2 uV  Max contraction: 25 uV   Max hold time: 4 " sec    Position: standing  Resting tone: 5 uV  Max contraction: 15-20 uV    20 TRIALS  Position: seated  Average contraction: 10.1 uV  Average rest: 3.3 uV    Cassidy participated in dynamic functional therapeutic activities to improve functional performance for 00 minutes, including:  Reviewed urge suppression strategies with pt demonstrating in clinic    Home Exercises Provided and Patient Education Provided     Education provided:   - diaphragmatic breathing, kegels and behavior modifications  Discussed progression of plan of care with patient; educated pt in activity modification; reviewed HEP with pt. Pt demonstrated and verbalized understanding of all instruction and was provided with a handout of HEP (see Patient Instructions).  - updated HEP    Written Home Exercises Provided: Patient instructed to cont prior HEP.  Exercises were reviewed and Cassidy was able to demonstrate them prior to the end of the session.  Cassidy demonstrated good  understanding of the education provided.     See EMR under Patient Instructions for exercises provided prior visit.    Assessment     Pt reports overall improvement in urinary symptoms. Initiated sEMG training this visit with good pt tolerance. She demonstrates slow return to baseline after performing a contraction, but was able to maintain a consistent max contraction over 20 trials. Pt also demonstrates poor holding ability with a tendency to hold her breath during kegel practice. Pt improves with PT providing verbal cues for correct performance. She was also able to perform PFM activation in standing position with good tolerance. Discussed continuing current HEP with emphasis on kegels in standing. Plan to progress to standing exercises next visit.     Cassidy Is progressing well towards her goals.   Pt prognosis is Good.     Pt will continue to benefit from skilled outpatient physical therapy to address the deficits listed in the problem list box on initial evaluation, provide  "pt/family education and to maximize pt's level of independence in the home and community environment.     Pt's spiritual, cultural and educational needs considered and pt agreeable to plan of care and goals.     Anticipated barriers to physical therapy: none    Goals:   Short Term Goals: 6 weeks   Pt to perform "the knack" prior to coughing, laughing or sneezing to decrease risk of incontinence. (progressing, not met)  Pt to report a decrease in pad usage to no more than 1 a day to demonstrate improving pelvic floor function needed for continence. (progressing, not met)  Pt to demonstrate being able to correctly and consistently perform a kegel which is needed  to increase pelvic floor muscle coordination and strength needed for continence. (progressing, not met)  Pt to report being able to transfer from sitting to standing without leakage of urine. (progressing, not met)  Pt to report being able to sneeze without incontinence demonstrating improved pelvic floor strength and coordination to improve confidence in social situations. (progressing, not met)     Long Term Goals: 12 weeks   Pt to be discharged with home plan for carry over after discharge. (progressing, not met)    Pt to be able to perform a 10 second kegel x 10 reps to demonstrate improving strength and endurance needed for continence. (progressing, not met)  Pt to report a decrease in pad usage to 0 pads a day to demonstrate improving pelvic floor muscle controls as evidenced by decreased episodes of incontinence needed to improve confidence in social situations. (progressing, not met)  Pt to report elimination of incontinence with ADLs to demonstrate improved pelvic floor muscle strength and coordination. (progressing, not met)  Pt to be able to delay the urge to urinate at least 30 minutes with a strong urge to urinate in order to make it to the bathroom without leaking. (progressing, not met)  Pt to report no longer feeling the need to urinate just " in case when shopping or participating in social activities to demonstrate improving pelvic floor and bladder control. (progressing, not met)    Plan   Plan of care Certification: 3/31/2022 to 6/23/2022.     Outpatient Physical Therapy 1 time(s) every week(s) for 12 weeks to include the following interventions: Manual Therapy, Moist Heat/ Ice, Neuromuscular Re-ed, Patient Education, Self Care, Therapeutic Activities, Therapeutic Exercise, Ultrasound and Biofeedback.    Progress standing exercises and PFM endurance.     Anna Kimura, PT

## 2022-05-04 ENCOUNTER — INFUSION (OUTPATIENT)
Dept: INFUSION THERAPY | Facility: HOSPITAL | Age: 77
End: 2022-05-04
Payer: MEDICARE

## 2022-05-04 VITALS
TEMPERATURE: 98 F | RESPIRATION RATE: 18 BRPM | SYSTOLIC BLOOD PRESSURE: 90 MMHG | HEART RATE: 66 BPM | BODY MASS INDEX: 18.22 KG/M2 | OXYGEN SATURATION: 100 % | HEIGHT: 69 IN | WEIGHT: 123 LBS | DIASTOLIC BLOOD PRESSURE: 50 MMHG

## 2022-05-04 DIAGNOSIS — C91.10 CLL (CHRONIC LYMPHOCYTIC LEUKEMIA): Primary | ICD-10-CM

## 2022-05-04 PROCEDURE — 25000003 PHARM REV CODE 250: Performed by: INTERNAL MEDICINE

## 2022-05-04 PROCEDURE — 96367 TX/PROPH/DG ADDL SEQ IV INF: CPT

## 2022-05-04 PROCEDURE — 63600175 PHARM REV CODE 636 W HCPCS: Mod: JG | Performed by: INTERNAL MEDICINE

## 2022-05-04 PROCEDURE — 96413 CHEMO IV INFUSION 1 HR: CPT

## 2022-05-04 PROCEDURE — 96415 CHEMO IV INFUSION ADDL HR: CPT

## 2022-05-04 RX ORDER — SODIUM CHLORIDE 0.9 % (FLUSH) 0.9 %
10 SYRINGE (ML) INJECTION
Status: DISCONTINUED | OUTPATIENT
Start: 2022-05-04 | End: 2022-05-04 | Stop reason: HOSPADM

## 2022-05-04 RX ORDER — ACETAMINOPHEN 325 MG/1
650 TABLET ORAL
Status: COMPLETED | OUTPATIENT
Start: 2022-05-04 | End: 2022-05-04

## 2022-05-04 RX ORDER — DIPHENHYDRAMINE HYDROCHLORIDE 50 MG/ML
50 INJECTION INTRAMUSCULAR; INTRAVENOUS ONCE AS NEEDED
Status: DISCONTINUED | OUTPATIENT
Start: 2022-05-04 | End: 2022-05-04 | Stop reason: HOSPADM

## 2022-05-04 RX ORDER — HEPARIN 100 UNIT/ML
500 SYRINGE INTRAVENOUS
Status: DISCONTINUED | OUTPATIENT
Start: 2022-05-04 | End: 2022-05-04 | Stop reason: HOSPADM

## 2022-05-04 RX ORDER — EPINEPHRINE 0.3 MG/.3ML
0.3 INJECTION SUBCUTANEOUS ONCE AS NEEDED
Status: DISCONTINUED | OUTPATIENT
Start: 2022-05-04 | End: 2022-05-04 | Stop reason: HOSPADM

## 2022-05-04 RX ADMIN — DIPHENHYDRAMINE HYDROCHLORIDE 50 MG: 50 INJECTION, SOLUTION INTRAMUSCULAR; INTRAVENOUS at 11:05

## 2022-05-04 RX ADMIN — SODIUM CHLORIDE: 0.9 INJECTION, SOLUTION INTRAVENOUS at 11:05

## 2022-05-04 RX ADMIN — OBINUTUZUMAB 1000 MG: 1000 INJECTION, SOLUTION, CONCENTRATE INTRAVENOUS at 12:05

## 2022-05-04 RX ADMIN — ACETAMINOPHEN 650 MG: 325 TABLET ORAL at 11:05

## 2022-05-04 NOTE — PLAN OF CARE
1100 Labs, hx, and medications reviewed, pt meets parameters for treatment today. Assessment completed and plan of care reviewed. Pt verbalized understanding. PIV accessed with no complications. Pt voices no new complaints or concerns, will continue to monitor for safety.

## 2022-05-04 NOTE — PLAN OF CARE
1539 Pt tolerated Gazyva infusion well today, no complaints or complications,. VSS through duration of treatment. Pt aware to call provider with any questions or concerns and is aware of upcoming appts. Pt ambulatory from clinic with steady gait, no distress noted.

## 2022-05-05 ENCOUNTER — CLINICAL SUPPORT (OUTPATIENT)
Dept: REHABILITATION | Facility: OTHER | Age: 77
End: 2022-05-05
Payer: MEDICARE

## 2022-05-05 DIAGNOSIS — R27.9 LACK OF COORDINATION: ICD-10-CM

## 2022-05-05 DIAGNOSIS — R53.1 WEAKNESS: Primary | ICD-10-CM

## 2022-05-05 PROCEDURE — 97112 NEUROMUSCULAR REEDUCATION: CPT

## 2022-05-07 ENCOUNTER — PATIENT MESSAGE (OUTPATIENT)
Dept: HEMATOLOGY/ONCOLOGY | Facility: CLINIC | Age: 77
End: 2022-05-07
Payer: MEDICARE

## 2022-05-09 ENCOUNTER — SPECIALTY PHARMACY (OUTPATIENT)
Dept: PHARMACY | Facility: CLINIC | Age: 77
End: 2022-05-09
Payer: MEDICARE

## 2022-05-09 ENCOUNTER — LAB VISIT (OUTPATIENT)
Dept: LAB | Facility: HOSPITAL | Age: 77
End: 2022-05-09
Attending: INTERNAL MEDICINE
Payer: MEDICARE

## 2022-05-09 DIAGNOSIS — C91.10 CLL (CHRONIC LYMPHOCYTIC LEUKEMIA): ICD-10-CM

## 2022-05-09 DIAGNOSIS — D59.9 ACQUIRED HEMOLYTIC ANEMIA: ICD-10-CM

## 2022-05-09 LAB
ALBUMIN SERPL BCP-MCNC: 4.1 G/DL (ref 3.5–5.2)
ALP SERPL-CCNC: 73 U/L (ref 55–135)
ALT SERPL W/O P-5'-P-CCNC: 17 U/L (ref 10–44)
ANION GAP SERPL CALC-SCNC: 7 MMOL/L (ref 8–16)
AST SERPL-CCNC: 18 U/L (ref 10–40)
BASOPHILS # BLD AUTO: 0.1 K/UL (ref 0–0.2)
BASOPHILS NFR BLD: 1.6 % (ref 0–1.9)
BILIRUB SERPL-MCNC: 2.2 MG/DL (ref 0.1–1)
BUN SERPL-MCNC: 14 MG/DL (ref 8–23)
CALCIUM SERPL-MCNC: 9.4 MG/DL (ref 8.7–10.5)
CHLORIDE SERPL-SCNC: 102 MMOL/L (ref 95–110)
CO2 SERPL-SCNC: 27 MMOL/L (ref 23–29)
CREAT SERPL-MCNC: 0.6 MG/DL (ref 0.5–1.4)
DIFFERENTIAL METHOD: ABNORMAL
EOSINOPHIL # BLD AUTO: 0 K/UL (ref 0–0.5)
EOSINOPHIL NFR BLD: 0 % (ref 0–8)
ERYTHROCYTE [DISTWIDTH] IN BLOOD BY AUTOMATED COUNT: 13.1 % (ref 11.5–14.5)
EST. GFR  (AFRICAN AMERICAN): >60 ML/MIN/1.73 M^2
EST. GFR  (NON AFRICAN AMERICAN): >60 ML/MIN/1.73 M^2
GLUCOSE SERPL-MCNC: 75 MG/DL (ref 70–110)
HAPTOGLOB SERPL-MCNC: 19 MG/DL (ref 30–250)
HCT VFR BLD AUTO: 34.2 % (ref 37–48.5)
HGB BLD-MCNC: 11.6 G/DL (ref 12–16)
IMM GRANULOCYTES # BLD AUTO: 0.03 K/UL (ref 0–0.04)
IMM GRANULOCYTES NFR BLD AUTO: 0.5 % (ref 0–0.5)
LDH SERPL L TO P-CCNC: 163 U/L (ref 110–260)
LYMPHOCYTES # BLD AUTO: 1.1 K/UL (ref 1–4.8)
LYMPHOCYTES NFR BLD: 17 % (ref 18–48)
MAGNESIUM SERPL-MCNC: 2 MG/DL (ref 1.6–2.6)
MCH RBC QN AUTO: 33.6 PG (ref 27–31)
MCHC RBC AUTO-ENTMCNC: 33.9 G/DL (ref 32–36)
MCV RBC AUTO: 99 FL (ref 82–98)
MONOCYTES # BLD AUTO: 0.7 K/UL (ref 0.3–1)
MONOCYTES NFR BLD: 10.7 % (ref 4–15)
NEUTROPHILS # BLD AUTO: 4.3 K/UL (ref 1.8–7.7)
NEUTROPHILS NFR BLD: 70.2 % (ref 38–73)
NRBC BLD-RTO: 0 /100 WBC
PHOSPHATE SERPL-MCNC: 4.3 MG/DL (ref 2.7–4.5)
PLATELET # BLD AUTO: 268 K/UL (ref 150–450)
PMV BLD AUTO: 9.8 FL (ref 9.2–12.9)
POTASSIUM SERPL-SCNC: 4.6 MMOL/L (ref 3.5–5.1)
PROT SERPL-MCNC: 6.4 G/DL (ref 6–8.4)
RBC # BLD AUTO: 3.45 M/UL (ref 4–5.4)
SODIUM SERPL-SCNC: 136 MMOL/L (ref 136–145)
URATE SERPL-MCNC: 4.3 MG/DL (ref 2.4–5.7)
WBC # BLD AUTO: 6.16 K/UL (ref 3.9–12.7)

## 2022-05-09 PROCEDURE — 36415 COLL VENOUS BLD VENIPUNCTURE: CPT | Performed by: INTERNAL MEDICINE

## 2022-05-09 PROCEDURE — 85025 COMPLETE CBC W/AUTO DIFF WBC: CPT | Performed by: INTERNAL MEDICINE

## 2022-05-09 PROCEDURE — 84100 ASSAY OF PHOSPHORUS: CPT | Performed by: INTERNAL MEDICINE

## 2022-05-09 PROCEDURE — 83615 LACTATE (LD) (LDH) ENZYME: CPT | Performed by: INTERNAL MEDICINE

## 2022-05-09 PROCEDURE — 83010 ASSAY OF HAPTOGLOBIN QUANT: CPT | Performed by: INTERNAL MEDICINE

## 2022-05-09 PROCEDURE — 83735 ASSAY OF MAGNESIUM: CPT | Performed by: INTERNAL MEDICINE

## 2022-05-09 PROCEDURE — 80053 COMPREHEN METABOLIC PANEL: CPT | Performed by: INTERNAL MEDICINE

## 2022-05-09 PROCEDURE — 84550 ASSAY OF BLOOD/URIC ACID: CPT | Performed by: INTERNAL MEDICINE

## 2022-05-09 NOTE — TELEPHONE ENCOUNTER
"5/2 note indicates patient should "stop venclexta". Reached out to [facility] to confirm we may disenroll her. Once we receive confirmation then I will contact Ms. Aguilar to  on safe disposal.   "

## 2022-05-10 ENCOUNTER — PATIENT MESSAGE (OUTPATIENT)
Dept: HEMATOLOGY/ONCOLOGY | Facility: CLINIC | Age: 77
End: 2022-05-10
Payer: MEDICARE

## 2022-05-10 NOTE — TELEPHONE ENCOUNTER
Per Briana SANCHEZ RN, Patient may be closed out as she will no longer receive Venclexta.     Patient contacted to review disposal options. Informed her she may disposal of used chemotherapy at [facility] pharmacy [address] [place] [address] or she may bring it to her next appointment for disposal.

## 2022-05-10 NOTE — PROGRESS NOTES
"  Pelvic Health Physical Therapy   Treatment Note     Name: Cassidy Aguilar  Clinic Number: [id number]    Therapy Diagnosis:   Encounter Diagnoses   Name Primary?    Weakness Yes    Lack of coordination      Physician: Adonis Wright MD    Visit Date: 5/12/2022    Physician Orders: PT Eval and Treat   Medical Diagnosis from Referral: M62.89 (ICD-10-CM) - Pelvic floor instability   Evaluation Date: 3/31/2022  Authorization Period Expiration: 6/1/2022  Plan of Care Expiration: 6/23/2022  Visit # / Visits authorized: 5/ 20  FOTO: 2/3    Time In: 1402  Time Out: 1450  Total Billable Time: 48 minutes    Precautions: Standard and cancer    Subjective     Pt reports: one incident of UUI on the way to the bathroom. She states that she is performing HEP 2-3x/week. She also reports that urinary urgency has improved.     She was compliant with home exercise program.  Response to previous treatment: no adverse response reported  Functional change: decreased UUI, decreased urgency     Pain: 0/10  Location: pelvic floor    Objective     Cassidy received therapeutic exercises to develop strength for 48 minutes including:   Bridges w/ blue theraband - 2x15  SLR 2x15 B  Sit to stand w/ kegel - 2x15   Wall squat w/ kegel - 2x10  Toe taps w/ kegel - 5x10    (not performed this visit)  Hip adduction ball squeeze w/ kegel 2x10 5"  Hip abduction w/ GTB 2x10 5"  Clamshells 2x10 B    Cassidy participated in neuromuscular re-education activities to develop Coordination for 00 minutes including:   SEMG EVALUATION:   PF Electrode placement: external perineal  Pt. Position: supine, sitting and standing    SEMG Finding: poor holding ability and slow return to baseline  No skin irritation, erythema, or other adverse effects observed from electrode placement.    Treatment: Worked on pelvic floor muscle endurance and strength training and coordination using sEMG assist.    BASELINE  Position: supine  Resting tone: 2.2 uV  Max contraction: 25 uV " "  Max hold time: 4 sec    Position: standing  Resting tone: 5 uV  Max contraction: 15-20 uV    20 TRIALS  Position: seated  Average contraction: 10.1 uV  Average rest: 3.3 uV    Cassidy participated in dynamic functional therapeutic activities to improve functional performance for 00 minutes, including:  Reviewed urge suppression strategies with pt demonstrating in clinic    Home Exercises Provided and Patient Education Provided     Education provided:   - diaphragmatic breathing and kegels  Discussed progression of plan of care with patient; educated pt in activity modification; reviewed HEP with pt. Pt demonstrated and verbalized understanding of all instruction and was provided with a handout of HEP (see Patient Instructions).  - updated HEP    Written Home Exercises Provided: yes.  Exercises were reviewed and Cassidy was able to demonstrate them prior to the end of the session.  Cassidy demonstrated good  understanding of the education provided.     See EMR under Patient Instructions for exercises provided 5/12/22.    Assessment     Progressed exercise program with focus on standing exercises in order to address occasional UUI while on the way to the bathroom. Increased repetitions to 15 per set in order to work onPFM endurance deficits. Pt demonstrates good tolerance to exercise and reports that she "feels like she had a good workout" at end of session. Pt states that she would like to decrease frequency of visits to every other week now that symptoms are improving. FOTO reassessment reveals Urinary Problem has improved from 60 to 70% since initial eval.     Cassidy Is progressing well towards her goals.   Pt prognosis is Good.     Pt will continue to benefit from skilled outpatient physical therapy to address the deficits listed in the problem list box on initial evaluation, provide pt/family education and to maximize pt's level of independence in the home and community environment.     Pt's spiritual, cultural and " "educational needs considered and pt agreeable to plan of care and goals.     Anticipated barriers to physical therapy: none    Goals:   Short Term Goals: 6 weeks   Pt to perform "the knack" prior to coughing, laughing or sneezing to decrease risk of incontinence. MET 5/12/22  Pt to report a decrease in pad usage to no more than 1 a day to demonstrate improving pelvic floor function needed for continence. (progressing, not met)  Pt to demonstrate being able to correctly and consistently perform a kegel which is needed  to increase pelvic floor muscle coordination and strength needed for continence. MET 5/5/22  Pt to report being able to transfer from sitting to standing without leakage of urine. (progressing, not met)  Pt to report being able to sneeze without incontinence demonstrating improved pelvic floor strength and coordination to improve confidence in social situations. MET 5/12/22     Long Term Goals: 12 weeks   Pt to be discharged with home plan for carry over after discharge. (progressing, not met)    Pt to be able to perform a 10 second kegel x 10 reps to demonstrate improving strength and endurance needed for continence. (progressing, not met)  Pt to report a decrease in pad usage to 0 pads a day to demonstrate improving pelvic floor muscle controls as evidenced by decreased episodes of incontinence needed to improve confidence in social situations. (progressing, not met)  Pt to report elimination of incontinence with ADLs to demonstrate improved pelvic floor muscle strength and coordination. (progressing, not met)  Pt to be able to delay the urge to urinate at least 30 minutes with a strong urge to urinate in order to make it to the bathroom without leaking. (progressing, not met)  Pt to report no longer feeling the need to urinate just in case when shopping or participating in social activities to demonstrate improving pelvic floor and bladder control. (progressing, not met)    Plan   Plan of care " Certification: 3/31/2022 to 6/23/2022.     Outpatient Physical Therapy 1 time(s) every week(s) for 12 weeks to include the following interventions: Manual Therapy, Moist Heat/ Ice, Neuromuscular Re-ed, Patient Education, Self Care, Therapeutic Activities, Therapeutic Exercise, Ultrasound and Biofeedback.    Progress standing exercises and PFM endurance.     Anna Kimura, PT

## 2022-05-12 ENCOUNTER — PATIENT MESSAGE (OUTPATIENT)
Dept: HEMATOLOGY/ONCOLOGY | Facility: CLINIC | Age: 77
End: 2022-05-12
Payer: MEDICARE

## 2022-05-12 ENCOUNTER — CLINICAL SUPPORT (OUTPATIENT)
Dept: REHABILITATION | Facility: OTHER | Age: 77
End: 2022-05-12
Payer: MEDICARE

## 2022-05-12 DIAGNOSIS — R53.1 WEAKNESS: Primary | ICD-10-CM

## 2022-05-12 DIAGNOSIS — R27.9 LACK OF COORDINATION: ICD-10-CM

## 2022-05-12 PROCEDURE — 97110 THERAPEUTIC EXERCISES: CPT

## 2022-05-12 NOTE — PATIENT INSTRUCTIONS
SIT TO STAND W/ KEGEL    Start by scooting close to the front of the chair. Next, initiate a Kegel and then lean forward at your trunk and rise to standing without using your hands to push off from the chair or other object. Release the Kegel and return to seated. Repeat 15 times, twice a day.      Wall Squats w/ Kegel    Leaning up against a wall or closed door on your back, inhale and slide your body downward. At the bottom of the squat, perform a kegel, exhale and then return back to upright position. Perform 10 repetitions, repeat 2 times.     TOE TAPPING W/ KEGELS     front of a step and perform a Kegel. Then, raise one foot off the floor as you balance on the other leg. Tap the top of the step with your toes. Be sure to lift high enough so you don't bump the front of the step with the front of your toes. Then, set your foot back down and perform on the other side. Use hand on table for balance if needed. Perform as many toe taps as you can until you feel the Kegel release. Take a short break and then repeat.     Perform 5 repetitions, twice a day.

## 2022-05-16 ENCOUNTER — LAB VISIT (OUTPATIENT)
Dept: LAB | Facility: HOSPITAL | Age: 77
End: 2022-05-16
Attending: INTERNAL MEDICINE
Payer: MEDICARE

## 2022-05-16 DIAGNOSIS — D59.9 ACQUIRED HEMOLYTIC ANEMIA: ICD-10-CM

## 2022-05-16 DIAGNOSIS — C91.10 CLL (CHRONIC LYMPHOCYTIC LEUKEMIA): ICD-10-CM

## 2022-05-16 LAB
ALBUMIN SERPL BCP-MCNC: 4.1 G/DL (ref 3.5–5.2)
ALP SERPL-CCNC: 70 U/L (ref 55–135)
ALT SERPL W/O P-5'-P-CCNC: 17 U/L (ref 10–44)
ANION GAP SERPL CALC-SCNC: 10 MMOL/L (ref 8–16)
AST SERPL-CCNC: 17 U/L (ref 10–40)
BASOPHILS # BLD AUTO: 0.05 K/UL (ref 0–0.2)
BASOPHILS NFR BLD: 1 % (ref 0–1.9)
BILIRUB SERPL-MCNC: 2.2 MG/DL (ref 0.1–1)
BUN SERPL-MCNC: 16 MG/DL (ref 8–23)
CALCIUM SERPL-MCNC: 9.2 MG/DL (ref 8.7–10.5)
CHLORIDE SERPL-SCNC: 100 MMOL/L (ref 95–110)
CO2 SERPL-SCNC: 26 MMOL/L (ref 23–29)
CREAT SERPL-MCNC: 0.6 MG/DL (ref 0.5–1.4)
DIFFERENTIAL METHOD: ABNORMAL
EOSINOPHIL # BLD AUTO: 0 K/UL (ref 0–0.5)
EOSINOPHIL NFR BLD: 0.2 % (ref 0–8)
ERYTHROCYTE [DISTWIDTH] IN BLOOD BY AUTOMATED COUNT: 12.4 % (ref 11.5–14.5)
EST. GFR  (AFRICAN AMERICAN): >60 ML/MIN/1.73 M^2
EST. GFR  (NON AFRICAN AMERICAN): >60 ML/MIN/1.73 M^2
GLUCOSE SERPL-MCNC: 82 MG/DL (ref 70–110)
HAPTOGLOB SERPL-MCNC: 39 MG/DL (ref 30–250)
HCT VFR BLD AUTO: 35.6 % (ref 37–48.5)
HGB BLD-MCNC: 12.1 G/DL (ref 12–16)
IMM GRANULOCYTES # BLD AUTO: 0.01 K/UL (ref 0–0.04)
IMM GRANULOCYTES NFR BLD AUTO: 0.2 % (ref 0–0.5)
LDH SERPL L TO P-CCNC: 153 U/L (ref 110–260)
LYMPHOCYTES # BLD AUTO: 1.2 K/UL (ref 1–4.8)
LYMPHOCYTES NFR BLD: 23.5 % (ref 18–48)
MAGNESIUM SERPL-MCNC: 2 MG/DL (ref 1.6–2.6)
MCH RBC QN AUTO: 33.2 PG (ref 27–31)
MCHC RBC AUTO-ENTMCNC: 34 G/DL (ref 32–36)
MCV RBC AUTO: 98 FL (ref 82–98)
MONOCYTES # BLD AUTO: 0.5 K/UL (ref 0.3–1)
MONOCYTES NFR BLD: 9.4 % (ref 4–15)
NEUTROPHILS # BLD AUTO: 3.4 K/UL (ref 1.8–7.7)
NEUTROPHILS NFR BLD: 65.7 % (ref 38–73)
NRBC BLD-RTO: 0 /100 WBC
PHOSPHATE SERPL-MCNC: 4.6 MG/DL (ref 2.7–4.5)
PLATELET # BLD AUTO: 180 K/UL (ref 150–450)
PMV BLD AUTO: 10.3 FL (ref 9.2–12.9)
POTASSIUM SERPL-SCNC: 4.1 MMOL/L (ref 3.5–5.1)
PROT SERPL-MCNC: 6.4 G/DL (ref 6–8.4)
RBC # BLD AUTO: 3.65 M/UL (ref 4–5.4)
SODIUM SERPL-SCNC: 136 MMOL/L (ref 136–145)
URATE SERPL-MCNC: 4.5 MG/DL (ref 2.4–5.7)
WBC # BLD AUTO: 5.19 K/UL (ref 3.9–12.7)

## 2022-05-16 PROCEDURE — 83010 ASSAY OF HAPTOGLOBIN QUANT: CPT | Performed by: INTERNAL MEDICINE

## 2022-05-16 PROCEDURE — 83735 ASSAY OF MAGNESIUM: CPT | Performed by: INTERNAL MEDICINE

## 2022-05-16 PROCEDURE — 36415 COLL VENOUS BLD VENIPUNCTURE: CPT | Performed by: INTERNAL MEDICINE

## 2022-05-16 PROCEDURE — 85025 COMPLETE CBC W/AUTO DIFF WBC: CPT | Performed by: INTERNAL MEDICINE

## 2022-05-16 PROCEDURE — 83615 LACTATE (LD) (LDH) ENZYME: CPT | Performed by: INTERNAL MEDICINE

## 2022-05-16 PROCEDURE — 84100 ASSAY OF PHOSPHORUS: CPT | Performed by: INTERNAL MEDICINE

## 2022-05-16 PROCEDURE — 84550 ASSAY OF BLOOD/URIC ACID: CPT | Performed by: INTERNAL MEDICINE

## 2022-05-16 PROCEDURE — 80053 COMPREHEN METABOLIC PANEL: CPT | Performed by: INTERNAL MEDICINE

## 2022-05-23 ENCOUNTER — LAB VISIT (OUTPATIENT)
Dept: LAB | Facility: HOSPITAL | Age: 77
End: 2022-05-23
Attending: INTERNAL MEDICINE
Payer: MEDICARE

## 2022-05-23 DIAGNOSIS — C91.10 CLL (CHRONIC LYMPHOCYTIC LEUKEMIA): ICD-10-CM

## 2022-05-23 DIAGNOSIS — D59.9 ACQUIRED HEMOLYTIC ANEMIA: ICD-10-CM

## 2022-05-23 LAB
ABO + RH BLD: NORMAL
ALBUMIN SERPL BCP-MCNC: 4.1 G/DL (ref 3.5–5.2)
ALP SERPL-CCNC: 71 U/L (ref 55–135)
ALT SERPL W/O P-5'-P-CCNC: 18 U/L (ref 10–44)
ANION GAP SERPL CALC-SCNC: 8 MMOL/L (ref 8–16)
AST SERPL-CCNC: 18 U/L (ref 10–40)
BASOPHILS # BLD AUTO: 0.08 K/UL (ref 0–0.2)
BASOPHILS NFR BLD: 1.5 % (ref 0–1.9)
BILIRUB SERPL-MCNC: 1.8 MG/DL (ref 0.1–1)
BLD GP AB SCN CELLS X3 SERPL QL: NORMAL
BUN SERPL-MCNC: 14 MG/DL (ref 8–23)
CALCIUM SERPL-MCNC: 9.4 MG/DL (ref 8.7–10.5)
CHLORIDE SERPL-SCNC: 100 MMOL/L (ref 95–110)
CO2 SERPL-SCNC: 29 MMOL/L (ref 23–29)
CREAT SERPL-MCNC: 0.6 MG/DL (ref 0.5–1.4)
DIFFERENTIAL METHOD: ABNORMAL
EOSINOPHIL # BLD AUTO: 0.1 K/UL (ref 0–0.5)
EOSINOPHIL NFR BLD: 1.9 % (ref 0–8)
ERYTHROCYTE [DISTWIDTH] IN BLOOD BY AUTOMATED COUNT: 12.2 % (ref 11.5–14.5)
EST. GFR  (AFRICAN AMERICAN): >60 ML/MIN/1.73 M^2
EST. GFR  (NON AFRICAN AMERICAN): >60 ML/MIN/1.73 M^2
GLUCOSE SERPL-MCNC: 79 MG/DL (ref 70–110)
HAPTOGLOB SERPL-MCNC: 36 MG/DL (ref 30–250)
HCT VFR BLD AUTO: 36.4 % (ref 37–48.5)
HGB BLD-MCNC: 12.8 G/DL (ref 12–16)
IMM GRANULOCYTES # BLD AUTO: 0.02 K/UL (ref 0–0.04)
IMM GRANULOCYTES NFR BLD AUTO: 0.4 % (ref 0–0.5)
LDH SERPL L TO P-CCNC: 161 U/L (ref 110–260)
LYMPHOCYTES # BLD AUTO: 1.4 K/UL (ref 1–4.8)
LYMPHOCYTES NFR BLD: 26.7 % (ref 18–48)
MAGNESIUM SERPL-MCNC: 1.9 MG/DL (ref 1.6–2.6)
MCH RBC QN AUTO: 33.3 PG (ref 27–31)
MCHC RBC AUTO-ENTMCNC: 35.2 G/DL (ref 32–36)
MCV RBC AUTO: 95 FL (ref 82–98)
MONOCYTES # BLD AUTO: 0.6 K/UL (ref 0.3–1)
MONOCYTES NFR BLD: 10.2 % (ref 4–15)
NEUTROPHILS # BLD AUTO: 3.2 K/UL (ref 1.8–7.7)
NEUTROPHILS NFR BLD: 59.3 % (ref 38–73)
NRBC BLD-RTO: 0 /100 WBC
PHOSPHATE SERPL-MCNC: 4.5 MG/DL (ref 2.7–4.5)
PLATELET # BLD AUTO: 146 K/UL (ref 150–450)
PMV BLD AUTO: 11 FL (ref 9.2–12.9)
POTASSIUM SERPL-SCNC: 4.5 MMOL/L (ref 3.5–5.1)
PROT SERPL-MCNC: 6.3 G/DL (ref 6–8.4)
RBC # BLD AUTO: 3.84 M/UL (ref 4–5.4)
SODIUM SERPL-SCNC: 137 MMOL/L (ref 136–145)
URATE SERPL-MCNC: 3.9 MG/DL (ref 2.4–5.7)
WBC # BLD AUTO: 5.4 K/UL (ref 3.9–12.7)

## 2022-05-23 PROCEDURE — 84550 ASSAY OF BLOOD/URIC ACID: CPT | Performed by: INTERNAL MEDICINE

## 2022-05-23 PROCEDURE — 85025 COMPLETE CBC W/AUTO DIFF WBC: CPT | Performed by: INTERNAL MEDICINE

## 2022-05-23 PROCEDURE — 86901 BLOOD TYPING SEROLOGIC RH(D): CPT | Performed by: INTERNAL MEDICINE

## 2022-05-23 PROCEDURE — 83010 ASSAY OF HAPTOGLOBIN QUANT: CPT | Performed by: INTERNAL MEDICINE

## 2022-05-23 PROCEDURE — 83615 LACTATE (LD) (LDH) ENZYME: CPT | Performed by: INTERNAL MEDICINE

## 2022-05-23 PROCEDURE — 80053 COMPREHEN METABOLIC PANEL: CPT | Performed by: INTERNAL MEDICINE

## 2022-05-23 PROCEDURE — 83735 ASSAY OF MAGNESIUM: CPT | Performed by: INTERNAL MEDICINE

## 2022-05-23 PROCEDURE — 84100 ASSAY OF PHOSPHORUS: CPT | Performed by: INTERNAL MEDICINE

## 2022-05-23 PROCEDURE — 36415 COLL VENOUS BLD VENIPUNCTURE: CPT | Performed by: INTERNAL MEDICINE

## 2022-05-24 DIAGNOSIS — E53.8 FOLIC ACID DEFICIENCY: ICD-10-CM

## 2022-05-24 RX ORDER — FOLIC ACID 1 MG/1
1 TABLET ORAL DAILY
Qty: 30 TABLET | Refills: 3 | Status: SHIPPED | OUTPATIENT
Start: 2022-05-24 | End: 2022-08-15 | Stop reason: SDUPTHER

## 2022-05-26 ENCOUNTER — CLINICAL SUPPORT (OUTPATIENT)
Dept: REHABILITATION | Facility: OTHER | Age: 77
End: 2022-05-26
Payer: MEDICARE

## 2022-05-26 DIAGNOSIS — R53.1 WEAKNESS: Primary | ICD-10-CM

## 2022-05-26 DIAGNOSIS — R27.9 LACK OF COORDINATION: ICD-10-CM

## 2022-05-26 PROCEDURE — 97110 THERAPEUTIC EXERCISES: CPT

## 2022-05-26 NOTE — PATIENT INSTRUCTIONS
"HIP ADDUCTION KNEE SQUEEZE     With ball or folded pillow between knees, tighten deep core then squeeze knees together and hold 5 seconds. Do 2 sets of 10.    BRIDGING W/ KEGEL    While lying on your back with knees bent, tighten your pelvic floor, squeeze your buttocks and then raise your buttocks off the floor/bed as creating a "Bridge" with your body. Hold for 3-5 seconds and then lower your back flat on the ground. Release the Kegel. Repeat 10 times, twice a day.    HIP ABDUCTION WITH BAND    Lie down on your back with your knees bent. Place an elastic band around your knees and then pull your knees apart. Return your knees together and repeat. Perform 2 sets of 10 repetitions.     CLAMSHELLS    Lie on side with knees bent and top of pelvic rolled slightly forward. Keeping ankles together lift top knee without pelvis rolling backwards. Do 2 sets of 10 on each side.     STRAIGHT LEG RAISE - SLR    While lying on your back, raise up your leg with a straight knee.  Keep the opposite knee bent with the foot planted on the ground. Complete 2 sets of 10 on each side.    SIT TO STAND W/ KEGEL    Start by scooting close to the front of the chair. Next, initiate a Kegel and then lean forward at your trunk and rise to standing without using your hands to push off from the chair or other object. Release the Kegel and return to seated. Repeat 15 times, twice a day.      Wall Squats w/ Kegel    Leaning up against a wall or closed door on your back, inhale and slide your body downward. At the bottom of the squat, perform a kegel, exhale and then return back to upright position. Perform 10 repetitions, repeat 2 times.     TOE TAPPING W/ KEGELS     front of a step and perform a Kegel. Then, raise one foot off the floor as you balance on the other leg. Tap the top of the step with your toes. Be sure to lift high enough so you don't bump the front of the step with the front of your toes. Then, set your foot back down and " perform on the other side. Use hand on table for balance if needed. Perform as many toe taps as you can until you feel the Kegel release. Take a short break and then repeat.     Perform 5 repetitions, twice a day.

## 2022-05-30 ENCOUNTER — LAB VISIT (OUTPATIENT)
Dept: LAB | Facility: HOSPITAL | Age: 77
End: 2022-05-30
Attending: INTERNAL MEDICINE
Payer: MEDICARE

## 2022-05-30 ENCOUNTER — OFFICE VISIT (OUTPATIENT)
Dept: HEMATOLOGY/ONCOLOGY | Facility: CLINIC | Age: 77
End: 2022-05-30
Payer: MEDICARE

## 2022-05-30 DIAGNOSIS — D59.12 COLD AUTOIMMUNE HEMOLYTIC ANEMIA: ICD-10-CM

## 2022-05-30 DIAGNOSIS — C91.10 CLL (CHRONIC LYMPHOCYTIC LEUKEMIA): Primary | ICD-10-CM

## 2022-05-30 DIAGNOSIS — D84.9 IMMUNOSUPPRESSION: ICD-10-CM

## 2022-05-30 DIAGNOSIS — C91.10 CLL (CHRONIC LYMPHOCYTIC LEUKEMIA): ICD-10-CM

## 2022-05-30 DIAGNOSIS — D59.9 ACQUIRED HEMOLYTIC ANEMIA: ICD-10-CM

## 2022-05-30 DIAGNOSIS — D59.12 COLD AGGLUTININ DISEASE: ICD-10-CM

## 2022-05-30 LAB
ALBUMIN SERPL BCP-MCNC: 3.9 G/DL (ref 3.5–5.2)
ALP SERPL-CCNC: 76 U/L (ref 55–135)
ALT SERPL W/O P-5'-P-CCNC: 16 U/L (ref 10–44)
ANION GAP SERPL CALC-SCNC: 6 MMOL/L (ref 8–16)
ANISOCYTOSIS BLD QL SMEAR: SLIGHT
AST SERPL-CCNC: 17 U/L (ref 10–40)
BASOPHILS # BLD AUTO: 0.06 K/UL (ref 0–0.2)
BASOPHILS NFR BLD: 1.1 % (ref 0–1.9)
BILIRUB SERPL-MCNC: 1.6 MG/DL (ref 0.1–1)
BUN SERPL-MCNC: 16 MG/DL (ref 8–23)
CALCIUM SERPL-MCNC: 9.2 MG/DL (ref 8.7–10.5)
CHLORIDE SERPL-SCNC: 102 MMOL/L (ref 95–110)
CO2 SERPL-SCNC: 28 MMOL/L (ref 23–29)
CREAT SERPL-MCNC: 0.6 MG/DL (ref 0.5–1.4)
DIFFERENTIAL METHOD: ABNORMAL
EOSINOPHIL # BLD AUTO: 0.1 K/UL (ref 0–0.5)
EOSINOPHIL NFR BLD: 2.2 % (ref 0–8)
ERYTHROCYTE [DISTWIDTH] IN BLOOD BY AUTOMATED COUNT: 11.9 % (ref 11.5–14.5)
EST. GFR  (AFRICAN AMERICAN): >60 ML/MIN/1.73 M^2
EST. GFR  (NON AFRICAN AMERICAN): >60 ML/MIN/1.73 M^2
GLUCOSE SERPL-MCNC: 65 MG/DL (ref 70–110)
HAPTOGLOB SERPL-MCNC: 54 MG/DL (ref 30–250)
HCT VFR BLD AUTO: 35.2 % (ref 37–48.5)
HGB BLD-MCNC: 12.6 G/DL (ref 12–16)
HYPOCHROMIA BLD QL SMEAR: ABNORMAL
IMM GRANULOCYTES # BLD AUTO: 0.02 K/UL (ref 0–0.04)
IMM GRANULOCYTES NFR BLD AUTO: 0.4 % (ref 0–0.5)
LDH SERPL L TO P-CCNC: 166 U/L (ref 110–260)
LYMPHOCYTES # BLD AUTO: 1.2 K/UL (ref 1–4.8)
LYMPHOCYTES NFR BLD: 21.3 % (ref 18–48)
MAGNESIUM SERPL-MCNC: 1.8 MG/DL (ref 1.6–2.6)
MCH RBC QN AUTO: 33.8 PG (ref 27–31)
MCHC RBC AUTO-ENTMCNC: 35.8 G/DL (ref 32–36)
MCV RBC AUTO: 94 FL (ref 82–98)
MONOCYTES # BLD AUTO: 0.5 K/UL (ref 0.3–1)
MONOCYTES NFR BLD: 9.2 % (ref 4–15)
NEUTROPHILS # BLD AUTO: 3.6 K/UL (ref 1.8–7.7)
NEUTROPHILS NFR BLD: 65.8 % (ref 38–73)
NRBC BLD-RTO: 0 /100 WBC
OVALOCYTES BLD QL SMEAR: ABNORMAL
PHOSPHATE SERPL-MCNC: 4.6 MG/DL (ref 2.7–4.5)
PLATELET # BLD AUTO: 151 K/UL (ref 150–450)
PMV BLD AUTO: 10.9 FL (ref 9.2–12.9)
POIKILOCYTOSIS BLD QL SMEAR: SLIGHT
POLYCHROMASIA BLD QL SMEAR: ABNORMAL
POTASSIUM SERPL-SCNC: 4.5 MMOL/L (ref 3.5–5.1)
PROT SERPL-MCNC: 6.2 G/DL (ref 6–8.4)
RBC # BLD AUTO: 3.73 M/UL (ref 4–5.4)
SODIUM SERPL-SCNC: 136 MMOL/L (ref 136–145)
SPHEROCYTES BLD QL SMEAR: ABNORMAL
URATE SERPL-MCNC: 3.9 MG/DL (ref 2.4–5.7)
WBC # BLD AUTO: 5.44 K/UL (ref 3.9–12.7)

## 2022-05-30 PROCEDURE — 36415 COLL VENOUS BLD VENIPUNCTURE: CPT | Performed by: INTERNAL MEDICINE

## 2022-05-30 PROCEDURE — 83735 ASSAY OF MAGNESIUM: CPT | Performed by: INTERNAL MEDICINE

## 2022-05-30 PROCEDURE — 84100 ASSAY OF PHOSPHORUS: CPT | Performed by: INTERNAL MEDICINE

## 2022-05-30 PROCEDURE — 83615 LACTATE (LD) (LDH) ENZYME: CPT | Performed by: INTERNAL MEDICINE

## 2022-05-30 PROCEDURE — 83010 ASSAY OF HAPTOGLOBIN QUANT: CPT | Performed by: INTERNAL MEDICINE

## 2022-05-30 PROCEDURE — 85025 COMPLETE CBC W/AUTO DIFF WBC: CPT | Performed by: INTERNAL MEDICINE

## 2022-05-30 PROCEDURE — 99215 OFFICE O/P EST HI 40 MIN: CPT | Mod: 95,,, | Performed by: INTERNAL MEDICINE

## 2022-05-30 PROCEDURE — 99215 PR OFFICE/OUTPT VISIT, EST, LEVL V, 40-54 MIN: ICD-10-PCS | Mod: 95,,, | Performed by: INTERNAL MEDICINE

## 2022-05-30 PROCEDURE — 80053 COMPREHEN METABOLIC PANEL: CPT | Performed by: INTERNAL MEDICINE

## 2022-05-30 PROCEDURE — 84550 ASSAY OF BLOOD/URIC ACID: CPT | Performed by: INTERNAL MEDICINE

## 2022-05-30 RX ORDER — ACETAMINOPHEN 325 MG/1
650 TABLET ORAL
Status: CANCELLED | OUTPATIENT
Start: 2022-06-01

## 2022-05-30 RX ORDER — SODIUM CHLORIDE 0.9 % (FLUSH) 0.9 %
10 SYRINGE (ML) INJECTION
Status: CANCELLED | OUTPATIENT
Start: 2022-06-01

## 2022-05-30 RX ORDER — DIPHENHYDRAMINE HYDROCHLORIDE 50 MG/ML
50 INJECTION INTRAMUSCULAR; INTRAVENOUS ONCE AS NEEDED
Status: CANCELLED | OUTPATIENT
Start: 2022-06-01

## 2022-05-30 RX ORDER — EPINEPHRINE 0.3 MG/.3ML
0.3 INJECTION SUBCUTANEOUS ONCE AS NEEDED
Status: CANCELLED | OUTPATIENT
Start: 2022-06-01

## 2022-05-30 RX ORDER — HEPARIN 100 UNIT/ML
500 SYRINGE INTRAVENOUS
Status: CANCELLED | OUTPATIENT
Start: 2022-06-01

## 2022-05-30 NOTE — PROGRESS NOTES
Section of Hematology and Stem Cell Transplantation  Follow Up Visit     The patient location is: [place], [place]  The chief complaint leading to consultation is: Chronic lymphocytic leukemia    Visit type: audiovisual    Face to Face time with patient: 20  45 minutes of total time spent on the encounter, which includes face to face time and non-face to face time preparing to see the patient (eg, review of tests), Obtaining and/or reviewing separately obtained history, Documenting clinical information in the electronic or other health record, Independently interpreting results (not separately reported) and communicating results to the patient/family/caregiver, or Care coordination (not separately reported).     Each patient to whom he or she provides medical services by telemedicine is:  (1) informed of the relationship between the physician and patient and the respective role of any other health care provider with respect to management of the patient; and (2) notified that he or she may decline to receive medical services by telemedicine and may withdraw from such care at any time.    Notes:     Visit date: 5/30/22  Visit diagnosis: CLL (chronic lymphocytic leukemia) [C91.10]  Referred by:  Shiv Watson MD    Oncologic History:     Primary Oncologic Diagnosis: Chronic lymphocytic leukemia, Binet stage C, Duarte stage III; cold agglutinin disease      2016: First noted to have cervical lymphadenopathy. Excisional biopsy confirmed chronic lymphocytic leukemia. She did not have an indication for treatment; therefore, observation recommended by Dr. Kelley.    6/17/21: PET/CT noted enlarged bilateral cervical, supraclavicular, axillary, retroperitoneal, and pelvic lymph nodes. All nodes enlarged from the prior exam.   7/28/21: First noted to have mild anemia (Hgb ~11.5 to 10.8 g/dL). Continued monitoring.   8/3/21: Established care with Dr. Carnes.    11/1/21: On follow up with Dr. Carnes, she was noted to  have worsening anemia - hemoglobin decreased to 8.6 g/dL. 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 Beta-2 microglobulin 3.19. CLL FISH revealed trisomy 12. Peripheral blood karyotype - 47,XX,+12[8]/47,idem,del(4)(p14p16)[3]/46,XX[9]. ZAP70 17% of cells.   12/14/21: She noted increased fatigue. Hemoglobin decreased to 6.2. Repeat labs confirmed decreased hemoglobin to 5.8 g/dL. Haptoglobin <1, . MICHELL Anti-IgG negative, MICHELL complement C3 positive.   12/17/21: Prednisone 60mg daily started in response to hemolytic anemia.   12/22/21: Initial visit at [facility] with Dr. Watson. WBC 96k, Hgb 8.9, Plts 288. MICHELL positive. , Hapto <10. Continued prednisone 60mg daily.    12/27/21: Initial visit with me. Prednisone decreased to 50mg daily.    1/4/22: Hemoglobin stable (~8.4 g/dL) with persistent hemolysis. Prednisone weaned to 40mg daily.    1/11/22: Cycle 1 day 1 of obinutuzumab plus venetoclax (starting day 22). Prednisone weaned to 30mg daily.    1/18/22: Hemoglobin improved. Prednisone weaned to 20mg daily.   1/31/22: Prednisone weaned to 15mg daily. Tapered to 10mg one week later.    2/15/22: Prednisone weaned to 5mg daily.    4/4/22: Prednisone to 5mg every other day x2 weeks then stop.   4/27/22: Cold agglutinin titer positive (>1:512).    History of Present Ilness:   Cassidy Bryan) is a pleasant 77 y.o.female with a past medical history of hypertension and chronic lymphocytic leukemia who presents for follow up visit regarding CLL and cold agglutinin hemolytic anemia.  She reports that she has felt much better since stopping venetoclax.  She feels well today.  Denies recent fatigue, fevers, chills, night sweats, weight loss.    PAST MEDICAL HISTORY:   Past Medical History:   Diagnosis Date    CLL (chronic lymphocytic leukemia) 12/22/2021    Hypertension     Hypothyroidism 4/26/2022    Thyroid disease        PAST SURGICAL HISTORY:   Past Surgical History:   Procedure Laterality Date     ADENOIDECTOMY      COLONOSCOPY      HYSTERECTOMY      TONSILLECTOMY         PAST SOCIAL HISTORY:  Social History     Tobacco Use    Smoking status: Never Smoker    Smokeless tobacco: Never Used   Substance Use Topics    Alcohol use: Yes     Alcohol/week: 0.0 standard drinks     Comment: rare       FAMILY HISTORY:  History reviewed. No pertinent family history.    CURRENT MEDICATIONS:   Current Outpatient Medications   Medication Sig    acyclovir (ZOVIRAX) 400 MG tablet Take 1 tablet (400 mg total) by mouth 2 (two) times daily.    aspirin (ECOTRIN) 81 MG EC tablet Take 81 mg by mouth.    atorvastatin (LIPITOR) 40 MG tablet TAKE ONE TABLET BY MOUTH once DAILY AT BEDTIME FOR cholesterol control    calcium polycarbophil (FIBERCON ORAL) Take by mouth.    CARVEDILOL PHOSPHATE 20 MG ORAL CM24 (COREG CR) 20 mg 24 hr capsule Take 20 mg by mouth nightly.    clindamycin (CLEOCIN T) 1 % Swab 2 (two) times a day.    ergocalciferol (ERGOCALCIFEROL) 50,000 unit Cap Take 50,000 Units by mouth every 7 days.    fluticasone (FLONASE) 50 mcg/actuation nasal spray 1 spray by Each Nare route once daily.    FLUZONE HIGH-DOSE 2018-19, PF, 180 mcg/0.5 mL vaccine ADM 0.5ML IM UTD    folic acid (FOLVITE) 1 MG tablet Take 1 tablet (1 mg total) by mouth once daily.    glucosamine-chondroitin 500-400 mg tablet Take 1 tablet by mouth 3 (three) times daily.    multivitamin (THERAGRAN) per tablet Take 1 tablet by mouth once daily.    ondansetron (ZOFRAN-ODT) 8 MG TbDL Take 1 tablet (8 mg total) by mouth every 12 (twelve) hours as needed (nausea).    sulfacetamide sodium, acne, 10 % Susp Apply 1 application topically 2 (two) times daily. Apply to affected area    SYNTHROID 75 mcg tablet Take 1 tablet (75 mcg total) by mouth daily    telmisartan (MICARDIS) 80 MG Tab     venetoclax (VENCLEXTA) 10 mg Tab Take 10 mg (1 tablet)  by mouth daily on days 1-7 of cycle 2.   Take 20 mg (2 tablets) by mouth daily on days 8-14 of cycle  2.  Take 50 mg (5 tablets) daily on days 15-21 of cycle 2. (Patient not taking: No sig reported)    venetoclax (VENCLEXTA) 100 mg Tab Take 100 mg (1 tablet) by mouth daily  on days 22-28 of cycle 2.  Take 200 mg (2 tablets) by mouth daily on days 1-7 of cycle 3. (Patient not taking: No sig reported)    venetoclax (VENCLEXTA) 100 mg Tab Take 200 mg (2 tablets)  by mouth once daily.    vitamin D (VITAMIN D3) 1000 units Tab Take 1,000 Units by mouth.    VIVELLE-DOT 0.025 mg/24 hr Place 1 patch onto the skin twice a week     No current facility-administered medications for this visit.       ALLERGIES:   Review of patient's allergies indicates:   Allergen Reactions    Sulfa (sulfonamide antibiotics) Rash    [place] Diarrhea       Review of Systems:     Pertinent positives and negatives included in the HPI. Otherwise a complete review of systems is negative.    Physical Exam:     There were no vitals filed for this visit.  General: Appears well, NAD  HEENT: MMM, no OP lesions  Pulmonary: CTAB, no increased work of breathing, no W/R/C  Cardiovascular: S1S2 normal, RRR, no M/R/G  Abdominal: Soft, NT, ND, BS+, no HSM  Extremities: No C/C/E  Neurological: AAOx4, grossly normal, no focal deficits  Dermatologic: No appreciable rashes or lesions  Lymphatic: No appreciable cervical, axillary, or inguinal lymphadenopathy     ECOG Performance Status: (foot note - ECOG PS provided by Eastern Cooperative Oncology Group) 1 - Symptomatic but completely ambulatory    Karnofsky Performance Score:  90%- Able to Carry on Normal Activity: Minor Symptoms of Disease    Labs:   Lab Results   Component Value Date    WBC 5.44 05/30/2022    HGB 12.6 05/30/2022    HCT 35.2 (L) 05/30/2022    MCV 94 05/30/2022     05/30/2022       Lab Results   Component Value Date     05/30/2022    K 4.5 05/30/2022     05/30/2022    CO2 28 05/30/2022    BUN 16 05/30/2022    CREATININE 0.6 05/30/2022    ALBUMIN 3.9 05/30/2022    BILITOT 1.6  (H) 05/30/2022    ALKPHOS 76 05/30/2022    AST 17 05/30/2022    ALT 16 05/30/2022       Imaging:   PET/CT 6/17/21  COMPARISON: PET/CT 8/22/2016.   HISTORY: Lymphoma.   FINDINGS:   Head and neck: There is symmetric and physiologic distribution of radiotracer throughout the included brain parenchyma. There is no hemorrhage, hydrocephalus, or midline shift. There are innumerable bilateral enlarged FDG avid cervical lymph nodes. These have worsened from the prior exam. A representative left lower cervical lymph node best visualized on image 84 measures 19 mm compared with 10 mm previously with an SUV max of 1.8. There are enlarged left supraclavicular lymph nodes which were not present on the prior exam. A representative eran conglomerate measures 3.4 cm in diameter with an SUV max of 2.2.   CHEST: There are innumerable bilateral axillary and subpectoral lymph nodes which are not present on the prior exam. These demonstrate low-level FDG avidity. A representative left axillary nodule best visualized on image 114 measures 1.9 cm with an SUV max of 1.6. There are prominent paratracheal lymph nodes which are not pathologically enlarged by size criteria and demonstrate background FDG avidity, however were not present on the prior exam.   There is no FDG avid pulmonary nodule or mass. There is no pleural effusion. There is no pneumothorax.   Abdomen and pelvis: There is physiologic distribution of radiotracer throughout the liver, spleen, and collecting system. There are multiple enlarged bilateral iliac and periaortic retroperitoneal lymph nodes. A representative left periaortic lymph node best visualized on image 201 measures 2.0 cm in diameter with an SUV max of 2.0.   MUSCULOSKELETAL: There is no FDG avid lytic or blastic osseous lesion.     IMPRESSION:   Innumerable enlarged bilateral cervical, supraclavicular, axillary, retroperitoneal, and pelvic lymph nodes all which demonstrate low-level FDG avidity, however are  enlarged from the prior exam and most consistent with recurrent disease.       Pathology:  Peripheral Blood Flow Cytometry (11/1/21):  Comment:      CD5+ B-CELL LYMPHOPROLIFERATIVE DISORDER (SEE COMMENT).     COMMENT: Clonal CD20+ B-cells are detected that coexpress   CD5, CD23, FMC7(dim) and moderate surface kappa light chain   and are negative for CD10, comprising 75% of all analyzed   cells.       Prognostication Tools:  CLL-IPI = 2, intermediate risk (79.4% survival after 5 years, 40% survival after 10 years, median  months)    Assessment and Plan:   Cassidy Bryan) is a pleasant 77 y.o.female with a past medical history of hypertension and chronic lymphocytic leukemia who presents for follow up visit regarding CLL.     1. Chronic lymphocytic leukemia, Binet stage C, Duarte stage III: She has had Duarte stage I disease since 2016. 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 She was initially treated with steroids. In January 2022, she was started on treatment for CLL due to steroid-refractory hemolytic anemia with obinutuzumab plus venetoclax because of the benefit of anti-CD20 MAB with AIHA plus fixed duration treatment (patient preference).  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 Venetoclax was stopped and obinutuzumab was continued to complete 6 cycles. Since April 2022, her hemolytic anemia has resolved.   a. Will proceed with cycle 6 of obinutuzumab.    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  Initially treated with prednisone in December with stabilization of blood counts but persistent hemolysis.  Hemolytic anemia resolved in  4/2022. Will complete obinutuzumab as noted above. Consider sutimlimab if relapse of LAYTON.  a. Obinutuzumab as above.  b. Continue to monitor LDH, haptoglobin, and retic with monthly labs.   c. Continue folic acid for 1 more month then stop.    3. Immunosuppression: Continue acyclovir 400mg BID until 1 year post-treatment (until 6/2023). She received 4 COVID vaccinations and 2 Evusheld infusions.     4. Hypertension: She reports improved BP control. HTN managed by cardiology.    5. Follow up: As below    Orders Placed:         Route Chart for Scheduling    BMT Chart Routing      Follow up with physician 1 month. RTC in approximately 1 month with labs prior   Follow up with ERIKA    Labs CBC, CMP, LDH, uric acid and other   Lab interval: every 4 weeks  Change labs to monthly (CBC, CMP, Mg, Phos, uric acid, LDH, haptoglobin, retic)   Imaging None      Pharmacy appointment No pharmacy appointment needed      Other referrals No additional referrals needed       Treatment Plan Information   OP CLL VENETOCLAX OBINUTUZUMAB Q4W   Adonis Wright MD   Upcoming Treatment Dates - OP CLL VENETOCLAX OBINUTUZUMAB Q4W    6/1/2022       Pre-Medications       acetaminophen tablet 650 mg       diphenhydramine (BENADRYL) 50 mg in NS 50 mL IVPB       Chemotherapy       obinutuzumab (GAZYVA) 1,000 mg in sodium chloride 0.9% 250 mL chemo infusion    Therapy Plan Information  EVUSHELD (TIXAGEVIMAB/CILGAVIMAB)  diphenhydrAMINE capsule 25 mg  25 mg, Oral, PRN  predniSONE tablet 40 mg  40 mg, Oral, PRN  EPINEPHrine (EPIPEN) 0.3 mg/0.3 mL pen injection 0.3 mg  0.3 mg, Intramuscular, PRN  ondansetron disintegrating tablet 4 mg  4 mg, Oral, PRN  acetaminophen tablet 650 mg  650 mg, Oral, PRN  albuterol inhaler 2 puff  2 puff, Inhalation, PRN    EVUSHELD (TIXAGEVIMAB/CILGAVIMAB)  diphenhydrAMINE capsule 25 mg  25 mg, Oral, PRN  predniSONE tablet 40 mg  40 mg, Oral, PRN  EPINEPHrine (EPIPEN) 0.3 mg/0.3 mL pen injection 0.3 mg  0.3 mg,  Intramuscular, PRN  ondansetron disintegrating tablet 4 mg  4 mg, Oral, PRN  acetaminophen tablet 650 mg  650 mg, Oral, PRN  albuterol inhaler 2 puff  2 puff, Inhalation, PRN      Bubba Wright MD  Hematology, Oncology, and Stem Cell Transplantation  [facility]

## 2022-06-01 ENCOUNTER — INFUSION (OUTPATIENT)
Dept: INFUSION THERAPY | Facility: HOSPITAL | Age: 77
End: 2022-06-01
Payer: MEDICARE

## 2022-06-01 VITALS
TEMPERATURE: 99 F | BODY MASS INDEX: 18.45 KG/M2 | WEIGHT: 124.56 LBS | HEIGHT: 69 IN | HEART RATE: 56 BPM | SYSTOLIC BLOOD PRESSURE: 117 MMHG | RESPIRATION RATE: 18 BRPM | DIASTOLIC BLOOD PRESSURE: 56 MMHG

## 2022-06-01 DIAGNOSIS — C91.10 CLL (CHRONIC LYMPHOCYTIC LEUKEMIA): Primary | ICD-10-CM

## 2022-06-01 PROCEDURE — 96367 TX/PROPH/DG ADDL SEQ IV INF: CPT

## 2022-06-01 PROCEDURE — 63600175 PHARM REV CODE 636 W HCPCS: Performed by: INTERNAL MEDICINE

## 2022-06-01 PROCEDURE — 96413 CHEMO IV INFUSION 1 HR: CPT

## 2022-06-01 PROCEDURE — 25000003 PHARM REV CODE 250: Performed by: INTERNAL MEDICINE

## 2022-06-01 PROCEDURE — 96415 CHEMO IV INFUSION ADDL HR: CPT

## 2022-06-01 RX ORDER — SODIUM CHLORIDE 0.9 % (FLUSH) 0.9 %
10 SYRINGE (ML) INJECTION
Status: DISCONTINUED | OUTPATIENT
Start: 2022-06-01 | End: 2022-06-01 | Stop reason: HOSPADM

## 2022-06-01 RX ORDER — EPINEPHRINE 0.3 MG/.3ML
0.3 INJECTION SUBCUTANEOUS ONCE AS NEEDED
Status: DISCONTINUED | OUTPATIENT
Start: 2022-06-01 | End: 2022-06-01 | Stop reason: HOSPADM

## 2022-06-01 RX ORDER — ACETAMINOPHEN 325 MG/1
650 TABLET ORAL
Status: COMPLETED | OUTPATIENT
Start: 2022-06-01 | End: 2022-06-01

## 2022-06-01 RX ORDER — DIPHENHYDRAMINE HYDROCHLORIDE 50 MG/ML
50 INJECTION INTRAMUSCULAR; INTRAVENOUS ONCE AS NEEDED
Status: DISCONTINUED | OUTPATIENT
Start: 2022-06-01 | End: 2022-06-01 | Stop reason: HOSPADM

## 2022-06-01 RX ADMIN — DIPHENHYDRAMINE HYDROCHLORIDE 50 MG: 50 INJECTION, SOLUTION INTRAMUSCULAR; INTRAVENOUS at 11:06

## 2022-06-01 RX ADMIN — OBINUTUZUMAB 1000 MG: 1000 INJECTION, SOLUTION, CONCENTRATE INTRAVENOUS at 12:06

## 2022-06-01 RX ADMIN — ACETAMINOPHEN 650 MG: 325 TABLET ORAL at 11:06

## 2022-06-01 RX ADMIN — SODIUM CHLORIDE: 0.9 INJECTION, SOLUTION INTRAVENOUS at 11:06

## 2022-06-01 NOTE — NURSING
1102  Pt here for Gazyva infusion, accompanied by son, no new complaints or concerns and reports tolerating prior treatment; discussed treatment plan for today, all questions answered and pt agrees to proceed

## 2022-06-02 ENCOUNTER — PATIENT MESSAGE (OUTPATIENT)
Dept: HEMATOLOGY/ONCOLOGY | Facility: CLINIC | Age: 77
End: 2022-06-02
Payer: MEDICARE

## 2022-06-03 ENCOUNTER — OFFICE VISIT (OUTPATIENT)
Dept: PSYCHIATRY | Facility: CLINIC | Age: 77
End: 2022-06-03
Payer: MEDICARE

## 2022-06-03 DIAGNOSIS — C91.10 CLL (CHRONIC LYMPHOCYTIC LEUKEMIA): ICD-10-CM

## 2022-06-03 DIAGNOSIS — F41.9 ANXIETY: Primary | ICD-10-CM

## 2022-06-03 PROCEDURE — 99999 PR PBB SHADOW E&M-EST. PATIENT-LVL II: ICD-10-PCS | Mod: PBBFAC,,, | Performed by: PSYCHOLOGIST

## 2022-06-03 PROCEDURE — 99999 PR PBB SHADOW E&M-EST. PATIENT-LVL II: CPT | Mod: PBBFAC,,, | Performed by: PSYCHOLOGIST

## 2022-06-03 PROCEDURE — 99212 OFFICE O/P EST SF 10 MIN: CPT | Mod: PBBFAC | Performed by: PSYCHOLOGIST

## 2022-06-03 PROCEDURE — 90791 PSYCH DIAGNOSTIC EVALUATION: CPT | Mod: ,,, | Performed by: PSYCHOLOGIST

## 2022-06-03 PROCEDURE — 90791 PR PSYCHIATRIC DIAGNOSTIC EVALUATION: ICD-10-PCS | Mod: ,,, | Performed by: PSYCHOLOGIST

## 2022-06-03 NOTE — PROGRESS NOTES
INFORMED CONSENT/ LIMITS of CONFIDENTIALITY: Prior to beginning the interview, the patient's identification was confirmed via name and date of birth. Cassidy Aguilar  was informed of the possible risks and benefits of psychological interventions (e.g., counseling, psychotherapy, testing) and provided information regarding the handling of protected health records and   the limits of confidentiality, including the importance of reporting any suicidal or homicidal ideation to ensure safety of all parties. This provider explained the purpose of today's appointment and the patient was provided with time to ask questions regarding this information.  Acceptance and understanding of these conditions was expressed, and Cassidy Aguilar freely consented to this evaluation.     PSYCHO-ONCOLOGY INTAKE    Diagnostic Interview - CPT 08669    Date: 6/3/2022  Site: [facility]     Evaluation Length (direct face-to-face time):  45 minutes     Referral Source: Adonis Wright MD   Oncologist:   PCP: Didier Ramirez MD    Clinical status of patient: Outpatient    Cassidy Aguilar, a 77 y.o. female, seen for initial evaluation visit.  Met with patient.    Chief complaint/reason for encounter: adjustment to illness, Psychological Evaluation and treatment recommendations    Medical/Surgical History:    Patient Active Problem List   Diagnosis    Lymphadenopathy, cervical    CLL (chronic lymphocytic leukemia)    Anemia, unspecified    Cold autoimmune hemolytic anemia    Long term systemic steroid user    Immunosuppression    Hypertension    Hyponatremia    At high risk of tumor lysis syndrome    Pelvic floor instability    Weakness    Lack of coordination    Diarrhea    Nausea    Fever    Elevated bilirubin    Hypothyroidism       Health Behaviors:       ETOH Use: Yes (within NI[employer] healthy use limits for age and gender)       Tobacco Use: No   Illicit Drug Use:  No     Prescription Misuse:No   Caffeine: 1-2 cups of  coffee   Exercise:The patient maintains a regular, healthy exercise program.   Firearms:  No   Advanced directives:Yes     Family History:   Psychiatric illness: No     Alcohol/Drug Abuse: No     Suicide: No      Past Psychiatric History:   Inpatient treatment: No     Outpatient treatment: Yes med management and therapy previously for support for grief     Prior substance abuse treatment: No     Suicide Attempts: No     Psychotropic Medications:  Current: none       Past: none    Current medications as per below, allergies reviewed in chart.    Current Outpatient Medications   Medication    acyclovir (ZOVIRAX) 400 MG tablet    aspirin (ECOTRIN) 81 MG EC tablet    atorvastatin (LIPITOR) 40 MG tablet    calcium polycarbophil (FIBERCON ORAL)    CARVEDILOL PHOSPHATE 20 MG ORAL CM24 (COREG CR) 20 mg 24 hr capsule    clindamycin (CLEOCIN T) 1 % Swab    ergocalciferol (ERGOCALCIFEROL) 50,000 unit Cap    fluticasone (FLONASE) 50 mcg/actuation nasal spray    FLUZONE HIGH-DOSE 2018-19, PF, 180 mcg/0.5 mL vaccine    folic acid (FOLVITE) 1 MG tablet    glucosamine-chondroitin 500-400 mg tablet    multivitamin (THERAGRAN) per tablet    ondansetron (ZOFRAN-ODT) 8 MG TbDL    sulfacetamide sodium, acne, 10 % Susp    SYNTHROID 75 mcg tablet    telmisartan (MICARDIS) 80 MG Tab    venetoclax (VENCLEXTA) 10 mg Tab    venetoclax (VENCLEXTA) 100 mg Tab    venetoclax (VENCLEXTA) 100 mg Tab    vitamin D (VITAMIN D3) 1000 units Tab    VIVELLE-DOT 0.025 mg/24 hr     No current facility-administered medications for this visit.          Social situation/Stressors: Cassidy Aguilar lives alone  in [facility].     Cassidy Aguilar has been  and  and has 3 children.  The patient reports good social support.   Cassidy Aguilar's hobbies include social activities.    Additional stressors:  None    Strengths:Resources - social, interpersonal, monetary, Vocational interests, hobbies and/or talents, Interpersonal relationships and  supports available - family, relatives, friends and Cultural/spiritual/Confucianist and community involvement  Liabilities: Complicated medical illness    Current Evaluation:     Mental Status Exam: Cassidy Aguilar arrived promptly for the assessment session.  The patient was fully cooperative throughout the interview and was an adequate historian   Appearance: age appropriate, appropriately  dressed, adequately  groomed  Behavior/Cooperation: friendly and cooperative  Speech: normal in rate, volume, and tone and appropriate quality, quantity and organization of sentences  Mood: euthymic  Affect: mood congruent  Thought Process: goal-directed, logical  Thought Content: normal, no suicidality, no homicidality, delusions, or paranoia;did not appear to be responding to internal stimuli during the interview.   Orientation: grossly intact  Memory: Grossly intact  Attention Span/Concentration: Attends to interview without distraction; reports no difficulty  Fund of Knowledge: average  Estimate of Intelligence: average from verbal skills and history  Cognition: grossly intact  Insight: patient has awareness of illness; good insight into own behavior and behavior of others  Judgment: the patient's behavior is adequate to circumstances    Distress Score             Practical Problems Physical Problems                                                   Family Problems                                         Emotional Problems                                                         Spiritual/Religions Concerns               Other Problems                  History of present illness:    Oncology History   CLL (chronic lymphocytic leukemia)   12/22/2021 Initial Diagnosis    CLL (chronic lymphocytic leukemia)     1/11/2022 -  Chemotherapy    Treatment Summary   Plan Name: OP CLL VENETOCLAX OBINUTUZUMAB Q4W  Treatment Goal: Curative  Status: Active  Start Date: 1/11/2022  End Date: 6/1/2022  Provider: Adonis Wright,  MD  Chemotherapy: venetoclax 10 mg-50 mg- 100 mg DsPk, 1 Package, Oral, See admin instructions, 1 of 1 cycle, Start date: 1/10/2022, End date: 1/25/2022  venetoclax (VENCLEXTA) 100 mg Tab, 400 mg, Oral, Daily, 1 of 1 cycle, Start date: 1/10/2022, End date: 1/25/2022  obinutuzumab (GAZYVA) 100 mg in sodium chloride 0.9% 100 mL chemo infusion, 100 mg, Intravenous, Clinic/HOD 1 time, 6 of 6 cycles  Administration: 100 mg (1/11/2022), 900 mg (1/12/2022), 1,000 mg (1/19/2022), 1,000 mg (2/9/2022), 1,000 mg (1/26/2022), 1,000 mg (3/9/2022), 1,000 mg (4/6/2022), 1,000 mg (5/4/2022), 1,000 mg (6/1/2022)       Patient reported that she is currently doing well. Had mild anxiety several weeks ago, resolved.    Cassidy Aguilar has adjusted to illness well primarily through active coping strategies. She has engaged in appropriate information gathering.  The patient hasgood family/friend support.  Her support system is coping well with the diagnosis/treatment/prognosis. Illness-related psychosocial stressors include changes in ability to engage in leisure activities.  The patient has a good partnership with her [facility] treatment team. The patient reports the following barriers to cancer care:none.       Behavioral Health Symptoms:   · Mood: Depression: denied;  prior depression:grief related; no SI/HI  · Danette: Denies  · Psychosis: Denies   · Anxiety: denied; prior anxiety:situations  · Generalized anxiety: Denies    · Panic Disorder: Denies  · Social/specific phobia: Denies   · OCD: Denies  · Trauma: Denies  · Sexual Dysfunction:  Denies  · Substance abuse: denied  · Cognitive functioning: denied  · Health behaviors: noncontributory  · Sleep: Some concerns chronically but has several skills to help.    · Pain: Ms. Aguilar reports none pain. Symptoms interfere with daily activity, sleeping and work.   · CAM Therapies: None         Assessment - Diagnosis - Goals:       ICD-10-CM ICD-9-CM   1. Anxiety  F41.9 300.00   2. CLL  (chronic lymphocytic leukemia)  C91.10 204.10     Plan:individual psychotherapy  PRN    Summary and Recommendations  Cassidy Aguilar is a 77 y.o. female referred by Adonis Wright MD for psychological evaluation and treatment.  Ms. Aguilar appears to be coping well with her diagnosis and proposed treatment course.  Patient has good support system and good relationship with spouse or significant other. Mood protective strategies during cancer treatment were discussed.  There are no recommendations for psychological treatment at this time.  She is not currently interested in regular CBT or supportive therapy, but is aware of available resources to address future needs..         Veronica Gonzalez, PhD  Clinical Psychologist  [place] License #[id number]  [place] License #[id number]

## 2022-06-07 NOTE — PROGRESS NOTES
"  Pelvic Health Physical Therapy   Treatment Note     Name: Cassidy Aguilar  Clinic Number: [id number]    Therapy Diagnosis:   Encounter Diagnoses   Name Primary?    Weakness Yes    Lack of coordination      Physician: Adonis Wright MD    Visit Date: 6/9/2022    Physician Orders: PT Eval and Treat   Medical Diagnosis from Referral: M62.89 (ICD-10-CM) - Pelvic floor instability   Evaluation Date: 3/31/2022  Authorization Period Expiration: 6/1/2022  Plan of Care Expiration: 6/23/2022  Visit # / Visits authorized: 7/ 20  FOTO: 2/3    Time In: 1506  Time Out: 1652  Total Billable Time: 46 minutes    Precautions: Standard and cancer    Subjective     Pt reports: only noticing one incident of having sudden urge to have a bowel movement while on a conference call. She states that she was on the call for an hour and had a small amount of fecal incontinence. Pt reports that she had an upset stomach and consistency of stool was softer. She states that she had small amount of UI while squatting 2x since last visit.     She was compliant with home exercise program.  Response to previous treatment: no adverse response reported  Functional change: none     Pain: 0/10  Location: pelvic floor    Objective     Cassidy received therapeutic exercises to develop strength for 5 minutes including:   Bridges w/ blue theraband - 2x15  Seated hip ADD ball squeeze - 3x10     (not performed this visit)  SLR 2x15 B  Seated hip ABD w/ green Theraband - 2x10  Sit to stand w/ kegel and 3.3 lb ball - 2x15   Side squat w/ kegel - 2x10  Wall squat w/ kegel - 2x10  Toe taps w/ kegel - 5x10  Hip adduction ball squeeze w/ kegel 2x10 5"  Hip abduction w/ GTB 2x10 5"  Clamshells 2x10 B    Cassidy participated in neuromuscular re-education activities to develop Coordination for 17 minutes including:   Kegel edu and practice. Increased time spent on edu on proper technique.  Pt improves with practice and verbal cues.    (not performed this visit)  SEMG " EVALUATION:   PF Electrode placement: external perineal  Pt. Position: supine, sitting and standing    SEMG Finding: poor holding ability and slow return to baseline  No skin irritation, erythema, or other adverse effects observed from electrode placement.    Treatment: Worked on pelvic floor muscle endurance and strength training and coordination using sEMG assist.    BASELINE  Position: supine  Resting tone: 2.2 uV  Max contraction: 25 uV   Max hold time: 4 sec    Position: standing  Resting tone: 5 uV  Max contraction: 15-20 uV    20 TRIALS  Position: seated  Average contraction: 10.1 uV  Average rest: 3.3 uV    Cassidy participated in dynamic functional therapeutic activities to improve functional performance for 24 minutes, including:  Edu provided on vaginal weight trainers to address PFM endurance deficits.    Home Exercises Provided and Patient Education Provided     Education provided:   - posture/body mechanices, diaphragmatic breathing and kegels  Discussed progression of plan of care with patient; educated pt in activity modification; reviewed HEP with pt. Pt demonstrated and verbalized understanding of all instruction and was provided with a handout of HEP (see Patient Instructions).  - updated HEP    Written Home Exercises Provided: Patient instructed to cont prior HEP.  Exercises were reviewed and Cassidy was able to demonstrate them prior to the end of the session.  Cassidy demonstrated good  understanding of the education provided.     See EMR under Patient Instructions for exercises provided prior visit.    Assessment     Discussed external factors that contribute to occasional fecal incontinence, such as diet and stool consistency, with pt stating that she believes incident was related to what she ate the night before as well as delaying defecating for too long. Pt requests to decrease frequency to 1x/month as she states that she feels this increases accountability and motivates her to work  "independently. Discussed incorporating vaginal weights into home program to assist with endurance deficits. Pt demonstrates excellent PFM activation and is able to maintain 10 second holds, however she begins to fatigue after 6 repetitions. Noted pessary in poor position and sitting inferior to pubic symphysis during treatment. Pt reports she is experiencing the pessary slipping out of position 2-3x/day and will remove and reinsert it, however she states that she does not want to get fitted for a different size or shape at this time. Reviewed exercises with pt and encouraged her to continue incorporating HEP into her regular gym routine.     Cassidy Is progressing well towards her goals.   Pt prognosis is Good.     Pt will continue to benefit from skilled outpatient physical therapy to address the deficits listed in the problem list box on initial evaluation, provide pt/family education and to maximize pt's level of independence in the home and community environment.     Pt's spiritual, cultural and educational needs considered and pt agreeable to plan of care and goals.     Anticipated barriers to physical therapy: none    Goals:   Short Term Goals: 6 weeks   Pt to perform "the knack" prior to coughing, laughing or sneezing to decrease risk of incontinence. MET 5/12/22  Pt to report a decrease in pad usage to no more than 1 a day to demonstrate improving pelvic floor function needed for continence. (progressing, not met)  Pt to demonstrate being able to correctly and consistently perform a kegel which is needed  to increase pelvic floor muscle coordination and strength needed for continence. MET 5/5/22  Pt to report being able to transfer from sitting to standing without leakage of urine. MET 5/26/22  Pt to report being able to sneeze without incontinence demonstrating improved pelvic floor strength and coordination to improve confidence in social situations. MET 5/12/22     Long Term Goals: 12 weeks   Pt to be " discharged with home plan for carry over after discharge. (progressing, not met)    Pt to be able to perform a 10 second kegel x 10 reps to demonstrate improving strength and endurance needed for continence. (progressing, not met)  Pt to report a decrease in pad usage to 0 pads a day to demonstrate improving pelvic floor muscle controls as evidenced by decreased episodes of incontinence needed to improve confidence in social situations. (progressing, not met)  Pt to report elimination of incontinence with ADLs to demonstrate improved pelvic floor muscle strength and coordination. (progressing, not met)  Pt to be able to delay the urge to urinate at least 30 minutes with a strong urge to urinate in order to make it to the bathroom without leaking. (progressing, not met)  Pt to report no longer feeling the need to urinate just in case when shopping or participating in social activities to demonstrate improving pelvic floor and bladder control. (progressing, not met)    Plan   Plan of care Certification: 3/31/2022 to 6/23/2022.     Outpatient Physical Therapy 1 time(s) every week(s) for 12 weeks to include the following interventions: Manual Therapy, Moist Heat/ Ice, Neuromuscular Re-ed, Patient Education, Self Care, Therapeutic Activities, Therapeutic Exercise, Ultrasound and Biofeedback.    Progress standing exercises and PFM endurance.     Anna Kimura, PT

## 2022-06-09 ENCOUNTER — CLINICAL SUPPORT (OUTPATIENT)
Dept: REHABILITATION | Facility: OTHER | Age: 77
End: 2022-06-09
Attending: INTERNAL MEDICINE
Payer: MEDICARE

## 2022-06-09 DIAGNOSIS — R53.1 WEAKNESS: Primary | ICD-10-CM

## 2022-06-09 DIAGNOSIS — R27.9 LACK OF COORDINATION: ICD-10-CM

## 2022-06-09 PROCEDURE — 97530 THERAPEUTIC ACTIVITIES: CPT

## 2022-06-09 PROCEDURE — 97112 NEUROMUSCULAR REEDUCATION: CPT

## 2022-06-16 NOTE — PROGRESS NOTES
"  Pelvic Health Physical Therapy   Treatment Note  Recertification Note     Name: Cassidy Aguilar  Clinic Number: [id number]    Therapy Diagnosis:   Encounter Diagnoses   Name Primary?    Weakness Yes    Lack of coordination      Physician: Adonis Wright MD    Visit Date: 6/23/2022    Physician Orders: PT Eval and Treat   Medical Diagnosis from Referral: M62.89 (ICD-10-CM) - Pelvic floor instability   Evaluation Date: 3/31/2022  Authorization Period Expiration: 6/1/2022  Plan of Care Expiration: 6/23/2022, updated to 9/21/2022  Visit # / Visits authorized: 8/ 20  FOTO: 2/3    Time In: 1401  Time Out: 1449  Total Billable Time: 48 minutes    Precautions: Standard and cancer    Subjective     Pt reports: has not received vaginal weights yet. She states that "things have been good overall" but reports that she has been traveling and has not been able to perform exercises at regularly. Pt does not report any incidents of UI since last visit.     She was compliant with home exercise program.  Response to previous treatment: no adverse response reported  Functional change: decreased ADITHYA     Pain: 0/10  Location: pelvic floor    Objective   Pt verbally consents to intravaginal treatment today.  Signed consent form already on file.     VAGINAL PELVIC FLOOR EXAM     EXTERNAL ASSESSMENT  Introitus: WNL  Skin condition: WNL  Scarring: none   Sensation: WNL   Pain: none  Voluntary contraction: visible lift  Voluntary relaxation: visible drop  Involuntary contraction: reflex tightening  Bearing down: bulge  Perineal descent: WNL                            INTERNAL ASSESSMENT  Pain: none   Sensation: able to localized pressure appropriately   Vaginal vault: roomy   Muscle Bulk: atrophy   Muscle Power: 4/5  Muscle Endurance: 10 sec  # Reps To Fatigue: 7    Fast Contractions in 10 seconds: 6   Quality of contraction: slow relaxation   Specificity: WNL   Coordination: WNL   Prolapse check: Grade 2 cystocele  Comments: pessary " "present during internal exam, poorly fitting     Cassidy received therapeutic exercises to develop strength for 26 minutes including:   Bridges w/ blue theraband - 2x15  Seated hip ADD ball squeeze - 2x15  Seated hip ABD w/ green Theraband - 2x10  SLR 2x15 B  Toe taps w/ kegel - 5x10  Sit to stand w/ kegel - 2x10    (not performed this visit)  Side squat w/ kegel - 2x10  Wall squat w/ kegel - 2x10  Toe taps w/ kegel - 5x10  Hip adduction ball squeeze w/ kegel 2x10 5"  Hip abduction w/ GTB 2x10 5"  Clamshells 2x10 B    Cassidy participated in neuromuscular re-education activities to develop Coordination for 22 minutes including:   Kegel edu and practice. Increased time spent on edu on proper technique.  Pt improves with practice and verbal cues.    (not performed this visit)  SEMG EVALUATION:   PF Electrode placement: external perineal  Pt. Position: supine, sitting and standing    SEMG Finding: poor holding ability and slow return to baseline  No skin irritation, erythema, or other adverse effects observed from electrode placement.    Treatment: Worked on pelvic floor muscle endurance and strength training and coordination using sEMG assist.    BASELINE  Position: supine  Resting tone: 2.2 uV  Max contraction: 25 uV   Max hold time: 4 sec    Position: standing  Resting tone: 5 uV  Max contraction: 15-20 uV    20 TRIALS  Position: seated  Average contraction: 10.1 uV  Average rest: 3.3 uV    Cassidy participated in dynamic functional therapeutic activities to improve functional performance for 00 minutes, including:  Edu provided on vaginal weight trainers to address PFM endurance deficits.    Home Exercises Provided and Patient Education Provided     Education provided:   - posture/body mechanices, diaphragmatic breathing and kegels  Discussed progression of plan of care with patient; educated pt in activity modification; reviewed HEP with pt. Pt demonstrated and verbalized understanding of all instruction and was " "provided with a handout of HEP (see Patient Instructions).  - updated HEP    Written Home Exercises Provided: yes.  Exercises were reviewed and Cassidy was able to demonstrate them prior to the end of the session.  Cassidy demonstrated good  understanding of the education provided.     See EMR under Patient Instructions for exercises provided 6/23/22.    Assessment     Pt reports no complaints this visit. Reassessment reveals increased PFM strength and endurance, however pt requires verbal cues for full deactivation after performing PFM activation. Pt has met all therapy goals except for discontinuing daily pantyliner use and delaying urination for at least 30 minutes. Progressed HEP to include seated and standing exercise with good pt tolerance. Plan to incorporate vaginal weight training into next session in order to prepare pt for discharge.    Cassidy Is progressing well towards her goals.   Pt prognosis is Good.     Pt will continue to benefit from skilled outpatient physical therapy to address the deficits listed in the problem list box on initial evaluation, provide pt/family education and to maximize pt's level of independence in the home and community environment.     Pt's spiritual, cultural and educational needs considered and pt agreeable to plan of care and goals.     Anticipated barriers to physical therapy: none    Goals:   Short Term Goals: 6 weeks   Pt to perform "the knack" prior to coughing, laughing or sneezing to decrease risk of incontinence. MET 5/12/22  Pt to report a decrease in pad usage to no more than 1 a day to demonstrate improving pelvic floor function needed for continence. MET 6/23/22  Pt to demonstrate being able to correctly and consistently perform a kegel which is needed  to increase pelvic floor muscle coordination and strength needed for continence. MET 5/5/22  Pt to report being able to transfer from sitting to standing without leakage of urine. MET 5/26/22  Pt to report being able " to sneeze without incontinence demonstrating improved pelvic floor strength and coordination to improve confidence in social situations. MET 5/12/22     Long Term Goals: 12 weeks   Pt to be discharged with home plan for carry over after discharge. (progressing, not met)    Pt to be able to perform a 10 second kegel x 10 reps to demonstrate improving strength and endurance needed for continence. MET 6/23/22  Pt to report a decrease in pad usage to 0 pads a day to demonstrate improving pelvic floor muscle controls as evidenced by decreased episodes of incontinence needed to improve confidence in social situations. (progressing, not met)  Pt to report elimination of incontinence with ADLs to demonstrate improved pelvic floor muscle strength and coordination. MET 6/23/22  Pt to be able to delay the urge to urinate at least 30 minutes with a strong urge to urinate in order to make it to the bathroom without leaking. (progressing, not met ~10 minutes)  Pt to report no longer feeling the need to urinate just in case when shopping or participating in social activities to demonstrate improving pelvic floor and bladder control. MET 6/23/22    Plan   Plan of care Certification: 3/31/2022 to 6/23/2022, updated to 9/21/2022     Outpatient Physical Therapy 1 time(s) every week(s) for 12 weeks to include the following interventions: Manual Therapy, Moist Heat/ Ice, Neuromuscular Re-ed, Patient Education, Self Care, Therapeutic Activities, Therapeutic Exercise, Ultrasound and Biofeedback.    Progress standing exercises and PFM endurance.     Anna Kimura, PT

## 2022-06-21 ENCOUNTER — PATIENT MESSAGE (OUTPATIENT)
Dept: HEMATOLOGY/ONCOLOGY | Facility: CLINIC | Age: 77
End: 2022-06-21
Payer: MEDICARE

## 2022-06-23 ENCOUNTER — CLINICAL SUPPORT (OUTPATIENT)
Dept: REHABILITATION | Facility: OTHER | Age: 77
End: 2022-06-23
Attending: INTERNAL MEDICINE
Payer: MEDICARE

## 2022-06-23 DIAGNOSIS — R53.1 WEAKNESS: Primary | ICD-10-CM

## 2022-06-23 DIAGNOSIS — R27.9 LACK OF COORDINATION: ICD-10-CM

## 2022-06-23 PROCEDURE — 97112 NEUROMUSCULAR REEDUCATION: CPT

## 2022-06-23 PROCEDURE — 97110 THERAPEUTIC EXERCISES: CPT

## 2022-06-23 NOTE — PATIENT INSTRUCTIONS
"BRIDGING W/ KEGEL    While lying on your back with knees bent, tighten your pelvic floor, squeeze your buttocks and then raise your buttocks off the floor/bed as creating a "Bridge" with your body. Hold for 3-5 seconds and then lower your back flat on the ground. Release the Kegel. Repeat 10 times, twice a day.    STRAIGHT LEG RAISE - SLR    While lying on your back, raise up your leg with a straight knee.  Keep the opposite knee bent with the foot planted on the ground. Complete 2 sets of 10 on each side.    SEATED HIP ADDUCTION KNEE SQUEEZE     With ball or folded pillow between knees, tighten deep core then squeeze knees together and hold 5 seconds. Do 2 sets of 10.    HIP ABDUCTION WITH BAND    While seated in a chair, place a looped elastic band around your thighs near your knees as shown. Start by moving both knees out to the side to separate your legs. Return to starting position and repeat. Perform 2 sets of 10 repetitions.     SIT TO STAND W/ KEGEL    Start by scooting close to the front of the chair. Next, initiate a Kegel and then lean forward at your trunk and rise to standing without using your hands to push off from the chair or other object. Release the Kegel and return to seated. Repeat 15 times, twice a day.      TOE TAPPING W/ KEGELS     front of a step and perform a Kegel. Then, raise one foot off the floor as you balance on the other leg. Tap the top of the step with your toes. Be sure to lift high enough so you don't bump the front of the step with the front of your toes. Then, set your foot back down and perform on the other side. Use hand on table for balance if needed. Perform as many toe taps as you can until you feel the Kegel release. Take a short break and then repeat.     Perform 5 repetitions, twice a day.     "

## 2022-06-27 ENCOUNTER — OFFICE VISIT (OUTPATIENT)
Dept: HEMATOLOGY/ONCOLOGY | Facility: CLINIC | Age: 77
End: 2022-06-27
Payer: MEDICARE

## 2022-06-27 VITALS
TEMPERATURE: 98 F | HEART RATE: 60 BPM | DIASTOLIC BLOOD PRESSURE: 67 MMHG | SYSTOLIC BLOOD PRESSURE: 140 MMHG | OXYGEN SATURATION: 97 % | BODY MASS INDEX: 18.45 KG/M2 | HEIGHT: 69 IN | WEIGHT: 124.56 LBS | RESPIRATION RATE: 18 BRPM

## 2022-06-27 DIAGNOSIS — D84.9 IMMUNOSUPPRESSION: ICD-10-CM

## 2022-06-27 DIAGNOSIS — D59.12 COLD AGGLUTININ DISEASE: ICD-10-CM

## 2022-06-27 DIAGNOSIS — C91.10 CLL (CHRONIC LYMPHOCYTIC LEUKEMIA): Primary | ICD-10-CM

## 2022-06-27 DIAGNOSIS — D59.12 COLD AUTOIMMUNE HEMOLYTIC ANEMIA: ICD-10-CM

## 2022-06-27 PROCEDURE — 99214 OFFICE O/P EST MOD 30 MIN: CPT | Mod: S$PBB,,, | Performed by: INTERNAL MEDICINE

## 2022-06-27 PROCEDURE — 99215 OFFICE O/P EST HI 40 MIN: CPT | Mod: PBBFAC | Performed by: INTERNAL MEDICINE

## 2022-06-27 PROCEDURE — 99999 PR PBB SHADOW E&M-EST. PATIENT-LVL V: CPT | Mod: PBBFAC,,, | Performed by: INTERNAL MEDICINE

## 2022-06-27 PROCEDURE — 99214 PR OFFICE/OUTPT VISIT, EST, LEVL IV, 30-39 MIN: ICD-10-PCS | Mod: S$PBB,,, | Performed by: INTERNAL MEDICINE

## 2022-06-27 PROCEDURE — 99999 PR PBB SHADOW E&M-EST. PATIENT-LVL V: ICD-10-PCS | Mod: PBBFAC,,, | Performed by: INTERNAL MEDICINE

## 2022-06-27 NOTE — PROGRESS NOTES
Section of Hematology and Stem Cell Transplantation  Follow Up Visit     Visit date: 6/27/22  Visit diagnosis: CLL (chronic lymphocytic leukemia) [C91.10]  Referred by:  Shiv Watson MD    Oncologic History:     Primary Oncologic Diagnosis: Chronic lymphocytic leukemia, Binet stage C, Duarte stage III; cold agglutinin disease      2016: First noted to have cervical lymphadenopathy. Excisional biopsy confirmed chronic lymphocytic leukemia. She did not have an indication for treatment; therefore, observation recommended by Dr. Kelley.    6/17/21: PET/CT noted enlarged bilateral cervical, supraclavicular, axillary, retroperitoneal, and pelvic lymph nodes. All nodes enlarged from the prior exam.   7/28/21: First noted to have mild anemia (Hgb ~11.5 to 10.8 g/dL). Continued monitoring.   8/3/21: Established care with Dr. Carnes.    11/1/21: On follow up with Dr. Carnes, she was noted to have worsening anemia - hemoglobin decreased to 8.6 g/dL. 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 Beta-2 microglobulin 3.19. CLL FISH revealed trisomy 12. Peripheral blood karyotype - 47,XX,+12[8]/47,idem,del(4)(p14p16)[3]/46,XX[9]. ZAP70 17% of cells.   12/14/21: She noted increased fatigue. Hemoglobin decreased to 6.2. Repeat labs confirmed decreased hemoglobin to 5.8 g/dL. Haptoglobin <1, . MICHELL Anti-IgG negative, MICHELL complement C3 positive.   12/17/21: Prednisone 60mg daily started in response to hemolytic anemia.   12/22/21: Initial visit at [facility] with Dr. Watson. WBC 96k, Hgb 8.9, Plts 288. MICHELL positive. , Hapto <10. Continued prednisone 60mg daily.    12/27/21: Initial visit with me. Prednisone decreased to 50mg daily.    1/4/22: Hemoglobin stable (~8.4 g/dL) with persistent hemolysis. Prednisone weaned to 40mg daily.    1/11/22: Cycle 1 day 1 of obinutuzumab plus venetoclax (starting day 22). Prednisone weaned to 30mg daily.    1/18/22: Hemoglobin improved. Prednisone weaned to 20mg daily.   1/31/22:  Prednisone weaned to 15mg daily. Tapered to 10mg one week later.    2/15/22: Prednisone weaned to 5mg daily.    4/4/22: Prednisone to 5mg every other day x2 weeks then stop.   4/27/22: Cold agglutinin titer positive (>1:512).    History of Present Ilness:   Cassidy Aguilar (Cassidy) is a pleasant 77 y.o.female with a past medical history of hypertension and chronic lymphocytic leukemia who presents for follow up visit regarding CLL and cold agglutinin hemolytic anemia.  She is doing very well today.  She denies recent fevers, chills, night sweats, weight loss.    PAST MEDICAL HISTORY:   Past Medical History:   Diagnosis Date    CLL (chronic lymphocytic leukemia) 12/22/2021    Hypertension     Hypothyroidism 4/26/2022    Thyroid disease        PAST SURGICAL HISTORY:   Past Surgical History:   Procedure Laterality Date    ADENOIDECTOMY      COLONOSCOPY      HYSTERECTOMY      TONSILLECTOMY         PAST SOCIAL HISTORY:  Social History     Tobacco Use    Smoking status: Never Smoker    Smokeless tobacco: Never Used   Substance Use Topics    Alcohol use: Yes     Alcohol/week: 0.0 standard drinks     Comment: rare       FAMILY HISTORY:  History reviewed. No pertinent family history.    CURRENT MEDICATIONS:   Current Outpatient Medications   Medication Sig    acyclovir (ZOVIRAX) 400 MG tablet Take 1 tablet (400 mg total) by mouth 2 (two) times daily.    aspirin (ECOTRIN) 81 MG EC tablet Take 81 mg by mouth.    atorvastatin (LIPITOR) 40 MG tablet TAKE ONE TABLET BY MOUTH once DAILY AT BEDTIME FOR cholesterol control    calcium polycarbophil (FIBERCON ORAL) Take by mouth.    CARVEDILOL PHOSPHATE 20 MG ORAL CM24 (COREG CR) 20 mg 24 hr capsule Take 20 mg by mouth nightly.    clindamycin (CLEOCIN T) 1 % Swab 2 (two) times a day.    ergocalciferol (ERGOCALCIFEROL) 50,000 unit Cap Take 50,000 Units by mouth every 7 days.    fluticasone (FLONASE) 50 mcg/actuation nasal spray 1 spray by Each Nare route once daily.     FLUZONE HIGH-DOSE 2018-19, PF, 180 mcg/0.5 mL vaccine ADM 0.5ML IM UTD    folic acid (FOLVITE) 1 MG tablet Take 1 tablet (1 mg total) by mouth once daily.    glucosamine-chondroitin 500-400 mg tablet Take 1 tablet by mouth 3 (three) times daily.    multivitamin (THERAGRAN) per tablet Take 1 tablet by mouth once daily.    ondansetron (ZOFRAN-ODT) 8 MG TbDL Take 1 tablet (8 mg total) by mouth every 12 (twelve) hours as needed (nausea).    sulfacetamide sodium, acne, 10 % Susp Apply 1 application topically 2 (two) times daily. Apply to affected area    SYNTHROID 75 mcg tablet Take 1 tablet (75 mcg total) by mouth daily    telmisartan (MICARDIS) 80 MG Tab     venetoclax (VENCLEXTA) 10 mg Tab Take 10 mg (1 tablet)  by mouth daily on days 1-7 of cycle 2.   Take 20 mg (2 tablets) by mouth daily on days 8-14 of cycle 2.  Take 50 mg (5 tablets) daily on days 15-21 of cycle 2.    venetoclax (VENCLEXTA) 100 mg Tab Take 100 mg (1 tablet) by mouth daily  on days 22-28 of cycle 2.  Take 200 mg (2 tablets) by mouth daily on days 1-7 of cycle 3.    venetoclax (VENCLEXTA) 100 mg Tab Take 200 mg (2 tablets)  by mouth once daily.    vitamin D (VITAMIN D3) 1000 units Tab Take 1,000 Units by mouth.    VIVELLE-DOT 0.025 mg/24 hr Place 1 patch onto the skin twice a week     No current facility-administered medications for this visit.       ALLERGIES:   Review of patient's allergies indicates:   Allergen Reactions    Sulfa (sulfonamide antibiotics) Rash    [place] Diarrhea       Review of Systems:     Pertinent positives and negatives included in the HPI. Otherwise a complete review of systems is negative.    Physical Exam:     Vitals:    06/27/22 1010   BP: (!) 140/67   Pulse: 60   Resp: 18   Temp: 98.2 °F (36.8 °C)     General: Appears well, NAD  HEENT: MMM, no OP lesions  Pulmonary: CTAB, no increased work of breathing, no W/R/C  Cardiovascular: S1S2 normal, RRR, no M/R/G  Abdominal: Soft, NT, ND, BS+, no  HSM  Extremities: No C/C/E  Neurological: AAOx4, grossly normal, no focal deficits  Dermatologic: No appreciable rashes or lesions  Lymphatic: No appreciable cervical, axillary, or inguinal lymphadenopathy     ECOG Performance Status: (foot note - ECOG PS provided by Eastern Cooperative Oncology Group) 0 - Asymptomatic    Karnofsky Performance Score:  100%- Normal, No Complaints, No Evidence of Disease    Labs:   Lab Results   Component Value Date    WBC 5.39 06/27/2022    HGB 13.3 06/27/2022    HCT 38.1 06/27/2022    MCV 94 06/27/2022     06/27/2022       Lab Results   Component Value Date     06/27/2022    K 4.2 06/27/2022     06/27/2022    CO2 26 06/27/2022    BUN 17 06/27/2022    CREATININE 0.6 06/27/2022    ALBUMIN 4.0 06/27/2022    BILITOT 1.2 (H) 06/27/2022    ALKPHOS 75 06/27/2022    AST 18 06/27/2022    ALT 17 06/27/2022       Imaging:   PET/CT 6/17/21  COMPARISON: PET/CT 8/22/2016.   HISTORY: Lymphoma.   FINDINGS:   Head and neck: There is symmetric and physiologic distribution of radiotracer throughout the included brain parenchyma. There is no hemorrhage, hydrocephalus, or midline shift. There are innumerable bilateral enlarged FDG avid cervical lymph nodes. These have worsened from the prior exam. A representative left lower cervical lymph node best visualized on image 84 measures 19 mm compared with 10 mm previously with an SUV max of 1.8. There are enlarged left supraclavicular lymph nodes which were not present on the prior exam. A representative eran conglomerate measures 3.4 cm in diameter with an SUV max of 2.2.   CHEST: There are innumerable bilateral axillary and subpectoral lymph nodes which are not present on the prior exam. These demonstrate low-level FDG avidity. A representative left axillary nodule best visualized on image 114 measures 1.9 cm with an SUV max of 1.6. There are prominent paratracheal lymph nodes which are not pathologically enlarged by size criteria and  demonstrate background FDG avidity, however were not present on the prior exam.   There is no FDG avid pulmonary nodule or mass. There is no pleural effusion. There is no pneumothorax.   Abdomen and pelvis: There is physiologic distribution of radiotracer throughout the liver, spleen, and collecting system. There are multiple enlarged bilateral iliac and periaortic retroperitoneal lymph nodes. A representative left periaortic lymph node best visualized on image 201 measures 2.0 cm in diameter with an SUV max of 2.0.   MUSCULOSKELETAL: There is no FDG avid lytic or blastic osseous lesion.     IMPRESSION:   Innumerable enlarged bilateral cervical, supraclavicular, axillary, retroperitoneal, and pelvic lymph nodes all which demonstrate low-level FDG avidity, however are enlarged from the prior exam and most consistent with recurrent disease.       Pathology:  Peripheral Blood Flow Cytometry (11/1/21):  Comment:      CD5+ B-CELL LYMPHOPROLIFERATIVE DISORDER (SEE COMMENT).     COMMENT: Clonal CD20+ B-cells are detected that coexpress   CD5, CD23, FMC7(dim) and moderate surface kappa light chain   and are negative for CD10, comprising 75% of all analyzed   cells.       Prognostication Tools:  CLL-IPI = 2, intermediate risk (79.4% survival after 5 years, 40% survival after 10 years, median  months)    Assessment and Plan:   Cassidy Bryan) is a pleasant 77 y.o.female with a past medical history of hypertension and chronic lymphocytic leukemia who presents for follow up visit regarding CLL.     1. Chronic lymphocytic leukemia, Binet stage C, Duarte stage III: She has had Duarte stage I disease since 2016. 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 She was initially treated with steroids. In January 2022, she was started on treatment for CLL due to steroid-refractory hemolytic anemia  with obinutuzumab plus venetoclax because of the benefit of anti-CD20 MAB with AIHA plus fixed duration treatment (patient preference).  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 Venetoclax was stopped and obinutuzumab was continued to complete 6 cycles. Since April 2022, her hemolytic anemia has resolved.   a. Continue to monitor clinically.    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  Initially treated with prednisone in December with stabilization of blood counts but persistent hemolysis.  Hemolytic anemia resolved in 4/2022. She completed 6 cycles of obinutuzumab as noted above. Consider sutimlimab if relapse of LAYTON.  a. Continue to monitor LDH, haptoglobin, and retic with monthly labs x2 then every 3 months.   b. Ok to stop folic acid.    3. Immunosuppression: Continue acyclovir 400mg BID until 1 year post-treatment (until 6/2023). She received 4 COVID vaccinations and 2 Evusheld infusions.     4. Hypertension: She reports improved BP control. HTN managed by cardiology.    5. Follow up: As below    Orders Placed:         Route Chart for Scheduling    BMT Chart Routing      Follow up with physician 1 month. RTC in approximately 1 month with labs prior   Follow up with ERIKA    Infusion scheduling note    Injection scheduling note    Labs CBC, CMP, LDH, uric acid and other   Lab interval: every 4 weeks  Labs prior to visit (CBC, CMP, Mg, Phos, uric acid, LDH, haptoglobin, retic)   Imaging None      Pharmacy appointment No pharmacy appointment needed      Other referrals No additional referrals needed         Bubba Wright MD  Hematology, Oncology, and Stem Cell Transplantation  [facility]

## 2022-07-07 ENCOUNTER — PATIENT MESSAGE (OUTPATIENT)
Dept: HEMATOLOGY/ONCOLOGY | Facility: CLINIC | Age: 77
End: 2022-07-07
Payer: MEDICARE

## 2022-07-11 NOTE — PROGRESS NOTES
"  Pelvic Health Physical Therapy   Treatment Note     Name: Cassidy Aguilar  Clinic Number: [id number]    Therapy Diagnosis:   Encounter Diagnoses   Name Primary?    Weakness Yes    Lack of coordination      Physician: Adonis Wright MD    Visit Date: 7/14/2022    Physician Orders: PT Eval and Treat   Medical Diagnosis from Referral: M62.89 (ICD-10-CM) - Pelvic floor instability   Evaluation Date: 3/31/2022  Authorization Period Expiration: 12/31/2022  Plan of Care Expiration: 6/23/2022, updated to 9/21/2022  Visit # / Visits authorized: 9/ 20  FOTO: 2/3    Time In: 1410  Time Out: 1500  Total Billable Time: 50 minutes    Precautions: Standard and cancer    Subjective     Pt reports: had one incident of ADITHYA when getting up to stand from a low sofa while on vacation. She states that she developed impetigo and had a bad reaction to the antibiotics which upset her stomach.      She was compliant with home exercise program.  Response to previous treatment: no adverse response reported  Functional change: decreased ADITHYA     Pain: 0/10  Location: pelvic floor    Objective   Pt verbally consents to intravaginal treatment today.  Signed consent form already on file.     Cassidy received therapeutic exercises to develop strength for 40 minutes including:   Exercises performed with 25kg vaginal weight inserted intravaginally:  Bridges w/ blue theraband - 2x15  SLR - 2x15 MALLORY  Marching w/ kegel - 2x15 MALLORY  Transitioning supine to sit x 3 reps with coordinated breathing and PFM activation  Static standing x 2 min   Ambulation x 300'     (not performed this visit)  Seated hip ADD ball squeeze - 2x15  Seated hip ABD w/ green Theraband - 2x10  SLR 2x15 B  Toe taps w/ kegel - 5x10  Sit to stand w/ kegel - 2x10  Side squat w/ kegel - 2x10  Wall squat w/ kegel - 2x10  Toe taps w/ kegel - 5x10  Hip adduction ball squeeze w/ kegel 2x10 5"  Hip abduction w/ GTB 2x10 5"  Clamshells 2x10 B    Cassidy participated in neuromuscular " re-education activities to develop Coordination for 00 minutes including:   Kegel edu and practice. Increased time spent on edu on proper technique.  Pt improves with practice and verbal cues.    SEMG EVALUATION:   PF Electrode placement: external perineal  Pt. Position: supine, sitting and standing    SEMG Finding: poor holding ability and slow return to baseline  No skin irritation, erythema, or other adverse effects observed from electrode placement.    Treatment: Worked on pelvic floor muscle endurance and strength training and coordination using sEMG assist.    BASELINE  Position: supine  Resting tone: 2.2 uV  Max contraction: 25 uV   Max hold time: 4 sec    Position: standing  Resting tone: 5 uV  Max contraction: 15-20 uV    20 TRIALS  Position: seated  Average contraction: 10.1 uV  Average rest: 3.3 uV    Cassidy participated in dynamic functional therapeutic activities to improve functional performance for 10 minutes, including:  Edu provided on vaginal weight trainers to address PFM endurance deficits. Pt was edu on cleaning, storage, and use of vaginal weights. She was also instructed on using pea-sized amount of water-based lubricant to assist with inserting weight.     Home Exercises Provided and Patient Education Provided     Education provided:   - anatomy/physiology of pelvic floor, posture/body mechanices, diaphragmatic breathing, kegels and vaginal weight training  Discussed progression of plan of care with patient; educated pt in activity modification; reviewed HEP with pt. Pt demonstrated and verbalized understanding of all instruction and was provided with a handout of HEP (see Patient Instructions).  - updated HEP    Written Home Exercises Provided: yes.  Exercises were reviewed and Cassidy was able to demonstrate them prior to the end of the session.  Cassidy demonstrated good  understanding of the education provided.     See EMR under Patient Instructions for exercises provided  "7/14/22.    Assessment     Initiated PFM endurance training utilizing vaginal weights this visit. Pt was instructed on inserting 25kg vaginal weight at home. Pt initially demonstrated difficulty maintaining vaginal weight internally due to poor positioning of pessary, however this improved with repositioning and practice. Pt was able to perform exercises without weight slipping out. She did demonstrate difficulty transitioning from supine to sit without losing control of the weight, even with cues for PFM activation and decreasing valsalva. Pt was able to stand and ambulate 300' with weight inserted without it slipping or falling out. Pt was given the soiled vaginal weight, used in today's therapy visit. Instructed pt in directions to clean the device at home: Remove device from plastic bag, and discard the plastic bag. Wash the device thoroughly with antibacterial soap and water. Let air dry whenever possible. Store in a dry location. Continue to bring the device to subsequent therapy visits unless otherwise instructed. Pt verbalized understanding of all instruction.     Cassidy Is progressing well towards her goals.   Pt prognosis is Good.     Pt will continue to benefit from skilled outpatient physical therapy to address the deficits listed in the problem list box on initial evaluation, provide pt/family education and to maximize pt's level of independence in the home and community environment.     Pt's spiritual, cultural and educational needs considered and pt agreeable to plan of care and goals.     Anticipated barriers to physical therapy: none    Goals:   Short Term Goals: 6 weeks   Pt to perform "the knack" prior to coughing, laughing or sneezing to decrease risk of incontinence. MET 5/12/22  Pt to report a decrease in pad usage to no more than 1 a day to demonstrate improving pelvic floor function needed for continence. MET 6/23/22  Pt to demonstrate being able to correctly and consistently perform a kegel " which is needed  to increase pelvic floor muscle coordination and strength needed for continence. MET 5/5/22  Pt to report being able to transfer from sitting to standing without leakage of urine. MET 5/26/22  Pt to report being able to sneeze without incontinence demonstrating improved pelvic floor strength and coordination to improve confidence in social situations. MET 5/12/22     Long Term Goals: 12 weeks   Pt to be discharged with home plan for carry over after discharge. (progressing, not met)    Pt to be able to perform a 10 second kegel x 10 reps to demonstrate improving strength and endurance needed for continence. MET 6/23/22  Pt to report a decrease in pad usage to 0 pads a day to demonstrate improving pelvic floor muscle controls as evidenced by decreased episodes of incontinence needed to improve confidence in social situations. (progressing, not met)  Pt to report elimination of incontinence with ADLs to demonstrate improved pelvic floor muscle strength and coordination. MET 6/23/22  Pt to be able to delay the urge to urinate at least 30 minutes with a strong urge to urinate in order to make it to the bathroom without leaking. (progressing, not met ~10 minutes)  Pt to report no longer feeling the need to urinate just in case when shopping or participating in social activities to demonstrate improving pelvic floor and bladder control. MET 6/23/22    Plan   Plan of care Certification: 3/31/2022 to 6/23/2022, updated to 9/21/2022     Outpatient Physical Therapy 1 time(s) every week(s) for 12 weeks to include the following interventions: Manual Therapy, Moist Heat/ Ice, Neuromuscular Re-ed, Patient Education, Self Care, Therapeutic Activities, Therapeutic Exercise, Ultrasound and Biofeedback.    Progress vaginal weight training. Progress standing exercises and PFM endurance.     Anna Kimura, PT

## 2022-07-14 ENCOUNTER — CLINICAL SUPPORT (OUTPATIENT)
Dept: REHABILITATION | Facility: OTHER | Age: 77
End: 2022-07-14
Attending: INTERNAL MEDICINE
Payer: MEDICARE

## 2022-07-14 DIAGNOSIS — R27.9 LACK OF COORDINATION: ICD-10-CM

## 2022-07-14 DIAGNOSIS — R53.1 WEAKNESS: Primary | ICD-10-CM

## 2022-07-14 PROCEDURE — 97110 THERAPEUTIC EXERCISES: CPT

## 2022-07-14 PROCEDURE — 97530 THERAPEUTIC ACTIVITIES: CPT

## 2022-07-14 NOTE — PATIENT INSTRUCTIONS
VAGINAL WEIGHT TRAININ) Find the weight appropriate for your current level of strength (start with the white size which is the lightest weight). Wash your hands and the weight with soap and water.     2) Apply a SMALL amount of lubricant to the tip of the weight and insert into the vaginal opening like you would a tampon.       3) Leave the weight in for about 15 minutes daily, preferably during some body movement, such as light household chores, showering and getting ready in the morning. As you get stronger, progress to the next heavier one.    4) Remove the weight and wash with antibacterial soap and warm water.     You can also insert the weight before performing your home exercise program for an additional challenge.

## 2022-07-29 ENCOUNTER — OFFICE VISIT (OUTPATIENT)
Dept: HEMATOLOGY/ONCOLOGY | Facility: CLINIC | Age: 77
End: 2022-07-29
Payer: MEDICARE

## 2022-07-29 ENCOUNTER — PATIENT MESSAGE (OUTPATIENT)
Dept: HEMATOLOGY/ONCOLOGY | Facility: CLINIC | Age: 77
End: 2022-07-29

## 2022-07-29 ENCOUNTER — LAB VISIT (OUTPATIENT)
Dept: LAB | Facility: HOSPITAL | Age: 77
End: 2022-07-29
Payer: MEDICARE

## 2022-07-29 VITALS
WEIGHT: 125.56 LBS | DIASTOLIC BLOOD PRESSURE: 90 MMHG | OXYGEN SATURATION: 99 % | HEIGHT: 69 IN | HEART RATE: 63 BPM | RESPIRATION RATE: 20 BRPM | BODY MASS INDEX: 18.6 KG/M2 | SYSTOLIC BLOOD PRESSURE: 180 MMHG

## 2022-07-29 DIAGNOSIS — C91.10 CLL (CHRONIC LYMPHOCYTIC LEUKEMIA): ICD-10-CM

## 2022-07-29 DIAGNOSIS — D84.9 IMMUNOSUPPRESSION: ICD-10-CM

## 2022-07-29 DIAGNOSIS — D59.12 COLD AGGLUTININ DISEASE: ICD-10-CM

## 2022-07-29 DIAGNOSIS — E87.1 HYPONATREMIA: ICD-10-CM

## 2022-07-29 DIAGNOSIS — I10 HYPERTENSION, UNSPECIFIED TYPE: ICD-10-CM

## 2022-07-29 DIAGNOSIS — C91.10 CLL (CHRONIC LYMPHOCYTIC LEUKEMIA): Primary | ICD-10-CM

## 2022-07-29 DIAGNOSIS — D59.9 ACQUIRED HEMOLYTIC ANEMIA: ICD-10-CM

## 2022-07-29 LAB
ALBUMIN SERPL BCP-MCNC: 3.9 G/DL (ref 3.5–5.2)
ALP SERPL-CCNC: 88 U/L (ref 55–135)
ALT SERPL W/O P-5'-P-CCNC: 18 U/L (ref 10–44)
ANION GAP SERPL CALC-SCNC: 8 MMOL/L (ref 8–16)
AST SERPL-CCNC: 18 U/L (ref 10–40)
BASOPHILS # BLD AUTO: 0.08 K/UL (ref 0–0.2)
BASOPHILS NFR BLD: 1.4 % (ref 0–1.9)
BILIRUB SERPL-MCNC: 1.3 MG/DL (ref 0.1–1)
BUN SERPL-MCNC: 15 MG/DL (ref 8–23)
CALCIUM SERPL-MCNC: 9.3 MG/DL (ref 8.7–10.5)
CHLORIDE SERPL-SCNC: 99 MMOL/L (ref 95–110)
CO2 SERPL-SCNC: 27 MMOL/L (ref 23–29)
CREAT SERPL-MCNC: 0.6 MG/DL (ref 0.5–1.4)
DIFFERENTIAL METHOD: ABNORMAL
EOSINOPHIL # BLD AUTO: 0.1 K/UL (ref 0–0.5)
EOSINOPHIL NFR BLD: 2.3 % (ref 0–8)
ERYTHROCYTE [DISTWIDTH] IN BLOOD BY AUTOMATED COUNT: 11.9 % (ref 11.5–14.5)
EST. GFR  (AFRICAN AMERICAN): >60 ML/MIN/1.73 M^2
EST. GFR  (NON AFRICAN AMERICAN): >60 ML/MIN/1.73 M^2
GLUCOSE SERPL-MCNC: 82 MG/DL (ref 70–110)
HAPTOGLOB SERPL-MCNC: 55 MG/DL (ref 30–250)
HCT VFR BLD AUTO: 38.1 % (ref 37–48.5)
HGB BLD-MCNC: 13.4 G/DL (ref 12–16)
IMM GRANULOCYTES # BLD AUTO: 0.01 K/UL (ref 0–0.04)
IMM GRANULOCYTES NFR BLD AUTO: 0.2 % (ref 0–0.5)
LDH SERPL L TO P-CCNC: 161 U/L (ref 110–260)
LYMPHOCYTES # BLD AUTO: 1.3 K/UL (ref 1–4.8)
LYMPHOCYTES NFR BLD: 21.9 % (ref 18–48)
MAGNESIUM SERPL-MCNC: 1.8 MG/DL (ref 1.6–2.6)
MCH RBC QN AUTO: 32.5 PG (ref 27–31)
MCHC RBC AUTO-ENTMCNC: 35.2 G/DL (ref 32–36)
MCV RBC AUTO: 93 FL (ref 82–98)
MONOCYTES # BLD AUTO: 0.5 K/UL (ref 0.3–1)
MONOCYTES NFR BLD: 9.2 % (ref 4–15)
NEUTROPHILS # BLD AUTO: 3.8 K/UL (ref 1.8–7.7)
NEUTROPHILS NFR BLD: 65 % (ref 38–73)
NRBC BLD-RTO: 0 /100 WBC
PHOSPHATE SERPL-MCNC: 3.5 MG/DL (ref 2.7–4.5)
PLATELET # BLD AUTO: 178 K/UL (ref 150–450)
PMV BLD AUTO: 10.9 FL (ref 9.2–12.9)
POTASSIUM SERPL-SCNC: 4.3 MMOL/L (ref 3.5–5.1)
PROT SERPL-MCNC: 6.5 G/DL (ref 6–8.4)
RBC # BLD AUTO: 4.12 M/UL (ref 4–5.4)
RETICS/RBC NFR AUTO: 1.6 % (ref 0.5–2.5)
SODIUM SERPL-SCNC: 134 MMOL/L (ref 136–145)
URATE SERPL-MCNC: 3.9 MG/DL (ref 2.4–5.7)
WBC # BLD AUTO: 5.76 K/UL (ref 3.9–12.7)

## 2022-07-29 PROCEDURE — 99999 PR PBB SHADOW E&M-EST. PATIENT-LVL V: ICD-10-PCS | Mod: PBBFAC,,, | Performed by: INTERNAL MEDICINE

## 2022-07-29 PROCEDURE — 85025 COMPLETE CBC W/AUTO DIFF WBC: CPT | Performed by: INTERNAL MEDICINE

## 2022-07-29 PROCEDURE — 84550 ASSAY OF BLOOD/URIC ACID: CPT | Performed by: INTERNAL MEDICINE

## 2022-07-29 PROCEDURE — 36415 COLL VENOUS BLD VENIPUNCTURE: CPT | Performed by: INTERNAL MEDICINE

## 2022-07-29 PROCEDURE — 83735 ASSAY OF MAGNESIUM: CPT | Performed by: INTERNAL MEDICINE

## 2022-07-29 PROCEDURE — 99999 PR PBB SHADOW E&M-EST. PATIENT-LVL V: CPT | Mod: PBBFAC,,, | Performed by: INTERNAL MEDICINE

## 2022-07-29 PROCEDURE — 84100 ASSAY OF PHOSPHORUS: CPT | Performed by: INTERNAL MEDICINE

## 2022-07-29 PROCEDURE — 99215 OFFICE O/P EST HI 40 MIN: CPT | Mod: S$PBB,,, | Performed by: INTERNAL MEDICINE

## 2022-07-29 PROCEDURE — 99215 PR OFFICE/OUTPT VISIT, EST, LEVL V, 40-54 MIN: ICD-10-PCS | Mod: S$PBB,,, | Performed by: INTERNAL MEDICINE

## 2022-07-29 PROCEDURE — 85045 AUTOMATED RETICULOCYTE COUNT: CPT | Performed by: INTERNAL MEDICINE

## 2022-07-29 PROCEDURE — 80053 COMPREHEN METABOLIC PANEL: CPT | Performed by: INTERNAL MEDICINE

## 2022-07-29 PROCEDURE — 83615 LACTATE (LD) (LDH) ENZYME: CPT | Performed by: INTERNAL MEDICINE

## 2022-07-29 PROCEDURE — 83010 ASSAY OF HAPTOGLOBIN QUANT: CPT | Performed by: INTERNAL MEDICINE

## 2022-07-29 PROCEDURE — 99215 OFFICE O/P EST HI 40 MIN: CPT | Mod: PBBFAC | Performed by: INTERNAL MEDICINE

## 2022-07-29 NOTE — PROGRESS NOTES
Section of Hematology and Stem Cell Transplantation  Follow Up Visit     Visit date: 7/29/22  Visit diagnosis: CLL (chronic lymphocytic leukemia) [C91.10]  Referred by:  Shiv Watson MD    Oncologic History:     Primary Oncologic Diagnosis: Chronic lymphocytic leukemia, Binet stage C, Duarte stage III; cold agglutinin disease      2016: First noted to have cervical lymphadenopathy. Excisional biopsy confirmed chronic lymphocytic leukemia. She did not have an indication for treatment; therefore, observation recommended by Dr. Kelley.    6/17/21: PET/CT noted enlarged bilateral cervical, supraclavicular, axillary, retroperitoneal, and pelvic lymph nodes. All nodes enlarged from the prior exam.   7/28/21: First noted to have mild anemia (Hgb ~11.5 to 10.8 g/dL). Continued monitoring.   8/3/21: Established care with Dr. Carnes.    11/1/21: On follow up with Dr. Carnes, she was noted to have worsening anemia - hemoglobin decreased to 8.6 g/dL. 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 Beta-2 microglobulin 3.19. CLL FISH revealed trisomy 12. Peripheral blood karyotype - 47,XX,+12[8]/47,idem,del(4)(p14p16)[3]/46,XX[9]. ZAP70 17% of cells.   12/14/21: She noted increased fatigue. Hemoglobin decreased to 6.2. Repeat labs confirmed decreased hemoglobin to 5.8 g/dL. Haptoglobin <1, . MICHELL Anti-IgG negative, MICHELL complement C3 positive.   12/17/21: Prednisone 60mg daily started in response to hemolytic anemia.   12/22/21: Initial visit at [facility] with Dr. Watson. WBC 96k, Hgb 8.9, Plts 288. MICHELL positive. , Hapto <10. Continued prednisone 60mg daily.    12/27/21: Initial visit with me. Prednisone decreased to 50mg daily.    1/4/22: Hemoglobin stable (~8.4 g/dL) with persistent hemolysis. Prednisone weaned to 40mg daily.    1/11/22: Cycle 1 day 1 of obinutuzumab plus venetoclax (starting day 22). Prednisone weaned to 30mg daily.    1/18/22: Hemoglobin improved. Prednisone weaned to 20mg daily.   1/31/22:  Prednisone weaned to 15mg daily. Tapered to 10mg one week later.    2/15/22: Prednisone weaned to 5mg daily.    4/4/22: Prednisone to 5mg every other day x2 weeks then stop.   4/27/22: Cold agglutinin titer positive (>1:512).    History of Present Ilness:   Cassidy Aguilar (Cassidy) is a pleasant 77 y.o.female with a past medical history of hypertension and chronic lymphocytic leukemia who presents for follow up visit regarding CLL and cold agglutinin hemolytic anemia. Her BP was elevated today, so she took an extra dose of telmisartan as instructed by her cardiologist. She is asymptomatic. She feels well today. No fevers, chills, night sweats, weight loss.    PAST MEDICAL HISTORY:   Past Medical History:   Diagnosis Date    CLL (chronic lymphocytic leukemia) 12/22/2021    Hypertension     Hypothyroidism 4/26/2022    Thyroid disease        PAST SURGICAL HISTORY:   Past Surgical History:   Procedure Laterality Date    ADENOIDECTOMY      COLONOSCOPY      HYSTERECTOMY      TONSILLECTOMY         PAST SOCIAL HISTORY:  Social History     Tobacco Use    Smoking status: Never Smoker    Smokeless tobacco: Never Used   Substance Use Topics    Alcohol use: Yes     Alcohol/week: 0.0 standard drinks     Comment: rare       FAMILY HISTORY:  History reviewed. No pertinent family history.    CURRENT MEDICATIONS:   Current Outpatient Medications   Medication Sig    acyclovir (ZOVIRAX) 400 MG tablet Take 1 tablet (400 mg total) by mouth 2 (two) times daily.    aspirin (ECOTRIN) 81 MG EC tablet Take 81 mg by mouth.    atorvastatin (LIPITOR) 40 MG tablet TAKE ONE TABLET BY MOUTH once DAILY AT BEDTIME FOR cholesterol control    CARVEDILOL PHOSPHATE 20 MG ORAL CM24 (COREG CR) 20 mg 24 hr capsule Take 20 mg by mouth nightly.    fluticasone (FLONASE) 50 mcg/actuation nasal spray 1 spray by Each Nare route once daily.    folic acid (FOLVITE) 1 MG tablet Take 1 tablet (1 mg total) by mouth once daily.    multivitamin  (THERAGRAN) per tablet Take 1 tablet by mouth once daily.    SYNTHROID 75 mcg tablet Take 1 tablet (75 mcg total) by mouth daily    telmisartan (MICARDIS) 80 MG Tab     vitamin D (VITAMIN D3) 1000 units Tab Take 1,000 Units by mouth.    VIVELLE-DOT 0.025 mg/24 hr Place 1 patch onto the skin twice a week    calcium polycarbophil (FIBERCON ORAL) Take by mouth.    clindamycin (CLEOCIN T) 1 % Swab 2 (two) times a day.    ergocalciferol (ERGOCALCIFEROL) 50,000 unit Cap Take 50,000 Units by mouth every 7 days.    FLUZONE HIGH-DOSE 2018-19, PF, 180 mcg/0.5 mL vaccine ADM 0.5ML IM UTD    glucosamine-chondroitin 500-400 mg tablet Take 1 tablet by mouth 3 (three) times daily.    ondansetron (ZOFRAN-ODT) 8 MG TbDL Take 1 tablet (8 mg total) by mouth every 12 (twelve) hours as needed (nausea).    sulfacetamide sodium, acne, 10 % Susp Apply 1 application topically 2 (two) times daily. Apply to affected area    venetoclax (VENCLEXTA) 10 mg Tab Take 10 mg (1 tablet)  by mouth daily on days 1-7 of cycle 2.   Take 20 mg (2 tablets) by mouth daily on days 8-14 of cycle 2.  Take 50 mg (5 tablets) daily on days 15-21 of cycle 2.    venetoclax (VENCLEXTA) 100 mg Tab Take 100 mg (1 tablet) by mouth daily  on days 22-28 of cycle 2.  Take 200 mg (2 tablets) by mouth daily on days 1-7 of cycle 3.    venetoclax (VENCLEXTA) 100 mg Tab Take 200 mg (2 tablets)  by mouth once daily.     No current facility-administered medications for this visit.       ALLERGIES:   Review of patient's allergies indicates:   Allergen Reactions    Sulfa (sulfonamide antibiotics) Rash    [place] Diarrhea       Review of Systems:     Pertinent positives and negatives included in the HPI. Otherwise a complete review of systems is negative.    Physical Exam:     Vitals:    07/29/22 1105   BP: (!) 203/93   Pulse: 63   Resp: 20     General: Appears well, NAD  HEENT: MMM, no OP lesions  Pulmonary: CTAB, no increased work of breathing, no  W/R/C  Cardiovascular: S1S2 normal, RRR, no M/R/G  Abdominal: Soft, NT, ND, BS+, no HSM  Extremities: No C/C/E  Neurological: AAOx4, grossly normal, no focal deficits  Dermatologic: No appreciable rashes or lesions  Lymphatic: No appreciable cervical, axillary, or inguinal lymphadenopathy     ECOG Performance Status: (foot note - ECOG PS provided by Eastern Cooperative Oncology Group) 0 - Asymptomatic    Karnofsky Performance Score:  100%- Normal, No Complaints, No Evidence of Disease    Labs:   Lab Results   Component Value Date    WBC 5.76 07/29/2022    HGB 13.4 07/29/2022    HCT 38.1 07/29/2022    MCV 93 07/29/2022     07/29/2022       Lab Results   Component Value Date     (L) 07/29/2022    K 4.3 07/29/2022    CL 99 07/29/2022    CO2 27 07/29/2022    BUN 15 07/29/2022    CREATININE 0.6 07/29/2022    ALBUMIN 3.9 07/29/2022    BILITOT 1.3 (H) 07/29/2022    ALKPHOS 88 07/29/2022    AST 18 07/29/2022    ALT 18 07/29/2022       Imaging:   PET/CT 6/17/21  COMPARISON: PET/CT 8/22/2016.   HISTORY: Lymphoma.   FINDINGS:   Head and neck: There is symmetric and physiologic distribution of radiotracer throughout the included brain parenchyma. There is no hemorrhage, hydrocephalus, or midline shift. There are innumerable bilateral enlarged FDG avid cervical lymph nodes. These have worsened from the prior exam. A representative left lower cervical lymph node best visualized on image 84 measures 19 mm compared with 10 mm previously with an SUV max of 1.8. There are enlarged left supraclavicular lymph nodes which were not present on the prior exam. A representative eran conglomerate measures 3.4 cm in diameter with an SUV max of 2.2.   CHEST: There are innumerable bilateral axillary and subpectoral lymph nodes which are not present on the prior exam. These demonstrate low-level FDG avidity. A representative left axillary nodule best visualized on image 114 measures 1.9 cm with an SUV max of 1.6. There are  prominent paratracheal lymph nodes which are not pathologically enlarged by size criteria and demonstrate background FDG avidity, however were not present on the prior exam.   There is no FDG avid pulmonary nodule or mass. There is no pleural effusion. There is no pneumothorax.   Abdomen and pelvis: There is physiologic distribution of radiotracer throughout the liver, spleen, and collecting system. There are multiple enlarged bilateral iliac and periaortic retroperitoneal lymph nodes. A representative left periaortic lymph node best visualized on image 201 measures 2.0 cm in diameter with an SUV max of 2.0.   MUSCULOSKELETAL: There is no FDG avid lytic or blastic osseous lesion.     IMPRESSION:   Innumerable enlarged bilateral cervical, supraclavicular, axillary, retroperitoneal, and pelvic lymph nodes all which demonstrate low-level FDG avidity, however are enlarged from the prior exam and most consistent with recurrent disease.       Pathology:  Peripheral Blood Flow Cytometry (11/1/21):  Comment:      CD5+ B-CELL LYMPHOPROLIFERATIVE DISORDER (SEE COMMENT).     COMMENT: Clonal CD20+ B-cells are detected that coexpress   CD5, CD23, FMC7(dim) and moderate surface kappa light chain   and are negative for CD10, comprising 75% of all analyzed   cells.       Prognostication Tools:  CLL-IPI = 2, intermediate risk (79.4% survival after 5 years, 40% survival after 10 years, median  months)    Assessment and Plan:   Cassidy Bryan) is a pleasant 77 y.o.female with a past medical history of hypertension and chronic lymphocytic leukemia who presents for follow up visit regarding CLL.     1. Chronic lymphocytic leukemia, Binet stage C, Duarte stage III: She has had Duarte stage I disease since 2016. 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 She was initially treated with steroids. In  January 2022, she was started on treatment for CLL due to steroid-refractory hemolytic anemia with obinutuzumab plus venetoclax because of the benefit of anti-CD20 MAB with AIHA plus fixed duration treatment (patient preference).  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 Venetoclax was stopped and obinutuzumab was continued to complete 6 cycles. Since April 2022, her hemolytic anemia has resolved.   a. Continue to monitor clinically.    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  Initially treated with prednisone in December with stabilization of blood counts but persistent hemolysis.  Hemolytic anemia resolved in 4/2022. She completed 6 cycles of obinutuzumab as noted above. Consider sutimlimab if relapse of LAYTON.  a. Continue to monitor LDH, haptoglobin, and retic.    3. Immunosuppression: Continue acyclovir 400mg BID until 1 year post-treatment (until 6/2023). She received 4 COVID vaccinations and 2 Evusheld infusions.     4. Hypertension: BP elevated on repeat after walking to and from the restroom. She is asymptomatic and already took an extra dose of telmisartan as instructed by her cardiologist. She will follow up with her cardiologist.    5. Follow up: As below    Orders Placed:         Route Chart for Scheduling    BMT Chart Routing      Follow up with physician Other. RTC in 6 weeks with labs prior   Follow up with ERI[facility]    Infusion scheduling note    Injection scheduling note    Labs CBC, CMP, LDH, uric acid and other   Lab interval:  Labs prior to visit (CBC, CMP, Mg, Phos, uric acid, LDH, haptoglobin, retic) in 6 weeks   Imaging None      Pharmacy appointment No pharmacy appointment needed      Other referrals No additional referrals needed         Bubba Wright  MD  Hematology, Oncology, and Stem Cell Transplantation  [facility]

## 2022-08-12 NOTE — PROGRESS NOTES
Pelvic Health Physical Therapy   Treatment Note     Name: Cassidy Aguilar  Clinic Number: [id number]    Therapy Diagnosis:   Encounter Diagnoses   Name Primary?    Weakness Yes    Lack of coordination      Physician: Adonis Wright MD    Visit Date: 8/18/2022    Physician Orders: PT Eval and Treat   Medical Diagnosis from Referral: M62.89 (ICD-10-CM) - Pelvic floor instability   Evaluation Date: 3/31/2022  Authorization Period Expiration: 12/31/2022  Plan of Care Expiration: 6/23/2022, updated to 9/21/2022  Visit # / Visits authorized: 10/ 20  FOTO: 3/3    Time In: 1508  Time Out: 1600  Total Billable Time: 52 minutes    Precautions: Standard and cancer    Subjective     Pt reports: had some incidents of UI while on vacation when she was not paying attention to urge to void. She states that has been trying to use vaginal weights but has only used 2-3x since last visit. Pt reports that she is no longer experiencing UUI when she comes home and has strong urge to urinate.     She was compliant with home exercise program.  Response to previous treatment: no adverse response reported  Functional change: decreased ADITHYA and UUI     Pain: 0/10  Location: pelvic floor    Objective   Pt verbally consents to intravaginal treatment today.  Signed consent form already on file.     Cassidy received therapeutic exercises to develop strength for 12 minutes including:   Exercises performed with 25kg vaginal weight inserted intravaginally:  Static standing x 2 min   Ambulation x 300'    (not performed this visit)  Bridges w/ blue theraband - 2x15  SLR - 2x15 MALLORY  Marching w/ kegel - 2x15 MALLORY  Transitioning supine to sit x 3 reps with coordinated breathing and PFM activation    Seated hip ADD ball squeeze - 2x15  Seated hip ABD w/ green Theraband - 2x10  SLR 2x15 B  Toe taps w/ kegel - 5x10  Sit to stand w/ kegel - 2x10  Side squat w/ kegel - 2x10  Wall squat w/ kegel - 2x10  Toe taps w/ kegel - 5x10  Hip adduction ball squeeze w/  "kegel 2x10 5"  Hip abduction w/ GTB 2x10 5"  Clamshells 2x10 B    Cassidy participated in neuromuscular re-education activities to develop Coordination for 20 minutes including:   Kegel edu and practice. Increased time spent on edu on proper technique.  Pt improves with practice and verbal cues. Practice quick flick kegels and endurance holds.    (not performed this visit)  SEMG EVALUATION:   PF Electrode placement: external perineal  Pt. Position: supine, sitting and standing    SEMG Finding: poor holding ability and slow return to baseline  No skin irritation, erythema, or other adverse effects observed from electrode placement.    Treatment: Worked on pelvic floor muscle endurance and strength training and coordination using sEMG assist.    BASELINE  Position: supine  Resting tone: 2.2 uV  Max contraction: 25 uV   Max hold time: 4 sec    Position: standing  Resting tone: 5 uV  Max contraction: 15-20 uV    20 TRIALS  Position: seated  Average contraction: 10.1 uV  Average rest: 3.3 uV    Cassidy participated in dynamic functional therapeutic activities to improve functional performance for 20 minutes, including:  Reviewed HEP for after discharge  Edu provided on vaginal weight trainers to address PFM endurance deficits. Pt was edu on cleaning, storage, and use of vaginal weights. She was also instructed on using pea-sized amount of water-based lubricant to assist with inserting weight.     Home Exercises Provided and Patient Education Provided     Education provided:   - anatomy/physiology of pelvic floor, posture/body mechanices, kegels and vaginal weight training  Discussed progression of plan of care with patient; educated pt in activity modification; reviewed HEP with pt. Pt demonstrated and verbalized understanding of all instruction and was provided with a handout of HEP (see Patient Instructions).  - updated HEP    Written Home Exercises Provided: yes.  Exercises were reviewed and Cassidy was able to demonstrate " "them prior to the end of the session.  Cassidy demonstrated good  understanding of the education provided.     See EMR under Patient Instructions for exercises provided 8/18/22.    Assessment     Pt reports that symptoms have overall improved, however she has occasional UI mainly while traveling and out of her routine. She states that at this time she would like to plan for a tentative discharge as long as symptoms continue to improve. Discussed final HEP after discharge, including continuing to utilize urge suppression, vaginal weight training, kegel practice, and exercise to maintain strength and endurance gains. She reports difficulty finding the time and circumstances to adequately perform HEP. Intravaginal assessment reveals good PFM activation/deactivation and holding ability. Pt demonstrates good technique with both long holds and quick flick kegels. Pt was able to tolerate ambulation and sit to stand practice with 25kg vaginal weight inserted without reporting slipping or the weight falling out. Plan to reach out to pt at end of September to discuss independent progress.     Cassidy Is progressing well towards her goals.   Pt prognosis is Good.     Pt will continue to benefit from skilled outpatient physical therapy to address the deficits listed in the problem list box on initial evaluation, provide pt/family education and to maximize pt's level of independence in the home and community environment.     Pt's spiritual, cultural and educational needs considered and pt agreeable to plan of care and goals.     Anticipated barriers to physical therapy: none    Goals:   Short Term Goals: 6 weeks   Pt to perform "the knack" prior to coughing, laughing or sneezing to decrease risk of incontinence. MET 5/12/22  Pt to report a decrease in pad usage to no more than 1 a day to demonstrate improving pelvic floor function needed for continence. MET 6/23/22  Pt to demonstrate being able to correctly and consistently perform " a kegel which is needed  to increase pelvic floor muscle coordination and strength needed for continence. MET 5/5/22  Pt to report being able to transfer from sitting to standing without leakage of urine. MET 5/26/22  Pt to report being able to sneeze without incontinence demonstrating improved pelvic floor strength and coordination to improve confidence in social situations. MET 5/12/22     Long Term Goals: 12 weeks   Pt to be discharged with home plan for carry over after discharge. MET 8/18/22  Pt to be able to perform a 10 second kegel x 10 reps to demonstrate improving strength and endurance needed for continence. MET 6/23/22  Pt to report a decrease in pad usage to 0 pads a day to demonstrate improving pelvic floor muscle controls as evidenced by decreased episodes of incontinence needed to improve confidence in social situations. (progressing, not met)  Pt to report elimination of incontinence with ADLs to demonstrate improved pelvic floor muscle strength and coordination. MET 6/23/22  Pt to be able to delay the urge to urinate at least 30 minutes with a strong urge to urinate in order to make it to the bathroom without leaking. (progressing, not met ~10 minutes)  Pt to report no longer feeling the need to urinate just in case when shopping or participating in social activities to demonstrate improving pelvic floor and bladder control. MET 6/23/22    Plan   Plan of care Certification: 3/31/2022 to 6/23/2022, updated to 9/21/2022     Outpatient Physical Therapy 1 time(s) every week(s) for 12 weeks to include the following interventions: Manual Therapy, Moist Heat/ Ice, Neuromuscular Re-ed, Patient Education, Self Care, Therapeutic Activities, Therapeutic Exercise, Ultrasound and Biofeedback.    Anna Kimura, PT

## 2022-08-15 DIAGNOSIS — E53.8 FOLIC ACID DEFICIENCY: ICD-10-CM

## 2022-08-15 RX ORDER — FOLIC ACID 1 MG/1
1 TABLET ORAL DAILY
Qty: 90 TABLET | Refills: 3 | Status: SHIPPED | OUTPATIENT
Start: 2022-08-15 | End: 2024-01-03

## 2022-08-18 ENCOUNTER — PATIENT MESSAGE (OUTPATIENT)
Dept: REHABILITATION | Facility: OTHER | Age: 77
End: 2022-08-18

## 2022-08-18 ENCOUNTER — CLINICAL SUPPORT (OUTPATIENT)
Dept: REHABILITATION | Facility: OTHER | Age: 77
End: 2022-08-18
Attending: INTERNAL MEDICINE
Payer: MEDICARE

## 2022-08-18 DIAGNOSIS — R27.9 LACK OF COORDINATION: ICD-10-CM

## 2022-08-18 DIAGNOSIS — R53.1 WEAKNESS: Primary | ICD-10-CM

## 2022-08-18 PROCEDURE — 97112 NEUROMUSCULAR REEDUCATION: CPT

## 2022-08-18 PROCEDURE — 97530 THERAPEUTIC ACTIVITIES: CPT

## 2022-08-18 PROCEDURE — 97110 THERAPEUTIC EXERCISES: CPT

## 2022-08-18 NOTE — PATIENT INSTRUCTIONS
1) Continue using URGE SUPPRESSION when you feel a strong urge to urinate and need to delay:     CONTROLLING URINARY / FECAL URGENCY    What to do when you experience a strong urge to urinate or defecate:     FIRST  Stop activity, stand quietly or sit down. Try to stay very still to maintain control. Avoid rushing to the toilet.    SECOND Begin Quick Flicks (1 second LIFT of pelvic floor muscles, 4 second DROP). Pelvic floor contractions send a message to the bladder to relax and hold urine.     THIRD Relax. Do not rush to the toilet. Take a deep belly or diaphragmatic breath and let it out slowly. Let the urge to urinate pass by using distraction techniques and positive thoughts. Try not to think about going to the bathroom.     FINALLY If the urge returns, repeat the above steps to regain control. When you feel the urge subside, walk normally to the bathroom. You can urinate once the urge has subsided.        The urge feeling strikes!   Stop, breathe, and be still and then begin Quick Flicks     Do Not rush to the toilet.     Think positively and distract yourself.     2) Continue practicing kegels daily. If you don't have time to do your full exercise routine, PRIORITIZE KEGELS.     Quick Flicks   Perform, a fast kegel (contract and LIFT the pelvic floor muscles as if you're trying to stop the stream of urine and passage of gas).    Make sure you're just using the internal muscles, not holding for longer than 1 second without holding your breath.  Let go and relax everything for 3-5 seconds.   Repeat 15 times, 2 sets per day.     Endurance Holds  Perform a long kegel (contract and LIFT the pelvic floor muscles as if you're trying to stop the stream of urine and passage of gas).    Make sure you're just using the internal muscles without holding your breath.  Let go and relax everything for 10 seconds.   Hold 10 seconds. Repeat 10 times, 3 sets per day.  (Goal is 10 seconds x10 reps)     3) Use your pelvic weights  "while you're performing household chores or getting ready for the day. Aim for using them 2-3x/week if possible. Progress up the weights as tolerated.     4) Continue using your home exercise program when you can fit it into your schedule (aim for 1-2x/week).    BRIDGING W/ KEGEL    While lying on your back with knees bent, tighten your pelvic floor, squeeze your buttocks and then raise your buttocks off the floor/bed as creating a "Bridge" with your body. Hold for 3-5 seconds and then lower your back flat on the ground. Release the Kegel. Repeat 10 times, twice a day.    STRAIGHT LEG RAISE - SLR    While lying on your back, raise up your leg with a straight knee.  Keep the opposite knee bent with the foot planted on the ground. Complete 2 sets of 10 on each side.    SEATED HIP ADDUCTION KNEE SQUEEZE     With ball or folded pillow between knees, tighten deep core then squeeze knees together and hold 5 seconds. Do 2 sets of 10.    HIP ABDUCTION WITH BAND    While seated in a chair, place a looped elastic band around your thighs near your knees as shown. Start by moving both knees out to the side to separate your legs. Return to starting position and repeat. Perform 2 sets of 10 repetitions.     SIT TO STAND W/ KEGEL    Start by scooting close to the front of the chair. Next, initiate a Kegel and then lean forward at your trunk and rise to standing without using your hands to push off from the chair or other object. Release the Kegel and return to seated. Repeat 15 times, twice a day.      TOE TAPPING W/ KEGELS     front of a step and perform a Kegel. Then, raise one foot off the floor as you balance on the other leg. Tap the top of the step with your toes. Be sure to lift high enough so you don't bump the front of the step with the front of your toes. Then, set your foot back down and perform on the other side. Use hand on table for balance if needed. Perform as many toe taps as you can until you feel the " Kegel release. Take a short break and then repeat.     Perform 5 repetitions, twice a day.

## 2022-08-22 ENCOUNTER — PATIENT MESSAGE (OUTPATIENT)
Dept: HEMATOLOGY/ONCOLOGY | Facility: CLINIC | Age: 77
End: 2022-08-22
Payer: MEDICARE

## 2022-08-22 DIAGNOSIS — Z01.84 ANTIBODY RESPONSE EXAMINATION: ICD-10-CM

## 2022-08-22 DIAGNOSIS — C91.10 CLL (CHRONIC LYMPHOCYTIC LEUKEMIA): Primary | ICD-10-CM

## 2022-09-09 ENCOUNTER — LAB VISIT (OUTPATIENT)
Dept: LAB | Facility: HOSPITAL | Age: 77
End: 2022-09-09
Attending: INTERNAL MEDICINE
Payer: MEDICARE

## 2022-09-09 ENCOUNTER — OFFICE VISIT (OUTPATIENT)
Dept: HEMATOLOGY/ONCOLOGY | Facility: CLINIC | Age: 77
End: 2022-09-09
Payer: MEDICARE

## 2022-09-09 VITALS
OXYGEN SATURATION: 98 % | BODY MASS INDEX: 18.63 KG/M2 | DIASTOLIC BLOOD PRESSURE: 80 MMHG | RESPIRATION RATE: 16 BRPM | WEIGHT: 125.75 LBS | HEART RATE: 70 BPM | HEIGHT: 69 IN | SYSTOLIC BLOOD PRESSURE: 188 MMHG

## 2022-09-09 DIAGNOSIS — Z01.84 ANTIBODY RESPONSE EXAMINATION: ICD-10-CM

## 2022-09-09 DIAGNOSIS — I10 HYPERTENSION, UNSPECIFIED TYPE: ICD-10-CM

## 2022-09-09 DIAGNOSIS — C91.10 CLL (CHRONIC LYMPHOCYTIC LEUKEMIA): Primary | ICD-10-CM

## 2022-09-09 DIAGNOSIS — D64.9 ANEMIA, UNSPECIFIED TYPE: ICD-10-CM

## 2022-09-09 DIAGNOSIS — D59.9 ACQUIRED HEMOLYTIC ANEMIA: ICD-10-CM

## 2022-09-09 DIAGNOSIS — D84.9 IMMUNOSUPPRESSION: ICD-10-CM

## 2022-09-09 DIAGNOSIS — C91.10 CLL (CHRONIC LYMPHOCYTIC LEUKEMIA): ICD-10-CM

## 2022-09-09 DIAGNOSIS — E87.1 HYPONATREMIA: ICD-10-CM

## 2022-09-09 LAB
ALBUMIN SERPL BCP-MCNC: 4.2 G/DL (ref 3.5–5.2)
ALP SERPL-CCNC: 79 U/L (ref 55–135)
ALT SERPL W/O P-5'-P-CCNC: 20 U/L (ref 10–44)
ANION GAP SERPL CALC-SCNC: 8 MMOL/L (ref 8–16)
AST SERPL-CCNC: 21 U/L (ref 10–40)
BASOPHILS # BLD AUTO: 0.06 K/UL (ref 0–0.2)
BASOPHILS NFR BLD: 1 % (ref 0–1.9)
BILIRUB SERPL-MCNC: 1.7 MG/DL (ref 0.1–1)
BUN SERPL-MCNC: 13 MG/DL (ref 8–23)
CALCIUM SERPL-MCNC: 9.6 MG/DL (ref 8.7–10.5)
CHLORIDE SERPL-SCNC: 97 MMOL/L (ref 95–110)
CO2 SERPL-SCNC: 28 MMOL/L (ref 23–29)
CREAT SERPL-MCNC: 0.6 MG/DL (ref 0.5–1.4)
DIFFERENTIAL METHOD: ABNORMAL
EOSINOPHIL # BLD AUTO: 0.1 K/UL (ref 0–0.5)
EOSINOPHIL NFR BLD: 1.9 % (ref 0–8)
ERYTHROCYTE [DISTWIDTH] IN BLOOD BY AUTOMATED COUNT: 12.5 % (ref 11.5–14.5)
EST. GFR  (NO RACE VARIABLE): >60 ML/MIN/1.73 M^2
GLUCOSE SERPL-MCNC: 74 MG/DL (ref 70–110)
HAPTOGLOB SERPL-MCNC: 70 MG/DL (ref 30–250)
HCT VFR BLD AUTO: 37.9 % (ref 37–48.5)
HGB BLD-MCNC: 14.2 G/DL (ref 12–16)
IMM GRANULOCYTES # BLD AUTO: 0.02 K/UL (ref 0–0.04)
IMM GRANULOCYTES NFR BLD AUTO: 0.3 % (ref 0–0.5)
LDH SERPL L TO P-CCNC: 197 U/L (ref 110–260)
LYMPHOCYTES # BLD AUTO: 1 K/UL (ref 1–4.8)
LYMPHOCYTES NFR BLD: 17.2 % (ref 18–48)
MAGNESIUM SERPL-MCNC: 1.9 MG/DL (ref 1.6–2.6)
MCH RBC QN AUTO: 35.1 PG (ref 27–31)
MCHC RBC AUTO-ENTMCNC: 37.5 G/DL (ref 32–36)
MCV RBC AUTO: 94 FL (ref 82–98)
MONOCYTES # BLD AUTO: 0.6 K/UL (ref 0.3–1)
MONOCYTES NFR BLD: 9.4 % (ref 4–15)
NEUTROPHILS # BLD AUTO: 4.2 K/UL (ref 1.8–7.7)
NEUTROPHILS NFR BLD: 70.2 % (ref 38–73)
NRBC BLD-RTO: 0 /100 WBC
PHOSPHATE SERPL-MCNC: 4 MG/DL (ref 2.7–4.5)
PLATELET # BLD AUTO: 191 K/UL (ref 150–450)
PMV BLD AUTO: 10.5 FL (ref 9.2–12.9)
POTASSIUM SERPL-SCNC: 4.1 MMOL/L (ref 3.5–5.1)
PROT SERPL-MCNC: 7 G/DL (ref 6–8.4)
RBC # BLD AUTO: 4.04 M/UL (ref 4–5.4)
RETICS/RBC NFR AUTO: 1.2 % (ref 0.5–2.5)
SARS-COV-2 IGG SERPL IA-ACNC: 4537.9 AU/ML
SARS-COV-2 IGG SERPL QL IA: POSITIVE
SODIUM SERPL-SCNC: 133 MMOL/L (ref 136–145)
URATE SERPL-MCNC: 4.3 MG/DL (ref 2.4–5.7)
WBC # BLD AUTO: 5.93 K/UL (ref 3.9–12.7)

## 2022-09-09 PROCEDURE — 85045 AUTOMATED RETICULOCYTE COUNT: CPT | Performed by: INTERNAL MEDICINE

## 2022-09-09 PROCEDURE — 83735 ASSAY OF MAGNESIUM: CPT | Performed by: INTERNAL MEDICINE

## 2022-09-09 PROCEDURE — 86382 NEUTRALIZATION TEST VIRAL: CPT | Mod: 59 | Performed by: INTERNAL MEDICINE

## 2022-09-09 PROCEDURE — 99215 OFFICE O/P EST HI 40 MIN: CPT | Mod: PBBFAC | Performed by: INTERNAL MEDICINE

## 2022-09-09 PROCEDURE — 84550 ASSAY OF BLOOD/URIC ACID: CPT | Performed by: INTERNAL MEDICINE

## 2022-09-09 PROCEDURE — 84100 ASSAY OF PHOSPHORUS: CPT | Performed by: INTERNAL MEDICINE

## 2022-09-09 PROCEDURE — 99215 OFFICE O/P EST HI 40 MIN: CPT | Mod: S$PBB,,, | Performed by: INTERNAL MEDICINE

## 2022-09-09 PROCEDURE — 99215 PR OFFICE/OUTPT VISIT, EST, LEVL V, 40-54 MIN: ICD-10-PCS | Mod: S$PBB,,, | Performed by: INTERNAL MEDICINE

## 2022-09-09 PROCEDURE — 86769 SARS-COV-2 COVID-19 ANTIBODY: CPT | Performed by: INTERNAL MEDICINE

## 2022-09-09 PROCEDURE — 85025 COMPLETE CBC W/AUTO DIFF WBC: CPT | Performed by: INTERNAL MEDICINE

## 2022-09-09 PROCEDURE — 99999 PR PBB SHADOW E&M-EST. PATIENT-LVL V: ICD-10-PCS | Mod: PBBFAC,,, | Performed by: INTERNAL MEDICINE

## 2022-09-09 PROCEDURE — 80053 COMPREHEN METABOLIC PANEL: CPT | Performed by: INTERNAL MEDICINE

## 2022-09-09 PROCEDURE — 36415 COLL VENOUS BLD VENIPUNCTURE: CPT | Performed by: INTERNAL MEDICINE

## 2022-09-09 PROCEDURE — 83010 ASSAY OF HAPTOGLOBIN QUANT: CPT | Performed by: INTERNAL MEDICINE

## 2022-09-09 PROCEDURE — 99999 PR PBB SHADOW E&M-EST. PATIENT-LVL V: CPT | Mod: PBBFAC,,, | Performed by: INTERNAL MEDICINE

## 2022-09-09 PROCEDURE — 83615 LACTATE (LD) (LDH) ENZYME: CPT | Performed by: INTERNAL MEDICINE

## 2022-09-09 RX ORDER — MUPIROCIN 20 MG/G
OINTMENT TOPICAL 2 TIMES DAILY
COMMUNITY
Start: 2022-05-17 | End: 2023-05-31

## 2022-09-09 RX ORDER — CARVEDILOL 3.12 MG/1
3.12 TABLET ORAL
COMMUNITY
Start: 2022-08-02 | End: 2023-05-31

## 2022-09-09 NOTE — PROGRESS NOTES
Section of Hematology and Stem Cell Transplantation  Follow Up Visit     Visit date: 9/9/22  Visit diagnosis: CLL (chronic lymphocytic leukemia) [C91.10]  Referred by:  Shiv Watson MD    Oncologic History:     Primary Oncologic Diagnosis: Chronic lymphocytic leukemia, Binet stage C, Duarte stage III; cold agglutinin disease     2016: First noted to have cervical lymphadenopathy. Excisional biopsy confirmed chronic lymphocytic leukemia. She did not have an indication for treatment; therefore, observation recommended by Dr. Kelley.   6/17/21: PET/CT noted enlarged bilateral cervical, supraclavicular, axillary, retroperitoneal, and pelvic lymph nodes. All nodes enlarged from the prior exam.  7/28/21: First noted to have mild anemia (Hgb ~11.5 to 10.8 g/dL). Continued monitoring.  8/3/21: Established care with Dr. Carnes.   11/1/21: On follow up with Dr. Carnes, she was noted to have worsening anemia - hemoglobin decreased to 8.6 g/dL. 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 Beta-2 microglobulin 3.19. CLL FISH revealed trisomy 12. Peripheral blood karyotype - 47,XX,+12[8]/47,idem,del(4)(p14p16)[3]/46,XX[9]. ZAP70 17% of cells.  12/14/21: She noted increased fatigue. Hemoglobin decreased to 6.2. Repeat labs confirmed decreased hemoglobin to 5.8 g/dL. Haptoglobin <1, . MICHELL Anti-IgG negative, MICHELL complement C3 positive.  12/17/21: Prednisone 60mg daily started in response to hemolytic anemia.  12/22/21: Initial visit at [facility] with Dr. Watson. WBC 96k, Hgb 8.9, Plts 288. MICHELL positive. , Hapto <10. Continued prednisone 60mg daily.   12/27/21: Initial visit with me. Prednisone decreased to 50mg daily.   1/4/22: Hemoglobin stable (~8.4 g/dL) with persistent hemolysis. Prednisone weaned to 40mg daily.   1/11/22: Cycle 1 day 1 of obinutuzumab plus venetoclax (starting day 22). Prednisone weaned to 30mg daily.   1/18/22: Hemoglobin improved. Prednisone weaned to 20mg daily.  1/31/22: Prednisone weaned to 15mg daily.  Tapered to 10mg one week later.   2/15/22: Prednisone weaned to 5mg daily.   4/4/22: Prednisone to 5mg every other day x2 weeks then stop.  4/27/22: Cold agglutinin titer positive (>1:512). Venetoclax stopped as hemolysis was attributed to cold agglutinins. Hemolysis improved.  6/1/22: She completed 6 cycles of obinutuzumab.      History of Present Ilness:   Cassidy Bryan) is a pleasant 77 y.o.female with a past medical history of hypertension and chronic lymphocytic leukemia who presents for follow up visit regarding CLL and cold agglutinin hemolytic anemia. She feels well today. BP elevated again today, but she reports her BP has been normal at home. She took an extra dose of carvedilol after her vitals were taken this morning. No fevers, chills, night sweats, weight loss.    PAST MEDICAL HISTORY:   Past Medical History:   Diagnosis Date    CLL (chronic lymphocytic leukemia) 12/22/2021    Hypertension     Hypothyroidism 4/26/2022    Thyroid disease        PAST SURGICAL HISTORY:   Past Surgical History:   Procedure Laterality Date    ADENOIDECTOMY      COLONOSCOPY      HYSTERECTOMY      TONSILLECTOMY         PAST SOCIAL HISTORY:  Social History     Tobacco Use    Smoking status: Never    Smokeless tobacco: Never   Substance Use Topics    Alcohol use: Yes     Alcohol/week: 0.0 standard drinks     Comment: rare       FAMILY HISTORY:  History reviewed. No pertinent family history.    CURRENT MEDICATIONS:   Current Outpatient Medications   Medication Sig    acyclovir (ZOVIRAX) 400 MG tablet Take 1 tablet (400 mg total) by mouth 2 (two) times daily.    aspirin (ECOTRIN) 81 MG EC tablet Take 81 mg by mouth.    atorvastatin (LIPITOR) 40 MG tablet TAKE ONE TABLET BY MOUTH once DAILY AT BEDTIME FOR cholesterol control    calcium polycarbophil (FIBERCON ORAL) Take by mouth.    CARVEDILOL PHOSPHATE 20 MG ORAL CM24 (COREG CR) 20 mg 24 hr capsule Take 20 mg by mouth nightly.    clindamycin (CLEOCIN T) 1 % Swab 2 (two)  times a day.    ergocalciferol (ERGOCALCIFEROL) 50,000 unit Cap Take 50,000 Units by mouth every 7 days.    fluticasone (FLONASE) 50 mcg/actuation nasal spray 1 spray by Each Nare route once daily.    FLUZONE HIGH-DOSE 2018-19, PF, 180 mcg/0.5 mL vaccine ADM 0.5ML IM UTD    folic acid (FOLVITE) 1 MG tablet Take 1 tablet (1 mg total) by mouth once daily.    glucosamine-chondroitin 500-400 mg tablet Take 1 tablet by mouth 3 (three) times daily.    multivitamin (THERAGRAN) per tablet Take 1 tablet by mouth once daily.    ondansetron (ZOFRAN-ODT) 8 MG TbDL Take 1 tablet (8 mg total) by mouth every 12 (twelve) hours as needed (nausea).    sulfacetamide sodium, acne, 10 % Susp Apply 1 application topically 2 (two) times daily. Apply to affected area    SYNTHROID 75 mcg tablet Take 1 tablet (75 mcg total) by mouth daily    telmisartan (MICARDIS) 80 MG Tab     venetoclax (VENCLEXTA) 10 mg Tab Take 10 mg (1 tablet)  by mouth daily on days 1-7 of cycle 2.   Take 20 mg (2 tablets) by mouth daily on days 8-14 of cycle 2.  Take 50 mg (5 tablets) daily on days 15-21 of cycle 2.    venetoclax (VENCLEXTA) 100 mg Tab Take 100 mg (1 tablet) by mouth daily  on days 22-28 of cycle 2.  Take 200 mg (2 tablets) by mouth daily on days 1-7 of cycle 3.    venetoclax (VENCLEXTA) 100 mg Tab Take 200 mg (2 tablets)  by mouth once daily.    vitamin D (VITAMIN D3) 1000 units Tab Take 1,000 Units by mouth.    VIVELLE-DOT 0.025 mg/24 hr Place 1 patch onto the skin twice a week    carvediloL (COREG) 3.125 MG tablet Take 3.125 mg by mouth as needed.    mupirocin (BACTROBAN) 2 % ointment 2 (two) times daily.     No current facility-administered medications for this visit.       ALLERGIES:   Review of patient's allergies indicates:   Allergen Reactions    Sulfa (sulfonamide antibiotics) Rash    Tobramycin-dexamethasone      Other reaction(s): Other (See Comments)  dizziness    [place] Diarrhea       Review of Systems:     Pertinent positives and  negatives included in the HPI. Otherwise a complete review of systems is negative.    Physical Exam:     Vitals:    09/09/22 1033   BP: (!) 188/80   Pulse: 70   Resp: 16     General: Appears well, NAD  HEENT: MMM, no OP lesions  Pulmonary: CTAB, no increased work of breathing, no W/R/C  Cardiovascular: S1S2 normal, RRR, no M/R/G  Abdominal: Soft, NT, ND, BS+, no HSM  Extremities: No C/C/E  Neurological: AAOx4, grossly normal, no focal deficits  Dermatologic: No appreciable rashes or lesions  Lymphatic: No appreciable cervical, axillary, or inguinal lymphadenopathy     ECOG Performance Status: (foot note - ECOG PS provided by Eastern Cooperative Oncology Group) 0 - Asymptomatic    Karnofsky Performance Score:  100%- Normal, No Complaints, No Evidence of Disease    Labs:   Lab Results   Component Value Date    WBC 5.93 09/09/2022    HGB 14.2 09/09/2022    HCT 37.9 09/09/2022    MCV 94 09/09/2022     09/09/2022       Lab Results   Component Value Date     (L) 09/09/2022    K 4.1 09/09/2022    CL 97 09/09/2022    CO2 28 09/09/2022    BUN 13 09/09/2022    CREATININE 0.6 09/09/2022    ALBUMIN 4.2 09/09/2022    BILITOT 1.7 (H) 09/09/2022    ALKPHOS 79 09/09/2022    AST 21 09/09/2022    ALT 20 09/09/2022       Imaging:   PET/CT 6/17/21  COMPARISON: PET/CT 8/22/2016.   HISTORY: Lymphoma.   FINDINGS:   Head and neck: There is symmetric and physiologic distribution of radiotracer throughout the included brain parenchyma. There is no hemorrhage, hydrocephalus, or midline shift. There are innumerable bilateral enlarged FDG avid cervical lymph nodes. These have worsened from the prior exam. A representative left lower cervical lymph node best visualized on image 84 measures 19 mm compared with 10 mm previously with an SUV max of 1.8. There are enlarged left supraclavicular lymph nodes which were not present on the prior exam. A representative eran conglomerate measures 3.4 cm in diameter with an SUV max of 2.2.    CHEST: There are innumerable bilateral axillary and subpectoral lymph nodes which are not present on the prior exam. These demonstrate low-level FDG avidity. A representative left axillary nodule best visualized on image 114 measures 1.9 cm with an SUV max of 1.6. There are prominent paratracheal lymph nodes which are not pathologically enlarged by size criteria and demonstrate background FDG avidity, however were not present on the prior exam.   There is no FDG avid pulmonary nodule or mass. There is no pleural effusion. There is no pneumothorax.   Abdomen and pelvis: There is physiologic distribution of radiotracer throughout the liver, spleen, and collecting system. There are multiple enlarged bilateral iliac and periaortic retroperitoneal lymph nodes. A representative left periaortic lymph node best visualized on image 201 measures 2.0 cm in diameter with an SUV max of 2.0.   MUSCULOSKELETAL: There is no FDG avid lytic or blastic osseous lesion.     IMPRESSION:   Innumerable enlarged bilateral cervical, supraclavicular, axillary, retroperitoneal, and pelvic lymph nodes all which demonstrate low-level FDG avidity, however are enlarged from the prior exam and most consistent with recurrent disease.       Pathology:  Peripheral Blood Flow Cytometry (11/1/21):  Comment:      CD5+ B-CELL LYMPHOPROLIFERATIVE DISORDER (SEE COMMENT).     COMMENT: Clonal CD20+ B-cells are detected that coexpress   CD5, CD23, FMC7(dim) and moderate surface kappa light chain   and are negative for CD10, comprising 75% of all analyzed   cells.       Prognostication Tools:  CLL-IPI = 2, intermediate risk (79.4% survival after 5 years, 40% survival after 10 years, median  months)    Assessment and Plan:   Cassidy Aguilar (Cassidy) is a pleasant 77 y.o.female with a past medical history of hypertension and chronic lymphocytic leukemia who presents for follow up visit regarding CLL.     Chronic lymphocytic leukemia, Binet stage C, Duarte stage  III: She has had Duarte stage I disease since 2016. 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 She was initially treated with steroids. In January 2022, she was started on treatment for CLL due to steroid-refractory hemolytic anemia with obinutuzumab plus venetoclax because of the benefit of anti-CD20 MAB with AIHA plus fixed duration treatment (patient preference).  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 Venetoclax was stopped and obinutuzumab was continued to complete 6 cycles. Since April 2022, her hemolytic anemia has resolved.   Continue to monitor clinically.    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  Initially treated with prednisone in December with stabilization of blood counts but persistent hemolysis.  Hemolytic anemia resolved in 4/2022. She completed 6 cycles of obinutuzumab as noted above. Consider sutimlimab if relapse of LAYTON.  Continue to monitor LDH, haptoglobin, and retic.    Immunosuppression: Continue acyclovir 400mg BID until 1 year post-treatment (until 6/2023). She received 4 COVID vaccinations and 2 Evusheld infusions.     Hypertension: BP elevated again today. She took an extra dose of carvedilol. Repeat BP improved. She remains asymptomatic. She reports her BP is normal at home.     Follow up: As below    Orders Placed:         Route Chart for Scheduling    BMT Chart Routing      Follow up with physician 3 months. RTC in 3 months with labs prior   Follow up with [facility]    Infusion scheduling note    Injection scheduling note    Labs CBC, CMP, LDH, uric acid and other   Lab interval:  Labs prior to visit (CBC, CMP, Mg, Phos, uric  acid, LDH, haptoglobin, retic) in 6 weeks   Imaging None      Pharmacy appointment No pharmacy appointment needed      Other referrals No additional referrals needed       Bubba Wright MD  Hematology, Oncology, and Stem Cell Transplantation  [facility]

## 2022-09-14 ENCOUNTER — PATIENT MESSAGE (OUTPATIENT)
Dept: HEMATOLOGY/ONCOLOGY | Facility: CLINIC | Age: 77
End: 2022-09-14
Payer: MEDICARE

## 2022-09-14 LAB
POLIO 1: NORMAL
POLIO 3: NORMAL

## 2022-10-12 ENCOUNTER — PATIENT MESSAGE (OUTPATIENT)
Dept: HEMATOLOGY/ONCOLOGY | Facility: CLINIC | Age: 77
End: 2022-10-12
Payer: MEDICARE

## 2022-10-12 ENCOUNTER — TELEPHONE (OUTPATIENT)
Dept: HEMATOLOGY/ONCOLOGY | Facility: CLINIC | Age: 77
End: 2022-10-12
Payer: MEDICARE

## 2022-10-12 NOTE — TELEPHONE ENCOUNTER
"----- Message from Mickey Frost sent at 10/12/2022  8:55 AM CDT -----  Consult/Advisory:          Name Of Caller: Self      Contact Preference?: [phone] (Mobile)      Provider Name: Adriana      Does patient feel the need to be seen today? No      What is the nature of the call?: Calling to speak w/ Metcalf about side effects from bivalent (omicron) booster. Stating her urine is slightly brown          Additional Notes:  "Thank you for all that you do for our patients"      "

## 2022-10-24 ENCOUNTER — LAB VISIT (OUTPATIENT)
Dept: LAB | Facility: HOSPITAL | Age: 77
End: 2022-10-24
Attending: INTERNAL MEDICINE
Payer: MEDICARE

## 2022-10-24 ENCOUNTER — PATIENT MESSAGE (OUTPATIENT)
Dept: HEMATOLOGY/ONCOLOGY | Facility: CLINIC | Age: 77
End: 2022-10-24
Payer: MEDICARE

## 2022-10-24 DIAGNOSIS — D59.9 ACQUIRED HEMOLYTIC ANEMIA: ICD-10-CM

## 2022-10-24 DIAGNOSIS — D64.9 ANEMIA, UNSPECIFIED TYPE: ICD-10-CM

## 2022-10-24 DIAGNOSIS — C91.10 CLL (CHRONIC LYMPHOCYTIC LEUKEMIA): ICD-10-CM

## 2022-10-24 LAB
ABO + RH BLD: NORMAL
ALBUMIN SERPL BCP-MCNC: 4.1 G/DL (ref 3.5–5.2)
ALP SERPL-CCNC: 77 U/L (ref 55–135)
ALT SERPL W/O P-5'-P-CCNC: 18 U/L (ref 10–44)
ANION GAP SERPL CALC-SCNC: 6 MMOL/L (ref 8–16)
AST SERPL-CCNC: 20 U/L (ref 10–40)
BASOPHILS # BLD AUTO: 0.06 K/UL (ref 0–0.2)
BASOPHILS NFR BLD: 1.1 % (ref 0–1.9)
BILIRUB SERPL-MCNC: 2.1 MG/DL (ref 0.1–1)
BLD GP AB SCN CELLS X3 SERPL QL: NORMAL
BUN SERPL-MCNC: 13 MG/DL (ref 8–23)
CALCIUM SERPL-MCNC: 9.3 MG/DL (ref 8.7–10.5)
CHLORIDE SERPL-SCNC: 99 MMOL/L (ref 95–110)
CO2 SERPL-SCNC: 27 MMOL/L (ref 23–29)
CREAT SERPL-MCNC: 0.6 MG/DL (ref 0.5–1.4)
DIFFERENTIAL METHOD: ABNORMAL
EOSINOPHIL # BLD AUTO: 0.1 K/UL (ref 0–0.5)
EOSINOPHIL NFR BLD: 2.1 % (ref 0–8)
ERYTHROCYTE [DISTWIDTH] IN BLOOD BY AUTOMATED COUNT: 13.2 % (ref 11.5–14.5)
EST. GFR  (NO RACE VARIABLE): >60 ML/MIN/1.73 M^2
FERRITIN SERPL-MCNC: 201 NG/ML (ref 20–300)
GLUCOSE SERPL-MCNC: 84 MG/DL (ref 70–110)
HAPTOGLOB SERPL-MCNC: 20 MG/DL (ref 30–250)
HCT VFR BLD AUTO: 36.1 % (ref 37–48.5)
HGB BLD-MCNC: 12.5 G/DL (ref 12–16)
IMM GRANULOCYTES # BLD AUTO: 0.02 K/UL (ref 0–0.04)
IMM GRANULOCYTES NFR BLD AUTO: 0.4 % (ref 0–0.5)
LDH SERPL L TO P-CCNC: 209 U/L (ref 110–260)
LYMPHOCYTES # BLD AUTO: 1.5 K/UL (ref 1–4.8)
LYMPHOCYTES NFR BLD: 29.1 % (ref 18–48)
MAGNESIUM SERPL-MCNC: 1.9 MG/DL (ref 1.6–2.6)
MCH RBC QN AUTO: 33 PG (ref 27–31)
MCHC RBC AUTO-ENTMCNC: 34.6 G/DL (ref 32–36)
MCV RBC AUTO: 95 FL (ref 82–98)
MONOCYTES # BLD AUTO: 0.9 K/UL (ref 0.3–1)
MONOCYTES NFR BLD: 17.4 % (ref 4–15)
NEUTROPHILS # BLD AUTO: 2.6 K/UL (ref 1.8–7.7)
NEUTROPHILS NFR BLD: 49.9 % (ref 38–73)
NRBC BLD-RTO: 0 /100 WBC
PHOSPHATE SERPL-MCNC: 3.6 MG/DL (ref 2.7–4.5)
PLATELET # BLD AUTO: 277 K/UL (ref 150–450)
PMV BLD AUTO: 10 FL (ref 9.2–12.9)
POTASSIUM SERPL-SCNC: 4.3 MMOL/L (ref 3.5–5.1)
PROT SERPL-MCNC: 6.4 G/DL (ref 6–8.4)
RBC # BLD AUTO: 3.79 M/UL (ref 4–5.4)
RETICS/RBC NFR AUTO: 4 % (ref 0.5–2.5)
SODIUM SERPL-SCNC: 132 MMOL/L (ref 136–145)
URATE SERPL-MCNC: 4 MG/DL (ref 2.4–5.7)
WBC # BLD AUTO: 5.23 K/UL (ref 3.9–12.7)

## 2022-10-24 PROCEDURE — 83735 ASSAY OF MAGNESIUM: CPT | Performed by: INTERNAL MEDICINE

## 2022-10-24 PROCEDURE — 80053 COMPREHEN METABOLIC PANEL: CPT | Performed by: INTERNAL MEDICINE

## 2022-10-24 PROCEDURE — 85045 AUTOMATED RETICULOCYTE COUNT: CPT | Performed by: INTERNAL MEDICINE

## 2022-10-24 PROCEDURE — 84100 ASSAY OF PHOSPHORUS: CPT | Performed by: INTERNAL MEDICINE

## 2022-10-24 PROCEDURE — 86850 RBC ANTIBODY SCREEN: CPT | Performed by: INTERNAL MEDICINE

## 2022-10-24 PROCEDURE — 83010 ASSAY OF HAPTOGLOBIN QUANT: CPT | Performed by: INTERNAL MEDICINE

## 2022-10-24 PROCEDURE — 83615 LACTATE (LD) (LDH) ENZYME: CPT | Performed by: INTERNAL MEDICINE

## 2022-10-24 PROCEDURE — 84550 ASSAY OF BLOOD/URIC ACID: CPT | Performed by: INTERNAL MEDICINE

## 2022-10-24 PROCEDURE — 85025 COMPLETE CBC W/AUTO DIFF WBC: CPT | Performed by: INTERNAL MEDICINE

## 2022-10-24 PROCEDURE — 82728 ASSAY OF FERRITIN: CPT | Performed by: INTERNAL MEDICINE

## 2022-10-26 NOTE — PROGRESS NOTES
Pelvic Health Physical Therapy   Treatment Note     Name: Cassidy Aguilar  Clinic Number: [id number]    Therapy Diagnosis:   Encounter Diagnoses   Name Primary?    Weakness Yes    Lack of coordination      Physician: Adonis Wright MD    Visit Date: 10/27/2022    Physician Orders: PT Eval and Treat   Medical Diagnosis from Referral: M62.89 (ICD-10-CM) - Pelvic floor instability   Evaluation Date: 3/31/2022  Last Reassessment: 10/27/2022, visit #13  Authorization Period Expiration: 12/31/2022  Plan of Care Expiration: 1/27/2022  Visit # / Visits authorized: 13/20  FOTO: 3/3    Time In: 14:00  Time Out: 14:45  Total Billable Time: 45 minutes    Precautions: Standard and cancer    Subjective     Cassidy reports overall continued improved urinary symptoms. She did note significant urinary urgency after receiving a COVID booster. She denies urinary incontinence for the most part, but it did happen recently with a coughing episode and with transferring out of a car. Urine leakage is more prevalent when her pessary is misaligned.    She was compliant with home exercise program - doesn't have too much time for exercise, performs long holds with walking and pelvic floor squeezes with sit to stands  Response to previous treatment: no adverse effect  Functional change: decreased incontinence and reduced urgency    Pain: 0/10  Location: pelvic floor    Objective     Cassidy received the following interventions -   TrA = transverse abdominis, PFM = pelvic floor muscle    Therapeutic exercises to develop strength, endurance and core stabilization for 8 minutes -  [x] PFM squeeze + sit to stand, x10 - required verbal cues for coordinating breath with loading  [x] HEP building/HEP review    Therapeutic activities to improve functional performance for 30 minutes -  [x] Review of urge suppression techniques  [x] Education on techniques to reduce post-void dribble, double-void  [x] Instruction on the Knack for reduction of stress urinary  "incontinence      Home Exercises & Patient Education     Education provided:   - anatomy/physiology of pelvic floor, posture/body mechanices, kegels and vaginal weight training , pressure management for support of prolapse  Discussed progression of plan of care with patient; educated pt in activity modification; reviewed HEP with pt. Pt demonstrated and verbalized understanding of all instruction and was provided with a handout of HEP (see Patient Instructions).  - updated HEP    Written Home Exercises Provided: yes  Exercises were reviewed, and Cassidy was able to demonstrate them prior to the end of the session.  Cassidy demonstrated good  understanding of the education provided.     See EMR under Patient Instructions for exercises/education provided    Assessment     Cassidy has progressed well with physical therapy overall, reporting minimal/no urinary urgency and incontinence. She continues to have issues sometimes, secondary to pelvic floor weakness, pelvic floor coordination deficits, and pelvic organ prolapse. Pt requires heavy verbal/manual cues to synch breath, TrA contraction, PFM contraction, and movement during exercises, but she improved with practice; Coordination breath and pelvic floor activation with movement should help support pelvic organ prolapse long-term.    Cassidy is progressing well towards her goals.   Pt prognosis is Good.     Pt will continue to benefit from skilled outpatient physical therapy to address the deficits listed in the problem list box on initial evaluation, provide pt/family education and to maximize pt's level of independence in the home and community environment.     Pt's spiritual, cultural and educational needs considered and pt agreeable to plan of care and goals.  Anticipated barriers to physical therapy: none    Short Term Goals: 6 weeks   Pt to perform "the knack" prior to coughing, laughing or sneezing to decrease risk of incontinence. MET 5/12/22  Pt to report a decrease " in pad usage to no more than 1 a day to demonstrate improving pelvic floor function needed for continence. MET 6/23/22  Pt to demonstrate being able to correctly and consistently perform a kegel which is needed  to increase pelvic floor muscle coordination and strength needed for continence. MET 5/5/22  Pt to report being able to transfer from sitting to standing without leakage of urine. MET 5/26/22  Pt to report being able to sneeze without incontinence demonstrating improved pelvic floor strength and coordination to improve confidence in social situations. MET 5/12/22     Long Term Goals: 12 weeks   Pt to be discharged with home plan for carry over after discharge. MET 8/18/22  Pt to be able to perform a 10 second kegel x 10 reps to demonstrate improving strength and endurance needed for continence. MET 6/23/22  Pt to report a decrease in pad usage to 0 pads a day to demonstrate improving pelvic floor muscle controls as evidenced by decreased episodes of incontinence needed to improve confidence in social situations. (progressing, not met)  Pt to report elimination of incontinence with ADLs to demonstrate improved pelvic floor muscle strength and coordination. MET 6/23/22  Pt to be able to delay the urge to urinate at least 30 minutes with a strong urge to urinate in order to make it to the bathroom without leaking. (progressing, not met ~10 minutes)  Pt to report no longer feeling the need to urinate just in case when shopping or participating in social activities to demonstrate improving pelvic floor and bladder control. MET 6/23/22  Pt to demonstrate appropriate breath and pelvic floor coordination with exercise for improved pressure support with loading. ESTABLISHED 10/27/22    Plan     Plan of care Certification: 10/27/22 - 1/27/23     Outpatient Physical Therapy 1 time(s) every week(s) for 12 weeks to include the following interventions: Manual Therapy, Moist Heat/ Ice, Neuromuscular Re-ed, Patient  Education, Self Care, Therapeutic Activities, Therapeutic Exercise, Ultrasound and Biofeedback.    Pt will return to therapy if needed for reassessment/treatment    Charmaine Barber, PT, DPT

## 2022-10-27 ENCOUNTER — PATIENT MESSAGE (OUTPATIENT)
Dept: REHABILITATION | Facility: OTHER | Age: 77
End: 2022-10-27

## 2022-10-27 ENCOUNTER — CLINICAL SUPPORT (OUTPATIENT)
Dept: REHABILITATION | Facility: OTHER | Age: 77
End: 2022-10-27
Attending: INTERNAL MEDICINE
Payer: MEDICARE

## 2022-10-27 DIAGNOSIS — R27.9 LACK OF COORDINATION: ICD-10-CM

## 2022-10-27 DIAGNOSIS — R53.1 WEAKNESS: Primary | ICD-10-CM

## 2022-10-27 PROCEDURE — 97530 THERAPEUTIC ACTIVITIES: CPT | Mod: PN | Performed by: PHYSICAL THERAPIST

## 2022-10-27 PROCEDURE — 97110 THERAPEUTIC EXERCISES: CPT | Mod: PN | Performed by: PHYSICAL THERAPIST

## 2022-10-27 NOTE — PLAN OF CARE
"[facility]   Pelvic Health Physical Therapy Reassessment    Date: 10/27/2022   Name: Cassidy Aguilar  Clinic Number: [id number]    Therapy Diagnosis:   Encounter Diagnoses   Name Primary?    Weakness Yes    Lack of coordination      Referring Provider: Adonis Wright MD    Physician Orders: PT Eval and Treat   Medical Diagnosis from Referral: M62.89 (ICD-10-CM) - Pelvic floor instability   Evaluation Date: 3/31/2022  Last Reassessment: 10/27/2022, visit #13  Authorization Period Expiration: 12/31/2022  Plan of Care Expiration: 1/27/2022  Visit # / Visits authorized: 13/20  FOTO: 3/3      SUBJECTIVE     Cassidy reports overall continued improved urinary symptoms. She did note significant urinary urgency after receiving a COVID booster. She denies urinary incontinence for the most part, but it did happen recently with a coughing episode and with transferring out of a car. Urine leakage is more prevalent when her pessary is misaligned.    OBJECTIVE     Pt declined pelvic floor assessment today    ASSESSMENT     Cassidy has progressed well with physical therapy overall, reporting minimal/no urinary urgency and incontinence. She continues to have issues sometimes, secondary to pelvic floor weakness, pelvic floor coordination deficits, and pelvic organ prolapse. Pt requires heavy verbal/manual cues to synch breath, TrA contraction, PFM contraction, and movement during exercises, but she improved with practice; Coordination breath and pelvic floor activation with movement should help support pelvic organ prolapse long-term. Pt should be able to maintain gains made with physical therapy through consistent home exercise performance, but she may require periodic check-ins with PT to update home exercise program, treat issues as they arise.    Short Term Goals: 6 weeks   Pt to perform "the knack" prior to coughing, laughing or sneezing to decrease risk of incontinence. MET 5/12/22  Pt to report a " decrease in pad usage to no more than 1 a day to demonstrate improving pelvic floor function needed for continence. MET 6/23/22  Pt to demonstrate being able to correctly and consistently perform a kegel which is needed  to increase pelvic floor muscle coordination and strength needed for continence. MET 5/5/22  Pt to report being able to transfer from sitting to standing without leakage of urine. MET 5/26/22  Pt to report being able to sneeze without incontinence demonstrating improved pelvic floor strength and coordination to improve confidence in social situations. MET 5/12/22     Long Term Goals: 12 weeks   Pt to be discharged with home plan for carry over after discharge. MET 8/18/22  Pt to be able to perform a 10 second kegel x 10 reps to demonstrate improving strength and endurance needed for continence. MET 6/23/22  Pt to report a decrease in pad usage to 0 pads a day to demonstrate improving pelvic floor muscle controls as evidenced by decreased episodes of incontinence needed to improve confidence in social situations. (progressing, not met)  Pt to report elimination of incontinence with ADLs to demonstrate improved pelvic floor muscle strength and coordination. MET 6/23/22  Pt to be able to delay the urge to urinate at least 30 minutes with a strong urge to urinate in order to make it to the bathroom without leaking. (progressing, not met ~10 minutes)  Pt to report no longer feeling the need to urinate just in case when shopping or participating in social activities to demonstrate improving pelvic floor and bladder control. MET 6/23/22  Pt to demonstrate appropriate breath and pelvic floor coordination with exercise for improved pressure support with loading. ESTABLISHED 10/27/22    PLAN     Plan of Care certification: 10/27/2022 to 1/27/2022    Outpatient Physical Therapy - 1 time per week for 12 weeks to include the following interventions: Therapeutic Exercise, Manual Therapy, Therapeutic Activity,  Neuromuscular Re-education, Electrical Stimulation Unattended, Self-Care, Patient Education      Pt will return to therapy if needed for reassessment/treatment    Charmaine Barber, PT, DPT

## 2022-10-27 NOTE — PATIENT INSTRUCTIONS
Reducing incontinence with the Knack  The Knack is a strong and well-timed contraction of the pelvic floor muscles performed immediately before and during an increase in downward pressure on the pelvic floor.     Bladder leaks can be controlled using this exercise, which helps to close the urine tube more effectively. When the pelvic floor muscles are working as they should, they contract automatically before and during an increase in pressure from within the abdomen, such as during a cough or sneeze. When the muscles are not working appropriately, this action is not automatic, but it can improve with practice.    The Knack can be used with events or activities that increase downward pressure upon your pelvic floor such as:    Coughing  Sneezing  Lifting  Blowing your nose  Rising into standing from sitting  Stepping down heavily    When you feel like you're about to cough/sneeze/lift...  Lift and squeeze the muscles in and around all three pelvic openings (urethra, vagina, and anus) immediately before  Maintain this pelvic floor muscle contraction as cough/sneeze/lift  Afterwards, relax your pelvic floor muscles back to normal resting level    If the pelvic floor is not very strong or has been working hard all day (lots of lifting, excessive coughing/sneezing while sick), then you may still experience some leakage. Just do your best, and it should improve as the pelvic floor gets stronger.    Simply remember to lift and squeeze with every cough, lift or sneeze!        PELVIC FLOOR PRESSURE MANAGEMENT         Your pelvic floor muscles and your diaphragm work closely together to regulate the pressure in your body. Each time you inhale, your diaphragm contracts, flattens, and moves downward. In response, your pelvic floor muscles relax and drop down as well. When you exhale, both muscles lift upwards. Throughout a diaphragmatic breath cycle, your pelvic floor goes through a full range of motion that contributes to  its optimal function.    Think of your body like a canister, with one opening at the top where your mouth is and another opening at the bottom through your pelvic floor. If the top of the canister is closed off (as with straining during bowel movements or Valsalva maneuver during lifting) all of the pressure moves downward. If your pelvic floor muscles are not strong enough to counteract this increased pressure, you may experience leaking or increased sensations of pelvic organ prolapse.     This is why it is extremely important to implement the following pressure management techniques:  Avoiding Constipation - make high-fiber foods part of your regular diet (fruits, vegetables, bran, and beans), reduce the amount of processed foods in your diet, drink plenty of water and fluids every day, exercise daily, and manage your stress with meditation or other relaxation techniques.  No Straining! Straining (bearing down and holding your breath) puts additional downward pressure on our organs, causing increased prolapse and pelvic pressure. Instead, blow out the candles as you gently push down. Do NOT hold your breath!  Use a Stool - Utilize a squat position when voiding. Get your knees up higher than your hips. Squatting helps relax the pelvic floor muscles and reduces straining.  Avoid heavy lifting and high-impact activities until you can strengthen your core and pelvic floor muscles appropriately. When you do have to lift, hold the load close to your body to reduce strain and do not hold your breath when lifting.  Do not Valsalva (hold your breath and push) at any time.  Use diaphragmatic breathing (belly breathing) to manage stress, help empty bladder, help empty bowels, and help lightly stretch pelvic floor muscles.   Gently squeeze your pelvic floor muscles prior to exertion (coughing, sneezing, lifting, transferring to stand) and avoid holding your breath      Sit to stand + kegel, x15 with 8-second hold  - Inhale  to prepare, relax the belly and pelvic floor  - As you exhale, gently squeeze the pelvic floor  - Hold the pelvic floor in as you lift out of the chair (use arms if needed)  *Don't hold your breath            DOUBLE VOIDING - Double voiding is a technique that may assist the bladder to empty more effectively when urine is left in the bladder. It involves passing urine more than once each time that you go to the toilet. This makes sure that the bladder is completely empty.    Here are 3 strategies you can try to fully empty your bladder.  You do not have to do all of these things every single time. Find which ones work best for you.     Check to make sure your pelvic floor is relaxed   Do a body scan - make sure your legs, buttocks, and abdominals are relaxed.  Take a couple deep, slow breaths to encourage your pelvic floor muscles to DROP (ie. Try Diaphragmatic Breathing).  Gently apply pressure over your bladder.  Change your pelvic position: lean forward, rock your pelvis forward and backward, stand up then sit back down.     Wait at least 30 seconds to 1 minute to see if a second urine stream begins.   Do not stop your urine stream or push/strain!

## 2022-11-10 ENCOUNTER — LAB VISIT (OUTPATIENT)
Dept: LAB | Facility: HOSPITAL | Age: 77
End: 2022-11-10
Attending: INTERNAL MEDICINE
Payer: MEDICARE

## 2022-11-10 ENCOUNTER — PATIENT MESSAGE (OUTPATIENT)
Dept: HEMATOLOGY/ONCOLOGY | Facility: CLINIC | Age: 77
End: 2022-11-10
Payer: MEDICARE

## 2022-11-10 DIAGNOSIS — D59.9 ACQUIRED HEMOLYTIC ANEMIA: ICD-10-CM

## 2022-11-10 DIAGNOSIS — C91.10 CLL (CHRONIC LYMPHOCYTIC LEUKEMIA): ICD-10-CM

## 2022-11-10 LAB
ABO + RH BLD: NORMAL
ALBUMIN SERPL BCP-MCNC: 4.1 G/DL (ref 3.5–5.2)
ALP SERPL-CCNC: 77 U/L (ref 55–135)
ALT SERPL W/O P-5'-P-CCNC: 16 U/L (ref 10–44)
ANION GAP SERPL CALC-SCNC: 8 MMOL/L (ref 8–16)
AST SERPL-CCNC: 18 U/L (ref 10–40)
BASOPHILS # BLD AUTO: 0.08 K/UL (ref 0–0.2)
BASOPHILS NFR BLD: 1.1 % (ref 0–1.9)
BILIRUB SERPL-MCNC: 1.6 MG/DL (ref 0.1–1)
BLD GP AB SCN CELLS X3 SERPL QL: NORMAL
BUN SERPL-MCNC: 15 MG/DL (ref 8–23)
CALCIUM SERPL-MCNC: 9.2 MG/DL (ref 8.7–10.5)
CHLORIDE SERPL-SCNC: 102 MMOL/L (ref 95–110)
CO2 SERPL-SCNC: 26 MMOL/L (ref 23–29)
CREAT SERPL-MCNC: 0.7 MG/DL (ref 0.5–1.4)
DIFFERENTIAL METHOD: ABNORMAL
EOSINOPHIL # BLD AUTO: 0.2 K/UL (ref 0–0.5)
EOSINOPHIL NFR BLD: 3.3 % (ref 0–8)
ERYTHROCYTE [DISTWIDTH] IN BLOOD BY AUTOMATED COUNT: 12.9 % (ref 11.5–14.5)
EST. GFR  (NO RACE VARIABLE): >60 ML/MIN/1.73 M^2
GLUCOSE SERPL-MCNC: 111 MG/DL (ref 70–110)
HAPTOGLOB SERPL-MCNC: 43 MG/DL (ref 30–250)
HCT VFR BLD AUTO: 37.6 % (ref 37–48.5)
HGB BLD-MCNC: 13.3 G/DL (ref 12–16)
IMM GRANULOCYTES # BLD AUTO: 0.02 K/UL (ref 0–0.04)
IMM GRANULOCYTES NFR BLD AUTO: 0.3 % (ref 0–0.5)
LDH SERPL L TO P-CCNC: 172 U/L (ref 110–260)
LYMPHOCYTES # BLD AUTO: 1.8 K/UL (ref 1–4.8)
LYMPHOCYTES NFR BLD: 25.5 % (ref 18–48)
MAGNESIUM SERPL-MCNC: 1.9 MG/DL (ref 1.6–2.6)
MCH RBC QN AUTO: 33 PG (ref 27–31)
MCHC RBC AUTO-ENTMCNC: 35.4 G/DL (ref 32–36)
MCV RBC AUTO: 93 FL (ref 82–98)
MONOCYTES # BLD AUTO: 0.5 K/UL (ref 0.3–1)
MONOCYTES NFR BLD: 7.2 % (ref 4–15)
NEUTROPHILS # BLD AUTO: 4.5 K/UL (ref 1.8–7.7)
NEUTROPHILS NFR BLD: 62.6 % (ref 38–73)
NRBC BLD-RTO: 0 /100 WBC
PHOSPHATE SERPL-MCNC: 3.8 MG/DL (ref 2.7–4.5)
PLATELET # BLD AUTO: 159 K/UL (ref 150–450)
PMV BLD AUTO: 10.8 FL (ref 9.2–12.9)
POTASSIUM SERPL-SCNC: 3.8 MMOL/L (ref 3.5–5.1)
PROT SERPL-MCNC: 6.5 G/DL (ref 6–8.4)
RBC # BLD AUTO: 4.03 M/UL (ref 4–5.4)
RETICS/RBC NFR AUTO: 1.4 % (ref 0.5–2.5)
SODIUM SERPL-SCNC: 136 MMOL/L (ref 136–145)
URATE SERPL-MCNC: 4 MG/DL (ref 2.4–5.7)
WBC # BLD AUTO: 7.18 K/UL (ref 3.9–12.7)

## 2022-11-10 PROCEDURE — 85025 COMPLETE CBC W/AUTO DIFF WBC: CPT | Performed by: INTERNAL MEDICINE

## 2022-11-10 PROCEDURE — 83010 ASSAY OF HAPTOGLOBIN QUANT: CPT | Performed by: INTERNAL MEDICINE

## 2022-11-10 PROCEDURE — 83735 ASSAY OF MAGNESIUM: CPT | Performed by: INTERNAL MEDICINE

## 2022-11-10 PROCEDURE — 86850 RBC ANTIBODY SCREEN: CPT | Performed by: INTERNAL MEDICINE

## 2022-11-10 PROCEDURE — 84100 ASSAY OF PHOSPHORUS: CPT | Performed by: INTERNAL MEDICINE

## 2022-11-10 PROCEDURE — 85045 AUTOMATED RETICULOCYTE COUNT: CPT | Performed by: INTERNAL MEDICINE

## 2022-11-10 PROCEDURE — 83615 LACTATE (LD) (LDH) ENZYME: CPT | Performed by: INTERNAL MEDICINE

## 2022-11-10 PROCEDURE — 80053 COMPREHEN METABOLIC PANEL: CPT | Performed by: INTERNAL MEDICINE

## 2022-11-10 PROCEDURE — 84550 ASSAY OF BLOOD/URIC ACID: CPT | Performed by: INTERNAL MEDICINE

## 2022-11-11 ENCOUNTER — PATIENT MESSAGE (OUTPATIENT)
Dept: PSYCHIATRY | Facility: CLINIC | Age: 77
End: 2022-11-11
Payer: MEDICARE

## 2022-11-14 ENCOUNTER — TELEPHONE (OUTPATIENT)
Dept: PSYCHIATRY | Facility: CLINIC | Age: 77
End: 2022-11-14
Payer: MEDICARE

## 2022-11-14 NOTE — TELEPHONE ENCOUNTER
Left a vm to schedule f/u appt with . Provided call back number.      ----- Message from Issa Carter, PhD sent at 11/14/2022  3:38 PM CST -----  Please schedule follow-up with Dr. Gonzalez.  Thank you  ML

## 2022-11-15 ENCOUNTER — TELEPHONE (OUTPATIENT)
Dept: PSYCHIATRY | Facility: CLINIC | Age: 77
End: 2022-11-15
Payer: MEDICARE

## 2022-11-15 NOTE — TELEPHONE ENCOUNTER
"Spoke with pt and confirmed f/u appt with .      ----- Message from Mickey Frost sent at 11/15/2022 11:33 AM CST -----  Consult/Advisory:          Name Of Caller: Self      Contact Preference?: [phone]       Provider Name: Carlos      Does patient feel the need to be seen today? No      What is the nature of the call?: Returning call to schedule appt          Additional Notes:  "Thank you for all that you do for our patients"       "

## 2022-11-17 ENCOUNTER — TELEPHONE (OUTPATIENT)
Dept: PSYCHIATRY | Facility: CLINIC | Age: 77
End: 2022-11-17
Payer: MEDICARE

## 2022-11-17 NOTE — TELEPHONE ENCOUNTER
Spoke with pt and offering the appt slot per 's request but she's unavailable due to Thanksgiving and holiday's. She said she will keep her appt as scheduled.      ----- Message from Veronica Gonzalez, PhD sent at 11/17/2022 11:12 AM CST -----  Hey if she needs a sooner appt, pleas feel free to use a new patient slot or my Friday 1pm rounds

## 2022-12-09 ENCOUNTER — LAB VISIT (OUTPATIENT)
Dept: LAB | Facility: HOSPITAL | Age: 77
End: 2022-12-09
Payer: MEDICARE

## 2022-12-09 ENCOUNTER — OFFICE VISIT (OUTPATIENT)
Dept: HEMATOLOGY/ONCOLOGY | Facility: CLINIC | Age: 77
End: 2022-12-09
Payer: MEDICARE

## 2022-12-09 ENCOUNTER — PATIENT MESSAGE (OUTPATIENT)
Dept: HEMATOLOGY/ONCOLOGY | Facility: CLINIC | Age: 77
End: 2022-12-09

## 2022-12-09 VITALS
HEIGHT: 69 IN | HEART RATE: 73 BPM | BODY MASS INDEX: 18.79 KG/M2 | OXYGEN SATURATION: 98 % | DIASTOLIC BLOOD PRESSURE: 75 MMHG | RESPIRATION RATE: 16 BRPM | WEIGHT: 126.88 LBS | SYSTOLIC BLOOD PRESSURE: 181 MMHG

## 2022-12-09 DIAGNOSIS — C91.10 CLL (CHRONIC LYMPHOCYTIC LEUKEMIA): ICD-10-CM

## 2022-12-09 DIAGNOSIS — D59.12 COLD AUTOIMMUNE HEMOLYTIC ANEMIA: ICD-10-CM

## 2022-12-09 DIAGNOSIS — D84.9 IMMUNOSUPPRESSION: ICD-10-CM

## 2022-12-09 DIAGNOSIS — S41.112A LACERATION OF LEFT UPPER ARM, INITIAL ENCOUNTER: ICD-10-CM

## 2022-12-09 DIAGNOSIS — D59.9 ACQUIRED HEMOLYTIC ANEMIA: ICD-10-CM

## 2022-12-09 DIAGNOSIS — C91.10 CLL (CHRONIC LYMPHOCYTIC LEUKEMIA): Primary | ICD-10-CM

## 2022-12-09 LAB
ALBUMIN SERPL BCP-MCNC: 3.8 G/DL (ref 3.5–5.2)
ALP SERPL-CCNC: 79 U/L (ref 55–135)
ALT SERPL W/O P-5'-P-CCNC: 17 U/L (ref 10–44)
ANION GAP SERPL CALC-SCNC: 8 MMOL/L (ref 8–16)
AST SERPL-CCNC: 17 U/L (ref 10–40)
BASOPHILS # BLD AUTO: 0.06 K/UL (ref 0–0.2)
BASOPHILS NFR BLD: 0.8 % (ref 0–1.9)
BILIRUB SERPL-MCNC: 1.5 MG/DL (ref 0.1–1)
BUN SERPL-MCNC: 15 MG/DL (ref 8–23)
CALCIUM SERPL-MCNC: 9.5 MG/DL (ref 8.7–10.5)
CHLORIDE SERPL-SCNC: 100 MMOL/L (ref 95–110)
CO2 SERPL-SCNC: 27 MMOL/L (ref 23–29)
CREAT SERPL-MCNC: 0.6 MG/DL (ref 0.5–1.4)
DIFFERENTIAL METHOD: ABNORMAL
EOSINOPHIL # BLD AUTO: 0.1 K/UL (ref 0–0.5)
EOSINOPHIL NFR BLD: 1.8 % (ref 0–8)
ERYTHROCYTE [DISTWIDTH] IN BLOOD BY AUTOMATED COUNT: 12.4 % (ref 11.5–14.5)
EST. GFR  (NO RACE VARIABLE): >60 ML/MIN/1.73 M^2
FERRITIN SERPL-MCNC: 258 NG/ML (ref 20–300)
GLUCOSE SERPL-MCNC: 76 MG/DL (ref 70–110)
HAPTOGLOB SERPL-MCNC: 145 MG/DL (ref 30–250)
HCT VFR BLD AUTO: 36.8 % (ref 37–48.5)
HGB BLD-MCNC: 12.5 G/DL (ref 12–16)
IMM GRANULOCYTES # BLD AUTO: 0.02 K/UL (ref 0–0.04)
IMM GRANULOCYTES NFR BLD AUTO: 0.3 % (ref 0–0.5)
LDH SERPL L TO P-CCNC: 164 U/L (ref 110–260)
LYMPHOCYTES # BLD AUTO: 1.3 K/UL (ref 1–4.8)
LYMPHOCYTES NFR BLD: 18.6 % (ref 18–48)
MAGNESIUM SERPL-MCNC: 1.9 MG/DL (ref 1.6–2.6)
MCH RBC QN AUTO: 32.2 PG (ref 27–31)
MCHC RBC AUTO-ENTMCNC: 34 G/DL (ref 32–36)
MCV RBC AUTO: 95 FL (ref 82–98)
MONOCYTES # BLD AUTO: 0.8 K/UL (ref 0.3–1)
MONOCYTES NFR BLD: 10.8 % (ref 4–15)
NEUTROPHILS # BLD AUTO: 4.8 K/UL (ref 1.8–7.7)
NEUTROPHILS NFR BLD: 67.7 % (ref 38–73)
NRBC BLD-RTO: 0 /100 WBC
PHOSPHATE SERPL-MCNC: 3.8 MG/DL (ref 2.7–4.5)
PLATELET # BLD AUTO: 226 K/UL (ref 150–450)
PMV BLD AUTO: 10.9 FL (ref 9.2–12.9)
POTASSIUM SERPL-SCNC: 4.2 MMOL/L (ref 3.5–5.1)
PROT SERPL-MCNC: 6.6 G/DL (ref 6–8.4)
RBC # BLD AUTO: 3.88 M/UL (ref 4–5.4)
RETICS/RBC NFR AUTO: 1.6 % (ref 0.5–2.5)
SODIUM SERPL-SCNC: 135 MMOL/L (ref 136–145)
URATE SERPL-MCNC: 4.2 MG/DL (ref 2.4–5.7)
WBC # BLD AUTO: 7.11 K/UL (ref 3.9–12.7)

## 2022-12-09 PROCEDURE — 85045 AUTOMATED RETICULOCYTE COUNT: CPT | Performed by: INTERNAL MEDICINE

## 2022-12-09 PROCEDURE — 83735 ASSAY OF MAGNESIUM: CPT | Performed by: INTERNAL MEDICINE

## 2022-12-09 PROCEDURE — 80053 COMPREHEN METABOLIC PANEL: CPT | Performed by: INTERNAL MEDICINE

## 2022-12-09 PROCEDURE — 84100 ASSAY OF PHOSPHORUS: CPT | Performed by: INTERNAL MEDICINE

## 2022-12-09 PROCEDURE — 83010 ASSAY OF HAPTOGLOBIN QUANT: CPT | Performed by: INTERNAL MEDICINE

## 2022-12-09 PROCEDURE — 82728 ASSAY OF FERRITIN: CPT | Performed by: INTERNAL MEDICINE

## 2022-12-09 PROCEDURE — 36415 COLL VENOUS BLD VENIPUNCTURE: CPT | Performed by: INTERNAL MEDICINE

## 2022-12-09 PROCEDURE — 84550 ASSAY OF BLOOD/URIC ACID: CPT | Performed by: INTERNAL MEDICINE

## 2022-12-09 PROCEDURE — 99999 PR PBB SHADOW E&M-EST. PATIENT-LVL V: ICD-10-PCS | Mod: PBBFAC,,, | Performed by: INTERNAL MEDICINE

## 2022-12-09 PROCEDURE — 99214 OFFICE O/P EST MOD 30 MIN: CPT | Mod: S$PBB,,, | Performed by: INTERNAL MEDICINE

## 2022-12-09 PROCEDURE — 99214 PR OFFICE/OUTPT VISIT, EST, LEVL IV, 30-39 MIN: ICD-10-PCS | Mod: S$PBB,,, | Performed by: INTERNAL MEDICINE

## 2022-12-09 PROCEDURE — 85025 COMPLETE CBC W/AUTO DIFF WBC: CPT | Performed by: INTERNAL MEDICINE

## 2022-12-09 PROCEDURE — 99999 PR PBB SHADOW E&M-EST. PATIENT-LVL V: CPT | Mod: PBBFAC,,, | Performed by: INTERNAL MEDICINE

## 2022-12-09 PROCEDURE — 99215 OFFICE O/P EST HI 40 MIN: CPT | Mod: PBBFAC | Performed by: INTERNAL MEDICINE

## 2022-12-09 PROCEDURE — 83615 LACTATE (LD) (LDH) ENZYME: CPT | Performed by: INTERNAL MEDICINE

## 2022-12-09 NOTE — PROGRESS NOTES
Section of Hematology and Stem Cell Transplantation  Follow Up Visit     Visit date: 12/9/22  Visit diagnosis: CLL (chronic lymphocytic leukemia) [C91.10]  Referred by:  Shiv Watson MD and Sony Carnes MD    Oncologic History:     Primary Oncologic Diagnosis: Chronic lymphocytic leukemia, Binet stage C, Duarte stage III; cold agglutinin disease     2016: First noted to have cervical lymphadenopathy. Excisional biopsy confirmed chronic lymphocytic leukemia. She did not have an indication for treatment; therefore, observation recommended by Dr. Kelley.   6/17/21: PET/CT noted enlarged bilateral cervical, supraclavicular, axillary, retroperitoneal, and pelvic lymph nodes. All nodes enlarged from the prior exam.  7/28/21: First noted to have mild anemia (Hgb ~11.5 to 10.8 g/dL). Continued monitoring.  8/3/21: Established care with Dr. Carnes.   11/1/21: On follow up with Dr. Carnes, she was noted to have worsening anemia - hemoglobin decreased to 8.6 g/dL. 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 Beta-2 microglobulin 3.19. CLL FISH revealed trisomy 12. Peripheral blood karyotype - 47,XX,+12[8]/47,idem,del(4)(p14p16)[3]/46,XX[9]. ZAP70 17% of cells.  12/14/21: She noted increased fatigue. Hemoglobin decreased to 6.2. Repeat labs confirmed decreased hemoglobin to 5.8 g/dL. Haptoglobin <1, . MICHELL Anti-IgG negative, MICHELL complement C3 positive.  12/17/21: Prednisone 60mg daily started in response to hemolytic anemia.  12/22/21: Initial visit at [facility] with Dr. Watson. WBC 96k, Hgb 8.9, Plts 288. MICHELL positive. , Hapto <10. Continued prednisone 60mg daily.   12/27/21: Initial visit with me. Prednisone decreased to 50mg daily.   1/4/22: Hemoglobin stable (~8.4 g/dL) with persistent hemolysis. Prednisone weaned to 40mg daily.   1/11/22: Cycle 1 day 1 of obinutuzumab plus venetoclax (starting day 22). Prednisone weaned to 30mg daily.   1/18/22: Hemoglobin improved. Prednisone weaned to 20mg daily.  1/31/22:  Prednisone weaned to 15mg daily. Tapered to 10mg one week later.   2/15/22: Prednisone weaned to 5mg daily.   4/4/22: Prednisone to 5mg every other day x2 weeks then stop.  4/27/22: Cold agglutinin titer positive (>1:512). Venetoclax stopped as hemolysis was attributed to cold agglutinins. Hemolysis improved.  6/1/22: She completed 6 cycles of obinutuzumab.      History of Present Ilness:   Cassidy Bryan) is a pleasant 77 y.o.female with a past medical history of hypertension and chronic lymphocytic leukemia who presents for follow up visit regarding CLL and cold agglutinin hemolytic anemia. She continues to do well. She had another brief episode of hemolysis following her COVID booster, which has since resolved. She had 2 lacerations (arm and leg) related to getting in and out of her car. Both are slowly healing. No fevers, chills, night sweats, weight loss.    PAST MEDICAL HISTORY:   Past Medical History:   Diagnosis Date    CLL (chronic lymphocytic leukemia) 12/22/2021    Hypertension     Hypothyroidism 4/26/2022    Thyroid disease        PAST SURGICAL HISTORY:   Past Surgical History:   Procedure Laterality Date    ADENOIDECTOMY      COLONOSCOPY      HYSTERECTOMY      TONSILLECTOMY         PAST SOCIAL HISTORY:  Social History     Tobacco Use    Smoking status: Never    Smokeless tobacco: Never   Substance Use Topics    Alcohol use: Yes     Alcohol/week: 0.0 standard drinks     Comment: rare       FAMILY HISTORY:  History reviewed. No pertinent family history.    CURRENT MEDICATIONS:   Current Outpatient Medications   Medication Sig    acyclovir (ZOVIRAX) 400 MG tablet Take 1 tablet (400 mg total) by mouth 2 (two) times daily.    aspirin (ECOTRIN) 81 MG EC tablet Take 81 mg by mouth.    atorvastatin (LIPITOR) 40 MG tablet TAKE ONE TABLET BY MOUTH once DAILY AT BEDTIME FOR cholesterol control    calcium polycarbophil (FIBERCON ORAL) Take by mouth.    carvediloL (COREG) 3.125 MG tablet Take 3.125 mg by mouth  as needed.    CARVEDILOL PHOSPHATE 20 MG ORAL CM24 (COREG CR) 20 mg 24 hr capsule Take 20 mg by mouth nightly.    clindamycin (CLEOCIN T) 1 % Swab 2 (two) times a day.    ergocalciferol (ERGOCALCIFEROL) 50,000 unit Cap Take 50,000 Units by mouth every 7 days.    fluticasone (FLONASE) 50 mcg/actuation nasal spray 1 spray by Each Nare route once daily.    FLUZONE HIGH-DOSE 2018-19, PF, 180 mcg/0.5 mL vaccine ADM 0.5ML IM UTD    folic acid (FOLVITE) 1 MG tablet Take 1 tablet (1 mg total) by mouth once daily.    glucosamine-chondroitin 500-400 mg tablet Take 1 tablet by mouth 3 (three) times daily.    multivitamin (THERAGRAN) per tablet Take 1 tablet by mouth once daily.    mupirocin (BACTROBAN) 2 % ointment 2 (two) times daily.    ondansetron (ZOFRAN-ODT) 8 MG TbDL Take 1 tablet (8 mg total) by mouth every 12 (twelve) hours as needed (nausea).    sulfacetamide sodium, acne, 10 % Susp Apply 1 application topically 2 (two) times daily. Apply to affected area    SYNTHROID 75 mcg tablet Take 1 tablet (75 mcg total) by mouth daily    telmisartan (MICARDIS) 80 MG Tab     venetoclax (VENCLEXTA) 10 mg Tab Take 10 mg (1 tablet)  by mouth daily on days 1-7 of cycle 2.   Take 20 mg (2 tablets) by mouth daily on days 8-14 of cycle 2.  Take 50 mg (5 tablets) daily on days 15-21 of cycle 2.    venetoclax (VENCLEXTA) 100 mg Tab Take 100 mg (1 tablet) by mouth daily  on days 22-28 of cycle 2.  Take 200 mg (2 tablets) by mouth daily on days 1-7 of cycle 3.    venetoclax (VENCLEXTA) 100 mg Tab Take 200 mg (2 tablets)  by mouth once daily.    vitamin D (VITAMIN D3) 1000 units Tab Take 1,000 Units by mouth.    VIVELLE-DOT 0.025 mg/24 hr Place 1 patch onto the skin twice a week     No current facility-administered medications for this visit.       ALLERGIES:   Review of patient's allergies indicates:   Allergen Reactions    Sulfa (sulfonamide antibiotics) Rash    Tobramycin-dexamethasone      Other reaction(s): Other (See  Comments)  dizziness    [place] Diarrhea       Review of Systems:     Pertinent positives and negatives included in the HPI. Otherwise a complete review of systems is negative.    Physical Exam:     Vitals:    12/09/22 1044   BP: (!) 181/75   Pulse: 73   Resp: 16     General: Appears well, NAD  HEENT: MMM, no OP lesions  Pulmonary: CTAB, no increased work of breathing, no W/R/C  Cardiovascular: S1S2 normal, RRR, no M/R/G  Abdominal: Soft, NT, ND, BS+, no HSM  Extremities: No C/C/E  Neurological: AAOx4, grossly normal, no focal deficits  Dermatologic: No appreciable rashes or lesions  Lymphatic: No appreciable cervical, axillary, or inguinal lymphadenopathy     ECOG Performance Status: (foot note - ECOG PS provided by Eastern Cooperative Oncology Group) 0 - Asymptomatic    Karnofsky Performance Score:  100%- Normal, No Complaints, No Evidence of Disease    Labs:   Lab Results   Component Value Date    WBC 7.11 12/09/2022    HGB 12.5 12/09/2022    HCT 36.8 (L) 12/09/2022    MCV 95 12/09/2022     12/09/2022       Lab Results   Component Value Date     (L) 12/09/2022    K 4.2 12/09/2022     12/09/2022    CO2 27 12/09/2022    BUN 15 12/09/2022    CREATININE 0.6 12/09/2022    ALBUMIN 3.8 12/09/2022    BILITOT 1.5 (H) 12/09/2022    ALKPHOS 79 12/09/2022    AST 17 12/09/2022    ALT 17 12/09/2022       Imaging:   PET/CT 6/17/21  COMPARISON: PET/CT 8/22/2016.   HISTORY: Lymphoma.   FINDINGS:   Head and neck: There is symmetric and physiologic distribution of radiotracer throughout the included brain parenchyma. There is no hemorrhage, hydrocephalus, or midline shift. There are innumerable bilateral enlarged FDG avid cervical lymph nodes. These have worsened from the prior exam. A representative left lower cervical lymph node best visualized on image 84 measures 19 mm compared with 10 mm previously with an SUV max of 1.8. There are enlarged left supraclavicular lymph nodes which were not present on the prior  exam. A representative eran conglomerate measures 3.4 cm in diameter with an SUV max of 2.2.   CHEST: There are innumerable bilateral axillary and subpectoral lymph nodes which are not present on the prior exam. These demonstrate low-level FDG avidity. A representative left axillary nodule best visualized on image 114 measures 1.9 cm with an SUV max of 1.6. There are prominent paratracheal lymph nodes which are not pathologically enlarged by size criteria and demonstrate background FDG avidity, however were not present on the prior exam.   There is no FDG avid pulmonary nodule or mass. There is no pleural effusion. There is no pneumothorax.   Abdomen and pelvis: There is physiologic distribution of radiotracer throughout the liver, spleen, and collecting system. There are multiple enlarged bilateral iliac and periaortic retroperitoneal lymph nodes. A representative left periaortic lymph node best visualized on image 201 measures 2.0 cm in diameter with an SUV max of 2.0.   MUSCULOSKELETAL: There is no FDG avid lytic or blastic osseous lesion.     IMPRESSION:   Innumerable enlarged bilateral cervical, supraclavicular, axillary, retroperitoneal, and pelvic lymph nodes all which demonstrate low-level FDG avidity, however are enlarged from the prior exam and most consistent with recurrent disease.       Pathology:  Peripheral Blood Flow Cytometry (11/1/21):  Comment:      CD5+ B-CELL LYMPHOPROLIFERATIVE DISORDER (SEE COMMENT).     COMMENT: Clonal CD20+ B-cells are detected that coexpress   CD5, CD23, FMC7(dim) and moderate surface kappa light chain   and are negative for CD10, comprising 75% of all analyzed   cells.       Prognostication Tools:  CLL-IPI = 2, intermediate risk (79.4% survival after 5 years, 40% survival after 10 years, median  months)    Assessment and Plan:   Cassidy JENNIFER Aguilar (Cassidy) is a pleasant 77 y.o.female with a past medical history of hypertension and chronic lymphocytic leukemia who presents  for follow up visit regarding CLL.     Chronic lymphocytic leukemia, Binet stage C, Duarte stage III: She has had Duarte stage I disease since 2016. 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 She was initially treated with steroids. In January 2022, she was started on treatment for CLL due to steroid-refractory hemolytic anemia with obinutuzumab plus venetoclax because of the benefit of anti-CD20 MAB with AIHA plus fixed duration treatment (patient preference).  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 Venetoclax was stopped and obinutuzumab was continued to complete 6 cycles. Since April 2022, her hemolytic anemia has resolved.   Continue to monitor clinically.    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  Initially treated with prednisone in December with stabilization of blood counts but persistent hemolysis.  Hemolytic anemia resolved in 4/2022. She completed 6 cycles of obinutuzumab as noted above. Consider sutimlimab if relapse of LAYTON.  She had another episode of hemolysis following a COVID booster, so theses may be related.   Continue to monitor LDH, haptoglobin, and retic.    Immunosuppression: Continue acyclovir 400mg BID until 1 year post-treatment (until 6/2023). She received 4 COVID vaccinations and 2 Evusheld infusions.     Hypertension: BP ok today. Continue to monitor.    Laceration:  Continue topical antimicrobial treatment as prescribed by her dermatologist.  We will hold off on systemic antibiotics at this time as there does not appear to be any surrounding erythema.    Follow up: As below    Orders Placed:         Route Chart for  Scheduling    BMT Chart Routing      Follow up with physician 3 months. With labs prior   Follow up with ERIKA    Provider visit type    Infusion scheduling note    Injection scheduling note    Labs CBC, CMP, phosphorus, magnesium, reticulocytes, uric acid, LDH and hemolysis panel   Lab interval:  Labs prior to visit - CBC, CMP, Mg, Phos, LDH, hapto, retic, uric acid   Imaging None      Pharmacy appointment No pharmacy appointment needed      Other referrals No additional referrals needed         Advance Care Planning   Date: 12/09/2022  We previously reviewed her underlying diagnosis, natural history, prognosis, and various treatment options.  We previously treated her underlying disease due to concern for related hemolytic anemia.  Treatment subsequently stopped, and she is done very well since then.  Her disease appears to be well-controlled we will reconsider treatment if she is interested in the future if she develops an indication for treatment.     Total time of this visit was 31 minutes, including time spent face to face with patient and/or via video/audio, and also in preparing for today's visit for MDM and documentation. (Medical Decision Making, including consideration of possible diagnoses, management options, complex medical record review, review of diagnostic tests and information, consideration and discussion of significant complications based on comorbidities, and discussion with providers involved with the care of the patient). Greater than 50% was spent face to face with the patient counseling and coordinating care.    Bubba Wright MD  Hematology, Oncology, and Stem Cell Transplantation  [facility]

## 2022-12-12 ENCOUNTER — OFFICE VISIT (OUTPATIENT)
Dept: PSYCHIATRY | Facility: CLINIC | Age: 77
End: 2022-12-12
Payer: MEDICARE

## 2022-12-12 ENCOUNTER — PATIENT MESSAGE (OUTPATIENT)
Dept: PSYCHIATRY | Facility: CLINIC | Age: 77
End: 2022-12-12

## 2022-12-12 DIAGNOSIS — C91.10 CLL (CHRONIC LYMPHOCYTIC LEUKEMIA): ICD-10-CM

## 2022-12-12 DIAGNOSIS — F41.9 ANXIETY: Primary | ICD-10-CM

## 2022-12-12 PROCEDURE — 90834 PSYTX W PT 45 MINUTES: CPT | Mod: 95,,, | Performed by: PSYCHOLOGIST

## 2022-12-12 PROCEDURE — 90834 PR PSYCHOTHERAPY W/PATIENT, 45 MIN: ICD-10-PCS | Mod: 95,,, | Performed by: PSYCHOLOGIST

## 2022-12-12 NOTE — PROGRESS NOTES
INFORMED CONSENT: Cassidy Aguilar   is known to this provider and identity was confirmed via NAME and .  The patient has been informed of the risks and benefits associated with engaging in psychotherapy, the handling of protected health information, the rights of privacy and the limits of confidentiality. The patient has also been informed of the importance of reporting any suicidal or homicidal ideation to this or any provider to ensure safety of all parties, and the Cassidy Aguilar expressed understanding. The patient was agreeable to these terms and freely participates in individual psychotherapy.      Telemedicine PSYCHO-ONCOLOGY NOTE/ Individual Psychotherapy     Consultation started: 2022 at 9:06 AM   The chief complaint leading to consultation is: stress management  The patient location is: [place], Patient home  Virtual visit with synchronous audio and video   Patient alone at the time of consultation      Crisis Disclaimer: Patient was informed that due to the virtual nature of the visit, that if a crisis develops, protocols will be implemented to ensure patient safety, including but not limited to: 1) Initiating a welfare check with local Law Enforcement, 2) Calling [facility]/National Crisis Hotline, and/or 3) Initiating PEC/CEC procedures.      Each patient provided medical services by telemedicine is:  (1) informed of the relationship between the physician and patient and the respective role of any other health care provider with respect to management of the patient; and (2) notified that he or she may decline to receive medical services by telemedicine and may withdraw from such care at any time.    Date: 2022   [facility] therapist[facility]         Therapeutic Intervention: Met with patient.  Outpatient - Behavior modifying psychotherapy 45 min - CPT code 63896    Referring provider:    Chief complaint/reason for encounter: anxiety   Met with patient to evaluate psychosocial adaptation to  survivorship of CLL, work stress    Patient was last seen by me on 6/3/2022    Objective:      Cassidy Aguilar arrived promptly for the session.The patient was fully cooperative throughout the session.  Appearance: age appropriate, appropriately  dressed, adequately  groomed  Behavior/Cooperation: friendly and cooperative  Speech: normal in rate, volume, and tone and appropriate quality, quantity and organization of sentences  Mood: anxious  Affect: mood congruent  Thought Process: goal-directed, logical  Thought Content: normal,  No delusions or paranoia; did not appear to be responding to internal stimuli during the session  Orientation: grossly intact  Memory: Grossly intact  Attention Span/Concentration: Attends to session without distraction; reports no difficulty  Fund of Knowledge: average  Estimate of Intelligence: average from verbal skills and history  Cognition: grossly intact  Insight: patient has awareness of illness; good insight into own behavior and behavior of others  Judgment: the patient's behavior is adequate to circumstances      Interval history and content of current session: Patient with new work stress and anxiety-impact her BP. Reviewed stress management techniques Discussed treatment. Reports to be coping with great difficulty. Evaluated cognitive response, paying particular attention to negative intrusive thoughts of a persistent and detrimental nature. Thoughts of this type are in evidence with mild distress. Identified and evaluated psychosocial and environmental stressors secondary to diagnosis and treatment.     Focused on providing cognitive behavioral therapy to address negative cognitions. Examined proactive behaviors that may be implemented to minimize or ameliorate psychosocial stressors secondary to diagnosis and treatment.     Risk parameters:   Patient reports no suicidal ideation  Patient reports no homicidal ideation  Patient reports no self-injurious behavior  Patient reports  no violent behavior   Safety needs:  None at this time      Verbal deficits: None     Patient's response to intervention:The patient's response to intervention is accepting.     Progress toward goals and other mental status changes:  The patient's progress toward goals is good.      Progress to date:Progress as Expected      Goals from last visit:  n/a      Patient reported outcomes:      Distress Thermometer:   Distress Score    Distress Score: 1        Practical Problems Physical Problems                                                   Family Problems                                         Emotional Problems                                                         Spiritual/Religions Concerns     Spiritual / Yarsanism Concerns: No         Other Problems             PHQ ANSWERS    Q1. Little interest or pleasure in doing things: (P) Not at all (12/12/22 0859)  Q2. Feeling down, depressed, or hopeless: (P) Not at all (12/12/22 0859)  Q3. Trouble falling or staying asleep, or sleeping too much: (P) Several days (12/12/22 0859)  Q4. Feeling tired or having little energy: (P) Not at all (12/12/22 0859)  Q5. Poor appetite or overeating: (P) Not at all (12/12/22 0859)  Q6. Feeling bad about yourself - or that you are a failure or have let yourself or your family down: (P) Not at all (12/12/22 0859)  Q7. Trouble concentrating on things, such as reading the newspaper or watching television: (P) Not at all (12/12/22 0859)  Q8. Moving or speaking so slowly that other people could have noticed. Or the opposite - being so fidgety or restless that you have been moving around a lot more than usual: (P) Not at all (12/12/22 0859)  Q9.  0    PHQ8 Score : (P) 1 (12/12/22 0859)  PHQ-9 Total Score: (P) 1 (12/12/22 0859)     CHUCK-7 Answers    GAD7 12/12/2022   1. Feeling nervous, anxious, or on edge? 1   2. Not being able to stop or control worrying? 0   3. Worrying too much about different things? 0   4. Trouble relaxing? 1   5.  Being so restless that it is hard to sit still? 0   6. Becoming easily annoyed or irritable? 0   7. Feeling afraid as if something awful might happen? 0   CHUCK-7 Score 2     CHUCK-7 Score: (P) 2  Interpretation: (P) Normal       Client Strengths: verbal, intelligent, successful, good social support, good insight, commitment to wellness, strong anabel, strong cultural traditions      ICD-10-CM ICD-9-CM   1. Anxiety  F41.9 300.00   2. CLL (chronic lymphocytic leukemia)  C91.10 204.10        Treatment Plan:individual psychotherapy  Target symptoms: anxiety , work stress  Why chosen therapy is appropriate versus another modality: relevant to diagnosis, patient responds to this modality, evidence based practice  Outcome monitoring methods: self-report, observation, checklist/rating scale  Therapeutic intervention type: behavior modifying psychotherapy  Prognosis: Good      Behavioral goals:    Exercise:   Stress management: STOP/TIP, belly breathing   Social engagement:   Nutrition:   Smoking Cessation:   Therapy:  Increase daily self-care and attention to health management  Pleasant events scheduling and increased social interaction  Monitor stressors in writing and bring to next visit    Return to clinic: as scheduled     Length of Service (minutes direct face-to-face contact): 45          Veronica Gonzalez, PhD  Clinical Psychologist  [place] License #[id number]  [place] License #[id number]

## 2022-12-20 DIAGNOSIS — C91.10 CLL (CHRONIC LYMPHOCYTIC LEUKEMIA): Primary | ICD-10-CM

## 2023-01-05 ENCOUNTER — PATIENT MESSAGE (OUTPATIENT)
Dept: HEMATOLOGY/ONCOLOGY | Facility: CLINIC | Age: 78
End: 2023-01-05
Payer: MEDICARE

## 2023-01-05 DIAGNOSIS — D84.9 IMMUNOSUPPRESSION: ICD-10-CM

## 2023-01-06 RX ORDER — ACYCLOVIR 400 MG/1
400 TABLET ORAL 2 TIMES DAILY
Qty: 60 TABLET | Refills: 11 | Status: SHIPPED | OUTPATIENT
Start: 2023-01-06 | End: 2023-05-31

## 2023-01-26 ENCOUNTER — OFFICE VISIT (OUTPATIENT)
Dept: PSYCHIATRY | Facility: CLINIC | Age: 78
End: 2023-01-26
Payer: MEDICARE

## 2023-01-26 DIAGNOSIS — F41.9 ANXIETY: Primary | ICD-10-CM

## 2023-01-26 DIAGNOSIS — C91.10 CLL (CHRONIC LYMPHOCYTIC LEUKEMIA): ICD-10-CM

## 2023-01-26 PROCEDURE — 90832 PR PSYCHOTHERAPY W/PATIENT, 30 MIN: ICD-10-PCS | Mod: 95,,, | Performed by: PSYCHOLOGIST

## 2023-01-26 PROCEDURE — 90832 PSYTX W PT 30 MINUTES: CPT | Mod: 95,,, | Performed by: PSYCHOLOGIST

## 2023-01-26 NOTE — PROGRESS NOTES
INFORMED CONSENT: Patient was identified using two patient-identifiers. The patient has been informed of the risks and benefits associated with engaging in psychotherapy, the handling of protected health information, the rights of privacy and the limits of confidentiality. The patient has also been informed of the importance of reporting any suicidal or homicidal ideation to this or any provider to ensure safety of all parties, and the Cassidy Aguilar expressed understanding. The patient was agreeable to these terms and freely participates in individual psychotherapy.    Telemedicine PSYCHO-ONCOLOGY NOTE/ Individual Psychotherapy     Consultation started: 1/26/2023 at 2:40 PM   The chief complaint leading to consultation is: anxiety  The patient location is: [place] Patient home  Virtual visit with synchronous audio and video   Patient alone at the time of consultation      Crisis Disclaimer: Patient was informed that due to the virtual nature of the visit, that if a crisis develops, protocols will be implemented to ensure patient safety, including but not limited to: 1) Initiating a welfare check with local Law Enforcement, 2) Calling 911/National Crisis Hotline, and/or 3) Initiating PEC/CEC procedures.      Each patient provided medical services by telemedicine is:  (1) informed of the relationship between the physician and patient and the respective role of any other health care provider with respect to management of the patient; and (2) notified that he or she may decline to receive medical services by telemedicine and may withdraw from such care at any time.    Date: 1/26/2023   Site of therapist[facility]         Therapeutic Intervention: Met with patient.  Outpatient - Behavior modifying psychotherapy 30 min - CPT code 51353    Referring provider:    Chief complaint/reason for encounter: anxiety   Met with patient to evaluate psychosocial adaptation to survivorship of CLL    Patient was last seen by me on  12/12/2022    Objective:      Cassidy Aguilar arrived promptly for the session.The patient was fully cooperative throughout the session.  Appearance: age appropriate, appropriately  dressed, adequately  groomed  Behavior/Cooperation: friendly and cooperative  Speech: normal in rate, volume, and tone and appropriate quality, quantity and organization of sentences  Mood: euthymic  Affect: mood congruent  Thought Process: goal-directed, logical  Thought Content: normal,  No delusions or paranoia; did not appear to be responding to internal stimuli during the session  Orientation: grossly intact  Memory: Grossly intact  Attention Span/Concentration: Attends to session without distraction; reports no difficulty  Fund of Knowledge: average  Estimate of Intelligence: average from verbal skills and history  Cognition: grossly intact  Insight: patient has awareness of illness; good insight into own behavior and behavior of others  Judgment: the patient's behavior is adequate to circumstances      Interval history and content of current session:  Discussed diagnosis, treatment, prognosis, current adaptation to disease and treatment status, and family's adaptation to disease and treatment status. Reports to be coping in an adequate manner. Evaluated cognitive response, paying particular attention to negative intrusive thoughts of a persistent and detrimental nature. Thoughts of this type are in evidence with no distress. Identified and evaluated psychosocial and environmental stressors secondary to diagnosis and treatment.     Focused on providing cognitive behavioral therapy to address negative cognitions. Examined proactive behaviors that may be implemented to minimize or ameliorate psychosocial stressors secondary to diagnosis and treatment.     Risk parameters:   Patient reports no suicidal ideation  Patient reports no homicidal ideation  Patient reports no self-injurious behavior  Patient reports no violent  behavior   Safety needs:  None at this time      Verbal deficits: None     Patient's response to intervention:The patient's response to intervention is accepting.     Progress toward goals and other mental status changes:  The patient's progress toward goals is good.      Progress to date:Progress as Expected      Goals from last visit: Met      Patient reported outcomes:      Distress Thermometer:   Distress Score    Distress Score: 0 - No Distress        Practical Problems Physical Problems                                                   Family Problems                                         Emotional Problems                                                         Spiritual/Religions Concerns     Spiritual / Sabianism Concerns: No         Other Problems             PHQ ANSWERS    Q1. Little interest or pleasure in doing things: (P) Not at all (01/26/23 1425)  Q2. Feeling down, depressed, or hopeless: (P) Not at all (01/26/23 1425)  Q3. Trouble falling or staying asleep, or sleeping too much: (P) Several days (01/26/23 1425)  Q4. Feeling tired or having little energy: (P) Not at all (01/26/23 1425)  Q5. Poor appetite or overeating: (P) Not at all (01/26/23 1425)  Q6. Feeling bad about yourself - or that you are a failure or have let yourself or your family down: (P) Not at all (01/26/23 1425)  Q7. Trouble concentrating on things, such as reading the newspaper or watching television: (P) Not at all (01/26/23 1425)  Q8. Moving or speaking so slowly that other people could have noticed. Or the opposite - being so fidgety or restless that you have been moving around a lot more than usual: (P) Not at all (01/26/23 1425)  Q9.  0      PHQ8 Score : (P) 1 (01/26/23 1425)  PHQ-9 Total Score: (P) 1 (01/26/23 1425)     CHUCK-7 Answers    GAD7 1/26/2023   1. Feeling nervous, anxious, or on edge? 0   2. Not being able to stop or control worrying? 0   3. Worrying too much about different things? 1   4. Trouble relaxing? 1   5.  Being so restless that it is hard to sit still? 0   6. Becoming easily annoyed or irritable? 0   7. Feeling afraid as if something awful might happen? 0   CHUCK-7 Score 2     CHUCK-7 Score: (P) 2  Interpretation: (P) Normal       Client Strengths: verbal, intelligent, successful, good social support, good insight, commitment to wellness, strong anabel, strong cultural traditions      ICD-10-CM ICD-9-CM   1. Anxiety  F41.9 300.00   2. CLL (chronic lymphocytic leukemia)  C91.10 204.10        Treatment Plan:individual psychotherapy  Target symptoms: adjustment  Why chosen therapy is appropriate versus another modality: relevant to diagnosis, patient responds to this modality, evidence based practice  Outcome monitoring methods: self-report, observation, checklist/rating scale  Therapeutic intervention type: insight oriented psychotherapy, behavior modifying psychotherapy  Prognosis: Good      Behavioral goals:    Exercise:   Stress management:   Social engagement:   Nutrition:   Smoking Cessation:   Therapy:  Increase daily self-care and attention to health management  Pleasant events scheduling and increased social interaction    Return to clinic: as scheduled     Length of Service (minutes direct face-to-face contact): 30          Veronica Gonzalez, PhD  Clinical Psychologist  [place] License #[id number]  [place] License #[id number]

## 2023-03-09 ENCOUNTER — TELEPHONE (OUTPATIENT)
Dept: HEMATOLOGY/ONCOLOGY | Facility: CLINIC | Age: 78
End: 2023-03-09
Payer: MEDICARE

## 2023-03-09 NOTE — TELEPHONE ENCOUNTER
Tried to call pt to confirm appt on 03/10/2023 at 10:30am with . Pt didn't answer so left message regarding call

## 2023-03-10 ENCOUNTER — LAB VISIT (OUTPATIENT)
Dept: LAB | Facility: HOSPITAL | Age: 78
End: 2023-03-10
Attending: INTERNAL MEDICINE
Payer: MEDICARE

## 2023-03-10 ENCOUNTER — OFFICE VISIT (OUTPATIENT)
Dept: HEMATOLOGY/ONCOLOGY | Facility: CLINIC | Age: 78
End: 2023-03-10
Payer: MEDICARE

## 2023-03-10 VITALS
OXYGEN SATURATION: 98 % | DIASTOLIC BLOOD PRESSURE: 81 MMHG | HEART RATE: 70 BPM | RESPIRATION RATE: 16 BRPM | SYSTOLIC BLOOD PRESSURE: 184 MMHG | WEIGHT: 126.75 LBS | HEIGHT: 69 IN | BODY MASS INDEX: 18.77 KG/M2

## 2023-03-10 DIAGNOSIS — D84.9 IMMUNOSUPPRESSION: ICD-10-CM

## 2023-03-10 DIAGNOSIS — C91.10 CLL (CHRONIC LYMPHOCYTIC LEUKEMIA): Primary | ICD-10-CM

## 2023-03-10 DIAGNOSIS — C91.10 CLL (CHRONIC LYMPHOCYTIC LEUKEMIA): ICD-10-CM

## 2023-03-10 DIAGNOSIS — D59.12 COLD AUTOIMMUNE HEMOLYTIC ANEMIA: ICD-10-CM

## 2023-03-10 LAB
ALBUMIN SERPL BCP-MCNC: 3.9 G/DL (ref 3.5–5.2)
ALP SERPL-CCNC: 77 U/L (ref 55–135)
ALT SERPL W/O P-5'-P-CCNC: 20 U/L (ref 10–44)
ANION GAP SERPL CALC-SCNC: 5 MMOL/L (ref 8–16)
AST SERPL-CCNC: 18 U/L (ref 10–40)
BASOPHILS # BLD AUTO: 0.06 K/UL (ref 0–0.2)
BASOPHILS NFR BLD: 0.9 % (ref 0–1.9)
BILIRUB SERPL-MCNC: 1.1 MG/DL (ref 0.1–1)
BUN SERPL-MCNC: 14 MG/DL (ref 8–23)
CALCIUM SERPL-MCNC: 9.2 MG/DL (ref 8.7–10.5)
CHLORIDE SERPL-SCNC: 102 MMOL/L (ref 95–110)
CO2 SERPL-SCNC: 28 MMOL/L (ref 23–29)
CREAT SERPL-MCNC: 0.6 MG/DL (ref 0.5–1.4)
DIFFERENTIAL METHOD: ABNORMAL
EOSINOPHIL # BLD AUTO: 0.1 K/UL (ref 0–0.5)
EOSINOPHIL NFR BLD: 1.3 % (ref 0–8)
ERYTHROCYTE [DISTWIDTH] IN BLOOD BY AUTOMATED COUNT: 12.3 % (ref 11.5–14.5)
EST. GFR  (NO RACE VARIABLE): >60 ML/MIN/1.73 M^2
GLUCOSE SERPL-MCNC: 96 MG/DL (ref 70–110)
HAPTOGLOB SERPL-MCNC: 63 MG/DL (ref 30–250)
HCT VFR BLD AUTO: 38 % (ref 37–48.5)
HGB BLD-MCNC: 13.2 G/DL (ref 12–16)
IMM GRANULOCYTES # BLD AUTO: 0.02 K/UL (ref 0–0.04)
IMM GRANULOCYTES NFR BLD AUTO: 0.3 % (ref 0–0.5)
LDH SERPL L TO P-CCNC: 158 U/L (ref 110–260)
LYMPHOCYTES # BLD AUTO: 1.3 K/UL (ref 1–4.8)
LYMPHOCYTES NFR BLD: 18.6 % (ref 18–48)
MAGNESIUM SERPL-MCNC: 1.8 MG/DL (ref 1.6–2.6)
MCH RBC QN AUTO: 31.9 PG (ref 27–31)
MCHC RBC AUTO-ENTMCNC: 34.7 G/DL (ref 32–36)
MCV RBC AUTO: 92 FL (ref 82–98)
MONOCYTES # BLD AUTO: 0.5 K/UL (ref 0.3–1)
MONOCYTES NFR BLD: 7.8 % (ref 4–15)
NEUTROPHILS # BLD AUTO: 4.9 K/UL (ref 1.8–7.7)
NEUTROPHILS NFR BLD: 71.1 % (ref 38–73)
NRBC BLD-RTO: 0 /100 WBC
PHOSPHATE SERPL-MCNC: 3.5 MG/DL (ref 2.7–4.5)
PLATELET # BLD AUTO: 210 K/UL (ref 150–450)
PMV BLD AUTO: 10.3 FL (ref 9.2–12.9)
POTASSIUM SERPL-SCNC: 4.2 MMOL/L (ref 3.5–5.1)
PROT SERPL-MCNC: 6.2 G/DL (ref 6–8.4)
RBC # BLD AUTO: 4.14 M/UL (ref 4–5.4)
RETICS/RBC NFR AUTO: 1.5 % (ref 0.5–2.5)
SODIUM SERPL-SCNC: 135 MMOL/L (ref 136–145)
URATE SERPL-MCNC: 4.2 MG/DL (ref 2.4–5.7)
WBC # BLD AUTO: 6.92 K/UL (ref 3.9–12.7)

## 2023-03-10 PROCEDURE — 83615 LACTATE (LD) (LDH) ENZYME: CPT | Performed by: INTERNAL MEDICINE

## 2023-03-10 PROCEDURE — 99999 PR PBB SHADOW E&M-EST. PATIENT-LVL III: CPT | Mod: PBBFAC,,, | Performed by: INTERNAL MEDICINE

## 2023-03-10 PROCEDURE — 83735 ASSAY OF MAGNESIUM: CPT | Performed by: INTERNAL MEDICINE

## 2023-03-10 PROCEDURE — 84100 ASSAY OF PHOSPHORUS: CPT | Performed by: INTERNAL MEDICINE

## 2023-03-10 PROCEDURE — 84550 ASSAY OF BLOOD/URIC ACID: CPT | Performed by: INTERNAL MEDICINE

## 2023-03-10 PROCEDURE — 83010 ASSAY OF HAPTOGLOBIN QUANT: CPT | Performed by: INTERNAL MEDICINE

## 2023-03-10 PROCEDURE — 85025 COMPLETE CBC W/AUTO DIFF WBC: CPT | Performed by: INTERNAL MEDICINE

## 2023-03-10 PROCEDURE — 99213 OFFICE O/P EST LOW 20 MIN: CPT | Mod: PBBFAC | Performed by: INTERNAL MEDICINE

## 2023-03-10 PROCEDURE — 99214 PR OFFICE/OUTPT VISIT, EST, LEVL IV, 30-39 MIN: ICD-10-PCS | Mod: S$PBB,,, | Performed by: INTERNAL MEDICINE

## 2023-03-10 PROCEDURE — 99214 OFFICE O/P EST MOD 30 MIN: CPT | Mod: S$PBB,,, | Performed by: INTERNAL MEDICINE

## 2023-03-10 PROCEDURE — 80053 COMPREHEN METABOLIC PANEL: CPT | Performed by: INTERNAL MEDICINE

## 2023-03-10 PROCEDURE — 85045 AUTOMATED RETICULOCYTE COUNT: CPT | Performed by: INTERNAL MEDICINE

## 2023-03-10 PROCEDURE — 99999 PR PBB SHADOW E&M-EST. PATIENT-LVL III: ICD-10-PCS | Mod: PBBFAC,,, | Performed by: INTERNAL MEDICINE

## 2023-03-10 PROCEDURE — 36415 COLL VENOUS BLD VENIPUNCTURE: CPT | Performed by: INTERNAL MEDICINE

## 2023-03-10 NOTE — PROGRESS NOTES
Section of Hematology and Stem Cell Transplantation  Follow Up Visit     Visit date: 3/10/23  Visit diagnosis: CLL (chronic lymphocytic leukemia) [C91.10]  Referred by:  Shiv Watson MD and Sony Carnes MD    Oncologic History:     Primary Oncologic Diagnosis: Chronic lymphocytic leukemia, Binet stage C, Duarte stage III; cold agglutinin disease     2016: First noted to have cervical lymphadenopathy. Excisional biopsy confirmed chronic lymphocytic leukemia. She did not have an indication for treatment; therefore, observation recommended by Dr. Kelley.   6/17/21: PET/CT noted enlarged bilateral cervical, supraclavicular, axillary, retroperitoneal, and pelvic lymph nodes. All nodes enlarged from the prior exam.  7/28/21: First noted to have mild anemia (Hgb ~11.5 to 10.8 g/dL). Continued monitoring.  8/3/21: Established care with Dr. Carnes.   11/1/21: On follow up with Dr. Carnes, she was noted to have worsening anemia - hemoglobin decreased to 8.6 g/dL. 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 Beta-2 microglobulin 3.19. CLL FISH revealed trisomy 12. Peripheral blood karyotype - 47,XX,+12[8]/47,idem,del(4)(p14p16)[3]/46,XX[9]. ZAP70 17% of cells.  12/14/21: She noted increased fatigue. Hemoglobin decreased to 6.2. Repeat labs confirmed decreased hemoglobin to 5.8 g/dL. Haptoglobin <1, . MICHELL Anti-IgG negative, MICHELL complement C3 positive.  12/17/21: Prednisone 60mg daily started in response to hemolytic anemia.  12/22/21: Initial visit at [facility] with Dr. Watson. WBC 96k, Hgb 8.9, Plts 288. MICHELL positive. , Hapto <10. Continued prednisone 60mg daily.   12/27/21: Initial visit with me. Prednisone decreased to 50mg daily.   1/4/22: Hemoglobin stable (~8.4 g/dL) with persistent hemolysis. Prednisone weaned to 40mg daily.   1/11/22: Cycle 1 day 1 of obinutuzumab plus venetoclax (starting day 22). Prednisone weaned to 30mg daily.   1/18/22: Hemoglobin improved. Prednisone weaned to 20mg daily.  1/31/22:  Prednisone weaned to 15mg daily. Tapered to 10mg one week later.   2/15/22: Prednisone weaned to 5mg daily.   4/4/22: Prednisone to 5mg every other day x2 weeks then stop.  4/27/22: Cold agglutinin titer positive (>1:512). Venetoclax stopped as hemolysis was attributed to cold agglutinins. Hemolysis improved.  6/1/22: She completed 6 cycles of obinutuzumab.      History of Present Ilness:   Cassidy Bryan) is a pleasant 78 y.o.female with a past medical history of hypertension and chronic lymphocytic leukemia who presents for follow up visit regarding CLL and cold agglutinin hemolytic anemia. She feels well today. She is still playing tennis regularly. No fevers, chills, night sweats, weight loss.    PAST MEDICAL HISTORY:   Past Medical History:   Diagnosis Date    CLL (chronic lymphocytic leukemia) 12/22/2021    Hypertension     Hypothyroidism 4/26/2022    Thyroid disease        PAST SURGICAL HISTORY:   Past Surgical History:   Procedure Laterality Date    ADENOIDECTOMY      COLONOSCOPY      HYSTERECTOMY      TONSILLECTOMY         PAST SOCIAL HISTORY:  Social History     Tobacco Use    Smoking status: Never    Smokeless tobacco: Never   Substance Use Topics    Alcohol use: Yes     Alcohol/week: 0.0 standard drinks     Comment: rare       FAMILY HISTORY:  History reviewed. No pertinent family history.    CURRENT MEDICATIONS:   Current Outpatient Medications   Medication Sig    acyclovir (ZOVIRAX) 400 MG tablet Take 1 tablet (400 mg total) by mouth 2 (two) times daily.    aspirin (ECOTRIN) 81 MG EC tablet Take 81 mg by mouth.    atorvastatin (LIPITOR) 40 MG tablet TAKE ONE TABLET BY MOUTH once DAILY AT BEDTIME FOR cholesterol control    calcium polycarbophil (FIBERCON ORAL) Take by mouth.    carvediloL (COREG) 3.125 MG tablet Take 3.125 mg by mouth as needed.    CARVEDILOL PHOSPHATE 20 MG ORAL CM24 (COREG CR) 20 mg 24 hr capsule Take 20 mg by mouth nightly.    clindamycin (CLEOCIN T) 1 % Swab 2 (two) times a  day.    ergocalciferol (ERGOCALCIFEROL) 50,000 unit Cap Take 50,000 Units by mouth every 7 days.    fluticasone (FLONASE) 50 mcg/actuation nasal spray 1 spray by Each Nare route once daily.    FLUZONE HIGH-DOSE 2018-19, PF, 180 mcg/0.5 mL vaccine ADM 0.5ML IM UTD    folic acid (FOLVITE) 1 MG tablet Take 1 tablet (1 mg total) by mouth once daily. (Patient taking differently: Take 800 mcg by mouth once daily.)    glucosamine-chondroitin 500-400 mg tablet Take 1 tablet by mouth 3 (three) times daily.    multivitamin (THERAGRAN) per tablet Take 1 tablet by mouth once daily.    mupirocin (BACTROBAN) 2 % ointment 2 (two) times daily.    ondansetron (ZOFRAN-ODT) 8 MG TbDL Take 1 tablet (8 mg total) by mouth every 12 (twelve) hours as needed (nausea).    sulfacetamide sodium, acne, 10 % Susp Apply 1 application topically 2 (two) times daily. Apply to affected area    SYNTHROID 75 mcg tablet Take 1 tablet (75 mcg total) by mouth daily    telmisartan (MICARDIS) 80 MG Tab     venetoclax (VENCLEXTA) 10 mg Tab Take 10 mg (1 tablet)  by mouth daily on days 1-7 of cycle 2.   Take 20 mg (2 tablets) by mouth daily on days 8-14 of cycle 2.  Take 50 mg (5 tablets) daily on days 15-21 of cycle 2.    venetoclax (VENCLEXTA) 100 mg Tab Take 100 mg (1 tablet) by mouth daily  on days 22-28 of cycle 2.  Take 200 mg (2 tablets) by mouth daily on days 1-7 of cycle 3.    vitamin D (VITAMIN D3) 1000 units Tab Take 1,000 Units by mouth.    VIVELLE-DOT 0.025 mg/24 hr Place 1 patch onto the skin twice a week    venetoclax (VENCLEXTA) 100 mg Tab Take 200 mg (2 tablets)  by mouth once daily.     No current facility-administered medications for this visit.       ALLERGIES:   Review of patient's allergies indicates:   Allergen Reactions    Sulfa (sulfonamide antibiotics) Rash    Tobramycin-dexamethasone      Other reaction(s): Other (See Comments)  dizziness    [place] Diarrhea       Review of Systems:     Pertinent positives and negatives included in  the HPI. Otherwise a complete review of systems is negative.    Physical Exam:     Vitals:    03/10/23 1025   BP: (!) 184/81   Pulse: 70   Resp: 16     General: Appears well, NAD  HEENT: MMM, no OP lesions  Pulmonary: CTAB, no increased work of breathing, no W/R/C  Cardiovascular: S1S2 normal, RRR, no M/R/G  Abdominal: Soft, NT, ND, BS+, no HSM  Extremities: No C/C/E  Neurological: AAOx4, grossly normal, no focal deficits  Dermatologic: No appreciable rashes or lesions  Lymphatic: No appreciable cervical, axillary, or inguinal lymphadenopathy     ECOG Performance Status: (foot note - ECOG PS provided by Eastern Cooperative Oncology Group) 0 - Asymptomatic    Karnofsky Performance Score:  100%- Normal, No Complaints, No Evidence of Disease    Labs:   Lab Results   Component Value Date    WBC 6.92 03/10/2023    HGB 13.2 03/10/2023    HCT 38.0 03/10/2023    MCV 92 03/10/2023     03/10/2023       Lab Results   Component Value Date     (L) 03/10/2023    K 4.2 03/10/2023     03/10/2023    CO2 28 03/10/2023    BUN 14 03/10/2023    CREATININE 0.6 03/10/2023    ALBUMIN 3.9 03/10/2023    BILITOT 1.1 (H) 03/10/2023    ALKPHOS 77 03/10/2023    AST 18 03/10/2023    ALT 20 03/10/2023       Imaging:   PET/CT 6/17/21  COMPARISON: PET/CT 8/22/2016.   HISTORY: Lymphoma.   FINDINGS:   Head and neck: There is symmetric and physiologic distribution of radiotracer throughout the included brain parenchyma. There is no hemorrhage, hydrocephalus, or midline shift. There are innumerable bilateral enlarged FDG avid cervical lymph nodes. These have worsened from the prior exam. A representative left lower cervical lymph node best visualized on image 84 measures 19 mm compared with 10 mm previously with an SUV max of 1.8. There are enlarged left supraclavicular lymph nodes which were not present on the prior exam. A representative eran conglomerate measures 3.4 cm in diameter with an SUV max of 2.2.   CHEST: There are  innumerable bilateral axillary and subpectoral lymph nodes which are not present on the prior exam. These demonstrate low-level FDG avidity. A representative left axillary nodule best visualized on image 114 measures 1.9 cm with an SUV max of 1.6. There are prominent paratracheal lymph nodes which are not pathologically enlarged by size criteria and demonstrate background FDG avidity, however were not present on the prior exam.   There is no FDG avid pulmonary nodule or mass. There is no pleural effusion. There is no pneumothorax.   Abdomen and pelvis: There is physiologic distribution of radiotracer throughout the liver, spleen, and collecting system. There are multiple enlarged bilateral iliac and periaortic retroperitoneal lymph nodes. A representative left periaortic lymph node best visualized on image 201 measures 2.0 cm in diameter with an SUV max of 2.0.   MUSCULOSKELETAL: There is no FDG avid lytic or blastic osseous lesion.     IMPRESSION:   Innumerable enlarged bilateral cervical, supraclavicular, axillary, retroperitoneal, and pelvic lymph nodes all which demonstrate low-level FDG avidity, however are enlarged from the prior exam and most consistent with recurrent disease.       Pathology:  Peripheral Blood Flow Cytometry (11/1/21):  Comment:      CD5+ B-CELL LYMPHOPROLIFERATIVE DISORDER (SEE COMMENT).     COMMENT: Clonal CD20+ B-cells are detected that coexpress   CD5, CD23, FMC7(dim) and moderate surface kappa light chain   and are negative for CD10, comprising 75% of all analyzed   cells.       Prognostication Tools:  CLL-IPI = 2, intermediate risk (79.4% survival after 5 years, 40% survival after 10 years, median  months)    Assessment and Plan:   Cassidy JENNIFER Bryan) is a pleasant 78 y.o.female with a past medical history of hypertension and chronic lymphocytic leukemia who presents for follow up visit regarding CLL.     Chronic lymphocytic leukemia, Binet stage C, Duarte stage III: She has had  Duarte stage I disease since 2016. 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 She was initially treated with steroids. In January 2022, she was started on treatment for CLL due to steroid-refractory hemolytic anemia with obinutuzumab plus venetoclax because of the benefit of anti-CD20 MAB with AIHA plus fixed duration treatment (patient preference).  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 Venetoclax was stopped and obinutuzumab was continued to complete 6 cycles. Since April 2022, her hemolytic anemia has resolved.   Continue to monitor clinically.    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  Initially treated with prednisone in December with stabilization of blood counts but persistent hemolysis.  Hemolytic anemia resolved in 4/2022. She completed 6 cycles of obinutuzumab as noted above. Consider sutimlimab if relapse of LAYTON.  She had another episode of hemolysis following a COVID booster, so theses may be related.   Continue to monitor LDH, haptoglobin, and retic.  She will continue folic acid supplementation.     Immunosuppression: Continue acyclovir 400mg BID until 1 year post-treatment (until 6/2023). She received 4 COVID vaccinations and 2 Evusheld infusions.     Hypertension: BP ok today. Continue to monitor.    Follow up: As below    Orders Placed:         Route Chart for Scheduling    BMT Chart Routing      Follow up with physician 3 months.   Follow up with ERIKA    Provider visit type    Infusion scheduling note    Injection scheduling note    Labs CBC, CMP, phosphorus, magnesium and LDH   Scheduling:  Preferred lab:  Lab interval:  Labs prior  to visit (CBC, CMP, Mg, Phos, LDH, retic, haptoglobin)   Imaging None      Pharmacy appointment No pharmacy appointment needed      Other referrals no Refer to Oncology Primary Care -  No additional referrals needed       Advance Care Planning   Date: 12/09/2022  We previously reviewed her underlying diagnosis, natural history, prognosis, and various treatment options.  We previously treated her underlying disease due to concern for related hemolytic anemia.  Treatment subsequently stopped, and she is done very well since then.  Her disease appears to be well-controlled we will reconsider treatment if she is interested in the future if she develops an indication for treatment.     Total time of this visit was 30 minutes, including time spent face to face with patient and/or via video/audio, and also in preparing for today's visit for MDM and documentation. (Medical Decision Making, including consideration of possible diagnoses, management options, complex medical record review, review of diagnostic tests and information, consideration and discussion of significant complications based on comorbidities, and discussion with providers involved with the care of the patient). Greater than 50% was spent face to face with the patient counseling and coordinating care.      Bubba Wright MD  Hematology, Oncology, and Stem Cell Transplantation  [facility] and [facility]

## 2023-03-13 ENCOUNTER — PATIENT MESSAGE (OUTPATIENT)
Dept: HEMATOLOGY/ONCOLOGY | Facility: CLINIC | Age: 78
End: 2023-03-13
Payer: MEDICARE

## 2023-03-24 DIAGNOSIS — D59.12 COLD AUTOIMMUNE HEMOLYTIC ANEMIA: ICD-10-CM

## 2023-03-24 DIAGNOSIS — C91.10 CLL (CHRONIC LYMPHOCYTIC LEUKEMIA): Primary | ICD-10-CM

## 2023-03-31 ENCOUNTER — PATIENT MESSAGE (OUTPATIENT)
Dept: HEMATOLOGY/ONCOLOGY | Facility: CLINIC | Age: 78
End: 2023-03-31
Payer: MEDICARE

## 2023-05-29 ENCOUNTER — LAB VISIT (OUTPATIENT)
Dept: LAB | Facility: HOSPITAL | Age: 78
End: 2023-05-29
Attending: INTERNAL MEDICINE
Payer: MEDICARE

## 2023-05-29 DIAGNOSIS — D59.12 COLD AUTOIMMUNE HEMOLYTIC ANEMIA: ICD-10-CM

## 2023-05-29 DIAGNOSIS — C91.10 CLL (CHRONIC LYMPHOCYTIC LEUKEMIA): ICD-10-CM

## 2023-05-29 LAB
ALBUMIN SERPL BCP-MCNC: 4.1 G/DL (ref 3.5–5.2)
ALP SERPL-CCNC: 76 U/L (ref 55–135)
ALT SERPL W/O P-5'-P-CCNC: 22 U/L (ref 10–44)
ANION GAP SERPL CALC-SCNC: 8 MMOL/L (ref 8–16)
AST SERPL-CCNC: 22 U/L (ref 10–40)
BILIRUB SERPL-MCNC: 1.3 MG/DL (ref 0.1–1)
BUN SERPL-MCNC: 18 MG/DL (ref 8–23)
CALCIUM SERPL-MCNC: 9.6 MG/DL (ref 8.7–10.5)
CHLORIDE SERPL-SCNC: 99 MMOL/L (ref 95–110)
CO2 SERPL-SCNC: 27 MMOL/L (ref 23–29)
CREAT SERPL-MCNC: 0.6 MG/DL (ref 0.5–1.4)
ERYTHROCYTE [DISTWIDTH] IN BLOOD BY AUTOMATED COUNT: 12.3 % (ref 11.5–14.5)
EST. GFR  (NO RACE VARIABLE): >60 ML/MIN/1.73 M^2
GLUCOSE SERPL-MCNC: 77 MG/DL (ref 70–110)
HAPTOGLOB SERPL-MCNC: 68 MG/DL (ref 30–250)
HCT VFR BLD AUTO: 41.6 % (ref 37–48.5)
HGB BLD-MCNC: 14.4 G/DL (ref 12–16)
LDH SERPL L TO P-CCNC: 178 U/L (ref 110–260)
MAGNESIUM SERPL-MCNC: 1.9 MG/DL (ref 1.6–2.6)
MCH RBC QN AUTO: 32.7 PG (ref 27–31)
MCHC RBC AUTO-ENTMCNC: 34.6 G/DL (ref 32–36)
MCV RBC AUTO: 94 FL (ref 82–98)
PHOSPHATE SERPL-MCNC: 3.7 MG/DL (ref 2.7–4.5)
PLATELET # BLD AUTO: 205 K/UL (ref 150–450)
PMV BLD AUTO: 10.3 FL (ref 9.2–12.9)
POTASSIUM SERPL-SCNC: 4.5 MMOL/L (ref 3.5–5.1)
PROT SERPL-MCNC: 6.9 G/DL (ref 6–8.4)
RBC # BLD AUTO: 4.41 M/UL (ref 4–5.4)
RETICS/RBC NFR AUTO: 1.5 % (ref 0.5–2.5)
SODIUM SERPL-SCNC: 134 MMOL/L (ref 136–145)
WBC # BLD AUTO: 8.07 K/UL (ref 3.9–12.7)

## 2023-05-29 PROCEDURE — 84100 ASSAY OF PHOSPHORUS: CPT | Performed by: INTERNAL MEDICINE

## 2023-05-29 PROCEDURE — 83615 LACTATE (LD) (LDH) ENZYME: CPT | Performed by: INTERNAL MEDICINE

## 2023-05-29 PROCEDURE — 80053 COMPREHEN METABOLIC PANEL: CPT | Performed by: INTERNAL MEDICINE

## 2023-05-29 PROCEDURE — 83010 ASSAY OF HAPTOGLOBIN QUANT: CPT | Performed by: INTERNAL MEDICINE

## 2023-05-29 PROCEDURE — 85045 AUTOMATED RETICULOCYTE COUNT: CPT | Performed by: INTERNAL MEDICINE

## 2023-05-29 PROCEDURE — 36415 COLL VENOUS BLD VENIPUNCTURE: CPT | Performed by: INTERNAL MEDICINE

## 2023-05-29 PROCEDURE — 85027 COMPLETE CBC AUTOMATED: CPT | Performed by: INTERNAL MEDICINE

## 2023-05-29 PROCEDURE — 83735 ASSAY OF MAGNESIUM: CPT | Performed by: INTERNAL MEDICINE

## 2023-05-31 ENCOUNTER — OFFICE VISIT (OUTPATIENT)
Dept: HEMATOLOGY/ONCOLOGY | Facility: CLINIC | Age: 78
End: 2023-05-31
Payer: MEDICARE

## 2023-05-31 VITALS
OXYGEN SATURATION: 97 % | BODY MASS INDEX: 19.07 KG/M2 | HEIGHT: 69 IN | SYSTOLIC BLOOD PRESSURE: 208 MMHG | RESPIRATION RATE: 18 BRPM | WEIGHT: 128.75 LBS | TEMPERATURE: 99 F | HEART RATE: 71 BPM | DIASTOLIC BLOOD PRESSURE: 95 MMHG

## 2023-05-31 DIAGNOSIS — D59.12 COLD AUTOIMMUNE HEMOLYTIC ANEMIA: ICD-10-CM

## 2023-05-31 DIAGNOSIS — D84.81 IMMUNODEFICIENCY DUE TO CONDITIONS CLASSIFIED ELSEWHERE: ICD-10-CM

## 2023-05-31 DIAGNOSIS — C91.10 CLL (CHRONIC LYMPHOCYTIC LEUKEMIA): Primary | ICD-10-CM

## 2023-05-31 DIAGNOSIS — D59.12 COLD AGGLUTININ DISEASE: ICD-10-CM

## 2023-05-31 PROCEDURE — 99214 PR OFFICE/OUTPT VISIT, EST, LEVL IV, 30-39 MIN: ICD-10-PCS | Mod: S$PBB,,, | Performed by: INTERNAL MEDICINE

## 2023-05-31 PROCEDURE — 99999 PR PBB SHADOW E&M-EST. PATIENT-LVL IV: CPT | Mod: PBBFAC,,, | Performed by: INTERNAL MEDICINE

## 2023-05-31 PROCEDURE — 99999 PR PBB SHADOW E&M-EST. PATIENT-LVL IV: ICD-10-PCS | Mod: PBBFAC,,, | Performed by: INTERNAL MEDICINE

## 2023-05-31 PROCEDURE — 99214 OFFICE O/P EST MOD 30 MIN: CPT | Mod: S$PBB,,, | Performed by: INTERNAL MEDICINE

## 2023-05-31 PROCEDURE — 99214 OFFICE O/P EST MOD 30 MIN: CPT | Mod: PBBFAC | Performed by: INTERNAL MEDICINE

## 2023-05-31 RX ORDER — CHOLECALCIFEROL (VITAMIN D3) 25 MCG
2000 TABLET ORAL
COMMUNITY

## 2023-05-31 NOTE — PROGRESS NOTES
Section of Hematology and Stem Cell Transplantation  Follow Up Visit     Visit date: 5/31/23  Visit diagnosis: CLL (chronic lymphocytic leukemia) [C91.10]  Referred by:  Shiv Watson MD and Sony Carnes MD    Oncologic History:     Primary Oncologic Diagnosis: Chronic lymphocytic leukemia, Binet stage A, Duarte stage 0; cold agglutinin disease     2016: First noted to have cervical lymphadenopathy. Excisional biopsy confirmed chronic lymphocytic leukemia. She did not have an indication for treatment; therefore, observation recommended by Dr. Kelley.   6/17/21: PET/CT noted enlarged bilateral cervical, supraclavicular, axillary, retroperitoneal, and pelvic lymph nodes. All nodes enlarged from the prior exam.  7/28/21: First noted to have mild anemia (Hgb ~11.5 to 10.8 g/dL). Continued monitoring.  8/3/21: Established care with Dr. Carnes.   11/1/21: On follow up with Dr. Carnes, she was noted to have worsening anemia - hemoglobin decreased to 8.6 g/dL. 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 Beta-2 microglobulin 3.19. CLL FISH revealed trisomy 12. Peripheral blood karyotype - 47,XX,+12[8]/47,idem,del(4)(p14p16)[3]/46,XX[9]. ZAP70 17% of cells.  12/14/21: She noted increased fatigue. Hemoglobin decreased to 6.2. Repeat labs confirmed decreased hemoglobin to 5.8 g/dL. Haptoglobin <1, . MICHELL Anti-IgG negative, MICHELL complement C3 positive.  12/17/21: Prednisone 60mg daily started in response to hemolytic anemia.  12/22/21: Initial visit at [facility] with Dr. Watson. WBC 96k, Hgb 8.9, Plts 288. MICHELL positive. , Hapto <10. Continued prednisone 60mg daily.   12/27/21: Initial visit with me. Prednisone decreased to 50mg daily.   1/4/22: Hemoglobin stable (~8.4 g/dL) with persistent hemolysis. Prednisone weaned to 40mg daily.   1/11/22: Cycle 1 day 1 of obinutuzumab plus venetoclax (starting day 22). Prednisone weaned to 30mg daily.   1/18/22: Hemoglobin improved. Prednisone weaned to 20mg daily.  1/31/22: Prednisone  weaned to 15mg daily. Tapered to 10mg one week later.   2/15/22: Prednisone weaned to 5mg daily.   4/4/22: Prednisone to 5mg every other day x2 weeks then stop.  4/27/22: Cold agglutinin titer positive (>1:512). Venetoclax stopped as hemolysis was attributed to cold agglutinins. Hemolysis improved.  6/1/22: She completed 6 cycles of obinutuzumab.      History of Present Ilness:   Cassidy Bryan) is a pleasant 78 y.o.female with a past medical history of hypertension and chronic lymphocytic leukemia who presents for follow up visit regarding CLL and cold agglutinin hemolytic anemia. She is doing well. She played tennis this morning. Her BP has been high lately but normal at home. No fevers, chills, night sweats, weight loss. She endorses left neck fullness.     PAST MEDICAL HISTORY:   Past Medical History:   Diagnosis Date    CLL (chronic lymphocytic leukemia) 12/22/2021    Hypertension     Hypothyroidism 4/26/2022    Thyroid disease        PAST SURGICAL HISTORY:   Past Surgical History:   Procedure Laterality Date    ADENOIDECTOMY      COLONOSCOPY      HYSTERECTOMY      TONSILLECTOMY         PAST SOCIAL HISTORY:  Social History     Tobacco Use    Smoking status: Never    Smokeless tobacco: Never   Substance Use Topics    Alcohol use: Yes     Alcohol/week: 0.0 standard drinks     Comment: rare       FAMILY HISTORY:  History reviewed. No pertinent family history.    CURRENT MEDICATIONS:   Current Outpatient Medications   Medication Sig    aspirin (ECOTRIN) 81 MG EC tablet Take 81 mg by mouth.    atorvastatin (LIPITOR) 40 MG tablet TAKE ONE TABLET BY MOUTH once DAILY AT BEDTIME FOR cholesterol control    calcium polycarbophil (FIBERCON ORAL) Take by mouth.    CARVEDILOL PHOSPHATE 20 MG ORAL CM24 (COREG CR) 20 mg 24 hr capsule Take 20 mg by mouth nightly.    fluticasone (FLONASE) 50 mcg/actuation nasal spray 1 spray by Each Nare route once daily.    FLUZONE HIGH-DOSE 2018-19, PF, 180 mcg/0.5 mL vaccine ADM 0.5ML  IM UTD    folic acid (FOLVITE) 1 MG tablet Take 1 tablet (1 mg total) by mouth once daily. (Patient taking differently: Take 800 mcg by mouth once daily.)    glucosamine-chondroitin 500-400 mg tablet Take 1 tablet by mouth 3 (three) times daily.    multivitamin (THERAGRAN) per tablet Take 1 tablet by mouth once daily.    sulfacetamide sodium, acne, 10 % Susp Apply 1 application topically 2 (two) times daily. Apply to affected area    SYNTHROID 75 mcg tablet Take 1 tablet (75 mcg total) by mouth daily    telmisartan (MICARDIS) 80 MG Tab     vitamin D (VITAMIN D3) 1000 units Tab Take 2,000 Units by mouth.    VIVELLE-DOT 0.025 mg/24 hr Place 1 patch onto the skin twice a week     No current facility-administered medications for this visit.       ALLERGIES:   Review of patient's allergies indicates:   Allergen Reactions    Sulfa (sulfonamide antibiotics) Rash     Other reaction(s): Other (See Comments)  ALLERGIC TO SULFA DRUGS oral    Tobramycin-dexamethasone      Other reaction(s): Other (See Comments)  dizziness    [place] Diarrhea       Review of Systems:     Pertinent positives and negatives included in the HPI. Otherwise a complete review of systems is negative.    Physical Exam:     Vitals:    05/31/23 1058   BP: (!) 208/95   Pulse: 71   Resp: 18   Temp: 98.5 °F (36.9 °C)     General: Appears well, NAD  HEENT: MMM, no OP lesions  Pulmonary: CTAB, no increased work of breathing, no W/R/C  Cardiovascular: S1S2 normal, RRR, no M/R/G  Abdominal: Soft, NT, ND, BS+, no HSM  Extremities: No C/C/E  Neurological: AAOx4, grossly normal, no focal deficits  Dermatologic: No appreciable rashes or lesions  Lymphatic: No appreciable cervical, axillary, or inguinal lymphadenopathy     ECOG Performance Status: (foot note - ECOG PS provided by Eastern Cooperative Oncology Group) 0 - Asymptomatic    Karnofsky Performance Score:  100%- Normal, No Complaints, No Evidence of Disease    Labs:   Lab Results   Component Value Date    WBC  8.07 05/29/2023    HGB 14.4 05/29/2023    HCT 41.6 05/29/2023    MCV 94 05/29/2023     05/29/2023       Lab Results   Component Value Date     (L) 05/29/2023    K 4.5 05/29/2023    CL 99 05/29/2023    CO2 27 05/29/2023    BUN 18 05/29/2023    CREATININE 0.6 05/29/2023    ALBUMIN 4.1 05/29/2023    BILITOT 1.3 (H) 05/29/2023    ALKPHOS 76 05/29/2023    AST 22 05/29/2023    ALT 22 05/29/2023       Imaging:   PET/CT 6/17/21  COMPARISON: PET/CT 8/22/2016.   HISTORY: Lymphoma.   FINDINGS:   Head and neck: There is symmetric and physiologic distribution of radiotracer throughout the included brain parenchyma. There is no hemorrhage, hydrocephalus, or midline shift. There are innumerable bilateral enlarged FDG avid cervical lymph nodes. These have worsened from the prior exam. A representative left lower cervical lymph node best visualized on image 84 measures 19 mm compared with 10 mm previously with an SUV max of 1.8. There are enlarged left supraclavicular lymph nodes which were not present on the prior exam. A representative eran conglomerate measures 3.4 cm in diameter with an SUV max of 2.2.   CHEST: There are innumerable bilateral axillary and subpectoral lymph nodes which are not present on the prior exam. These demonstrate low-level FDG avidity. A representative left axillary nodule best visualized on image 114 measures 1.9 cm with an SUV max of 1.6. There are prominent paratracheal lymph nodes which are not pathologically enlarged by size criteria and demonstrate background FDG avidity, however were not present on the prior exam.   There is no FDG avid pulmonary nodule or mass. There is no pleural effusion. There is no pneumothorax.   Abdomen and pelvis: There is physiologic distribution of radiotracer throughout the liver, spleen, and collecting system. There are multiple enlarged bilateral iliac and periaortic retroperitoneal lymph nodes. A representative left periaortic lymph node best visualized  on image 201 measures 2.0 cm in diameter with an SUV max of 2.0.   MUSCULOSKELETAL: There is no FDG avid lytic or blastic osseous lesion.     IMPRESSION:   Innumerable enlarged bilateral cervical, supraclavicular, axillary, retroperitoneal, and pelvic lymph nodes all which demonstrate low-level FDG avidity, however are enlarged from the prior exam and most consistent with recurrent disease.       Pathology:  Peripheral Blood Flow Cytometry (11/1/21):  Comment:      CD5+ B-CELL LYMPHOPROLIFERATIVE DISORDER (SEE COMMENT).     COMMENT: Clonal CD20+ B-cells are detected that coexpress   CD5, CD23, FMC7(dim) and moderate surface kappa light chain   and are negative for CD10, comprising 75% of all analyzed   cells.       Prognostication Tools:  CLL-IPI = 2, intermediate risk (79.4% survival after 5 years, 40% survival after 10 years, median  months)    Assessment and Plan:   Cassidy Bryan) is a pleasant 78 y.o.female with a past medical history of hypertension and chronic lymphocytic leukemia who presents for follow up visit regarding CLL.     Chronic lymphocytic leukemia, Binet stage A, Duarte stage 0: She has had Duarte stage I disease since 2016. 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 She was initially treated with steroids. In January 2022, she was started on treatment for CLL due to steroid-refractory hemolytic anemia with obinutuzumab plus venetoclax because of the benefit of anti-CD20 MAB with AIHA plus fixed duration treatment (patient preference).  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 Venetoclax was stopped and obinutuzumab was continued to complete 6 cycles. Since April 2022, her hemolytic anemia has resolved.   Continue to monitor clinically.    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  Initially treated with prednisone in December with stabilization of blood counts but persistent hemolysis.  Hemolytic anemia resolved in 4/2022. She completed 6 cycles of obinutuzumab as noted above. Consider sutimlimab if relapse of LAYTON.  She had another episode of hemolysis following a COVID booster, so theses may be related.   Continue to monitor LDH, haptoglobin, and retic.    Immunosuppression: Ok to stop acyclovir. She received 4 COVID vaccinations and 2 Evusheld infusions.     Hypertension: Manual BP ok today. Possibly white coat syndrome. Continue to monitor.    Follow up: As below    Orders Placed:      Orders Placed This Encounter    Uric Acid    Lactate Dehydrogenase    Phosphorus    Magnesium    Comprehensive Metabolic Panel    CBC Auto Differential    Haptoglobin    Reticulocytes    Type & Screen       Route Chart for Scheduling    BMT Chart Routing      Follow up with physician 3 months. as previously scheduled   Follow up with ERIKA    Provider visit type    Infusion scheduling note    Injection scheduling note    Labs CBC, CMP, phosphorus, magnesium, LDH, hemolysis panel and reticulocytes   Scheduling:  Preferred lab:  Lab interval: every 4 weeks  Labs every month (CBC, CMP, Mg, Phos, LDH, hapto, retic)   Imaging None      Pharmacy appointment No pharmacy appointment needed      Other referrals no referral to Oncology Primary Care needed -    No additional referrals needed           Treatment Plan Information   OP CLL VENETOCLAX OBINUTUZUMAB Q4W   Adonis Wright MD   Upcoming Treatment Dates - OP CLL VENETOCLAX OBINUTUZUMAB Q4W    No upcoming days in selected categories.    Therapy Plan Information  EVUSHELD (TIXAGEVIMAB/CILGAVIMAB)  diphenhydrAMINE capsule 25 mg  25 mg, Oral, PRN  predniSONE tablet 40 mg  40 mg, Oral, PRN  EPINEPHrine  (EPIPEN) 0.3 mg/0.3 mL pen injection 0.3 mg  0.3 mg, Intramuscular, PRN  ondansetron disintegrating tablet 4 mg  4 mg, Oral, PRN  acetaminophen tablet 650 mg  650 mg, Oral, PRN  albuterol inhaler 2 puff  2 puff, Inhalation, PRN    EVUSHELD (TIXAGEVIMAB/CILGAVIMAB)  diphenhydrAMINE capsule 25 mg  25 mg, Oral, PRN  predniSONE tablet 40 mg  40 mg, Oral, PRN  EPINEPHrine (EPIPEN) 0.3 mg/0.3 mL pen injection 0.3 mg  0.3 mg, Intramuscular, PRN  ondansetron disintegrating tablet 4 mg  4 mg, Oral, PRN  acetaminophen tablet 650 mg  650 mg, Oral, PRN  albuterol inhaler 2 puff  2 puff, Inhalation, PRN      Advance Care Planning   Date: 12/09/2022  We previously reviewed her underlying diagnosis, natural history, prognosis, and various treatment options.  We previously treated her underlying disease due to concern for related hemolytic anemia.  Treatment subsequently stopped, and she is done very well since then.  Her disease appears to be well-controlled we will reconsider treatment if she is interested in the future if she develops an indication for treatment.     Total time of this visit was 30 minutes, including time spent face to face with patient and/or via video/audio, and also in preparing for today's visit for MDM and documentation. (Medical Decision Making, including consideration of possible diagnoses, management options, complex medical record review, review of diagnostic tests and information, consideration and discussion of significant complications based on comorbidities, and discussion with providers involved with the care of the patient). Greater than 50% was spent face to face with the patient counseling and coordinating care.      Bubba Wright MD  Hematology, Oncology, and Stem Cell Transplantation  [facility]

## 2023-09-01 ENCOUNTER — LAB VISIT (OUTPATIENT)
Dept: LAB | Facility: HOSPITAL | Age: 78
End: 2023-09-01
Attending: INTERNAL MEDICINE
Payer: MEDICARE

## 2023-09-01 ENCOUNTER — PATIENT MESSAGE (OUTPATIENT)
Dept: HEMATOLOGY/ONCOLOGY | Facility: CLINIC | Age: 78
End: 2023-09-01
Payer: MEDICARE

## 2023-09-01 DIAGNOSIS — D59.12 COLD AUTOIMMUNE HEMOLYTIC ANEMIA: ICD-10-CM

## 2023-09-01 DIAGNOSIS — D84.81 IMMUNODEFICIENCY DUE TO CONDITIONS CLASSIFIED ELSEWHERE: ICD-10-CM

## 2023-09-01 DIAGNOSIS — C91.10 CLL (CHRONIC LYMPHOCYTIC LEUKEMIA): ICD-10-CM

## 2023-09-01 LAB
ALBUMIN SERPL BCP-MCNC: 4 G/DL (ref 3.5–5.2)
ALP SERPL-CCNC: 70 U/L (ref 55–135)
ALT SERPL W/O P-5'-P-CCNC: 19 U/L (ref 10–44)
ANION GAP SERPL CALC-SCNC: 8 MMOL/L (ref 8–16)
AST SERPL-CCNC: 19 U/L (ref 10–40)
BASOPHILS # BLD AUTO: 0.05 K/UL (ref 0–0.2)
BASOPHILS NFR BLD: 0.8 % (ref 0–1.9)
BILIRUB SERPL-MCNC: 1.1 MG/DL (ref 0.1–1)
BUN SERPL-MCNC: 12 MG/DL (ref 8–23)
CALCIUM SERPL-MCNC: 9.4 MG/DL (ref 8.7–10.5)
CHLORIDE SERPL-SCNC: 99 MMOL/L (ref 95–110)
CO2 SERPL-SCNC: 27 MMOL/L (ref 23–29)
CREAT SERPL-MCNC: 0.7 MG/DL (ref 0.5–1.4)
DIFFERENTIAL METHOD: ABNORMAL
EOSINOPHIL # BLD AUTO: 0.1 K/UL (ref 0–0.5)
EOSINOPHIL NFR BLD: 1.4 % (ref 0–8)
ERYTHROCYTE [DISTWIDTH] IN BLOOD BY AUTOMATED COUNT: 12.5 % (ref 11.5–14.5)
EST. GFR  (NO RACE VARIABLE): >60 ML/MIN/1.73 M^2
GLUCOSE SERPL-MCNC: 73 MG/DL (ref 70–110)
HAPTOGLOB SERPL-MCNC: 67 MG/DL (ref 30–250)
HCT VFR BLD AUTO: 39.5 % (ref 37–48.5)
HGB BLD-MCNC: 13.7 G/DL (ref 12–16)
IMM GRANULOCYTES # BLD AUTO: 0.02 K/UL (ref 0–0.04)
IMM GRANULOCYTES NFR BLD AUTO: 0.3 % (ref 0–0.5)
LYMPHOCYTES # BLD AUTO: 1.2 K/UL (ref 1–4.8)
LYMPHOCYTES NFR BLD: 19 % (ref 18–48)
MAGNESIUM SERPL-MCNC: 1.9 MG/DL (ref 1.6–2.6)
MCH RBC QN AUTO: 31.8 PG (ref 27–31)
MCHC RBC AUTO-ENTMCNC: 34.7 G/DL (ref 32–36)
MCV RBC AUTO: 92 FL (ref 82–98)
MONOCYTES # BLD AUTO: 0.5 K/UL (ref 0.3–1)
MONOCYTES NFR BLD: 8.4 % (ref 4–15)
NEUTROPHILS # BLD AUTO: 4.4 K/UL (ref 1.8–7.7)
NEUTROPHILS NFR BLD: 70.1 % (ref 38–73)
NRBC BLD-RTO: 0 /100 WBC
PHOSPHATE SERPL-MCNC: 3.6 MG/DL (ref 2.7–4.5)
PLATELET # BLD AUTO: 206 K/UL (ref 150–450)
PMV BLD AUTO: 10.4 FL (ref 9.2–12.9)
POTASSIUM SERPL-SCNC: 4.3 MMOL/L (ref 3.5–5.1)
PROT SERPL-MCNC: 6.7 G/DL (ref 6–8.4)
RBC # BLD AUTO: 4.31 M/UL (ref 4–5.4)
RETICS/RBC NFR AUTO: 1.5 % (ref 0.5–2.5)
SODIUM SERPL-SCNC: 134 MMOL/L (ref 136–145)
WBC # BLD AUTO: 6.3 K/UL (ref 3.9–12.7)

## 2023-09-01 PROCEDURE — 80053 COMPREHEN METABOLIC PANEL: CPT | Performed by: INTERNAL MEDICINE

## 2023-09-01 PROCEDURE — 85045 AUTOMATED RETICULOCYTE COUNT: CPT | Performed by: INTERNAL MEDICINE

## 2023-09-01 PROCEDURE — 84100 ASSAY OF PHOSPHORUS: CPT | Performed by: INTERNAL MEDICINE

## 2023-09-01 PROCEDURE — 83010 ASSAY OF HAPTOGLOBIN QUANT: CPT | Performed by: INTERNAL MEDICINE

## 2023-09-01 PROCEDURE — 36415 COLL VENOUS BLD VENIPUNCTURE: CPT | Performed by: INTERNAL MEDICINE

## 2023-09-01 PROCEDURE — 83735 ASSAY OF MAGNESIUM: CPT | Performed by: INTERNAL MEDICINE

## 2023-09-01 PROCEDURE — 85025 COMPLETE CBC W/AUTO DIFF WBC: CPT | Performed by: INTERNAL MEDICINE

## 2023-09-17 ENCOUNTER — PATIENT MESSAGE (OUTPATIENT)
Dept: HEMATOLOGY/ONCOLOGY | Facility: CLINIC | Age: 78
End: 2023-09-17
Payer: MEDICARE

## 2023-10-17 ENCOUNTER — TELEPHONE (OUTPATIENT)
Dept: HEMATOLOGY/ONCOLOGY | Facility: CLINIC | Age: 78
End: 2023-10-17
Payer: MEDICARE

## 2023-10-17 NOTE — TELEPHONE ENCOUNTER
----- Message from Jenny Ceasar sent at 10/17/2023  9:36 AM CDT -----  Regarding: Patient advice  Contact: Pt [phone]          Name of Caller:  Cassidy     Contact Preference: [phone]    Nature of Call: Would like advice on what can be done to help with symptoms of  chills and fever yesterday feeling exhausted today started after taking Covid Vaccine

## 2023-10-24 ENCOUNTER — TELEPHONE (OUTPATIENT)
Dept: HEMATOLOGY/ONCOLOGY | Facility: CLINIC | Age: 78
End: 2023-10-24
Payer: MEDICARE

## 2023-10-24 ENCOUNTER — PATIENT MESSAGE (OUTPATIENT)
Dept: HEMATOLOGY/ONCOLOGY | Facility: CLINIC | Age: 78
End: 2023-10-24
Payer: MEDICARE

## 2023-10-24 NOTE — TELEPHONE ENCOUNTER
----- Message from Jenny Mcdonough sent at 10/23/2023  8:55 AM CDT -----  Regarding: Appt  Contact: Pt [phone]          Current Appt date:    12/01/23     Type of Appt:  Ep     Physician:  Adonis Wright MD    Reason for rescheduling:   Would prefer appt moved to 11/30/23 or 12/04/23 morning if not available Please keep original date     Caller:    Cassidy       Contact Preference: [phone]

## 2023-10-24 NOTE — TELEPHONE ENCOUNTER
Tried to reach out to pt in reagrds to moving appt. Pt didn't answer. Sent portal message with change of appt    -DANIELLE Barry

## 2023-11-29 ENCOUNTER — TELEPHONE (OUTPATIENT)
Dept: HEMATOLOGY/ONCOLOGY | Facility: CLINIC | Age: 78
End: 2023-11-29
Payer: MEDICARE

## 2023-11-29 ENCOUNTER — PATIENT MESSAGE (OUTPATIENT)
Dept: HEMATOLOGY/ONCOLOGY | Facility: CLINIC | Age: 78
End: 2023-11-29
Payer: MEDICARE

## 2023-11-30 ENCOUNTER — TELEPHONE (OUTPATIENT)
Dept: HEMATOLOGY/ONCOLOGY | Facility: CLINIC | Age: 78
End: 2023-11-30
Payer: MEDICARE

## 2023-12-06 ENCOUNTER — PATIENT MESSAGE (OUTPATIENT)
Dept: HEMATOLOGY/ONCOLOGY | Facility: CLINIC | Age: 78
End: 2023-12-06
Payer: MEDICARE

## 2023-12-06 DIAGNOSIS — E03.9 HYPOTHYROIDISM, UNSPECIFIED TYPE: Primary | ICD-10-CM

## 2023-12-08 ENCOUNTER — OFFICE VISIT (OUTPATIENT)
Dept: HEMATOLOGY/ONCOLOGY | Facility: CLINIC | Age: 78
End: 2023-12-08
Payer: MEDICARE

## 2023-12-08 ENCOUNTER — LAB VISIT (OUTPATIENT)
Dept: LAB | Facility: HOSPITAL | Age: 78
End: 2023-12-08
Attending: INTERNAL MEDICINE
Payer: MEDICARE

## 2023-12-08 ENCOUNTER — PATIENT MESSAGE (OUTPATIENT)
Dept: HEMATOLOGY/ONCOLOGY | Facility: CLINIC | Age: 78
End: 2023-12-08

## 2023-12-08 VITALS
OXYGEN SATURATION: 98 % | RESPIRATION RATE: 16 BRPM | TEMPERATURE: 98 F | DIASTOLIC BLOOD PRESSURE: 73 MMHG | WEIGHT: 128.5 LBS | BODY MASS INDEX: 19.03 KG/M2 | HEIGHT: 69 IN | SYSTOLIC BLOOD PRESSURE: 161 MMHG | HEART RATE: 62 BPM

## 2023-12-08 DIAGNOSIS — C91.10 CLL (CHRONIC LYMPHOCYTIC LEUKEMIA): ICD-10-CM

## 2023-12-08 DIAGNOSIS — D84.81 IMMUNODEFICIENCY DUE TO CONDITIONS CLASSIFIED ELSEWHERE: ICD-10-CM

## 2023-12-08 DIAGNOSIS — D59.12 COLD AUTOIMMUNE HEMOLYTIC ANEMIA: ICD-10-CM

## 2023-12-08 DIAGNOSIS — C91.10 CLL (CHRONIC LYMPHOCYTIC LEUKEMIA): Primary | ICD-10-CM

## 2023-12-08 DIAGNOSIS — I10 HYPERTENSION, UNSPECIFIED TYPE: ICD-10-CM

## 2023-12-08 DIAGNOSIS — E03.9 HYPOTHYROIDISM, UNSPECIFIED TYPE: ICD-10-CM

## 2023-12-08 LAB
ALBUMIN SERPL BCP-MCNC: 4.2 G/DL (ref 3.5–5.2)
ALP SERPL-CCNC: 85 U/L (ref 55–135)
ALT SERPL W/O P-5'-P-CCNC: 20 U/L (ref 10–44)
ANION GAP SERPL CALC-SCNC: 11 MMOL/L (ref 8–16)
AST SERPL-CCNC: 20 U/L (ref 10–40)
BASOPHILS # BLD AUTO: 0.06 K/UL (ref 0–0.2)
BASOPHILS NFR BLD: 0.7 % (ref 0–1.9)
BILIRUB SERPL-MCNC: 1.2 MG/DL (ref 0.1–1)
BUN SERPL-MCNC: 14 MG/DL (ref 8–23)
CALCIUM SERPL-MCNC: 9.5 MG/DL (ref 8.7–10.5)
CHLORIDE SERPL-SCNC: 95 MMOL/L (ref 95–110)
CO2 SERPL-SCNC: 28 MMOL/L (ref 23–29)
CREAT SERPL-MCNC: 0.7 MG/DL (ref 0.5–1.4)
DIFFERENTIAL METHOD: ABNORMAL
EOSINOPHIL # BLD AUTO: 0.1 K/UL (ref 0–0.5)
EOSINOPHIL NFR BLD: 1.6 % (ref 0–8)
ERYTHROCYTE [DISTWIDTH] IN BLOOD BY AUTOMATED COUNT: 12.7 % (ref 11.5–14.5)
EST. GFR  (NO RACE VARIABLE): >60 ML/MIN/1.73 M^2
GLUCOSE SERPL-MCNC: 90 MG/DL (ref 70–110)
HAPTOGLOB SERPL-MCNC: 96 MG/DL (ref 30–250)
HCT VFR BLD AUTO: 40.6 % (ref 37–48.5)
HGB BLD-MCNC: 15 G/DL (ref 12–16)
IMM GRANULOCYTES # BLD AUTO: 0.03 K/UL (ref 0–0.04)
IMM GRANULOCYTES NFR BLD AUTO: 0.3 % (ref 0–0.5)
LDH SERPL L TO P-CCNC: 201 U/L (ref 110–260)
LYMPHOCYTES # BLD AUTO: 1.4 K/UL (ref 1–4.8)
LYMPHOCYTES NFR BLD: 16.1 % (ref 18–48)
MAGNESIUM SERPL-MCNC: 1.9 MG/DL (ref 1.6–2.6)
MCH RBC QN AUTO: 31.3 PG (ref 27–31)
MCHC RBC AUTO-ENTMCNC: 36.9 G/DL (ref 32–36)
MCV RBC AUTO: 85 FL (ref 82–98)
MONOCYTES # BLD AUTO: 0.8 K/UL (ref 0.3–1)
MONOCYTES NFR BLD: 9 % (ref 4–15)
NEUTROPHILS # BLD AUTO: 6.5 K/UL (ref 1.8–7.7)
NEUTROPHILS NFR BLD: 72.3 % (ref 38–73)
NRBC BLD-RTO: 0 /100 WBC
PHOSPHATE SERPL-MCNC: 3.7 MG/DL (ref 2.7–4.5)
PLATELET # BLD AUTO: 248 K/UL (ref 150–450)
PMV BLD AUTO: 9.9 FL (ref 9.2–12.9)
POTASSIUM SERPL-SCNC: 4.7 MMOL/L (ref 3.5–5.1)
PROT SERPL-MCNC: 7.4 G/DL (ref 6–8.4)
RBC # BLD AUTO: 4.8 M/UL (ref 4–5.4)
RETICS/RBC NFR AUTO: 1.3 % (ref 0.5–2.5)
SODIUM SERPL-SCNC: 134 MMOL/L (ref 136–145)
T4 FREE SERPL-MCNC: 1.27 NG/DL (ref 0.71–1.51)
TSH SERPL DL<=0.005 MIU/L-ACNC: 1.98 UIU/ML (ref 0.4–4)
WBC # BLD AUTO: 8.93 K/UL (ref 3.9–12.7)

## 2023-12-08 PROCEDURE — 84439 ASSAY OF FREE THYROXINE: CPT | Performed by: INTERNAL MEDICINE

## 2023-12-08 PROCEDURE — 99999 PR PBB SHADOW E&M-EST. PATIENT-LVL V: ICD-10-PCS | Mod: PBBFAC,,, | Performed by: INTERNAL MEDICINE

## 2023-12-08 PROCEDURE — 99215 OFFICE O/P EST HI 40 MIN: CPT | Mod: PBBFAC | Performed by: INTERNAL MEDICINE

## 2023-12-08 PROCEDURE — 99999 PR PBB SHADOW E&M-EST. PATIENT-LVL V: CPT | Mod: PBBFAC,,, | Performed by: INTERNAL MEDICINE

## 2023-12-08 PROCEDURE — 99214 OFFICE O/P EST MOD 30 MIN: CPT | Mod: S$PBB,,, | Performed by: INTERNAL MEDICINE

## 2023-12-08 PROCEDURE — 85045 AUTOMATED RETICULOCYTE COUNT: CPT | Performed by: INTERNAL MEDICINE

## 2023-12-08 PROCEDURE — 36415 COLL VENOUS BLD VENIPUNCTURE: CPT | Performed by: INTERNAL MEDICINE

## 2023-12-08 PROCEDURE — 99214 PR OFFICE/OUTPT VISIT, EST, LEVL IV, 30-39 MIN: ICD-10-PCS | Mod: S$PBB,,, | Performed by: INTERNAL MEDICINE

## 2023-12-08 PROCEDURE — 80053 COMPREHEN METABOLIC PANEL: CPT | Performed by: INTERNAL MEDICINE

## 2023-12-08 PROCEDURE — 83735 ASSAY OF MAGNESIUM: CPT | Performed by: INTERNAL MEDICINE

## 2023-12-08 PROCEDURE — 84100 ASSAY OF PHOSPHORUS: CPT | Performed by: INTERNAL MEDICINE

## 2023-12-08 PROCEDURE — 84443 ASSAY THYROID STIM HORMONE: CPT | Performed by: INTERNAL MEDICINE

## 2023-12-08 PROCEDURE — 83010 ASSAY OF HAPTOGLOBIN QUANT: CPT | Performed by: INTERNAL MEDICINE

## 2023-12-08 PROCEDURE — 83615 LACTATE (LD) (LDH) ENZYME: CPT | Performed by: INTERNAL MEDICINE

## 2023-12-08 PROCEDURE — 85025 COMPLETE CBC W/AUTO DIFF WBC: CPT | Performed by: INTERNAL MEDICINE

## 2023-12-08 RX ORDER — VALSARTAN 320 MG/1
1 TABLET ORAL DAILY
COMMUNITY
Start: 2023-11-27 | End: 2024-11-26

## 2023-12-08 RX ORDER — NEOMYCIN SULFATE, POLYMYXIN B SULFATE AND DEXAMETHASONE 1; 3.5; 1 MG/ML; MG/ML; [USP'U]/ML
1 SUSPENSION OPHTHALMIC 2 TIMES DAILY
COMMUNITY
Start: 2023-09-05

## 2023-12-08 RX ORDER — CARVEDILOL PHOSPHATE 40 MG/1
1 CAPSULE, EXTENDED RELEASE ORAL DAILY
COMMUNITY
Start: 2023-11-16

## 2023-12-08 RX ORDER — HYDROCHLOROTHIAZIDE 25 MG/1
1 TABLET ORAL DAILY
COMMUNITY
Start: 2023-12-07 | End: 2024-01-03

## 2023-12-09 NOTE — PROGRESS NOTES
Section of Hematology and Stem Cell Transplantation  Follow Up Visit     Visit date: 12/8/23  Visit diagnosis: CLL (chronic lymphocytic leukemia) [C91.10]  Referred by:  Shiv Watson MD and Sony Carnes MD    Oncologic History:     Primary Oncologic Diagnosis: Chronic lymphocytic leukemia, Binet stage A, Duarte stage 0; cold agglutinin disease     2016: First noted to have cervical lymphadenopathy. Excisional biopsy confirmed chronic lymphocytic leukemia. She did not have an indication for treatment; therefore, observation recommended by Dr. Kelley.   6/17/21: PET/CT noted enlarged bilateral cervical, supraclavicular, axillary, retroperitoneal, and pelvic lymph nodes. All nodes enlarged from the prior exam.  7/28/21: First noted to have mild anemia (Hgb ~11.5 to 10.8 g/dL). Continued monitoring.  8/3/21: Established care with Dr. Carnes.   11/1/21: On follow up with Dr. Carnes, she was noted to have worsening anemia - hemoglobin decreased to 8.6 g/dL. 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 Beta-2 microglobulin 3.19. CLL FISH revealed trisomy 12. Peripheral blood karyotype - 47,XX,+12[8]/47,idem,del(4)(p14p16)[3]/46,XX[9]. ZAP70 17% of cells.  12/14/21: She noted increased fatigue. Hemoglobin decreased to 6.2. Repeat labs confirmed decreased hemoglobin to 5.8 g/dL. Haptoglobin <1, . MICHELL Anti-IgG negative, MICHELL complement C3 positive.  12/17/21: Prednisone 60mg daily started in response to hemolytic anemia.  12/22/21: Initial visit at [facility] with Dr. Watson. WBC 96k, Hgb 8.9, Plts 288. MICHELL positive. , Hapto <10. Continued prednisone 60mg daily.   12/27/21: Initial visit with me. Prednisone decreased to 50mg daily.   1/4/22: Hemoglobin stable (~8.4 g/dL) with persistent hemolysis. Prednisone weaned to 40mg daily.   1/11/22: Cycle 1 day 1 of obinutuzumab plus venetoclax (starting day 22). Prednisone weaned to 30mg daily.   1/18/22: Hemoglobin improved. Prednisone weaned to 20mg daily.  1/31/22: Prednisone  weaned to 15mg daily. Tapered to 10mg one week later.   2/15/22: Prednisone weaned to 5mg daily.   4/4/22: Prednisone to 5mg every other day x2 weeks then stop.  4/27/22: Cold agglutinin titer positive (>1:512). Venetoclax stopped as hemolysis was attributed to cold agglutinins. Hemolysis improved.  6/1/22: She completed 6 cycles of obinutuzumab.      History of Present Ilness:   Cassidy Bryan) is a pleasant 78 y.o.female with a past medical history of hypertension and chronic lymphocytic leukemia who presents for follow up visit regarding CLL and cold agglutinin hemolytic anemia. She is doing well. Labs unremarkable. BP has been slightly elevated, but she is working with her cardiologist to adjust antihypertensives. No recent fevers, chills, night sweats, weight loss.    PAST MEDICAL HISTORY:   Past Medical History:   Diagnosis Date    CLL (chronic lymphocytic leukemia) 12/22/2021    Hypertension     Hypothyroidism 4/26/2022    Thyroid disease        PAST SURGICAL HISTORY:   Past Surgical History:   Procedure Laterality Date    ADENOIDECTOMY      COLONOSCOPY      HYSTERECTOMY      TONSILLECTOMY         PAST SOCIAL HISTORY:  Social History     Tobacco Use    Smoking status: Never    Smokeless tobacco: Never   Substance Use Topics    Alcohol use: Yes     Alcohol/week: 0.0 standard drinks of alcohol     Comment: rare       FAMILY HISTORY:  History reviewed. No pertinent family history.    CURRENT MEDICATIONS:   Current Outpatient Medications   Medication Sig    aspirin (ECOTRIN) 81 MG EC tablet Take 81 mg by mouth.    atorvastatin (LIPITOR) 40 MG tablet TAKE ONE TABLET BY MOUTH once DAILY AT BEDTIME FOR cholesterol control    Bifidobacterium infantis (ALIGN ORAL) Take by mouth. PRN    calcium polycarbophil (FIBERCON ORAL) Take by mouth.    carvedilol PHOSPHATE 40 MG ORAL CM24 (COREG CR) 40 MG CM24 Take 1 capsule by mouth once daily.    fluticasone (FLONASE) 50 mcg/actuation nasal spray 1 spray by Each Nare  route once daily.    folic acid (FOLVITE) 1 MG tablet Take 1 tablet (1 mg total) by mouth once daily. (Patient taking differently: Take 800 mcg by mouth once daily.)    glucosamine-chondroitin 500-400 mg tablet Take 1 tablet by mouth 3 (three) times daily.    hydroCHLOROthiazide (HYDRODIURIL) 25 MG tablet Take 1 tablet by mouth once daily.    MAXITROL 3.5mg/mL-10,000 unit/mL-0.1 % DrpS Place 1 drop into the right eye 2 (two) times daily.    multivitamin (THERAGRAN) per tablet Take 1 tablet by mouth once daily.    SYNTHROID 75 mcg tablet Take 1 tablet (75 mcg total) by mouth daily    valsartan (DIOVAN) 320 MG tablet Take 1 tablet by mouth once daily.    vitamin D (VITAMIN D3) 1000 units Tab Take 2,000 Units by mouth.    VIVELLE-DOT 0.025 mg/24 hr Place 1 patch onto the skin twice a week    CARVEDILOL PHOSPHATE 20 MG ORAL CM24 (COREG CR) 20 mg 24 hr capsule Take 20 mg by mouth nightly.    FLUZONE HIGH-DOSE 2018-19, PF, 180 mcg/0.5 mL vaccine ADM 0.5ML IM UTD    sulfacetamide sodium, acne, 10 % Susp Apply 1 application topically 2 (two) times daily. Apply to affected area    telmisartan (MICARDIS) 80 MG Tab      No current facility-administered medications for this visit.       ALLERGIES:   Review of patient's allergies indicates:   Allergen Reactions    Sulfa (sulfonamide antibiotics) Rash     Other reaction(s): Other (See Comments)  ALLERGIC TO SULFA DRUGS oral    Tobramycin-dexamethasone      Other reaction(s): Other (See Comments)  dizziness    Amoret Diarrhea       Review of Systems:     Pertinent positives and negatives included in the HPI. Otherwise a complete review of systems is negative.    Physical Exam:     Vitals:    12/08/23 1025   BP: (!) 161/73   Pulse: 62   Resp: 16   Temp: 98.4 °F (36.9 °C)     General: Appears well, NAD  HEENT: MMM, no OP lesions  Pulmonary: CTAB, no increased work of breathing, no W/R/C  Cardiovascular: S1S2 normal, RRR, no M/R/G  Abdominal: Soft, NT, ND, BS+, no HSM  Extremities:  No C/C/E  Neurological: AAOx4, grossly normal, no focal deficits  Dermatologic: No appreciable rashes or lesions  Lymphatic: No appreciable cervical, axillary, or inguinal lymphadenopathy     ECOG Performance Status: (foot note - ECOG PS provided by Eastern Cooperative Oncology Group) 0 - Asymptomatic    Karnofsky Performance Score:  100%- Normal, No Complaints, No Evidence of Disease    Labs:   Lab Results   Component Value Date    WBC 8.93 12/08/2023    HGB 15.0 12/08/2023    HCT 40.6 12/08/2023    MCV 85 12/08/2023     12/08/2023       Lab Results   Component Value Date     (L) 12/08/2023    K 4.7 12/08/2023    CL 95 12/08/2023    CO2 28 12/08/2023    BUN 14 12/08/2023    CREATININE 0.7 12/08/2023    ALBUMIN 4.2 12/08/2023    BILITOT 1.2 (H) 12/08/2023    ALKPHOS 85 12/08/2023    AST 20 12/08/2023    ALT 20 12/08/2023       Imaging:   PET/CT 6/17/21  COMPARISON: PET/CT 8/22/2016.   HISTORY: Lymphoma.   FINDINGS:   Head and neck: There is symmetric and physiologic distribution of radiotracer throughout the included brain parenchyma. There is no hemorrhage, hydrocephalus, or midline shift. There are innumerable bilateral enlarged FDG avid cervical lymph nodes. These have worsened from the prior exam. A representative left lower cervical lymph node best visualized on image 84 measures 19 mm compared with 10 mm previously with an SUV max of 1.8. There are enlarged left supraclavicular lymph nodes which were not present on the prior exam. A representative eran conglomerate measures 3.4 cm in diameter with an SUV max of 2.2.   CHEST: There are innumerable bilateral axillary and subpectoral lymph nodes which are not present on the prior exam. These demonstrate low-level FDG avidity. A representative left axillary nodule best visualized on image 114 measures 1.9 cm with an SUV max of 1.6. There are prominent paratracheal lymph nodes which are not pathologically enlarged by size criteria and demonstrate  background FDG avidity, however were not present on the prior exam.   There is no FDG avid pulmonary nodule or mass. There is no pleural effusion. There is no pneumothorax.   Abdomen and pelvis: There is physiologic distribution of radiotracer throughout the liver, spleen, and collecting system. There are multiple enlarged bilateral iliac and periaortic retroperitoneal lymph nodes. A representative left periaortic lymph node best visualized on image 201 measures 2.0 cm in diameter with an SUV max of 2.0.   MUSCULOSKELETAL: There is no FDG avid lytic or blastic osseous lesion.     IMPRESSION:   Innumerable enlarged bilateral cervical, supraclavicular, axillary, retroperitoneal, and pelvic lymph nodes all which demonstrate low-level FDG avidity, however are enlarged from the prior exam and most consistent with recurrent disease.       Pathology:  Peripheral Blood Flow Cytometry (11/1/21):  Comment:      CD5+ B-CELL LYMPHOPROLIFERATIVE DISORDER (SEE COMMENT).     COMMENT: Clonal CD20+ B-cells are detected that coexpress   CD5, CD23, FMC7(dim) and moderate surface kappa light chain   and are negative for CD10, comprising 75% of all analyzed   cells.       Prognostication Tools:  CLL-IPI = 2, intermediate risk (79.4% survival after 5 years, 40% survival after 10 years, median  months)    Assessment and Plan:   Cassidy Aguilar (Cassidy) is a pleasant 78 y.o.female with a past medical history of hypertension and chronic lymphocytic leukemia who presents for follow up visit regarding CLL.     Chronic lymphocytic leukemia, Binet stage A, Duarte stage 0: She has had Duarte stage I disease since 2016. 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 She was initially treated with steroids. In January 2022, she was started on treatment for CLL due to steroid-refractory hemolytic anemia with obinutuzumab  plus venetoclax because of the benefit of anti-CD20 MAB with AIHA plus fixed duration treatment (patient preference).  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 Venetoclax was stopped and obinutuzumab was continued to complete 6 cycles. Since April 2022, her hemolytic anemia has resolved.   Continue to monitor clinically.    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  Initially treated with prednisone in December with stabilization of blood counts but persistent hemolysis.  Hemolytic anemia resolved in 4/2022. She completed 6 cycles of obinutuzumab as noted above. Consider sutimlimab if relapse of LAYTON.  She had another episode of hemolysis following a COVID booster, so theses may be related.   Continue to monitor LDH, haptoglobin, and retic.    Immunosuppression: She received 5 COVID vaccinations and 2 Evusheld infusions.     Hypertension: BP slightly elevated today. She is asymptomatic. Continue to monitor.    Hypothyroidism: TFTs unremarkable today. Management per PCP.      Orders Placed:             Route Chart for Scheduling    BMT Chart Routing      Follow up with physician . As scheduled   Follow up with ERIKA    Provider visit type    Infusion scheduling note    Injection scheduling note    Labs    Imaging    Pharmacy appointment    Other referrals                         Advance Care Planning   Date: 12/09/2022  We previously reviewed her underlying diagnosis, natural history, prognosis, and various treatment options.  We previously treated her underlying disease due to concern for related hemolytic anemia.  Treatment subsequently stopped, and she is done very well since then.  Her disease appears to be well-controlled we will reconsider treatment if she is interested in the future  if she develops an indication for treatment.     Total time of this visit was 35 minutes, including time spent face to face with patient and/or via video/audio, and also in preparing for today's visit for MDM and documentation. (Medical Decision Making, including consideration of possible diagnoses, management options, complex medical record review, review of diagnostic tests and information, consideration and discussion of significant complications based on comorbidities, and discussion with providers involved with the care of the patient). Greater than 50% was spent face to face with the patient counseling and coordinating care.      Bubba Wright MD  Hematology, Oncology, and Stem Cell Transplantation  [facility]

## 2023-12-28 ENCOUNTER — TELEPHONE (OUTPATIENT)
Dept: HEMATOLOGY/ONCOLOGY | Facility: CLINIC | Age: 78
End: 2023-12-28
Payer: MEDICARE

## 2023-12-28 NOTE — TELEPHONE ENCOUNTER
----- Message from Blanquita Sharp sent at 12/28/2023  2:11 PM CST -----  Regarding: Consult/Advisory      Name Of Caller:    Cassidy      Contact Preference:    [phone]       Nature of call:    Calling to inform a nurse that she tested positive for Covid on X-mas. Pt has some questions. Please advise.

## 2023-12-31 ENCOUNTER — NURSE TRIAGE (OUTPATIENT)
Dept: ADMINISTRATIVE | Facility: CLINIC | Age: 78
End: 2023-12-31
Payer: MEDICARE

## 2023-12-31 NOTE — TELEPHONE ENCOUNTER
Pt states that she was out of town on 12/25/23 and tested positive for Covid. Pt completed Paxlovid and steroid on yesterday. Now c/o head feeling full and weakness. Denies headache, dizziness and lightheadedness. /86 HR 71. Pt also states that she feels worse than yesterday and is feeling exhausted. Advised to go to ED now per protocol. VU. Encounter routed to provider.    Reason for Disposition   [1] Systolic BP  >= 160 OR Diastolic >= 100 AND [2] cardiac or neurologic symptoms (e.g., chest pain, difficulty breathing, unsteady gait, blurred vision)    Additional Information   Negative: SEVERE difficulty breathing (e.g., struggling for each breath, speaks in single words)   Negative: Shock suspected (e.g., cold/pale/clammy skin, too weak to stand, low BP, rapid pulse)   Negative: Difficult to awaken or acting confused (e.g., disoriented, slurred speech)   Negative: [1] Fainted > 15 minutes ago AND [2] still feels too weak or dizzy to stand   Negative: [1] SEVERE weakness (i.e., unable to walk or barely able to walk, requires support) AND [2] new-onset or worsening   Negative: Sounds like a life-threatening emergency to the triager   Negative: Difficulty breathing   Negative: Difficult to awaken or acting confused (e.g., disoriented, slurred speech)   Negative: Severe difficulty breathing (e.g., struggling for each breath, speaks in single words)   Negative: [1] Weakness of the face, arm or leg on one side of the body AND [2] new onset   Negative: [1] Numbness (i.e., loss of sensation) of the face, arm or leg on one side of the body AND [2] new onset   Negative: [1] Chest pain lasts > 5 minutes AND [2] history of heart disease  (i.e., heart attack, bypass surgery, angina, angioplasty, CHF)   Negative: [1] Chest pain AND [2] took nitrogylcerin AND [3] pain was not relieved    Protocols used: Weakness (Generalized) and Fatigue-A-AH, High Blood Pressure-A-AH

## 2024-01-02 ENCOUNTER — TELEPHONE (OUTPATIENT)
Dept: HEMATOLOGY/ONCOLOGY | Facility: CLINIC | Age: 79
End: 2024-01-02
Payer: MEDICARE

## 2024-01-02 NOTE — TELEPHONE ENCOUNTER
----- Message from Adelfo Jansen sent at 1/2/2024  2:02 PM CST -----  Contact: pt  Type:  Needs Medical Advice    Who Called: pt   Symptoms (please be specific): covid   How long has patient had these symptoms: 12/25; 12/31 with Pneumonia  Would the patient rather a call back or a response via [facility]? Call; or through [employer]  Best Call Back Number: [phone] or Son Willie molina phone # [phone]  Additional Information:   Pt requesting a call regarding covid and pneumonia  Pt stated she feel worse  Pt stated she need this her 2nd time calling

## 2024-01-03 ENCOUNTER — OFFICE VISIT (OUTPATIENT)
Dept: INTERNAL MEDICINE | Facility: CLINIC | Age: 79
End: 2024-01-03
Payer: MEDICARE

## 2024-01-03 ENCOUNTER — CLINICAL SUPPORT (OUTPATIENT)
Dept: INTERNAL MEDICINE | Facility: CLINIC | Age: 79
End: 2024-01-03
Payer: MEDICARE

## 2024-01-03 VITALS
OXYGEN SATURATION: 95 % | DIASTOLIC BLOOD PRESSURE: 64 MMHG | HEIGHT: 69 IN | BODY MASS INDEX: 18.35 KG/M2 | HEART RATE: 66 BPM | SYSTOLIC BLOOD PRESSURE: 115 MMHG | WEIGHT: 123.88 LBS

## 2024-01-03 DIAGNOSIS — Z76.89 ESTABLISHING CARE WITH NEW DOCTOR, ENCOUNTER FOR: ICD-10-CM

## 2024-01-03 DIAGNOSIS — N39.41 URGE INCONTINENCE OF URINE DUE TO FEMALE GENITAL PROLAPSE: ICD-10-CM

## 2024-01-03 DIAGNOSIS — N81.9 URGE INCONTINENCE OF URINE DUE TO FEMALE GENITAL PROLAPSE: ICD-10-CM

## 2024-01-03 DIAGNOSIS — Z71.89 ENCOUNTER FOR MEDICATION REVIEW AND COUNSELING: ICD-10-CM

## 2024-01-03 DIAGNOSIS — Z00.00 HEALTHCARE MAINTENANCE: ICD-10-CM

## 2024-01-03 DIAGNOSIS — U07.1 COVID-19 VIRUS INFECTION: Primary | ICD-10-CM

## 2024-01-03 DIAGNOSIS — Z00.00 ANNUAL PHYSICAL EXAM: Primary | ICD-10-CM

## 2024-01-03 DIAGNOSIS — R79.9 ABNORMAL FINDING OF BLOOD CHEMISTRY, UNSPECIFIED: ICD-10-CM

## 2024-01-03 PROBLEM — R19.7 DIARRHEA: Status: RESOLVED | Noted: 2022-04-26 | Resolved: 2024-01-03

## 2024-01-03 PROBLEM — I11.9 HYPERTENSIVE HEART DISEASE WITHOUT HEART FAILURE: Status: ACTIVE | Noted: 2018-05-07

## 2024-01-03 PROBLEM — R11.0 NAUSEA: Status: RESOLVED | Noted: 2022-04-26 | Resolved: 2024-01-03

## 2024-01-03 PROBLEM — N81.10 PELVIC ORGAN PROLAPSE QUANTIFICATION STAGE 3 CYSTOCELE: Status: ACTIVE | Noted: 2022-12-08

## 2024-01-03 PROBLEM — C85.91: Status: ACTIVE | Noted: 2018-08-20

## 2024-01-03 PROBLEM — D68.51 HETEROZYGOUS FACTOR V LEIDEN MUTATION: Status: ACTIVE | Noted: 2018-08-20

## 2024-01-03 PROBLEM — Z46.89 PESSARY MAINTENANCE: Status: ACTIVE | Noted: 2021-11-08

## 2024-01-03 PROBLEM — E78.00 HYPERCHOLESTEROLEMIA: Status: ACTIVE | Noted: 2021-08-24

## 2024-01-03 PROBLEM — N99.3 VAGINAL VAULT PROLAPSE, POSTHYSTERECTOMY: Status: ACTIVE | Noted: 2023-10-26

## 2024-01-03 PROBLEM — I25.10 CORONARY ARTERY DISEASE: Status: ACTIVE | Noted: 2023-07-31

## 2024-01-03 PROBLEM — N95.2 ATROPHIC VAGINITIS: Status: ACTIVE | Noted: 2023-06-22

## 2024-01-03 LAB
CHOLEST SERPL-MCNC: 106 MG/DL (ref 120–199)
CHOLEST/HDLC SERPL: 2.4 {RATIO} (ref 2–5)
ESTIMATED AVG GLUCOSE: 108 MG/DL (ref 68–131)
HBA1C MFR BLD: 5.4 % (ref 4–5.6)
HDLC SERPL-MCNC: 44 MG/DL (ref 40–75)
HDLC SERPL: 41.5 % (ref 20–50)
HIV 1+2 AB+HIV1 P24 AG SERPL QL IA: NORMAL
LDLC SERPL CALC-MCNC: 48.2 MG/DL (ref 63–159)
NONHDLC SERPL-MCNC: 62 MG/DL
TRIGL SERPL-MCNC: 69 MG/DL (ref 30–150)

## 2024-01-03 PROCEDURE — 87389 HIV-1 AG W/HIV-1&-2 AB AG IA: CPT | Performed by: EMERGENCY MEDICINE

## 2024-01-03 PROCEDURE — 83036 HEMOGLOBIN GLYCOSYLATED A1C: CPT | Performed by: EMERGENCY MEDICINE

## 2024-01-03 PROCEDURE — 80061 LIPID PANEL: CPT | Performed by: EMERGENCY MEDICINE

## 2024-01-03 PROCEDURE — 99212 OFFICE O/P EST SF 10 MIN: CPT | Mod: PBBFAC | Performed by: EMERGENCY MEDICINE

## 2024-01-03 PROCEDURE — 99999 PR PBB SHADOW E&M-EST. PATIENT-LVL II: CPT | Mod: PBBFAC,,, | Performed by: EMERGENCY MEDICINE

## 2024-01-03 PROCEDURE — 99205 OFFICE O/P NEW HI 60 MIN: CPT | Mod: S$PBB,,, | Performed by: EMERGENCY MEDICINE

## 2024-01-03 RX ORDER — CETIRIZINE HYDROCHLORIDE 10 MG/1
10 TABLET ORAL DAILY
Qty: 7 TABLET | Refills: 0 | Status: SHIPPED | OUTPATIENT
Start: 2024-01-03 | End: 2024-01-10

## 2024-01-03 RX ORDER — AZITHROMYCIN 250 MG/1
250 TABLET, FILM COATED ORAL DAILY
COMMUNITY
Start: 2024-01-02 | End: 2024-01-04

## 2024-01-03 RX ORDER — VITAMIN A 3000 MCG
1 CAPSULE ORAL
Qty: 30 ML | Refills: 0 | Status: SHIPPED | OUTPATIENT
Start: 2024-01-03

## 2024-01-03 RX ORDER — ALBUTEROL SULFATE 0.63 MG/3ML
0.63 SOLUTION RESPIRATORY (INHALATION) EVERY 6 HOURS PRN
Qty: 75 ML | Refills: 0 | Status: SHIPPED | OUTPATIENT
Start: 2024-01-03 | End: 2025-01-02

## 2024-01-03 RX ORDER — FOLIC ACID 1 MG/1
1 TABLET ORAL DAILY
Qty: 90 TABLET | Refills: 3 | Status: SHIPPED | OUTPATIENT
Start: 2024-01-03 | End: 2025-01-02

## 2024-01-03 RX ORDER — AMLODIPINE BESYLATE 5 MG/1
5 TABLET ORAL DAILY
COMMUNITY
Start: 2023-12-26

## 2024-01-03 RX ORDER — BENZONATATE 200 MG/1
200 CAPSULE ORAL 3 TIMES DAILY PRN
Qty: 20 CAPSULE | Refills: 0 | Status: SHIPPED | OUTPATIENT
Start: 2024-01-03 | End: 2024-01-10

## 2024-01-03 NOTE — PROGRESS NOTES
[facility]      TODAY'S Date 01/03/2024  Patient ID: Cassidy Aguilar is a 78 y.o. female   MRN: [id number]  Primary Physician: An Noriega MD    SUBJECTIVE     Chief Complaint:   Chief Complaint   Patient presents with    Establish Care    COVID-19 Post Vaccine Symptoms    Post Pneumonia    Cough     HPI:   Reviewed medical, surgical, social and family history, medications, appropriate preventive health screenings, as well as vaccination history. Updates as noted below or in assessment and plan.    This is a very pleasant 78 y.o. nonsmoking female with PMHx HTN on Valsartan, amlodipine and carvedilol CR 40 mg daily, Stage III pelvic organ prolapse cystocele, rectocele and vaginal vault prolapse, hypothyroidism on Synthyroid, Chronic lymphocytic leukemia, Binet stage A, Duarte stage 0; cold agglutinin disease s/p obinutuzumab treatment. She is a new patient to me presenting for an annual exam in [facility]. Patient reports she was recently diagnosed with COVID 12/25/23 and pneumonia 12/31/23. She has persistent mildly productive cough, myalgias, and anorexia. She is feeling better now than yesterday but still has fatigue.  Her son who resides in [facility] is here to help her until she feels better. Of note, patient was seen in early December for hyponatremia with concerns that HCTZ may be exacerbating condition.  She stopped the medication and started on amlodipine instead.    Her goal for 2024 is to be healthier. 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  She has been  for 15 years.  Although she is currently dating, the patient has not had intercourse since the passing of her . She and her late  share 2 adult son, one which lives in [facility] and the other in [place].  In her free time, she enjoys playing tennis 3-4 x week. For breakfast, she typically has fresh orange juice and/or coffee. For lunch, she eats  salad or dinner leftovers. Dinner consists of a home cooked meal made of meat, vegetable and starch such as a potato. She snacks on cashews or almonds. The patient drinks a glass wine nightly, Pedialyte and water. She has been avoiding tea as she was told that can make you dehydrated. Patient is vaccines upon  resolution of her acute illness. Of note, patient states that when she coughs she may have urinary incontinence. She is removing and replacing pessary herself without issues.    SCREENING:   Mammogram:  UTD March 2023  Cervical Cancer: S/p hyst  DEXA:   Due and ordered  Colonoscopy:              UTD as of July 2023, patient does not want further testing  Depression:                negative   Anxiety:  negative  Eye Exam:                UTD  Dental Exam:            Routine q 6 months  Skin:                         uses sunscreen, states she bruises easily on aspirin  Sex:                          Not sexually active  Transportation safety:  Uses restraint consistently, does not drink alcohol before or while driving  Tobacco use:             none    A review of medical records indicates Specialists:    Hematology and Oncology - Adonis Wright MD for CLL (chronic lymphocytic leukemia)  Cardiology -  Angelique David MD, FACC for HTN  Obstetrics and Gynecology - Rafael Pedro MD  UroGyn -  Marie Hemphill MMD      Immunization History   Administered Date(s) Administered    COVID-19 Vaccine 01/07/2021, 01/29/2021    COVID-19, MRNA, LN-S, PF (Pfizer) (Gray Cap) 04/13/2022    COVID-19, MRNA, LN-S, PF (Pfizer) (Purple Cap) 01/07/2021, 01/29/2021, 08/17/2021    COVID-19, mRNA, LNP-S, PF, oracio-sucrose, 30 mcg/0.3 mL (Pfizer 2023 Ages 12+) 10/15/2023    COVID-19, mRNA, LNP-S, bivalent booster, PF (PFIZER OMICRON) 10/10/2022    Hepatitis A, Adult 09/26/2016    Influenza (FLUAD) - Quadrivalent - Adjuvanted - PF *Preferred* (65+) 09/10/2022, 09/15/2023    Influenza - High Dose - PF (65 years and older)  09/23/2014, 09/17/2016, 09/26/2017, 09/11/2018, 09/18/2019    Influenza - Quadrivalent - High Dose - PF (65 years and older) 08/20/2020    Influenza A (H1N1) 2009 Monovalent - IM 02/17/2010, 02/17/2010    Pneumococcal Conjugate - 13 Valent 03/28/2017    Pneumococcal Polysaccharide - 23 Valent 10/06/2004, 10/06/2004, 10/06/2009, 10/06/2009    Tdap 03/05/2020    Typhoid - ViCPs 09/26/2016    Yellow Fever 02/10/2017    Zoster 06/23/2011, 12/29/2018, 04/03/2019    Zoster Recombinant 12/29/2018, 04/03/2019     Past Medical History:   Diagnosis Date    CAD (coronary artery disease)     CLL (chronic lymphocytic leukemia) 12/22/2021    HLD (hyperlipidemia)     Hypertension     Hypertensive heart disease     Hypothyroidism 04/26/2022    Thyroid disease      Past Surgical History:   Procedure Laterality Date    ADENOIDECTOMY      COLONOSCOPY      HYSTERECTOMY      INTRACAPSULAR CATARACT EXTRACTION      TONSILLECTOMY       Family History   Problem Relation Age of Onset    Thyroid disease Mother     Osteoarthritis Mother     Hypertension Mother     Cancer Mother     Dementia Mother     Cancer Father         bladder    Heart disease Father     Diabetes Father     Leukemia Maternal Grandmother     Stroke Maternal Grandfather     Cancer Maternal Grandfather     Cancer Paternal Grandmother      Social History     Tobacco Use    Smoking status: Never    Smokeless tobacco: Never   Substance Use Topics    Alcohol use: Yes     Alcohol/week: 0.0 standard drinks of alcohol     Comment: rare     Past Medical, Surgical and Social history reviewed and verified by me.     Review of patient's allergies indicates:   Allergen Reactions    Sulfa (sulfonamide antibiotics) Rash     Other reaction(s): Other (See Comments)  ALLERGIC TO SULFA DRUGS oral    Tobramycin-dexamethasone      Other reaction(s): Other (See Comments)  dizziness    Seltzer Diarrhea       Current Outpatient Medications on File Prior to Visit   Medication Sig Dispense Refill     amLODIPine (NORVASC) 5 MG tablet Take 5 mg by mouth once daily.      azithromycin (Z-ROXANNA) 250 MG tablet Take 250 mg by mouth once daily.      aspirin (ECOTRIN) 81 MG EC tablet Take 81 mg by mouth.      atorvastatin (LIPITOR) 40 MG tablet TAKE ONE TABLET BY MOUTH once DAILY AT BEDTIME FOR cholesterol control      BENEFIBER, GUAR GUM, ORAL Take 1 Dose by mouth 2 (two) times daily.      Bifidobacterium infantis (ALIGN ORAL) Take by mouth. PRN      carvedilol PHOSPHATE 40 MG ORAL CM24 (COREG CR) 40 MG CM24 Take 1 capsule by mouth once daily.      fluticasone (FLONASE) 50 mcg/actuation nasal spray 1 spray by Each Nare route once daily.      glucosamine-chondroitin 500-400 mg tablet Take 1 tablet by mouth 3 (three) times daily.      MAXITROL 3.5mg/mL-10,000 unit/mL-0.1 % DrpS Place 1 drop into the right eye 2 (two) times daily.      multivitamin (THERAGRAN) per tablet Take 1 tablet by mouth once daily.      sulfacetamide sodium, acne, 10 % Susp Apply 1 application topically 2 (two) times daily. Apply to affected area      SYNTHROID 75 mcg tablet Take 1 tablet (75 mcg total) by mouth daily      valsartan (DIOVAN) 320 MG tablet Take 1 tablet by mouth once daily.      vitamin D (VITAMIN D3) 1000 units Tab Take 2,000 Units by mouth.      VIVELLE-DOT 0.025 mg/24 hr Place 1 patch onto the skin twice a week      [DISCONTINUED] calcium polycarbophil (FIBERCON ORAL) Take by mouth.      [DISCONTINUED] folic acid (FOLVITE) 1 MG tablet Take 1 tablet (1 mg total) by mouth once daily. (Patient taking differently: Take 800 mcg by mouth once daily.) 90 tablet 3    [DISCONTINUED] hydroCHLOROthiazide (HYDRODIURIL) 25 MG tablet Take 1 tablet by mouth once daily.      [DISCONTINUED] telmisartan (MICARDIS) 80 MG Tab        No current facility-administered medications on file prior to visit.       Review of Systems as per HPI  Review of Systems   Constitutional:  Positive for chills and malaise/fatigue. Negative for diaphoresis and weight  "loss.   HENT:  Positive for congestion. Negative for ear discharge, ear pain, hearing loss, nosebleeds, sore throat and tinnitus.    Eyes:  Negative for blurred vision, double vision, photophobia and pain.   Respiratory:  Positive for cough. Negative for stridor.    Gastrointestinal:  Negative for blood in stool, constipation, melena, nausea and vomiting.   Genitourinary:  Negative for dysuria, flank pain and hematuria.   Musculoskeletal:  Positive for myalgias.   Skin:  Negative for itching and rash.   Endo/Heme/Allergies:  Bruises/bleeds easily.   All other systems reviewed and are negative.      OBJECTIVE     PHYSICAL EXAM  Vitals:    01/03/24 1000   BP: 115/64   Pulse: 66   SpO2: 95%   Weight: 56.2 kg (123 lb 14.4 oz)   Height: 5' 9" (1.753 m)     Vital Signs (Most Recent):  Pulse: 66 (01/03/24 1000)  BP: 115/64 (01/03/24 1000)  SpO2: 95 % (01/03/24 1000)   Weight:   Wt Readings from Last 1 Encounters:   01/03/24 56.2 kg (123 lb 14.4 oz)     Body mass index is 18.3 kg/m².      Vital signs and nursing assessment noted: relatively normal vitals    GEN:   NAD, A & Ox3, atraumatic, well appearing, nontoxic appearing, thin  HEENT:  PERRLA, EOMI, moist membranes, nl conjunctiva, no scleral icterus, no nystagmus, no nodes/nodules, soft, supple, FROM, no trachial deviation, nexus negative  Physical Exam  HENT:      Right Ear: Hearing, tympanic membrane, ear canal and external ear normal.      Left Ear: Hearing, tympanic membrane, ear canal and external ear normal.      Nose: Congestion present. No nasal deformity, septal deviation, signs of injury, laceration, nasal tenderness, mucosal edema or rhinorrhea.      Right Nostril: No foreign body, epistaxis, septal hematoma or occlusion.      Left Nostril: No foreign body, epistaxis, septal hematoma or occlusion.      Right Turbinates: Not enlarged, swollen or pale.      Left Turbinates: Not enlarged, swollen or pale.      Right Sinus: Maxillary sinus tenderness and frontal " sinus tenderness present.      Left Sinus: Maxillary sinus tenderness and frontal sinus tenderness present.      Mouth/Throat:      Pharynx: Oropharynx is clear. Uvula midline.      Tonsils: No tonsillar exudate or tonsillar abscesses.   Neck:      Thyroid: No thyroid mass, thyromegaly or thyroid tenderness.      Trachea: Trachea and phonation normal.   Lymphadenopathy:      Cervical: No cervical adenopathy.   CV:   RRR no m/r/g, 2+ radial pulses, <2sec cap refill, no obvious JVD  RESP:  CTA B, no w/r/r, equal and bilateral chest rise, no respiratory distress  ABD:   soft, Nontender, Nondistended, +BS, no guarding/rebound  :   Deferred  BACK:  FROM, no midline tenderness, no paraspinal tenderness  EXT:   FROM, MCCOLLUM x 4, no swelling, no edema, no calf tenderness, no bony tenderness, no warmth or redness, no crepitus, no obvious deformity  NEURO:  GCS 15, CN II-XII grossly intact, no obvious motor/sensory deficit, no tremor, negative Romberg,  nl gait/coordination  PSYCH:   Cooperative, no SI/HI, no anxiety, nl mood/affect, nl judgement/thought process  SKIN:  Warm, dry, intact, no rashes/lesions or masses. ecchymosis on L anterior shin and lateral R LE otherwise nl color and without pallor      Tests    Labs reviewed and independently interpreted. Imaging studies reviewed.   Recent Results (from the past 2016 hour(s))   Lactate Dehydrogenase    Collection Time: 12/08/23 10:36 AM   Result Value Ref Range     110 - 260 U/L   Phosphorus    Collection Time: 12/08/23 10:36 AM   Result Value Ref Range    Phosphorus 3.7 2.7 - 4.5 mg/dL   Magnesium    Collection Time: 12/08/23 10:36 AM   Result Value Ref Range    Magnesium 1.9 1.6 - 2.6 mg/dL   Comprehensive Metabolic Panel    Collection Time: 12/08/23 10:36 AM   Result Value Ref Range    Sodium 134 (L) 136 - 145 mmol/L    Potassium 4.7 3.5 - 5.1 mmol/L    Chloride 95 95 - 110 mmol/L    CO2 28 23 - 29 mmol/L    Glucose 90 70 - 110 mg/dL    BUN 14 8 - 23 mg/dL     Creatinine 0.7 0.5 - 1.4 mg/dL    Calcium 9.5 8.7 - 10.5 mg/dL    Total Protein 7.4 6.0 - 8.4 g/dL    Albumin 4.2 3.5 - 5.2 g/dL    Total Bilirubin 1.2 (H) 0.1 - 1.0 mg/dL    Alkaline Phosphatase 85 55 - 135 U/L    AST 20 10 - 40 U/L    ALT 20 10 - 44 U/L    eGFR >60.0 >60 mL/min/1.73 m^2    Anion Gap 11 8 - 16 mmol/L   CBC Auto Differential    Collection Time: 12/08/23 10:36 AM   Result Value Ref Range    WBC 8.93 3.90 - 12.70 K/uL    RBC 4.80 4.00 - 5.40 M/uL    Hemoglobin 15.0 12.0 - 16.0 g/dL    Hematocrit 40.6 37.0 - 48.5 %    MCV 85 82 - 98 fL    MCH 31.3 (H) 27.0 - 31.0 pg    MCHC 36.9 (H) 32.0 - 36.0 g/dL    RDW 12.7 11.5 - 14.5 %    Platelets 248 150 - 450 K/uL    MPV 9.9 9.2 - 12.9 fL    Immature Granulocytes 0.3 0.0 - 0.5 %    Gran # (ANC) 6.5 1.8 - 7.7 K/uL    Immature Grans (Abs) 0.03 0.00 - 0.04 K/uL    Lymph # 1.4 1.0 - 4.8 K/uL    Mono # 0.8 0.3 - 1.0 K/uL    Eos # 0.1 0.0 - 0.5 K/uL    Baso # 0.06 0.00 - 0.20 K/uL    nRBC 0 0 /100 WBC    Gran % 72.3 38.0 - 73.0 %    Lymph % 16.1 (L) 18.0 - 48.0 %    Mono % 9.0 4.0 - 15.0 %    Eosinophil % 1.6 0.0 - 8.0 %    Basophil % 0.7 0.0 - 1.9 %    Differential Method Automated    Haptoglobin    Collection Time: 12/08/23 10:36 AM   Result Value Ref Range    Haptoglobin 96 30 - 250 mg/dL   Reticulocytes    Collection Time: 12/08/23 10:36 AM   Result Value Ref Range    Retic 1.3 0.5 - 2.5 %   TSH    Collection Time: 12/08/23 10:36 AM   Result Value Ref Range    TSH 1.976 0.400 - 4.000 uIU/mL   T4, FREE    Collection Time: 12/08/23 10:36 AM   Result Value Ref Range    Free T4 1.27 0.71 - 1.51 ng/dL   UA MICROSCOPIC    Collection Time: 12/09/23  2:33 PM   Result Value Ref Range    Color, UA Colorless Light Yellow, Colorless, Yellow, Dark Yellow    Clarity/Appearance Clear Clear    Specific Gravity,UA 1.007 1.005 - 1.035    pH 7.0 5.0 - 8.0    Glucose, UA Normal Normal    Protein Negative Negative    Ketones, UA Negative Negative    Bilirubin (UA) Negative Negative     Nitrites, UA Negative Negative    Blood, UA Negative Negative    Leukocytes, UA Negative Negative    Urobilinogen, UA Normal Normal   MAGNESIUM    Collection Time: 12/10/23  5:40 AM   Result Value Ref Range    Magnesium Level 1.7 1.6 - 2.6 mg/dL   Hemoglobin A1C    Collection Time: 01/03/24  9:32 AM   Result Value Ref Range    Hemoglobin A1C 5.4 4.0 - 5.6 %    Estimated Avg Glucose 108 68 - 131 mg/dL   Lipid Panel    Collection Time: 01/03/24  9:32 AM   Result Value Ref Range    Cholesterol 106 (L) 120 - 199 mg/dL    Triglycerides 69 30 - 150 mg/dL    HDL 44 40 - 75 mg/dL    LDL Cholesterol 48.2 (L) 63.0 - 159.0 mg/dL    HDL/Cholesterol Ratio 41.5 20.0 - 50.0 %    Total Cholesterol/HDL Ratio 2.4 2.0 - 5.0    Non-HDL Cholesterol 62 mg/dL   HIV 1/2 Ag/Ab (4th Gen)    Collection Time: 01/03/24  9:32 AM   Result Value Ref Range    HIV 1/2 Ag/Ab Non-reactive Non-reactive       Latest Reference Range & Units 01/03/22 10:29   Hepatitis C Ab Negative  Negative       X-Ray Chest 1 View 01/01/24 Impression 1.   Right infrahilar consolidation/airspace disease.    CT Head Without Contrast Dec 2023 Impression  1. No acute intracranial process is identified.  2. Minimal scattered white matter microvascular ischemic changes.  3. Diffuse involutional changes.  4. Prominent perivascular space versus punctate chronic lacunar infarct in the posterior left caudate nucleus head.    MAMMO DIGITAL DIAGNOSTIC LEFT WITH MICHAEL March 2023 Impression   No mammographic or sonographic evidence of malignancy.  Breast density category: The breasts are heterogeneously dense, which may obscure small masses. (c)  BI-RADS:  1-Negative    NM PET CU64 DOTATATE, SKULL TO MID THIGH June 2021 Impression  Innumerable enlarged bilateral cervical, supraclavicular, axillary, retroperitoneal, and pelvic lymph nodes all which demonstrate low-level FDG avidity, however are enlarged from the prior exam and most consistent with recurrent disease.    NUCLEAR  STRESS TEST (CUPID ONLY) May 2021 Impression:  1. Exercise Myoview SPECT perfusion stress study is positive for reversible ischemia of the inferolateral and anteroseptal segments.  2. Good left ventricular systolic function with calculated ejection fraction 72%.  3. Functional capacity is good for age.    CT CALCIUM SCORING CARDIAC April 2021 Impression  1.  Coronary artery calcium score is  478.8 involving the left anterior descending artery, corresponding to the 90th percentile for age and sex.    DXA BONE DENSITY AXIAL SKELETON 1 OR MORE SITES Oct 2018 Impression  1. Normal bone mineral density in the mean of the measured levels of the lumbar spine. The mean bone mineral density has increased since the previous exam.  2. Normal bone mineral density in the measured mean of both femoral necks.  The mean bone mineral density has increased since the previous exam.  FRAX 10 year probability of fracture: Major osteoporotic fracture: 5.7%. Hip fracture: 0.3%     CT MAXILLOFACIAL WITHOUT CONTRAST July 2018 Impression  1. Stable appearance to a nonspecific 0.65 x 0.8 cm, nonspecific soft tissue nodular density in the subcutaneous fat just anterior to the inferior aspect of the right maxillary antrum.  2. Innumerable soft tissue nodular densities centimeters on the neck bilaterally is consistent with abnormally prominent and numerous lymph nodes in this patient with non-Hodgkin's lymphoma. Unfortunately, in the absence of intravenous contrast, it is impossible to differentiate most of these nodules from unopacified vessels. There does not appear to have been any significant change in the number and size of the multiple nodules.  3. There is partial opacification of the right frontal sinus that, in retrospect, is unchanged from the prior exam and consistent with right frontal facial sinusitis.    ASSESSMENT:     1. COVID-19 virus infection    2. Establishing care with new doctor, encounter for    3. Encounter for medication  review and counseling    4. Healthcare maintenance    5. Urge incontinence of urine due to female genital prolapse      78 y.o. female PMHx HTN on Valsartan, amlodipine and carvedilol CR 40 mg daily, Stage III pelvic organ prolapse cystocele, rectocele and vaginal vault prolapse, hypothyroidism on Synthyroid, Chronic lymphocytic leukemia with Binet stage A, Duarte stage 0; cold agglutinin disease s/p obinutuzumab treatment. She is a new patient to me presenting for an annual exam in [facility]. Patient reports she was recently diagnosed with COVID 12/25/23 and pneumonia 12/31/23 with residual cough, malaise, chills and myalgias.  Home medications reviewed. Exam shows a thin women with nasal congestion, sinus tenderness and ecchymosis on L anterior shin and lateral R LE. No skin lesions suspicious for malignancy noted. Immunization status: up to date and documented, missing doses of RSV, pneumovax.     MDM  Reviewed: previous chart, nursing note and vitals  Reviewed previous: labs, CT scan and x-ray  Interpretation: labs (low chol and LDL otherwise relatively unremarkable Lipid, HgA1c, HIV.  Previous CMP, CBC, Hep C, TSH unremarkable.)  Consults: cardiology (Joann to discuss use of statin)      PLAN:   We suggest periodic health maintenance visits annually or sooner with concerns.   Routine labs CMP, CBC, Lipid, HgA1C, TSH  Dexa ordered   Consider Vaccines on next visit upon resolution of acute illness.  Continue pessary (patient defers surgical intervention for now)  Discussed further colon screening colonoscopy. Patient declined further imaging.   Consider repeating chest imaging on resolution of acute illness.   Encouraged healthy eating to include dietary consumption of calcium 1200mg  Upon resolution of acute illness, recommend 150 minutes of moderate-intensity physical activity and 2 days of muscle strengthening activity weekly.  Recommend daily sun protection/avoidance, use of at least SPF 30, broad  spectrum sunscreen (OTC drug), and routine physician surveillance to optimize early detection.  Consider holding statin while have myalgias and discussion with cardiology about benefit thereafter.  Continue other current medications.  New Prescriptions    ACETAMINOPHEN-CODEINE 300-30MG (TYLENOL #3) 300-30 MG TAB    Take 1 tablet by mouth every 4 (four) hours as needed (pain, cough).    ALBUTEROL (ACCUNEB) 0.63 MG/3 ML NEBU    Take 3 mLs (0.63 mg total) by nebulization every 6 (six) hours as needed. Rescue    BENZONATATE (TESSALON) 200 MG CAPSULE    Take 1 capsule (200 mg total) by mouth 3 (three) times daily as needed for Cough.    CETIRIZINE (ZYRTEC) 10 MG TABLET    Take 1 tablet (10 mg total) by mouth once daily. for 7 days    SODIUM CHLORIDE (SALINE NASAL) 0.65 % NASAL SPRAY    1 spray by Nasal route as needed for Congestion.        The results of physical exam findings, labs, and imaging were reviewed with the patient. Management of above assessments/visit diagnoses was discussed with patient.   Precautions for return discussed at length. Patient was given ample time for questions. All questions asked and answered to the satisfaction of the patient. Patient is in agreement with the above and verbalized understanding. Total time spent caring for the patient today was 61 minutes. This includes time spent before the visit reviewing the chart, time spent during the visit, and time spent after the visit on documentation.     An Noriega MD  [facility]

## 2024-01-04 RX ORDER — ACETAMINOPHEN AND CODEINE PHOSPHATE 300; 30 MG/1; MG/1
1 TABLET ORAL EVERY 4 HOURS PRN
Qty: 20 TABLET | Refills: 0 | Status: SHIPPED | OUTPATIENT
Start: 2024-01-04 | End: 2024-01-14

## 2024-01-06 ENCOUNTER — TELEPHONE (OUTPATIENT)
Dept: INTERNAL MEDICINE | Facility: CLINIC | Age: 79
End: 2024-01-06
Payer: MEDICARE

## 2024-01-08 ENCOUNTER — OFFICE VISIT (OUTPATIENT)
Dept: INTERNAL MEDICINE | Facility: CLINIC | Age: 79
DRG: 178 | End: 2024-01-08
Payer: MEDICARE

## 2024-01-08 ENCOUNTER — HOSPITAL ENCOUNTER (OUTPATIENT)
Dept: RADIOLOGY | Facility: HOSPITAL | Age: 79
Discharge: HOME | DRG: 178 | End: 2024-01-08
Attending: EMERGENCY MEDICINE
Payer: MEDICARE

## 2024-01-08 ENCOUNTER — HOSPITAL ENCOUNTER (INPATIENT)
Facility: HOSPITAL | Age: 79
LOS: 1 days | Discharge: HOME | DRG: 178 | End: 2024-01-09
Attending: STUDENT IN AN ORGANIZED HEALTH CARE EDUCATION/TRAINING PROGRAM | Admitting: STUDENT IN AN ORGANIZED HEALTH CARE EDUCATION/TRAINING PROGRAM
Payer: MEDICARE

## 2024-01-08 ENCOUNTER — PATIENT MESSAGE (OUTPATIENT)
Dept: HEMATOLOGY/ONCOLOGY | Facility: CLINIC | Age: 79
End: 2024-01-08
Payer: MEDICARE

## 2024-01-08 DIAGNOSIS — A41.9 SEPSIS, DUE TO UNSPECIFIED ORGANISM, UNSPECIFIED WHETHER ACUTE ORGAN DYSFUNCTION PRESENT: Primary | ICD-10-CM

## 2024-01-08 DIAGNOSIS — R89.9 ABNORMAL LABORATORY TEST: ICD-10-CM

## 2024-01-08 DIAGNOSIS — J18.9 PNEUMONIA DUE TO INFECTIOUS ORGANISM, UNSPECIFIED LATERALITY, UNSPECIFIED PART OF LUNG: ICD-10-CM

## 2024-01-08 DIAGNOSIS — E87.1 HYPONATREMIA: ICD-10-CM

## 2024-01-08 DIAGNOSIS — A41.9 SEPSIS, DUE TO UNSPECIFIED ORGANISM, UNSPECIFIED WHETHER ACUTE ORGAN DYSFUNCTION PRESENT: ICD-10-CM

## 2024-01-08 DIAGNOSIS — E87.1 HYPONATREMIA: Primary | ICD-10-CM

## 2024-01-08 DIAGNOSIS — J69.0 ASPIRATION PNEUMONIA OF RIGHT LOWER LOBE, UNSPECIFIED ASPIRATION PNEUMONIA TYPE: ICD-10-CM

## 2024-01-08 DIAGNOSIS — R50.9 FEVER, UNSPECIFIED FEVER CAUSE: Primary | ICD-10-CM

## 2024-01-08 LAB
APTT PPP: 28.1 SEC (ref 21–32)
BNP SERPL-MCNC: 25 PG/ML (ref 0–99)
INR PPP: 1 (ref 0.8–1.2)
LACTATE SERPL-SCNC: 0.6 MMOL/L (ref 0.5–2.2)
PROTHROMBIN TIME: 10.5 SEC (ref 9–12.5)
TROPONIN I SERPL DL<=0.01 NG/ML-MCNC: <0.006 NG/ML (ref 0–0.03)

## 2024-01-08 PROCEDURE — 71260 CT THORAX DX C+: CPT | Mod: 26,,, | Performed by: INTERNAL MEDICINE

## 2024-01-08 PROCEDURE — 85610 PROTHROMBIN TIME: CPT

## 2024-01-08 PROCEDURE — 87449 NOS EACH ORGANISM AG IA: CPT

## 2024-01-08 PROCEDURE — 93010 ELECTROCARDIOGRAM REPORT: CPT | Mod: ,,, | Performed by: INTERNAL MEDICINE

## 2024-01-08 PROCEDURE — 85730 THROMBOPLASTIN TIME PARTIAL: CPT

## 2024-01-08 PROCEDURE — 74177 CT ABD & PELVIS W/CONTRAST: CPT | Mod: 26,,, | Performed by: INTERNAL MEDICINE

## 2024-01-08 PROCEDURE — 99443 PR PHYSICIAN TELEPHONE EVALUATION 21-30 MIN: CPT | Mod: 95,,, | Performed by: EMERGENCY MEDICINE

## 2024-01-08 PROCEDURE — 87040 BLOOD CULTURE FOR BACTERIA: CPT | Mod: 59 | Performed by: EMERGENCY MEDICINE

## 2024-01-08 PROCEDURE — 87081 CULTURE SCREEN ONLY: CPT

## 2024-01-08 PROCEDURE — 83605 ASSAY OF LACTIC ACID: CPT | Performed by: EMERGENCY MEDICINE

## 2024-01-08 PROCEDURE — 96365 THER/PROPH/DIAG IV INF INIT: CPT

## 2024-01-08 PROCEDURE — 25500020 PHARM REV CODE 255: Performed by: STUDENT IN AN ORGANIZED HEALTH CARE EDUCATION/TRAINING PROGRAM

## 2024-01-08 PROCEDURE — 84484 ASSAY OF TROPONIN QUANT: CPT

## 2024-01-08 PROCEDURE — 74177 CT ABD & PELVIS W/CONTRAST: CPT | Mod: TC

## 2024-01-08 PROCEDURE — 99285 EMERGENCY DEPT VISIT HI MDM: CPT | Mod: 25

## 2024-01-08 PROCEDURE — 25500020 PHARM REV CODE 255: Performed by: EMERGENCY MEDICINE

## 2024-01-08 PROCEDURE — 83880 ASSAY OF NATRIURETIC PEPTIDE: CPT

## 2024-01-08 PROCEDURE — 93005 ELECTROCARDIOGRAM TRACING: CPT

## 2024-01-08 RX ADMIN — IOHEXOL 65 ML: 350 INJECTION, SOLUTION INTRAVENOUS at 11:01

## 2024-01-08 RX ADMIN — IOHEXOL 85 ML: 350 INJECTION, SOLUTION INTRAVENOUS at 04:01

## 2024-01-08 NOTE — Clinical Note
Diagnosis: Abnormal laboratory test [332261]   Future Attending Provider: BRADY BONE [[id number]]   Admitting Provider:: BRADY BONE [[id number]]   Special Needs:: No Special Needs [1]

## 2024-01-08 NOTE — PROGRESS NOTES
Established Patient - Audio Only Telehealth Visit     The patient location is: home  The chief complaint leading to consultation is: insomnia  Visit type: Virtual visit with audio only (telephone)  Total time spent with patient: 41        The reason for the audio only service rather than synchronous audio and video virtual visit was related to technical difficulties or patient preference/necessity.     Each patient to whom I provide medical services by telemedicine is:  (1) informed of the relationship between the physician and patient and the respective role of any other health care provider with respect to management of the patient; and (2) notified that they may decline to receive medical services by telemedicine and may withdraw from such care at any time. Patient verbally consented to receive this service via voice-only telephone call.        HPI:           78-year-old female with the past medical history of CLL presents with persistent diarrhea that worsen after she coughs. Of note, patient was seen for COVID-19 infection and completed course of Paxlovid. She subsequently had a pneumonia last week and completed course of azithromycin. Shes been having mostly nonproductive cough but sometimes will have phlegm. Diarrhea is not bloody. She noted yesterday that she had some incontinence of s tool when some stool on her underwear. She denies any nausea, vomiting, or abdominal discomfort at this time. She states that she does not feel short of breath. She did have a temperature of 100.2. She noted her vitals of a heart rate of 69 and a pulse ox of 96%. After discussion of Risk and Benefits I recommended to patient that there is a preference for patient to be seen and have labs done. I also reviewed the note from oncology on D ecember 8, 2023 and will include haptiglobin, LDA and retic with my CMP, CBC, blood culture, and urinalysis. Previous chest x-ray reviewed last week showed a pneumonia. Will do a CT scan  of chest for completeness sake to see if theres a resolution and include an abdomen pelvis because of the persistent diarrhea. Concern is for sepsis. At this point I do not believe that patient needs ova and parasite testing as she does not appear to be super ill however  after viewing her in person, if she is tender or very ill appearing, will add that to the Labs that will be done.    -----  Reassessment at 4:45 pm. labs review and independently interpreted. Imaging studies reviewed. Leucocytosis, anemia, hyponatremia and normal creatinine. UA pending.  Patient does not feel terribly horrible, but states that she has little energy and only drinks a little bit of Pedialyte.     4:50 pm CT reviewed with concerns for pneumonia and pyelonephritis. After discussion of Risk and Benefits, it is best for patient to be admitted for at least observation, IV fluids and IV antibiotics instead of oral antibiotics RX to pharmacy with continued attempt of oral hydration. Plan to go to ED tonight.     A/P  1. Hyponatremia    2. Pneumonia due to infectious organism, unspecified laterality, unspecified part of lung               This service was not originating from a related E/M service provided within the previous 7 days nor will  to an E/M service or procedure within the next 24 hours or my soonest available appointment.  Prevailing standard of care was able to be met in this audio-only visit.     Time spent reviewing test results,  documenting the medical record and time spent with family members discussing treatment and management decisions and decision for admission    The time involved in the performance of separately reportable procedures was not counted toward critical care time.

## 2024-01-09 VITALS
TEMPERATURE: 99 F | RESPIRATION RATE: 19 BRPM | SYSTOLIC BLOOD PRESSURE: 127 MMHG | HEART RATE: 61 BPM | DIASTOLIC BLOOD PRESSURE: 61 MMHG | OXYGEN SATURATION: 98 % | WEIGHT: 123.88 LBS | BODY MASS INDEX: 18.3 KG/M2

## 2024-01-09 PROBLEM — Z71.89 ACP (ADVANCE CARE PLANNING): Status: ACTIVE | Noted: 2021-11-08

## 2024-01-09 PROBLEM — J69.0 ASPIRATION PNEUMONIA OF RIGHT LOWER LOBE: Status: ACTIVE | Noted: 2024-01-09

## 2024-01-09 LAB
ANION GAP SERPL CALC-SCNC: 6 MMOL/L (ref 8–16)
BILIRUB UR QL STRIP: NEGATIVE
BUN SERPL-MCNC: 5 MG/DL (ref 8–23)
CALCIUM SERPL-MCNC: 8.1 MG/DL (ref 8.7–10.5)
CHLORIDE SERPL-SCNC: 96 MMOL/L (ref 95–110)
CLARITY UR REFRACT.AUTO: ABNORMAL
CO2 SERPL-SCNC: 25 MMOL/L (ref 23–29)
COLOR UR AUTO: YELLOW
CORTIS SERPL-MCNC: 16.8 UG/DL
CREAT SERPL-MCNC: 0.5 MG/DL (ref 0.5–1.4)
EST. GFR  (NO RACE VARIABLE): >60 ML/MIN/1.73 M^2
GLUCOSE SERPL-MCNC: 109 MG/DL (ref 70–110)
GLUCOSE UR QL STRIP: NEGATIVE
HGB UR QL STRIP: ABNORMAL
KETONES UR QL STRIP: NEGATIVE
LEUKOCYTE ESTERASE UR QL STRIP: NEGATIVE
MAGNESIUM SERPL-MCNC: 1.8 MG/DL (ref 1.6–2.6)
NITRITE UR QL STRIP: NEGATIVE
OSMOLALITY SERPL: 263 MOSM/KG (ref 275–295)
OSMOLALITY UR: 366 MOSM/KG (ref 50–1200)
PH UR STRIP: 7 [PH] (ref 5–8)
PHOSPHATE SERPL-MCNC: 3.1 MG/DL (ref 2.7–4.5)
POTASSIUM SERPL-SCNC: 3.8 MMOL/L (ref 3.5–5.1)
PROT UR QL STRIP: ABNORMAL
SODIUM SERPL-SCNC: 127 MMOL/L (ref 136–145)
SODIUM SERPL-SCNC: 130 MMOL/L (ref 136–145)
SODIUM UR-SCNC: 34 MMOL/L (ref 20–250)
SP GR UR STRIP: >1.03 (ref 1–1.03)
T4 SERPL-MCNC: 9.3 UG/DL (ref 4.5–11.5)
TSH SERPL DL<=0.005 MIU/L-ACNC: 2.56 UIU/ML (ref 0.4–4)
URN SPEC COLLECT METH UR: ABNORMAL

## 2024-01-09 PROCEDURE — 82533 TOTAL CORTISOL: CPT | Performed by: HOSPITALIST

## 2024-01-09 PROCEDURE — 63600175 PHARM REV CODE 636 W HCPCS: Performed by: HOSPITALIST

## 2024-01-09 PROCEDURE — 83930 ASSAY OF BLOOD OSMOLALITY: CPT | Performed by: HOSPITALIST

## 2024-01-09 PROCEDURE — 80048 BASIC METABOLIC PNL TOTAL CA: CPT | Performed by: HOSPITALIST

## 2024-01-09 PROCEDURE — 25000003 PHARM REV CODE 250: Performed by: STUDENT IN AN ORGANIZED HEALTH CARE EDUCATION/TRAINING PROGRAM

## 2024-01-09 PROCEDURE — 83735 ASSAY OF MAGNESIUM: CPT | Performed by: HOSPITALIST

## 2024-01-09 PROCEDURE — 84443 ASSAY THYROID STIM HORMONE: CPT | Performed by: HOSPITALIST

## 2024-01-09 PROCEDURE — 83935 ASSAY OF URINE OSMOLALITY: CPT | Performed by: HOSPITALIST

## 2024-01-09 PROCEDURE — 25000003 PHARM REV CODE 250: Performed by: HOSPITALIST

## 2024-01-09 PROCEDURE — 84100 ASSAY OF PHOSPHORUS: CPT | Performed by: HOSPITALIST

## 2024-01-09 PROCEDURE — 92610 EVALUATE SWALLOWING FUNCTION: CPT

## 2024-01-09 PROCEDURE — 84300 ASSAY OF URINE SODIUM: CPT | Performed by: HOSPITALIST

## 2024-01-09 PROCEDURE — 84295 ASSAY OF SERUM SODIUM: CPT | Mod: 91 | Performed by: STUDENT IN AN ORGANIZED HEALTH CARE EDUCATION/TRAINING PROGRAM

## 2024-01-09 PROCEDURE — 84436 ASSAY OF TOTAL THYROXINE: CPT | Performed by: HOSPITALIST

## 2024-01-09 PROCEDURE — 81003 URINALYSIS AUTO W/O SCOPE: CPT | Performed by: HOSPITALIST

## 2024-01-09 PROCEDURE — 12000002 HC ACUTE/MED SURGE SEMI-PRIVATE ROOM

## 2024-01-09 RX ORDER — VALSARTAN 160 MG/1
320 TABLET ORAL DAILY
Status: DISCONTINUED | OUTPATIENT
Start: 2024-01-09 | End: 2024-01-09 | Stop reason: HOSPADM

## 2024-01-09 RX ORDER — AMOXICILLIN AND CLAVULANATE POTASSIUM 875; 125 MG/1; MG/1
1 TABLET, FILM COATED ORAL 2 TIMES DAILY
Qty: 8 TABLET | Refills: 0 | Status: SHIPPED | OUTPATIENT
Start: 2024-01-09 | End: 2024-01-13

## 2024-01-09 RX ORDER — GUAIFENESIN/DEXTROMETHORPHAN 100-10MG/5
10 SYRUP ORAL EVERY 6 HOURS PRN
Status: DISCONTINUED | OUTPATIENT
Start: 2024-01-09 | End: 2024-01-09 | Stop reason: HOSPADM

## 2024-01-09 RX ORDER — AMOXICILLIN 250 MG
1 CAPSULE ORAL 2 TIMES DAILY PRN
Status: DISCONTINUED | OUTPATIENT
Start: 2024-01-09 | End: 2024-01-09 | Stop reason: HOSPADM

## 2024-01-09 RX ORDER — CHOLECALCIFEROL (VITAMIN D3) 25 MCG
2000 TABLET ORAL DAILY
Status: DISCONTINUED | OUTPATIENT
Start: 2024-01-09 | End: 2024-01-09 | Stop reason: HOSPADM

## 2024-01-09 RX ORDER — IPRATROPIUM BROMIDE AND ALBUTEROL SULFATE 2.5; .5 MG/3ML; MG/3ML
3 SOLUTION RESPIRATORY (INHALATION) EVERY 4 HOURS PRN
Status: DISCONTINUED | OUTPATIENT
Start: 2024-01-09 | End: 2024-01-09 | Stop reason: HOSPADM

## 2024-01-09 RX ORDER — ALBUTEROL SULFATE 90 UG/1
2 AEROSOL, METERED RESPIRATORY (INHALATION) EVERY 6 HOURS PRN
Status: DISCONTINUED | OUTPATIENT
Start: 2024-01-09 | End: 2024-01-09

## 2024-01-09 RX ORDER — BENZONATATE 100 MG/1
200 CAPSULE ORAL 3 TIMES DAILY PRN
Status: DISCONTINUED | OUTPATIENT
Start: 2024-01-09 | End: 2024-01-09 | Stop reason: HOSPADM

## 2024-01-09 RX ORDER — FOLIC ACID 1 MG/1
1 TABLET ORAL DAILY
Status: DISCONTINUED | OUTPATIENT
Start: 2024-01-09 | End: 2024-01-09 | Stop reason: HOSPADM

## 2024-01-09 RX ORDER — AMLODIPINE BESYLATE 5 MG/1
5 TABLET ORAL DAILY
Status: DISCONTINUED | OUTPATIENT
Start: 2024-01-09 | End: 2024-01-09 | Stop reason: HOSPADM

## 2024-01-09 RX ORDER — ASPIRIN 81 MG/1
81 TABLET ORAL DAILY
Status: DISCONTINUED | OUTPATIENT
Start: 2024-01-09 | End: 2024-01-09 | Stop reason: HOSPADM

## 2024-01-09 RX ORDER — TALC
6 POWDER (GRAM) TOPICAL NIGHTLY PRN
Status: DISCONTINUED | OUTPATIENT
Start: 2024-01-09 | End: 2024-01-09 | Stop reason: HOSPADM

## 2024-01-09 RX ORDER — LEVOTHYROXINE SODIUM 75 UG/1
75 TABLET ORAL
Status: DISCONTINUED | OUTPATIENT
Start: 2024-01-09 | End: 2024-01-09 | Stop reason: HOSPADM

## 2024-01-09 RX ORDER — ACETAMINOPHEN AND CODEINE PHOSPHATE 300; 30 MG/1; MG/1
1 TABLET ORAL EVERY 4 HOURS PRN
Status: DISCONTINUED | OUTPATIENT
Start: 2024-01-09 | End: 2024-01-09 | Stop reason: HOSPADM

## 2024-01-09 RX ORDER — GUAIFENESIN/DEXTROMETHORPHAN 100-10MG/5
10 SYRUP ORAL EVERY 6 HOURS PRN
Qty: 354 ML | Refills: 0 | Status: SHIPPED | OUTPATIENT
Start: 2024-01-09 | End: 2024-01-19

## 2024-01-09 RX ORDER — FLUTICASONE PROPIONATE 50 MCG
1 SPRAY, SUSPENSION (ML) NASAL DAILY
Status: DISCONTINUED | OUTPATIENT
Start: 2024-01-09 | End: 2024-01-09 | Stop reason: HOSPADM

## 2024-01-09 RX ORDER — SODIUM CHLORIDE 0.9 % (FLUSH) 0.9 %
10 SYRINGE (ML) INJECTION
Status: DISCONTINUED | OUTPATIENT
Start: 2024-01-09 | End: 2024-01-09 | Stop reason: HOSPADM

## 2024-01-09 RX ORDER — ATORVASTATIN CALCIUM 40 MG/1
40 TABLET, FILM COATED ORAL DAILY
Status: DISCONTINUED | OUTPATIENT
Start: 2024-01-09 | End: 2024-01-09 | Stop reason: HOSPADM

## 2024-01-09 RX ORDER — CETIRIZINE HYDROCHLORIDE 5 MG/1
10 TABLET ORAL DAILY
Status: DISCONTINUED | OUTPATIENT
Start: 2024-01-09 | End: 2024-01-09 | Stop reason: HOSPADM

## 2024-01-09 RX ADMIN — LEVOTHYROXINE SODIUM 75 MCG: 75 TABLET ORAL at 07:01

## 2024-01-09 RX ADMIN — AZITHROMYCIN MONOHYDRATE 500 MG: 500 INJECTION, POWDER, LYOPHILIZED, FOR SOLUTION INTRAVENOUS at 04:01

## 2024-01-09 RX ADMIN — FOLIC ACID 1 MG: 1 TABLET ORAL at 10:01

## 2024-01-09 RX ADMIN — ASPIRIN 81 MG: 81 TABLET, COATED ORAL at 10:01

## 2024-01-09 RX ADMIN — CEFTRIAXONE SODIUM 1 G: 1 INJECTION, POWDER, FOR SOLUTION INTRAMUSCULAR; INTRAVENOUS at 03:01

## 2024-01-09 RX ADMIN — SODIUM CHLORIDE 1000 ML: 9 INJECTION, SOLUTION INTRAVENOUS at 03:01

## 2024-01-09 RX ADMIN — THERA TABS 1 TABLET: TAB at 10:01

## 2024-01-09 RX ADMIN — SODIUM CHLORIDE 500 ML: 9 INJECTION, SOLUTION INTRAVENOUS at 10:01

## 2024-01-09 RX ADMIN — PIPERACILLIN SODIUM AND TAZOBACTAM SODIUM 4.5 G: 4; .5 INJECTION, POWDER, FOR SOLUTION INTRAVENOUS at 07:01

## 2024-01-09 RX ADMIN — AMLODIPINE BESYLATE 5 MG: 5 TABLET ORAL at 10:01

## 2024-01-09 RX ADMIN — CHOLECALCIFEROL TAB 25 MCG (1000 UNIT) 2000 UNITS: 25 TAB at 10:01

## 2024-01-09 NOTE — ASSESSMENT & PLAN NOTE
Chronic, controlled. Latest blood pressure and vitals reviewed-     Temp:  [98 °F (36.7 °C)-98.3 °F (36.8 °C)]   Pulse:  [63-66]   Resp:  [17-20]   BP: (107-127)/(51-60)   SpO2:  [95 %-96 %] .   Home meds for hypertension were reviewed and noted below.   Hypertension Medications               amLODIPine (NORVASC) 5 MG tablet Take 5 mg by mouth once daily.    carvedilol PHOSPHATE 40 MG ORAL CM24 (COREG CR) 40 MG CM24 Take 1 capsule by mouth once daily.    valsartan (DIOVAN) 320 MG tablet Take 1 tablet by mouth once daily.            While in the hospital, will manage blood pressure as follows; Continue home antihypertensive regimen    Will utilize p.r.n. blood pressure medication only if patient's blood pressure greater than 180/110 and she develops symptoms such as worsening chest pain or shortness of breath.

## 2024-01-09 NOTE — ED NOTES
Took report from Jay RN, and assumed care of pt at this time. Pt resting comfortably and independently repositioned in stretcher with bed locked in lowest position for safety. NAD noted at this time. Respirations even and unlabored and visible chest rise noted.  Patient offered bathroom assistance and denies need at this time. Pt instructed to call if assistance is needed. Pt on continuous cardiac, BP, and O2 monitoring. Call light within reach. Family at bedside. No needs at this time. Will continue to monitor.

## 2024-01-09 NOTE — ED PROVIDER NOTES
Encounter Date: 1/8/2024       History     Chief Complaint   Patient presents with    abnormal labs     Referred to ED by PCP for abnormal labs; dx with covid on 1/1/24     78-year-old female with a past medical history of hypothyroidism, CLL, CAD, HLD, HTN presents to the ED via referral by PCP for abnormal labs.    Patient was originally diagnosed with COVID on 12/25/2023.  She states her symptoms did not improve so she went to the emergency department on 01/01/2024 and was diagnosed with pneumonia and was discharged with antibiotics (possibly azithromycin).  She finished her course and was scheduled to see her PCP today and she had labs drawn which revealed hyponatremia which is why she was sent to the emergency department.    Currently she denies any chest pain or shortness of breath but does endorse fatigue and cough.  No other complaints at this time.    The history is provided by the patient and medical records.     Review of patient's allergies indicates:   Allergen Reactions    Sulfa (sulfonamide antibiotics) Rash     Other reaction(s): Other (See Comments)  ALLERGIC TO SULFA DRUGS oral    Tobramycin-dexamethasone      Other reaction(s): Other (See Comments)  dizziness    [place] Diarrhea     Past Medical History:   Diagnosis Date    CAD (coronary artery disease)     CLL (chronic lymphocytic leukemia) 12/22/2021    HLD (hyperlipidemia)     Hypertension     Hypertensive heart disease     Hypothyroidism 04/26/2022    Thyroid disease      Past Surgical History:   Procedure Laterality Date    ADENOIDECTOMY      COLONOSCOPY      HYSTERECTOMY      INTRACAPSULAR CATARACT EXTRACTION      TONSILLECTOMY       Family History   Problem Relation Age of Onset    Thyroid disease Mother     Osteoarthritis Mother     Hypertension Mother     Cancer Mother     Dementia Mother     Cancer Father         bladder    Heart disease Father     Diabetes Father     Leukemia Maternal Grandmother     Stroke Maternal Grandfather      Cancer Maternal Grandfather     Cancer Paternal Grandmother      Social History     Tobacco Use    Smoking status: Never    Smokeless tobacco: Never   Substance Use Topics    Alcohol use: Yes     Alcohol/week: 0.0 standard drinks of alcohol     Comment: rare     Review of Systems    Physical Exam     Initial Vitals [01/08/24 1816]   BP Pulse Resp Temp SpO2   127/60 63 20 98.3 °F (36.8 °C) 96 %      MAP       --         Physical Exam    Nursing note and vitals reviewed.  Constitutional: Vital signs are normal. She appears well-developed and well-nourished. She is not diaphoretic. No distress.   HENT:   Head: Normocephalic and atraumatic.   Right Ear: External ear normal.   Left Ear: External ear normal.   Eyes: EOM are normal. Right eye exhibits no discharge. Left eye exhibits no discharge.   Neck: Trachea normal. Neck supple. No thyroid mass present.   Normal range of motion.  Cardiovascular:  Normal rate, regular rhythm, normal heart sounds and intact distal pulses.     Exam reveals no gallop and no friction rub.       No murmur heard.  Pulmonary/Chest: No respiratory distress. She has no wheezes. She has no rhonchi. She has rales (Right lung field).   Abdominal: Abdomen is soft. Bowel sounds are normal. She exhibits no distension. There is no abdominal tenderness. There is no rebound and no guarding.   Musculoskeletal:         General: Edema (Left lower extremity) present.      Cervical back: Normal range of motion and neck supple.     Neurological: She is alert and oriented to person, place, and time. She has normal strength. No cranial nerve deficit or sensory deficit. GCS score is 15. GCS eye subscore is 4. GCS verbal subscore is 5. GCS motor subscore is 6.   Skin: Skin is warm and dry. Capillary refill takes less than 2 seconds. No rash noted.   Psychiatric: She has a normal mood and affect.         ED Course   Procedures  Labs Reviewed   CULTURE, BLOOD   CULTURE, BLOOD   LACTIC ACID, PLASMA   CULTURE,  LEGIONELLA   TROPONIN I   B-TYPE NATRIURETIC PEPTIDE   PROTIME-INR   APTT   LEGIONELLA ANTIGEN, URINE RANDOM     EKG Readings: (Independently Interpreted)   58 beats per minute.  Sinus bradycardia.  Normal axis.  No STEMI.       Imaging Results              CTA Chest Non-Coronary (PE Studies) (In process)                      Medications   iohexoL (OMNIPAQUE 350) injection 75 mL (65 mLs Intravenous Given 1/8/24 2330)     Medical Decision Making  78-year-old female who appears to be in NAD presents to the ED with son at bedside for abnormal lab results.  ABC's intact.  Initial triage vital signs were within normal limits.    Differential diagnosis includes but is not limited to pneumonia, COVID, electrolyte abnormality, anemia, ACS, PE    Amount and/or Complexity of Data Reviewed  Labs: ordered. Decision-making details documented in ED Course.  Radiology: ordered.    Risk  Prescription drug management.  Decision regarding hospitalization.      Additional MDM:     Well's Criteria Score:  -Clinical symptoms of DVT (leg swelling, pain with palpation) = 3.0  -PE is #1 diagnosis OR equally likely =            0.0  -Heart Rate >100 =   0.0  -Immobilization (= or > than 3 days) or surgery in the previous 4 weeks = 0.0  -Previous DVT/PE = 0.0  -Hemoptysis =          0.0  -Malignancy =           1.0  Well's Probability Score =    4              ED Course as of 01/08/24 2332 Mon Jan 08, 2024 1948 Lactate, Kenneth: 0.6  Within normal limits. [BG]   2133 Coagulation factors were within normal limits. [BG]   2205 Troponin I: <0.006  Decreases likelihood of R heart strain 2/2 a possible PE. Will correlate with CT PE. [BG]   2245 BNP: 25  No physical exam signs of fluid overload. [BG]   2331 Patient is active pending CT PE and ultimate disposition.  I anticipate patient will likely be admitted for hyponatremia.  I will leave ultimate disposition up to oncoming provider. [BG]      ED Course User Index  [BG] Maryanne Suárez MD                            Clinical Impression:  Final diagnoses:  [R89.9] Abnormal laboratory test  [E87.1] Hyponatremia          ED Disposition Condition    Admit Stable                Maryanne Suárez MD  Resident  01/08/24 9709

## 2024-01-09 NOTE — ASSESSMENT & PLAN NOTE
Advance Care Planning     Date: 01/09/2024    [facility]  I engaged the patient in a voluntary conversation about advance care planning and we specifically addressed what the goals of care would be moving forward, in light of the patient's change in clinical status, specifically if her heart stops or unable to breath on her own.  We did specifically address the patient's likely prognosis, which is fair .  We explored the patient's values and preferences for future care.  The patient endorses that what is most important right now is to focus on remaining as independent as possible, symptom/pain control, and extending life as long as possible, even it it means sacrificing quality    Accordingly, we have decided that the best plan to meet the patient's goals includes continuing with treatment and wishes to remain full code.     I spent a total of 10 minutes engaging the patient in this advance care planning discussion.

## 2024-01-09 NOTE — ASSESSMENT & PLAN NOTE
Patient with known CAD which is controlled Will continue  beta blocker, ASA, and Statin and monitor for S/Sx of angina/ACS. Continue to monitor on telemetry.

## 2024-01-09 NOTE — FIRST PROVIDER EVALUATION
Medical screening examination initiated.  I have conducted a focused provider triage encounter, findings are as follows:    Brief history of present illness:  79 y/o presenting with sepsis and RML and RLL pneumonia on chest CT today. Also with hyponatremia, not confused or light-headed, previous hx of such, but not this low.    Vitals:    01/08/24 1816   BP: 127/60   BP Location: Left arm   Patient Position: Sitting   Pulse: 63   Resp: 20   Temp: 98.3 °F (36.8 °C)   TempSrc: Oral   SpO2: 96%   Weight: 56.2 kg (123 lb 14.4 oz)       Pertinent physical exam:  ambulatory, frail    Brief workup plan:  admit for IV abx, labs     Preliminary workup initiated; this workup will be continued and followed by the physician or advanced practice provider that is assigned to the patient when roomed.

## 2024-01-09 NOTE — PLAN OF CARE
Could you call over to radiology and asked them to burn a disc on the x-rays MRI scans that I have done on [facility] and then we need to get those mailed to [facility] [address], [place].  Could you get that done for me please thank you   Swallow eval completed. Pt with no overt s/s of aspiration. Safe to begin a regular diet and thin liquids.

## 2024-01-09 NOTE — ED TRIAGE NOTES
Patient is a 78-year-old female presents to the ED with c/o abnormal labs. Patient  was seen by her primary care doctor today and blood work shows hyponatremia. Patient was recommended to come to the ED for further evaluation. Patient denies SOB, chest pain, nausea, vomiting. Patient presents with cough.

## 2024-01-09 NOTE — ASSESSMENT & PLAN NOTE
-CT chest abdomen showed RML + RLL ground glass opacities in a patient with recent h/o COVID (treated with paxlovid) and R sided pneumonia (just completed 5 days course of azithromycin)  -likely due to microaspiration during cough   -afebrile and oxygenating well on RA   -has leukocytosis with WBC 16  -will start on IV zosyn and follow up with blood and sputum cultures   -follow up with legionella Ag   -duonebs and antitussive prn   -SLP evaluation

## 2024-01-09 NOTE — ASSESSMENT & PLAN NOTE
-successfully treated with monoclonal abs in the past   -follows regularly in hematology clinic   -follow up with haptoglobin, LDH and retic count

## 2024-01-09 NOTE — ED NOTES
Bed: [facility]  Expected date:   Expected time:   Means of arrival:   Comments:  Lauren

## 2024-01-09 NOTE — DISCHARGE INSTRUCTIONS
- Take the antibiotic (Augmentin) 2x/day for next 4 days.   - Get repeat lab work before the end of the week.   - Follow-up with your PCP within 7 days. Also follow-up with your nephrologist.

## 2024-01-09 NOTE — PT/OT/SLP EVAL
"Speech Language Pathology Evaluation  Bedside Swallow/ Discharge note    Patient Name:  Cassidy Aguilar   MRN:  [id number]  Admitting Diagnosis: Aspiration pneumonia of right lower lobe    Recommendations:                 General Recommendations:  Follow-up not indicated  Diet recommendations:  Regular Diet - IDDSI Level 7, Thin liquids - IDDSI Level 0   Aspiration Precautions: Standard aspiration precautions   General Precautions: Standard,    Communication strategies:  none    Assessment:     Cassidy Aguilar is a 78 y.o. female with an SLP diagnosis of  swallowing wfl .  She presents with no further speech tx needs.    History:     Past Medical History:   Diagnosis Date    CAD (coronary artery disease)     CLL (chronic lymphocytic leukemia) 12/22/2021    HLD (hyperlipidemia)     Hypertension     Hypertensive heart disease     Hypothyroidism 04/26/2022    Thyroid disease        Past Surgical History:   Procedure Laterality Date    ADENOIDECTOMY      COLONOSCOPY      HYSTERECTOMY      INTRACAPSULAR CATARACT EXTRACTION      TONSILLECTOMY         Social History: Patient lives with alone    Prior Intubation HX:  n/a    Modified Barium Swallow: n/a    Prior diet: regular/thin.        Subjective     " I need to go home"  Patient goals: to go home     Pain/Comfort:  Pain Rating 1: 0/10  Pain Rating Post-Intervention 1: 0/10    Respiratory Status: Room air    Objective:     Oral Musculature Evaluation  Oral Musculature: WFL  Dentition: present and adequate  Mucosal Quality: good  Oral Labial Strength and Mobility: WFL  Lingual Strength and Mobility: WFL  Volitional Cough: strong  Voice Prior to PO Intake: clear    Bedside Swallow Eval:   Consistencies Assessed:  Thin liquids straw sips  Puree pudding  Solids cracker      Oral Phase:   WFL    Pharyngeal Phase:   no overt clinical signs/symptoms of pharyngeal dysphagia    Compensatory Strategies  None    Treatment: No further speech tx recommneded. Education provide re: s/s of " aspiration and general swallowing precautions. Pt with good understanding.     Goals:   Multidisciplinary Problems       SLP Goals       Not on file              Multidisciplinary Problems (Resolved)          Problem: SLP    Goal Priority Disciplines Outcome   SLP Goal   (Resolved)     SLP Met                       Plan:     Patient to be seen:      Plan of Care expires:     Plan of Care reviewed with:  patient   SLP Follow-Up:  No       Discharge recommendations:  No Therapy Indicated   Barriers to Discharge:  None    Time Tracking:     SLP Treatment Date:   01/09/24  Speech Start Time:  0832  Speech Stop Time:  0843     Speech Total Time (min):  11 min    Billable Minutes: Eval Swallow and Oral Function 11 01/09/2024

## 2024-01-09 NOTE — ASSESSMENT & PLAN NOTE
Patient has hyponatremia which is uncontrolled,We will aim to correct the sodium by 4-6mEq in 24 hours. We will monitor sodium Every 8 hours. The hyponatremia is due to Dehydration/hypovolemia. We will obtain the following studies: Urine sodium, urine osmolality, serum osmolality, AM cortisol, or TSH, T4. We will treat the hyponatremia with IV fluids as follows: NS 1 liter bolus and follow up repeat Na to reassess. The patient's sodium results have been reviewed and are listed below.  Recent Labs   Lab 01/08/24  1530   *

## 2024-01-09 NOTE — H&P
[facility] - Emergency Dept  [facility]  History & Physical    Patient Name: Cassidy Aguilar  MRN: [id number]  Patient Class: IP- Inpatient  Admission Date: 1/8/2024  Attending Physician: Fouzia Maldonado MD   Primary Care Provider: An Noriega MD         Patient information was obtained from patient and ER records.     Subjective:     Principal Problem:Aspiration pneumonia of right lower lobe    Chief Complaint:   Chief Complaint   Patient presents with    abnormal labs     Referred to ED by PCP for abnormal labs; dx with covid on 1/1/24        HPI: Cassidy Aguilar is a 78 year old female with PMH of CLL, HTN, HLD, CAD, hypothyroidism who was advised by PCP to come to ED for management of hyponatremia. Patient is a [facility] patient at [facility] and saw her PCP there earlier today. She was diagnosed with COVID around [place] for which she completed course of Paxlovid and subsequently diagnosed with R sided pneumonia per CXR on new year day. She just completed 5 days course of azithromycin. Patient still has fatigue, chest congestion and mostly nonproductive cough. She reports incontinence of loose stool during cough. PCP ordered labs, blood cultures and CT chest/abdomen for further evaluation. Labs revealed hyponatremia with Na 123 (recent baseline ~135). She denies fever, chills, sob, N/V/D/abdominal pain, dysuria, hematuria, focal weakness/numbness, confusion, dark stool or bleeding from other sites. CT chest abdomen showed RML and RLL ground glass opacities concerning for aspiration pneumonia. CTA chest in ED negative for PE.     ED course: afebrile, vitals stable and oxygenating well on room air. Leukocytosis WBC 17. Serum osm, urine osm, urine Na and elevated urine specific gravity indicative of hypovolemic hyponatremia. During my interview, patient is awake, conversant with her son justo at bedside. She reports previous episodes of hyponatremia in the past due to HCTZ use which resolved with IVF  and discontinuation of HCTZ.        Past Medical History:   Diagnosis Date    CAD (coronary artery disease)     CLL (chronic lymphocytic leukemia) 12/22/2021    HLD (hyperlipidemia)     Hypertension     Hypertensive heart disease     Hypothyroidism 04/26/2022    Thyroid disease        Past Surgical History:   Procedure Laterality Date    ADENOIDECTOMY      COLONOSCOPY      HYSTERECTOMY      INTRACAPSULAR CATARACT EXTRACTION      TONSILLECTOMY         Review of patient's allergies indicates:   Allergen Reactions    Sulfa (sulfonamide antibiotics) Rash     Other reaction(s): Other (See Comments)  ALLERGIC TO SULFA DRUGS oral    Tobramycin-dexamethasone      Other reaction(s): Other (See Comments)  dizziness    [place] Diarrhea       Current Facility-Administered Medications on File Prior to Encounter   Medication    [COMPLETED] iohexoL (OMNIPAQUE 350) injection 85 mL     Current Outpatient Medications on File Prior to Encounter   Medication Sig    acetaminophen-codeine 300-30mg (TYLENOL #3) 300-30 mg Tab Take 1 tablet by mouth every 4 (four) hours as needed (pain, cough).    albuterol (ACCUNEB) 0.63 mg/3 mL Nebu Take 3 mLs (0.63 mg total) by nebulization every 6 (six) hours as needed. Rescue    amLODIPine (NORVASC) 5 MG tablet Take 5 mg by mouth once daily.    aspirin (ECOTRIN) 81 MG EC tablet Take 81 mg by mouth.    atorvastatin (LIPITOR) 40 MG tablet TAKE ONE TABLET BY MOUTH once DAILY AT BEDTIME FOR cholesterol control    BENEFIBER, GUAR GUM, ORAL Take 1 Dose by mouth 2 (two) times daily.    benzonatate (TESSALON) 200 MG capsule Take 1 capsule (200 mg total) by mouth 3 (three) times daily as needed for Cough.    Bifidobacterium infantis (ALIGN ORAL) Take by mouth. PRN    carvedilol PHOSPHATE 40 MG ORAL CM24 (COREG CR) 40 MG CM24 Take 1 capsule by mouth once daily.    cetirizine (ZYRTEC) 10 MG tablet Take 1 tablet (10 mg total) by mouth once daily. for 7 days    fluticasone (FLONASE) 50 mcg/actuation nasal spray 1  spray by Each Nare route once daily.    folic acid (FOLVITE) 1 MG tablet Take 1 tablet (1 mg total) by mouth once daily.    glucosamine-chondroitin 500-400 mg tablet Take 1 tablet by mouth 3 (three) times daily.    MAXITROL 3.5mg/mL-10,000 unit/mL-0.1 % DrpS Place 1 drop into the right eye 2 (two) times daily.    multivitamin (THERAGRAN) per tablet Take 1 tablet by mouth once daily.    sodium chloride (SALINE NASAL) 0.65 % nasal spray 1 spray by Nasal route as needed for Congestion.    sulfacetamide sodium, acne, 10 % Susp Apply 1 application topically 2 (two) times daily. Apply to affected area    SYNTHROID 75 mcg tablet Take 1 tablet (75 mcg total) by mouth daily    valsartan (DIOVAN) 320 MG tablet Take 1 tablet by mouth once daily.    vitamin D (VITAMIN D3) 1000 units Tab Take 2,000 Units by mouth.    VIVELLE-DOT 0.025 mg/24 hr Place 1 patch onto the skin twice a week     Family History       Problem Relation (Age of Onset)    Cancer Mother, Father, Maternal Grandfather, Paternal Grandmother    Dementia Mother    Diabetes Father    Heart disease Father    Hypertension Mother    Leukemia Maternal Grandmother    Osteoarthritis Mother    Stroke Maternal Grandfather    Thyroid disease Mother          Tobacco Use    Smoking status: Never    Smokeless tobacco: Never   Substance and Sexual Activity    Alcohol use: Yes     Alcohol/week: 0.0 standard drinks of alcohol     Comment: rare    Drug use: Not on file    Sexual activity: Not on file     Review of Systems   Constitutional:  Positive for fatigue. Negative for activity change, appetite change, chills, diaphoresis and fever.   HENT:  Positive for congestion. Negative for dental problem, drooling, ear discharge, ear pain, facial swelling, hearing loss, mouth sores, nosebleeds, postnasal drip, rhinorrhea, sinus pressure, sneezing, sore throat, tinnitus, trouble swallowing and voice change.    Eyes:  Negative for photophobia, pain, discharge, redness, itching and visual  disturbance.   Respiratory:  Positive for cough. Negative for apnea, choking, chest tightness, shortness of breath, wheezing and stridor.    Cardiovascular:  Negative for chest pain, palpitations and leg swelling.   Gastrointestinal:  Negative for abdominal distention, abdominal pain, anal bleeding, blood in stool, constipation, diarrhea, nausea, rectal pain and vomiting.        Incontinence of loose stool during cough    Endocrine: Negative for cold intolerance, heat intolerance, polydipsia, polyphagia and polyuria.   Genitourinary:  Negative for decreased urine volume, difficulty urinating, dyspareunia, dysuria, enuresis, flank pain, frequency, genital sores, hematuria, menstrual problem, pelvic pain, urgency, vaginal bleeding, vaginal discharge and vaginal pain.   Musculoskeletal:  Negative for arthralgias, back pain, gait problem, joint swelling, myalgias, neck pain and neck stiffness.   Skin:  Negative for color change, pallor, rash and wound.   Allergic/Immunologic: Negative for environmental allergies, food allergies and immunocompromised state.   Neurological:  Negative for dizziness, tremors, seizures, syncope, facial asymmetry, speech difficulty, weakness, light-headedness, numbness and headaches.   Hematological:  Negative for adenopathy. Does not bruise/bleed easily.   Psychiatric/Behavioral:  Negative for agitation, behavioral problems, confusion, decreased concentration, dysphoric mood, hallucinations, self-injury, sleep disturbance and suicidal ideas. The patient is not nervous/anxious and is not hyperactive.      Objective:     Vital Signs (Most Recent):  Temp: 98.2 °F (36.8 °C) (01/09/24 0305)  Pulse: 64 (01/09/24 0305)  Resp: 17 (01/09/24 0305)  BP: 122/60 (01/09/24 0305)  SpO2: 95 % (01/09/24 0305) Vital Signs (24h Range):  Temp:  [98 °F (36.7 °C)-98.3 °F (36.8 °C)] 98.2 °F (36.8 °C)  Pulse:  [63-66] 64  Resp:  [17-20] 17  SpO2:  [95 %-96 %] 95 %  BP: (107-127)/(51-60) 122/60     Weight: 56.2 kg  (123 lb 14.4 oz)  Body mass index is 18.3 kg/m².     Physical Exam  Constitutional:       General: She is not in acute distress.     Appearance: She is well-developed. She is not diaphoretic.   HENT:      Head: Normocephalic and atraumatic.      Right Ear: External ear normal.      Left Ear: External ear normal.      Nose: Nose normal.      Mouth/Throat:      Mouth: Mucous membranes are dry.      Pharynx: No oropharyngeal exudate.   Eyes:      General: No scleral icterus.     Conjunctiva/sclera: Conjunctivae normal.      Pupils: Pupils are equal, round, and reactive to light.   Neck:      Thyroid: No thyromegaly.      Vascular: No JVD.      Trachea: No tracheal deviation.   Cardiovascular:      Rate and Rhythm: Normal rate and regular rhythm.      Heart sounds: Normal heart sounds. No murmur heard.     No gallop.   Pulmonary:      Effort: Pulmonary effort is normal. No respiratory distress.      Breath sounds: Rhonchi present. No wheezing or rales.   Chest:      Chest wall: No tenderness.   Abdominal:      General: Bowel sounds are normal. There is no distension.      Palpations: Abdomen is soft. There is no mass.      Tenderness: There is no abdominal tenderness. There is no guarding or rebound.   Genitourinary:     Vagina: No vaginal discharge.   Musculoskeletal:         General: No tenderness.      Cervical back: Neck supple.   Lymphadenopathy:      Cervical: No cervical adenopathy.   Skin:     General: Skin is warm and dry.      Coloration: Skin is not pale.      Findings: No erythema or rash.   Neurological:      Mental Status: She is alert and oriented to person, place, and time.      Cranial Nerves: No cranial nerve deficit.      Motor: No abnormal muscle tone.      Coordination: Coordination normal.      Deep Tendon Reflexes: Reflexes are normal and symmetric. Reflexes normal.      Comments: Mild tremors of upper extremities    Psychiatric:         Behavior: Behavior normal.         Thought Content: Thought  content normal.         Judgment: Judgment normal.      Comments: Flat affect               CRANIAL NERVES     CN III, IV, VI   Pupils are equal, round, and reactive to light.       Significant Labs: All pertinent labs within the past 24 hours have been reviewed.  Recent Lab Results  (Last 5 results in the past 24 hours)        01/09/24  0120   01/08/24  2146   01/08/24  2019   01/08/24  1832   01/08/24  1831        Appearance, UA Hazy               aPTT     28.1  Comment: Refer to local heparin nomogram for intensity/dose specific   therapeutic   range.             Bilirubin (UA) Negative               Blood Culture, Routine       No Growth to date  [P]         BNP     25  Comment: Values of less than 100 pg/ml are consistent with non-CHF populations.           Color, UA Yellow               Cortisol 16.80  Comment: Cortisol Reference Range:  Before 10:00 am: 4.46-22.7 ug/dL  After 5:00 pm:    1.7-14.1 ug/dL                 Glucose, UA Negative               INR     1.0  Comment: Coumadin Therapy:  2.0 - 3.0 for INR for all indicators except mechanical heart valves  and antiphospholipid syndromes which should use 2.5 - 3.5.             Ketones, UA Negative               Lactate, Kenneth         0.6  Comment: Falsely low lactic acid results can be found in samples   containing >=13.0 mg/dL total bilirubin and/or >=3.5 mg/dL   direct bilirubin.         Legionella Culture Source   Sputum, Expectorated             Leukocytes, UA Negative               NITRITE UA Negative               Occult Blood UA Trace               Osmolality 263               Osmolality, Urine 366  Comment: The random urine reference ranges provided were established   for 24 hour urine collections.  No reference ranges exist for  random urine specimens.  Correlate clinically.                 pH, UA 7.0               Protein, UA Trace  Comment: Recommend a 24 hour urine protein or a urine   protein/creatinine ratio if globulin induced proteinuria  is  clinically suspected.                 Protime     10.5           Sodium, Urine 34  Comment: The random urine reference ranges provided were established   for 24 hour urine collections.  No reference ranges exist for  random urine specimens.  Correlate clinically.                 Specific Gravity, UA >1.030               Specimen UA Urine, Clean Catch               T4 Total 9.3               Troponin I     <0.006  Comment: The reference interval for Troponin I represents the 99th percentile   cutoff   for our facility and is consistent with 3rd generation assay   performance.             TSH 2.559                                       [P] - Preliminary Result               Significant Imaging: I have reviewed all pertinent imaging results/findings within the past 24 hours.  Assessment/Plan:     * Aspiration pneumonia of right lower lobe  -CT chest abdomen showed RML + RLL ground glass opacities in a patient with recent h/o COVID (treated with paxlovid) and R sided pneumonia (just completed 5 days course of azithromycin)  -likely due to microaspiration during cough   -afebrile and oxygenating well on RA   -has leukocytosis with WBC 16  -will start on IV zosyn and follow up with blood and sputum cultures   -follow up with legionella Ag   -duonebs and antitussive prn   -SLP evaluation       Hyponatremia  Patient has hyponatremia which is uncontrolled,We will aim to correct the sodium by 4-6mEq in 24 hours. We will monitor sodium Every 8 hours. The hyponatremia is due to Dehydration/hypovolemia. We will obtain the following studies: Urine sodium, urine osmolality, serum osmolality, AM cortisol, or TSH, T4. We will treat the hyponatremia with IV fluids as follows: NS 1 liter bolus and follow up repeat Na to reassess. The patient's sodium results have been reviewed and are listed below.  Recent Labs   Lab 01/08/24  1530   *       Coronary artery disease  Patient with known CAD which is controlled Will continue   beta blocker, ASA, and Statin and monitor for S/Sx of angina/ACS. Continue to monitor on telemetry.     Hypertension  Chronic, controlled. Latest blood pressure and vitals reviewed-     Temp:  [98 °F (36.7 °C)-98.3 °F (36.8 °C)]   Pulse:  [63-66]   Resp:  [17-20]   BP: (107-127)/(51-60)   SpO2:  [95 %-96 %] .   Home meds for hypertension were reviewed and noted below.   Hypertension Medications               amLODIPine (NORVASC) 5 MG tablet Take 5 mg by mouth once daily.    carvedilol PHOSPHATE 40 MG ORAL CM24 (COREG CR) 40 MG CM24 Take 1 capsule by mouth once daily.    valsartan (DIOVAN) 320 MG tablet Take 1 tablet by mouth once daily.            While in the hospital, will manage blood pressure as follows; Continue home antihypertensive regimen    Will utilize p.r.n. blood pressure medication only if patient's blood pressure greater than 180/110 and she develops symptoms such as worsening chest pain or shortness of breath.    CLL (chronic lymphocytic leukemia)  -currently under remission and follows regularly with hematology clinic (last visit in December 2023)        Cold autoimmune hemolytic anemia  -successfully treated with monoclonal abs in the past   -follows regularly in hematology clinic   -follow up with haptoglobin, LDH and retic count     Hypothyroidism  -no acute issue and will continue home synthroid   -check TSH, FT4      ACP (advance care planning)  Advance Care Planning    Date: 01/09/2024    [facility]  I engaged the patient in a voluntary conversation about advance care planning and we specifically addressed what the goals of care would be moving forward, in light of the patient's change in clinical status, specifically if her heart stops or unable to breath on her own.  We did specifically address the patient's likely prognosis, which is fair .  We explored the patient's values and preferences for future care.  The patient endorses that what is most important right now is to focus on remaining as  independent as possible, symptom/pain control, and extending life as long as possible, even it it means sacrificing quality    Accordingly, we have decided that the best plan to meet the patient's goals includes continuing with treatment and wishes to remain full code.     I spent a total of 10 minutes engaging the patient in this advance care planning discussion.                  VTE Risk Mitigation (From admission, onward)           Ordered     IP VTE HIGH RISK PATIENT  Once         01/09/24 0117     Place sequential compression device  Until discontinued         01/09/24 0117                                    Reynaldo Reid DO  Department of Hospital Medicine  [facility] - Emergency Dept

## 2024-01-09 NOTE — SUBJECTIVE & OBJECTIVE
Past Medical History:   Diagnosis Date    CAD (coronary artery disease)     CLL (chronic lymphocytic leukemia) 12/22/2021    HLD (hyperlipidemia)     Hypertension     Hypertensive heart disease     Hypothyroidism 04/26/2022    Thyroid disease        Past Surgical History:   Procedure Laterality Date    ADENOIDECTOMY      COLONOSCOPY      HYSTERECTOMY      INTRACAPSULAR CATARACT EXTRACTION      TONSILLECTOMY         Review of patient's allergies indicates:   Allergen Reactions    Sulfa (sulfonamide antibiotics) Rash     Other reaction(s): Other (See Comments)  ALLERGIC TO SULFA DRUGS oral    Tobramycin-dexamethasone      Other reaction(s): Other (See Comments)  dizziness    [place] Diarrhea       Current Facility-Administered Medications on File Prior to Encounter   Medication    [COMPLETED] iohexoL (OMNIPAQUE 350) injection 85 mL     Current Outpatient Medications on File Prior to Encounter   Medication Sig    acetaminophen-codeine 300-30mg (TYLENOL #3) 300-30 mg Tab Take 1 tablet by mouth every 4 (four) hours as needed (pain, cough).    albuterol (ACCUNEB) 0.63 mg/3 mL Nebu Take 3 mLs (0.63 mg total) by nebulization every 6 (six) hours as needed. Rescue    amLODIPine (NORVASC) 5 MG tablet Take 5 mg by mouth once daily.    aspirin (ECOTRIN) 81 MG EC tablet Take 81 mg by mouth.    atorvastatin (LIPITOR) 40 MG tablet TAKE ONE TABLET BY MOUTH once DAILY AT BEDTIME FOR cholesterol control    BENEFIBER, GUAR GUM, ORAL Take 1 Dose by mouth 2 (two) times daily.    benzonatate (TESSALON) 200 MG capsule Take 1 capsule (200 mg total) by mouth 3 (three) times daily as needed for Cough.    Bifidobacterium infantis (ALIGN ORAL) Take by mouth. PRN    carvedilol PHOSPHATE 40 MG ORAL CM24 (COREG CR) 40 MG CM24 Take 1 capsule by mouth once daily.    cetirizine (ZYRTEC) 10 MG tablet Take 1 tablet (10 mg total) by mouth once daily. for 7 days    fluticasone (FLONASE) 50 mcg/actuation nasal spray 1 spray by Each Nare route once daily.     folic acid (FOLVITE) 1 MG tablet Take 1 tablet (1 mg total) by mouth once daily.    glucosamine-chondroitin 500-400 mg tablet Take 1 tablet by mouth 3 (three) times daily.    MAXITROL 3.5mg/mL-10,000 unit/mL-0.1 % DrpS Place 1 drop into the right eye 2 (two) times daily.    multivitamin (THERAGRAN) per tablet Take 1 tablet by mouth once daily.    sodium chloride (SALINE NASAL) 0.65 % nasal spray 1 spray by Nasal route as needed for Congestion.    sulfacetamide sodium, acne, 10 % Susp Apply 1 application topically 2 (two) times daily. Apply to affected area    SYNTHROID 75 mcg tablet Take 1 tablet (75 mcg total) by mouth daily    valsartan (DIOVAN) 320 MG tablet Take 1 tablet by mouth once daily.    vitamin D (VITAMIN D3) 1000 units Tab Take 2,000 Units by mouth.    VIVELLE-DOT 0.025 mg/24 hr Place 1 patch onto the skin twice a week     Family History       Problem Relation (Age of Onset)    Cancer Mother, Father, Maternal Grandfather, Paternal Grandmother    Dementia Mother    Diabetes Father    Heart disease Father    Hypertension Mother    Leukemia Maternal Grandmother    Osteoarthritis Mother    Stroke Maternal Grandfather    Thyroid disease Mother          Tobacco Use    Smoking status: Never    Smokeless tobacco: Never   Substance and Sexual Activity    Alcohol use: Yes     Alcohol/week: 0.0 standard drinks of alcohol     Comment: rare    Drug use: Not on file    Sexual activity: Not on file     Review of Systems   Constitutional:  Positive for fatigue. Negative for activity change, appetite change, chills, diaphoresis and fever.   HENT:  Positive for congestion. Negative for dental problem, drooling, ear discharge, ear pain, facial swelling, hearing loss, mouth sores, nosebleeds, postnasal drip, rhinorrhea, sinus pressure, sneezing, sore throat, tinnitus, trouble swallowing and voice change.    Eyes:  Negative for photophobia, pain, discharge, redness, itching and visual disturbance.   Respiratory:   Positive for cough. Negative for apnea, choking, chest tightness, shortness of breath, wheezing and stridor.    Cardiovascular:  Negative for chest pain, palpitations and leg swelling.   Gastrointestinal:  Negative for abdominal distention, abdominal pain, anal bleeding, blood in stool, constipation, diarrhea, nausea, rectal pain and vomiting.        Incontinence of loose stool during cough    Endocrine: Negative for cold intolerance, heat intolerance, polydipsia, polyphagia and polyuria.   Genitourinary:  Negative for decreased urine volume, difficulty urinating, dyspareunia, dysuria, enuresis, flank pain, frequency, genital sores, hematuria, menstrual problem, pelvic pain, urgency, vaginal bleeding, vaginal discharge and vaginal pain.   Musculoskeletal:  Negative for arthralgias, back pain, gait problem, joint swelling, myalgias, neck pain and neck stiffness.   Skin:  Negative for color change, pallor, rash and wound.   Allergic/Immunologic: Negative for environmental allergies, food allergies and immunocompromised state.   Neurological:  Negative for dizziness, tremors, seizures, syncope, facial asymmetry, speech difficulty, weakness, light-headedness, numbness and headaches.   Hematological:  Negative for adenopathy. Does not bruise/bleed easily.   Psychiatric/Behavioral:  Negative for agitation, behavioral problems, confusion, decreased concentration, dysphoric mood, hallucinations, self-injury, sleep disturbance and suicidal ideas. The patient is not nervous/anxious and is not hyperactive.      Objective:     Vital Signs (Most Recent):  Temp: 98.2 °F (36.8 °C) (01/09/24 0305)  Pulse: 64 (01/09/24 0305)  Resp: 17 (01/09/24 0305)  BP: 122/60 (01/09/24 0305)  SpO2: 95 % (01/09/24 0305) Vital Signs (24h Range):  Temp:  [98 °F (36.7 °C)-98.3 °F (36.8 °C)] 98.2 °F (36.8 °C)  Pulse:  [63-66] 64  Resp:  [17-20] 17  SpO2:  [95 %-96 %] 95 %  BP: (107-127)/(51-60) 122/60     Weight: 56.2 kg (123 lb 14.4 oz)  Body mass  index is 18.3 kg/m².     Physical Exam  Constitutional:       General: She is not in acute distress.     Appearance: She is well-developed. She is not diaphoretic.   HENT:      Head: Normocephalic and atraumatic.      Right Ear: External ear normal.      Left Ear: External ear normal.      Nose: Nose normal.      Mouth/Throat:      Mouth: Mucous membranes are dry.      Pharynx: No oropharyngeal exudate.   Eyes:      General: No scleral icterus.     Conjunctiva/sclera: Conjunctivae normal.      Pupils: Pupils are equal, round, and reactive to light.   Neck:      Thyroid: No thyromegaly.      Vascular: No JVD.      Trachea: No tracheal deviation.   Cardiovascular:      Rate and Rhythm: Normal rate and regular rhythm.      Heart sounds: Normal heart sounds. No murmur heard.     No gallop.   Pulmonary:      Effort: Pulmonary effort is normal. No respiratory distress.      Breath sounds: Rhonchi present. No wheezing or rales.   Chest:      Chest wall: No tenderness.   Abdominal:      General: Bowel sounds are normal. There is no distension.      Palpations: Abdomen is soft. There is no mass.      Tenderness: There is no abdominal tenderness. There is no guarding or rebound.   Genitourinary:     Vagina: No vaginal discharge.   Musculoskeletal:         General: No tenderness.      Cervical back: Neck supple.   Lymphadenopathy:      Cervical: No cervical adenopathy.   Skin:     General: Skin is warm and dry.      Coloration: Skin is not pale.      Findings: No erythema or rash.   Neurological:      Mental Status: She is alert and oriented to person, place, and time.      Cranial Nerves: No cranial nerve deficit.      Motor: No abnormal muscle tone.      Coordination: Coordination normal.      Deep Tendon Reflexes: Reflexes are normal and symmetric. Reflexes normal.      Comments: Mild tremors of upper extremities    Psychiatric:         Behavior: Behavior normal.         Thought Content: Thought content normal.          Judgment: Judgment normal.      Comments: Flat affect               CRANIAL NERVES     CN III, IV, VI   Pupils are equal, round, and reactive to light.       Significant Labs: All pertinent labs within the past 24 hours have been reviewed.  Recent Lab Results  (Last 5 results in the past 24 hours)        01/09/24  0120   01/08/24  2146   01/08/24  2019   01/08/24  1832   01/08/24  1831        Appearance, UA Hazy               aPTT     28.1  Comment: Refer to local heparin nomogram for intensity/dose specific   therapeutic   range.             Bilirubin (UA) Negative               Blood Culture, Routine       No Growth to date  [P]         BNP     25  Comment: Values of less than 100 pg/ml are consistent with non-CHF populations.           Color, UA Yellow               Cortisol 16.80  Comment: Cortisol Reference Range:  Before 10:00 am: 4.46-22.7 ug/dL  After 5:00 pm:    1.7-14.1 ug/dL                 Glucose, UA Negative               INR     1.0  Comment: Coumadin Therapy:  2.0 - 3.0 for INR for all indicators except mechanical heart valves  and antiphospholipid syndromes which should use 2.5 - 3.5.             Ketones, UA Negative               Lactate, Kenneth         0.6  Comment: Falsely low lactic acid results can be found in samples   containing >=13.0 mg/dL total bilirubin and/or >=3.5 mg/dL   direct bilirubin.         Legionella Culture Source   Sputum, Expectorated             Leukocytes, UA Negative               NITRITE UA Negative               Occult Blood UA Trace               Osmolality 263               Osmolality, Urine 366  Comment: The random urine reference ranges provided were established   for 24 hour urine collections.  No reference ranges exist for  random urine specimens.  Correlate clinically.                 pH, UA 7.0               Protein, UA Trace  Comment: Recommend a 24 hour urine protein or a urine   protein/creatinine ratio if globulin induced proteinuria is  clinically  suspected.                 Protime     10.5           Sodium, Urine 34  Comment: The random urine reference ranges provided were established   for 24 hour urine collections.  No reference ranges exist for  random urine specimens.  Correlate clinically.                 Specific Gravity, UA >1.030               Specimen UA Urine, Clean Catch               T4 Total 9.3               Troponin I     <0.006  Comment: The reference interval for Troponin I represents the 99th percentile   cutoff   for our facility and is consistent with 3rd generation assay   performance.             TSH 2.559                                       [P] - Preliminary Result               Significant Imaging: I have reviewed all pertinent imaging results/findings within the past 24 hours.

## 2024-01-09 NOTE — HPI
Cassidy Aguilar is a 78 year old female with PMHx of CLL, HTN, HLD, CAD, & hypothyroidism who was advised by PCP to come to ED for management of hyponatremia. Patient is a [facility] patient at [facility] and saw her PCP there earlier today. She was diagnosed with COVID around [place] for which she completed course of Paxlovid. Subsequently diagnosed with R-sided pneumonia per CXR on 1/1. She just completed a 5-days course of azithromycin. Patient still has fatigue, chest congestion and (mostly) nonproductive cough. She reports incontinence of loose stool during cough. PCP ordered labs, blood cultures and CT chest/abdomen for further evaluation. Labs revealed hyponatremia with Na 123 (recent baseline ~135). She denies fever, chills, SOB, N/V/D/abdominal pain, dysuria, hematuria, focal weakness/numbness, confusion, dark stool or bleeding from other sites. CT chest abdomen showed RML and RLL ground glass opacities concerning for aspiration pneumonia. CTA chest in ED negative for PE.     ED course: afebrile, vitals stable and oxygenating well on room air. Leukocytosis WBC 17. Serum osm, urine osm, urine Na and elevated urine specific gravity indicative of hypovolemic hyponatremia. During my interview, patient is awake, conversant with her son justo at bedside. She reports previous episodes of hyponatremia in the past due to HCTZ use which resolved with IVF and discontinuation of HCTZ.

## 2024-01-09 NOTE — ASSESSMENT & PLAN NOTE
-currently under remission and follows regularly with hematology clinic (last visit in December 2023)

## 2024-01-10 ENCOUNTER — PATIENT MESSAGE (OUTPATIENT)
Dept: INTERNAL MEDICINE | Facility: CLINIC | Age: 79
End: 2024-01-10
Payer: MEDICARE

## 2024-01-10 DIAGNOSIS — J18.9 PNEUMONIA DUE TO INFECTIOUS ORGANISM, UNSPECIFIED LATERALITY, UNSPECIFIED PART OF LUNG: ICD-10-CM

## 2024-01-10 DIAGNOSIS — E87.1 HYPONATREMIA: Primary | ICD-10-CM

## 2024-01-11 LAB — L PNEUMO AG UR QL IA: NEGATIVE

## 2024-01-12 ENCOUNTER — PATIENT OUTREACH (OUTPATIENT)
Dept: ADMINISTRATIVE | Facility: CLINIC | Age: 79
End: 2024-01-12
Payer: MEDICARE

## 2024-01-12 NOTE — PROGRESS NOTES
C3 nurse spoke with Cassidy Aguilar  for a TCC post hospital discharge follow up call. Nurse offered to scheduled [facility] hospital follow-up appointment. The patient declined appointment at this time.

## 2024-01-13 LAB
BACTERIA BLD CULT: NORMAL
BACTERIA BLD CULT: NORMAL
BASOPHILS # BLD AUTO: 145 CELLS/UL (ref 0–200)
BASOPHILS NFR BLD AUTO: 0.9 %
BUN SERPL-MCNC: 13 MG/DL (ref 7–25)
BUN/CREAT SERPL: 30 (CALC) (ref 6–22)
CALCIUM SERPL-MCNC: 8.4 MG/DL (ref 8.6–10.4)
CHLORIDE SERPL-SCNC: 91 MMOL/L (ref 98–110)
CO2 SERPL-SCNC: 29 MMOL/L (ref 20–32)
CREAT SERPL-MCNC: 0.43 MG/DL (ref 0.6–1)
EGFR: 101 ML/MIN/1.73M2
EOSINOPHIL # BLD AUTO: 274 CELLS/UL (ref 15–500)
EOSINOPHIL NFR BLD AUTO: 1.7 %
ERYTHROCYTE [DISTWIDTH] IN BLOOD BY AUTOMATED COUNT: 12.4 % (ref 11–15)
GLUCOSE SERPL-MCNC: 123 MG/DL (ref 65–99)
HCT VFR BLD AUTO: 31.7 % (ref 35–45)
HGB BLD-MCNC: 10.9 G/DL (ref 11.7–15.5)
LYMPHOCYTES # BLD AUTO: 1497 CELLS/UL (ref 850–3900)
LYMPHOCYTES NFR BLD AUTO: 9.3 %
MCH RBC QN AUTO: 30.7 PG (ref 27–33)
MCHC RBC AUTO-ENTMCNC: 34.4 G/DL (ref 32–36)
MCV RBC AUTO: 89.3 FL (ref 80–100)
MONOCYTES # BLD AUTO: 1014 CELLS/UL (ref 200–950)
MONOCYTES NFR BLD AUTO: 6.3 %
NEUTROPHILS # BLD AUTO: ABNORMAL CELLS/UL (ref 1500–7800)
NEUTROPHILS NFR BLD AUTO: 81.8 %
PLATELET # BLD AUTO: 365 THOUSAND/UL (ref 140–400)
PMV BLD REES-ECKER: 9.5 FL (ref 7.5–12.5)
POTASSIUM SERPL-SCNC: 4.1 MMOL/L (ref 3.5–5.3)
RBC # BLD AUTO: 3.55 MILLION/UL (ref 3.8–5.1)
SODIUM SERPL-SCNC: 130 MMOL/L (ref 135–146)
WBC # BLD AUTO: 16.1 THOUSAND/UL (ref 3.8–10.8)

## 2024-01-16 DIAGNOSIS — R19.7 DIARRHEA, UNSPECIFIED TYPE: Primary | ICD-10-CM

## 2024-01-16 NOTE — DISCHARGE SUMMARY
[facility] - Emergency Dept  Hospital Medicine  Discharge Summary      Patient Name: Cassidy Aguilar  MRN: [id number]  CYRUS: [id number]  Patient Class: IP- Inpatient  Admission Date: 1/8/2024  Hospital Length of Stay: 1 days  Discharge Date and Time: 1/9/2024  2:30 PM  Attending Physician: No att. providers found   Discharging Provider: Fouzia Maldonado MD  Primary Care Provider: An Noriega MD  Hospital Medicine Team: [facility] A Fouzia Maldonado MD  Primary Care Team: [facility]OS[facility] MED A      HPI:   Cassidy Aguilar is a 78 year old female with PMHx of CLL, HTN, HLD, CAD, & hypothyroidism who was advised by PCP to come to ED for management of hyponatremia. Patient is a [facility] patient at [facility] and saw her PCP there earlier today. She was diagnosed with COVID around [place] for which she completed course of Paxlovid. Subsequently diagnosed with R-sided pneumonia per CXR on 1/1. She just completed a 5-days course of azithromycin. Patient still has fatigue, chest congestion and (mostly) nonproductive cough. She reports incontinence of loose stool during cough. PCP ordered labs, blood cultures and CT chest/abdomen for further evaluation. Labs revealed hyponatremia with Na 123 (recent baseline ~135). She denies fever, chills, SOB, N/V/D/abdominal pain, dysuria, hematuria, focal weakness/numbness, confusion, dark stool or bleeding from other sites. CT chest abdomen showed RML and RLL ground glass opacities concerning for aspiration pneumonia. CTA chest in ED negative for PE.     ED course: afebrile, vitals stable and oxygenating well on room air. Leukocytosis WBC 17. Serum osm, urine osm, urine Na and elevated urine specific gravity indicative of hypovolemic hyponatremia. During my interview, patient is awake, conversant with her son justo at bedside. She reports previous episodes of hyponatremia in the past due to HCTZ use which resolved with IVF and discontinuation of HCTZ.        Hospital Course:   Started on  Zosyn and received 1L NS w/ improvement of Na to 127. Pt requested to be discharged from ED & be able to follow-up w/ PCP. Given addn'l 0.5L NS w/ further improvement of Na to 130. Pt tolerating PO well and ensured to have good outpatient follow-up. Given pt preference, improving Na trend w/ good PO intake and stable VS, pt discharged home. As pt remains symptomatic w/ elevated WBC, discharged on abx course (as persistent infxn may also be contributing to her hyponatremia).        Goals of Care Treatment Preferences:  Code Status: Full Code    Living Will: Yes     What is most important right now is to focus on remaining as independent as possible, symptom/pain control, extending life as long as possible, even it it means sacrificing quality.  Accordingly, we have decided that the best plan to meet the patient's goals includes continuing with treatment.      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   - Get repeat lab work before the end of the week.   - Follow-up with your PCP within 7 days. Also follow-up with your nephrologist.       BASIC METABOLIC PANEL   Standing Status: Future Standing Exp. Date: 03/09/25     CBC W/ AUTO DIFFERENTIAL   Standing Status: Future Standing Exp. Date: 03/09/25       Significant Diagnostic Studies: N/A ; as discussed in  ""Hospital Course" above      Pending Diagnostic Studies:       None           Medications:  Reconciled Home Medications:      Medication List        START taking these medications      dextromethorphan-guaiFENesin  mg/5 ml  mg/5 mL liquid  Commonly known as: ROBITUSSIN-DM  Take 10 mLs by mouth every 6 (six) hours as needed.            CONTINUE taking these medications      albuterol 0.63 mg/3 mL Nebu  Commonly known as: ACCUNEB  Take 3 mLs (0.63 mg total) by nebulization every 6 (six) hours as needed. Rescue     ALIGN ORAL  Take by mouth. PRN     amLODIPine 5 MG tablet  Commonly known as: NORVASC  Take 5 mg by mouth once daily.     aspirin 81 MG EC tablet  Commonly known as: ECOTRIN  Take 81 mg by mouth.     atorvastatin 40 MG tablet  Commonly known as: LIPITOR  TAKE ONE TABLET BY MOUTH once DAILY AT BEDTIME FOR cholesterol control     BENEFIBER (GUAR GUM) ORAL  Take 1 Dose by mouth 2 (two) times daily.     carvedilol PHOSPHATE 40 MG ORAL CM24 40 MG Cm24  Commonly known as: COREG CR  Take 1 capsule by mouth once daily.     cetirizine 10 MG tablet  Commonly known as: ZYRTEC  Take 1 tablet (10 mg total) by mouth once daily. for 7 days     fluticasone propionate 50 mcg/actuation nasal spray  Commonly known as: FLONASE  1 spray by Each Nare route once daily.     folic acid 1 MG tablet  Commonly known as: FOLVITE  Take 1 tablet (1 mg total) by mouth once daily.     glucosamine-chondroitin 500-400 mg tablet  Take 1 tablet by mouth 3 (three) times daily.     MAXITROL 3.5mg/mL-10,000 unit/mL-0.1 % Drps  Generic drug: neomycin-polymyxin-dexamethasone  Place 1 drop into the right eye 2 (two) times daily.     multivitamin per tablet  Commonly known as: THERAGRAN  Take 1 tablet by mouth once daily.     Saline NasaL 0.65 % nasal spray  Generic drug: sodium chloride  1 spray by Nasal route as needed for Congestion.     sulfacetamide sodium (acne) 10 % Susp  Apply 1 application topically 2 (two) times daily. Apply to " affected area     SYNTHROID 75 MCG tablet  Generic drug: levothyroxine  Take 1 tablet (75 mcg total) by mouth daily     valsartan 320 MG tablet  Commonly known as: DIOVAN  Take 1 tablet by mouth once daily.     vitamin D 1000 units Tab  Commonly known as: VITAMIN D3  Take 2,000 Units by mouth.     VIVELLE-DOT 0.025 mg/24 hr  Generic drug: estradioL  Place 1 patch onto the skin twice a week            ASK your doctor about these medications      acetaminophen-codeine 300-30mg 300-30 mg Tab  Commonly known as: TYLENOL #3  Take 1 tablet by mouth every 4 (four) hours as needed (pain, cough).  Ask about: Should I take this medication?     amoxicillin-clavulanate 875-125mg 875-125 mg per tablet  Commonly known as: AUGMENTIN  Take 1 tablet by mouth 2 (two) times daily. for 4 days  Ask about: Should I take this medication?     benzonatate 200 MG capsule  Commonly known as: TESSALON  Take 1 capsule (200 mg total) by mouth 3 (three) times daily as needed for Cough.  Ask about: Should I take this medication?              Indwelling Lines/Drains at time of discharge:   Lines/Drains/Airways       None              Time spent on the discharge of patient: 25 minutes         Fouzia Maldonado MD  Department of Hospital Medicine  [facility] - Emergency Dept

## 2024-01-16 NOTE — HOSPITAL COURSE
Started on Zosyn and received 1L NS w/ improvement of Na to 127. Pt requested to be discharged from ED & be able to follow-up w/ PCP. Given addn'l 0.5L NS w/ further improvement of Na to 130. Pt tolerating PO well and ensured to have good outpatient follow-up. Given pt preference, improving Na trend w/ good PO intake and stable VS, pt discharged home. As pt remains symptomatic w/ elevated WBC, discharged on abx course (as persistent infxn may also be contributing to her hyponatremia).

## 2024-01-18 LAB
O+P STL TRI STN: NORMAL
WBC STL QL MICRO: NORMAL

## 2024-01-19 LAB
LEGIONELLA CULTURE STATUS: NORMAL
LEGIONELLA SPEC CULT: NORMAL
SPECIMEN SOURCE: NORMAL

## 2024-01-23 ENCOUNTER — PATIENT MESSAGE (OUTPATIENT)
Dept: HEMATOLOGY/ONCOLOGY | Facility: CLINIC | Age: 79
End: 2024-01-23
Payer: MEDICARE

## 2024-01-31 RX ORDER — LEVOTHYROXINE SODIUM 75 UG/1
TABLET ORAL
Qty: 90 TABLET | Refills: 1 | Status: SHIPPED | OUTPATIENT
Start: 2024-01-31

## 2024-04-12 ENCOUNTER — LAB VISIT (OUTPATIENT)
Dept: LAB | Facility: HOSPITAL | Age: 79
End: 2024-04-12
Payer: MEDICARE

## 2024-04-12 ENCOUNTER — OFFICE VISIT (OUTPATIENT)
Dept: HEMATOLOGY/ONCOLOGY | Facility: CLINIC | Age: 79
End: 2024-04-12
Payer: MEDICARE

## 2024-04-12 VITALS
TEMPERATURE: 98 F | HEART RATE: 63 BPM | HEIGHT: 69 IN | OXYGEN SATURATION: 99 % | SYSTOLIC BLOOD PRESSURE: 152 MMHG | BODY MASS INDEX: 18.34 KG/M2 | WEIGHT: 123.81 LBS | DIASTOLIC BLOOD PRESSURE: 67 MMHG

## 2024-04-12 DIAGNOSIS — C91.10 CLL (CHRONIC LYMPHOCYTIC LEUKEMIA): ICD-10-CM

## 2024-04-12 DIAGNOSIS — D59.12 COLD AGGLUTININ DISEASE: ICD-10-CM

## 2024-04-12 DIAGNOSIS — D59.12 COLD AUTOIMMUNE HEMOLYTIC ANEMIA: ICD-10-CM

## 2024-04-12 DIAGNOSIS — C91.10 CLL (CHRONIC LYMPHOCYTIC LEUKEMIA): Primary | ICD-10-CM

## 2024-04-12 DIAGNOSIS — D84.81 IMMUNODEFICIENCY DUE TO CONDITIONS CLASSIFIED ELSEWHERE: ICD-10-CM

## 2024-04-12 LAB
ABO + RH BLD: NORMAL
ALBUMIN SERPL BCP-MCNC: 3.8 G/DL (ref 3.5–5.2)
ALP SERPL-CCNC: 95 U/L (ref 55–135)
ALT SERPL W/O P-5'-P-CCNC: 18 U/L (ref 10–44)
ANION GAP SERPL CALC-SCNC: 5 MMOL/L (ref 8–16)
AST SERPL-CCNC: 19 U/L (ref 10–40)
BASOPHILS # BLD AUTO: 0.1 K/UL (ref 0–0.2)
BASOPHILS NFR BLD: 1 % (ref 0–1.9)
BILIRUB SERPL-MCNC: 0.8 MG/DL (ref 0.1–1)
BLD GP AB SCN CELLS X3 SERPL QL: NORMAL
BUN SERPL-MCNC: 13 MG/DL (ref 8–23)
CALCIUM SERPL-MCNC: 9.7 MG/DL (ref 8.7–10.5)
CHLORIDE SERPL-SCNC: 101 MMOL/L (ref 95–110)
CO2 SERPL-SCNC: 29 MMOL/L (ref 23–29)
CREAT SERPL-MCNC: 0.6 MG/DL (ref 0.5–1.4)
DIFFERENTIAL METHOD BLD: ABNORMAL
EOSINOPHIL # BLD AUTO: 0.2 K/UL (ref 0–0.5)
EOSINOPHIL NFR BLD: 2.2 % (ref 0–8)
ERYTHROCYTE [DISTWIDTH] IN BLOOD BY AUTOMATED COUNT: 13.1 % (ref 11.5–14.5)
EST. GFR  (NO RACE VARIABLE): >60 ML/MIN/1.73 M^2
GLUCOSE SERPL-MCNC: 90 MG/DL (ref 70–110)
HAPTOGLOB SERPL-MCNC: 171 MG/DL (ref 30–250)
HCT VFR BLD AUTO: 43.4 % (ref 37–48.5)
HGB BLD-MCNC: 14.4 G/DL (ref 12–16)
IMM GRANULOCYTES # BLD AUTO: 0.03 K/UL (ref 0–0.04)
IMM GRANULOCYTES NFR BLD AUTO: 0.3 % (ref 0–0.5)
LDH SERPL L TO P-CCNC: 208 U/L (ref 110–260)
LYMPHOCYTES # BLD AUTO: 1.6 K/UL (ref 1–4.8)
LYMPHOCYTES NFR BLD: 15.2 % (ref 18–48)
MAGNESIUM SERPL-MCNC: 1.9 MG/DL (ref 1.6–2.6)
MCH RBC QN AUTO: 29.9 PG (ref 27–31)
MCHC RBC AUTO-ENTMCNC: 33.2 G/DL (ref 32–36)
MCV RBC AUTO: 90 FL (ref 82–98)
MONOCYTES # BLD AUTO: 1 K/UL (ref 0.3–1)
MONOCYTES NFR BLD: 9.3 % (ref 4–15)
NEUTROPHILS # BLD AUTO: 7.4 K/UL (ref 1.8–7.7)
NEUTROPHILS NFR BLD: 72 % (ref 38–73)
NRBC BLD-RTO: 0 /100 WBC
PHOSPHATE SERPL-MCNC: 4.1 MG/DL (ref 2.7–4.5)
PLATELET # BLD AUTO: 263 K/UL (ref 150–450)
PMV BLD AUTO: 10.3 FL (ref 9.2–12.9)
POTASSIUM SERPL-SCNC: 4.8 MMOL/L (ref 3.5–5.1)
PROT SERPL-MCNC: 6.8 G/DL (ref 6–8.4)
RBC # BLD AUTO: 4.82 M/UL (ref 4–5.4)
RETICS/RBC NFR AUTO: 1.4 % (ref 0.5–2.5)
SODIUM SERPL-SCNC: 135 MMOL/L (ref 136–145)
SPECIMEN OUTDATE: NORMAL
URATE SERPL-MCNC: 4 MG/DL (ref 2.4–5.7)
WBC # BLD AUTO: 10.28 K/UL (ref 3.9–12.7)

## 2024-04-12 PROCEDURE — 85045 AUTOMATED RETICULOCYTE COUNT: CPT | Performed by: INTERNAL MEDICINE

## 2024-04-12 PROCEDURE — 83010 ASSAY OF HAPTOGLOBIN QUANT: CPT | Performed by: INTERNAL MEDICINE

## 2024-04-12 PROCEDURE — 83615 LACTATE (LD) (LDH) ENZYME: CPT | Performed by: INTERNAL MEDICINE

## 2024-04-12 PROCEDURE — 86850 RBC ANTIBODY SCREEN: CPT | Performed by: INTERNAL MEDICINE

## 2024-04-12 PROCEDURE — 84100 ASSAY OF PHOSPHORUS: CPT | Performed by: INTERNAL MEDICINE

## 2024-04-12 PROCEDURE — 84550 ASSAY OF BLOOD/URIC ACID: CPT | Performed by: INTERNAL MEDICINE

## 2024-04-12 PROCEDURE — 99999 PR PBB SHADOW E&M-EST. PATIENT-LVL IV: CPT | Mod: PBBFAC,,, | Performed by: INTERNAL MEDICINE

## 2024-04-12 PROCEDURE — 99214 OFFICE O/P EST MOD 30 MIN: CPT | Mod: PBBFAC | Performed by: INTERNAL MEDICINE

## 2024-04-12 PROCEDURE — 80053 COMPREHEN METABOLIC PANEL: CPT | Performed by: INTERNAL MEDICINE

## 2024-04-12 PROCEDURE — 99214 OFFICE O/P EST MOD 30 MIN: CPT | Mod: S$PBB,,, | Performed by: INTERNAL MEDICINE

## 2024-04-12 PROCEDURE — 83735 ASSAY OF MAGNESIUM: CPT | Performed by: INTERNAL MEDICINE

## 2024-04-12 PROCEDURE — 85025 COMPLETE CBC W/AUTO DIFF WBC: CPT | Performed by: INTERNAL MEDICINE

## 2024-04-12 RX ORDER — BISMUTH SUBSALICYLATE 262 MG/1
4 TABLET ORAL
COMMUNITY

## 2024-04-12 NOTE — PROGRESS NOTES
Section of Hematology and Stem Cell Transplantation  Follow Up Visit     Visit date: 4/12/24  Visit diagnosis: CLL (chronic lymphocytic leukemia) [C91.10]  Referred by:  Shiv Watson MD and Sony Carnes MD    Oncologic History:     Primary Oncologic Diagnosis: Chronic lymphocytic leukemia, Binet stage A, Duarte stage 0; cold agglutinin disease     2016: First noted to have cervical lymphadenopathy. Excisional biopsy confirmed chronic lymphocytic leukemia. She did not have an indication for treatment; therefore, observation recommended by Dr. Kelley.   6/17/21: PET/CT noted enlarged bilateral cervical, supraclavicular, axillary, retroperitoneal, and pelvic lymph nodes. All nodes enlarged from the prior exam.  7/28/21: First noted to have mild anemia (Hgb ~11.5 to 10.8 g/dL). Continued monitoring.  8/3/21: Established care with Dr. Carnes.   11/1/21: On follow up with Dr. Carnes, she was noted to have worsening anemia - hemoglobin decreased to 8.6 g/dL. 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 Beta-2 microglobulin 3.19. CLL FISH revealed trisomy 12. Peripheral blood karyotype - 47,XX,+12[8]/47,idem,del(4)(p14p16)[3]/46,XX[9]. ZAP70 17% of cells.  12/14/21: She noted increased fatigue. Hemoglobin decreased to 6.2. Repeat labs confirmed decreased hemoglobin to 5.8 g/dL. Haptoglobin <1, . MICHELL Anti-IgG negative, MICHELL complement C3 positive.  12/17/21: Prednisone 60mg daily started in response to hemolytic anemia.  12/22/21: Initial visit at [facility] with Dr. Watson. WBC 96k, Hgb 8.9, Plts 288. MICHELL positive. , Hapto <10. Continued prednisone 60mg daily.   12/27/21: Initial visit with me. Prednisone decreased to 50mg daily.   1/4/22: Hemoglobin stable (~8.4 g/dL) with persistent hemolysis. Prednisone weaned to 40mg daily.   1/11/22: Cycle 1 day 1 of obinutuzumab plus venetoclax (starting day 22). Prednisone weaned to 30mg daily.   1/18/22: Hemoglobin improved. Prednisone weaned to 20mg daily.  1/31/22: Prednisone  weaned to 15mg daily. Tapered to 10mg one week later.   2/15/22: Prednisone weaned to 5mg daily.   4/4/22: Prednisone to 5mg every other day x2 weeks then stop.  4/27/22: Cold agglutinin titer positive (>1:512). Venetoclax stopped as hemolysis was attributed to cold agglutinins. Hemolysis improved.  6/1/22: She completed 6 cycles of obinutuzumab.      History of Present Ilness:   Cassidy Bryan) is a pleasant 79 y.o.female with a past medical history of hypertension and chronic lymphocytic leukemia who presents for follow up visit regarding CLL and cold agglutinin hemolytic anemia. She was diagnosed with COVID in 1/2024 while at [employer]. Upon return she was admitted for secondary bacterial pneumonia. She then had diarrhea and a subsequent UTI. She has since recovered and feels well at this time.     PAST MEDICAL HISTORY:   Past Medical History:   Diagnosis Date    CAD (coronary artery disease)     CLL (chronic lymphocytic leukemia) 12/22/2021    HLD (hyperlipidemia)     Hypertension     Hypertensive heart disease     Hypothyroidism 04/26/2022    Thyroid disease        PAST SURGICAL HISTORY:   Past Surgical History:   Procedure Laterality Date    ADENOIDECTOMY      COLONOSCOPY      HYSTERECTOMY      INTRACAPSULAR CATARACT EXTRACTION      TONSILLECTOMY         PAST SOCIAL HISTORY:  Social History     Tobacco Use    Smoking status: Never    Smokeless tobacco: Never   Substance Use Topics    Alcohol use: Yes     Alcohol/week: 0.0 standard drinks of alcohol     Comment: rare       FAMILY HISTORY:  Family History   Problem Relation Age of Onset    Thyroid disease Mother     Osteoarthritis Mother     Hypertension Mother     Cancer Mother     Dementia Mother     Cancer Father         bladder    Heart disease Father     Diabetes Father     Leukemia Maternal Grandmother     Stroke Maternal Grandfather     Cancer Maternal Grandfather     Cancer Paternal Grandmother        CURRENT MEDICATIONS:   Current Outpatient  Medications   Medication Sig    albuterol (ACCUNEB) 0.63 mg/3 mL Nebu Take 3 mLs (0.63 mg total) by nebulization every 6 (six) hours as needed. Rescue    amLODIPine (NORVASC) 5 MG tablet Take 5 mg by mouth once daily.    aspirin (ECOTRIN) 81 MG EC tablet Take 81 mg by mouth.    atorvastatin (LIPITOR) 40 MG tablet TAKE ONE TABLET BY MOUTH once DAILY AT BEDTIME FOR cholesterol control    BENEFIBER, GUAR GUM, ORAL Take 1 Dose by mouth 2 (two) times daily.    Bifidobacterium infantis (ALIGN ORAL) Take by mouth. PRN    bismuth subsalicylate (BISMAREX) 262 mg Chew Take 4 tablets by mouth.    carvedilol PHOSPHATE 40 MG ORAL CM24 (COREG CR) 40 MG CM24 Take 1 capsule by mouth once daily.    fluticasone (FLONASE) 50 mcg/actuation nasal spray 1 spray by Each Nare route once daily.    folic acid (FOLVITE) 1 MG tablet Take 1 tablet (1 mg total) by mouth once daily.    glucosamine-chondroitin 500-400 mg tablet Take 1 tablet by mouth 3 (three) times daily.    MAXITROL 3.5mg/mL-10,000 unit/mL-0.1 % DrpS Place 1 drop into the right eye 2 (two) times daily.    multivitamin (THERAGRAN) per tablet Take 1 tablet by mouth once daily.    sodium chloride (SALINE NASAL) 0.65 % nasal spray 1 spray by Nasal route as needed for Congestion.    sulfacetamide sodium, acne, 10 % Susp Apply 1 application topically 2 (two) times daily. Apply to affected area    SYNTHROID 75 mcg tablet Take 1 tablet (75 mcg total) by mouth daily    valsartan (DIOVAN) 160 MG tablet Take 160 mg by mouth once daily.    vitamin D (VITAMIN D3) 1000 units Tab Take 2,000 Units by mouth.    VIVELLE-DOT 0.025 mg/24 hr Place 1 patch onto the skin twice a week     No current facility-administered medications for this visit.       ALLERGIES:   Review of patient's allergies indicates:   Allergen Reactions    Sulfa (sulfonamide antibiotics) Rash     Other reaction(s): Other (See Comments)  ALLERGIC TO SULFA DRUGS oral    Tobramycin-dexamethasone      Other reaction(s): Other (See  Comments)  dizziness    [place] Diarrhea       Review of Systems:     Pertinent positives and negatives included in the HPI. Otherwise a complete review of systems is negative.    Physical Exam:     Vitals:    04/12/24 1050   BP: (!) 152/67   Pulse: 63   Temp: 97.8 °F (36.6 °C)     General: Appears well, NAD  HEENT: MMM, no OP lesions  Pulmonary: CTAB, no increased work of breathing, no W/R/C  Cardiovascular: S1S2 normal, RRR, no M/R/G  Abdominal: Soft, NT, ND, BS+, no HSM  Extremities: No C/C/E  Neurological: AAOx4, grossly normal, no focal deficits  Dermatologic: No appreciable rashes or lesions  Lymphatic: No appreciable cervical, axillary, or inguinal lymphadenopathy     ECOG Performance Status: (foot note - ECOG PS provided by Eastern Cooperative Oncology Group) 0 - Asymptomatic    Karnofsky Performance Score:  100%- Normal, No Complaints, No Evidence of Disease    Labs:   Lab Results   Component Value Date    WBC 10.28 04/12/2024    HGB 14.4 04/12/2024    HCT 43.4 04/12/2024    MCV 90 04/12/2024     04/12/2024       Lab Results   Component Value Date     (L) 04/12/2024    K 4.8 04/12/2024     04/12/2024    CO2 29 04/12/2024    BUN 13 04/12/2024    CREATININE 0.6 04/12/2024    ALBUMIN 3.8 04/12/2024    BILITOT 0.8 04/12/2024    ALKPHOS 95 04/12/2024    AST 19 04/12/2024    ALT 18 04/12/2024       Imaging:   PET/CT 6/17/21  COMPARISON: PET/CT 8/22/2016.   HISTORY: Lymphoma.   FINDINGS:   Head and neck: There is symmetric and physiologic distribution of radiotracer throughout the included brain parenchyma. There is no hemorrhage, hydrocephalus, or midline shift. There are innumerable bilateral enlarged FDG avid cervical lymph nodes. These have worsened from the prior exam. A representative left lower cervical lymph node best visualized on image 84 measures 19 mm compared with 10 mm previously with an SUV max of 1.8. There are enlarged left supraclavicular lymph nodes which were not present on  the prior exam. A representative eran conglomerate measures 3.4 cm in diameter with an SUV max of 2.2.   CHEST: There are innumerable bilateral axillary and subpectoral lymph nodes which are not present on the prior exam. These demonstrate low-level FDG avidity. A representative left axillary nodule best visualized on image 114 measures 1.9 cm with an SUV max of 1.6. There are prominent paratracheal lymph nodes which are not pathologically enlarged by size criteria and demonstrate background FDG avidity, however were not present on the prior exam.   There is no FDG avid pulmonary nodule or mass. There is no pleural effusion. There is no pneumothorax.   Abdomen and pelvis: There is physiologic distribution of radiotracer throughout the liver, spleen, and collecting system. There are multiple enlarged bilateral iliac and periaortic retroperitoneal lymph nodes. A representative left periaortic lymph node best visualized on image 201 measures 2.0 cm in diameter with an SUV max of 2.0.   MUSCULOSKELETAL: There is no FDG avid lytic or blastic osseous lesion.     IMPRESSION:   Innumerable enlarged bilateral cervical, supraclavicular, axillary, retroperitoneal, and pelvic lymph nodes all which demonstrate low-level FDG avidity, however are enlarged from the prior exam and most consistent with recurrent disease.       Pathology:  Peripheral Blood Flow Cytometry (11/1/21):  Comment:      CD5+ B-CELL LYMPHOPROLIFERATIVE DISORDER (SEE COMMENT).     COMMENT: Clonal CD20+ B-cells are detected that coexpress   CD5, CD23, FMC7(dim) and moderate surface kappa light chain   and are negative for CD10, comprising 75% of all analyzed   cells.       Prognostication Tools:  CLL-IPI = 2, intermediate risk (79.4% survival after 5 years, 40% survival after 10 years, median  months)    Assessment and Plan:   Cassidy JENNIFER Aguilar (Cassidy) is a pleasant 79 y.o.female with a past medical history of hypertension and chronic lymphocytic leukemia  who presents for follow up visit regarding CLL.     Chronic lymphocytic leukemia, Binet stage A, Duarte stage 0: She has had Duarte stage I disease since 2016. 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 She was initially treated with steroids. In January 2022, she was started on treatment for CLL due to steroid-refractory hemolytic anemia with obinutuzumab plus venetoclax because of the benefit of anti-CD20 MAB with AIHA plus fixed duration treatment (patient preference).  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 Venetoclax was stopped and obinutuzumab was continued to complete 6 cycles. Since April 2022, her hemolytic anemia has resolved.   Continue to monitor clinically.    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  Initially treated with prednisone in December with stabilization of blood counts but persistent hemolysis.  Hemolytic anemia resolved in 4/2022. She completed 6 cycles of obinutuzumab as noted above. Consider sutimlimab if relapse of LAYTON.  She had another episode of hemolysis following a COVID booster, so theses may be related.   Continue to monitor LDH, haptoglobin, and retic.    Immunosuppression: She received 5 COVID vaccinations and 2 Evusheld infusions.     Hypertension: BP slightly elevated today. She is asymptomatic. Continue to monitor.    Hypothyroidism: TFTs unremarkable today. Management per PCP.      Orders Placed:             Route Chart for Scheduling    BMT Chart Routing      Follow up with physician 4 months.   Follow up with ERIKA    Provider visit type    Infusion scheduling note    Injection scheduling note    Labs CBC, CMP,  phosphorus, magnesium, hemolysis panel, LDH and reticulocytes   Scheduling:  Preferred lab:  Lab interval:  prior to visit   Imaging    Pharmacy appointment    Other referrals                         Advance Care Planning   Date: 12/09/2022  We previously reviewed her underlying diagnosis, natural history, prognosis, and various treatment options.  We previously treated her underlying disease due to concern for related hemolytic anemia.  Treatment subsequently stopped, and she is done very well since then.  Her disease appears to be well-controlled we will reconsider treatment if she is interested in the future if she develops an indication for treatment.     Total time of this visit was 35 minutes, including time spent face to face with patient and/or via video/audio, and also in preparing for today's visit for MDM and documentation. (Medical Decision Making, including consideration of possible diagnoses, management options, complex medical record review, review of diagnostic tests and information, consideration and discussion of significant complications based on comorbidities, and discussion with providers involved with the care of the patient). Greater than 50% was spent face to face with the patient counseling and coordinating care.      Bubba Wright MD  Hematology, Oncology, and Stem Cell Transplantation  [facility] and [facility]

## 2024-04-15 PROBLEM — J69.0 ASPIRATION PNEUMONIA OF RIGHT LOWER LOBE: Status: RESOLVED | Noted: 2024-01-09 | Resolved: 2024-04-15

## 2024-04-25 DIAGNOSIS — C91.10 CLL (CHRONIC LYMPHOCYTIC LEUKEMIA): Primary | ICD-10-CM

## 2024-07-11 DIAGNOSIS — N99.3 VAGINAL VAULT PROLAPSE, POSTHYSTERECTOMY: Primary | ICD-10-CM

## 2024-07-23 DIAGNOSIS — E03.9 HYPOTHYROIDISM, UNSPECIFIED TYPE: Primary | ICD-10-CM

## 2024-07-23 RX ORDER — LEVOTHYROXINE SODIUM 75 UG/1
TABLET ORAL
Qty: 90 TABLET | Refills: 3 | Status: SHIPPED | OUTPATIENT
Start: 2024-07-23

## 2024-08-02 ENCOUNTER — LAB VISIT (OUTPATIENT)
Dept: LAB | Facility: HOSPITAL | Age: 79
End: 2024-08-02
Payer: MEDICARE

## 2024-08-02 ENCOUNTER — OFFICE VISIT (OUTPATIENT)
Dept: HEMATOLOGY/ONCOLOGY | Facility: CLINIC | Age: 79
End: 2024-08-02
Payer: MEDICARE

## 2024-08-02 VITALS
OXYGEN SATURATION: 98 % | HEIGHT: 69 IN | TEMPERATURE: 98 F | BODY MASS INDEX: 19.24 KG/M2 | DIASTOLIC BLOOD PRESSURE: 63 MMHG | WEIGHT: 129.88 LBS | SYSTOLIC BLOOD PRESSURE: 131 MMHG | HEART RATE: 65 BPM

## 2024-08-02 DIAGNOSIS — C91.10 CLL (CHRONIC LYMPHOCYTIC LEUKEMIA): ICD-10-CM

## 2024-08-02 DIAGNOSIS — D59.12 COLD AGGLUTININ DISEASE: ICD-10-CM

## 2024-08-02 DIAGNOSIS — C91.10 CLL (CHRONIC LYMPHOCYTIC LEUKEMIA): Primary | ICD-10-CM

## 2024-08-02 LAB
ALBUMIN SERPL BCP-MCNC: 4 G/DL (ref 3.5–5.2)
ALP SERPL-CCNC: 96 U/L (ref 55–135)
ALT SERPL W/O P-5'-P-CCNC: 20 U/L (ref 10–44)
ANION GAP SERPL CALC-SCNC: 6 MMOL/L (ref 8–16)
AST SERPL-CCNC: 19 U/L (ref 10–40)
BASOPHILS # BLD AUTO: 0.06 K/UL (ref 0–0.2)
BASOPHILS NFR BLD: 0.7 % (ref 0–1.9)
BILIRUB SERPL-MCNC: 0.8 MG/DL (ref 0.1–1)
BUN SERPL-MCNC: 14 MG/DL (ref 8–23)
CALCIUM SERPL-MCNC: 9.6 MG/DL (ref 8.7–10.5)
CHLORIDE SERPL-SCNC: 103 MMOL/L (ref 95–110)
CO2 SERPL-SCNC: 28 MMOL/L (ref 23–29)
CREAT SERPL-MCNC: 0.7 MG/DL (ref 0.5–1.4)
DAT IGG-SP REAG RBC-IMP: NORMAL
DIFFERENTIAL METHOD BLD: ABNORMAL
EOSINOPHIL # BLD AUTO: 0.2 K/UL (ref 0–0.5)
EOSINOPHIL NFR BLD: 1.9 % (ref 0–8)
ERYTHROCYTE [DISTWIDTH] IN BLOOD BY AUTOMATED COUNT: 14.1 % (ref 11.5–14.5)
EST. GFR  (NO RACE VARIABLE): >60 ML/MIN/1.73 M^2
GLUCOSE SERPL-MCNC: 73 MG/DL (ref 70–110)
HAPTOGLOB SERPL-MCNC: 105 MG/DL (ref 30–250)
HCT VFR BLD AUTO: 42 % (ref 37–48.5)
HGB BLD-MCNC: 14.3 G/DL (ref 12–16)
IMM GRANULOCYTES # BLD AUTO: 0.02 K/UL (ref 0–0.04)
IMM GRANULOCYTES NFR BLD AUTO: 0.2 % (ref 0–0.5)
LDH SERPL L TO P-CCNC: 219 U/L (ref 110–260)
LYMPHOCYTES # BLD AUTO: 1.1 K/UL (ref 1–4.8)
LYMPHOCYTES NFR BLD: 12.3 % (ref 18–48)
MAGNESIUM SERPL-MCNC: 2 MG/DL (ref 1.6–2.6)
MCH RBC QN AUTO: 30.8 PG (ref 27–31)
MCHC RBC AUTO-ENTMCNC: 34 G/DL (ref 32–36)
MCV RBC AUTO: 90 FL (ref 82–98)
MONOCYTES # BLD AUTO: 0.9 K/UL (ref 0.3–1)
MONOCYTES NFR BLD: 9.3 % (ref 4–15)
NEUTROPHILS # BLD AUTO: 6.9 K/UL (ref 1.8–7.7)
NEUTROPHILS NFR BLD: 75.6 % (ref 38–73)
NRBC BLD-RTO: 0 /100 WBC
PHOSPHATE SERPL-MCNC: 3.5 MG/DL (ref 2.7–4.5)
PLATELET # BLD AUTO: 200 K/UL (ref 150–450)
PMV BLD AUTO: 10.5 FL (ref 9.2–12.9)
POTASSIUM SERPL-SCNC: 4.3 MMOL/L (ref 3.5–5.1)
PROT SERPL-MCNC: 7.1 G/DL (ref 6–8.4)
RBC # BLD AUTO: 4.65 M/UL (ref 4–5.4)
RETICS/RBC NFR AUTO: 1.4 % (ref 0.5–2.5)
SODIUM SERPL-SCNC: 137 MMOL/L (ref 136–145)
WBC # BLD AUTO: 9.1 K/UL (ref 3.9–12.7)

## 2024-08-02 PROCEDURE — 83010 ASSAY OF HAPTOGLOBIN QUANT: CPT | Performed by: INTERNAL MEDICINE

## 2024-08-02 PROCEDURE — 86880 COOMBS TEST DIRECT: CPT | Performed by: INTERNAL MEDICINE

## 2024-08-02 PROCEDURE — 83615 LACTATE (LD) (LDH) ENZYME: CPT | Performed by: INTERNAL MEDICINE

## 2024-08-02 PROCEDURE — 83735 ASSAY OF MAGNESIUM: CPT | Performed by: INTERNAL MEDICINE

## 2024-08-02 PROCEDURE — 84100 ASSAY OF PHOSPHORUS: CPT | Performed by: INTERNAL MEDICINE

## 2024-08-02 PROCEDURE — 85025 COMPLETE CBC W/AUTO DIFF WBC: CPT | Performed by: INTERNAL MEDICINE

## 2024-08-02 PROCEDURE — 99214 OFFICE O/P EST MOD 30 MIN: CPT | Mod: PBBFAC,25 | Performed by: INTERNAL MEDICINE

## 2024-08-02 PROCEDURE — 36415 COLL VENOUS BLD VENIPUNCTURE: CPT | Performed by: INTERNAL MEDICINE

## 2024-08-02 PROCEDURE — 99999 PR PBB SHADOW E&M-EST. PATIENT-LVL IV: CPT | Mod: PBBFAC,,, | Performed by: INTERNAL MEDICINE

## 2024-08-02 PROCEDURE — 80053 COMPREHEN METABOLIC PANEL: CPT | Performed by: INTERNAL MEDICINE

## 2024-08-02 PROCEDURE — 85045 AUTOMATED RETICULOCYTE COUNT: CPT | Performed by: INTERNAL MEDICINE

## 2024-08-02 RX ORDER — CARVEDILOL PHOSPHATE 20 MG/1
1 CAPSULE, EXTENDED RELEASE ORAL DAILY
COMMUNITY
Start: 2024-06-06

## 2024-08-02 NOTE — PROGRESS NOTES
Section of Hematology and Stem Cell Transplantation  Follow Up Visit     Visit date: 8/2/24  Visit diagnosis: CLL (chronic lymphocytic leukemia) [C91.10]  Referred by:  Shiv Watson MD and Sony Carnes MD    Oncologic History:     Primary Oncologic Diagnosis: Chronic lymphocytic leukemia, Binet stage A, Duarte stage 0; cold agglutinin disease     2016: First noted to have cervical lymphadenopathy. Excisional biopsy confirmed chronic lymphocytic leukemia. She did not have an indication for treatment; therefore, observation recommended by Dr. Kelley.   6/17/21: PET/CT noted enlarged bilateral cervical, supraclavicular, axillary, retroperitoneal, and pelvic lymph nodes. All nodes enlarged from the prior exam.  7/28/21: First noted to have mild anemia (Hgb ~11.5 to 10.8 g/dL). Continued monitoring.  8/3/21: Established care with Dr. Carnes.   11/1/21: On follow up with Dr. Carnse, she was noted to have worsening anemia - hemoglobin decreased to 8.6 g/dL. 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 Beta-2 microglobulin 3.19. CLL FISH revealed trisomy 12. Peripheral blood karyotype - 47,XX,+12[8]/47,idem,del(4)(p14p16)[3]/46,XX[9]. ZAP70 17% of cells.  12/14/21: She noted increased fatigue. Hemoglobin decreased to 6.2. Repeat labs confirmed decreased hemoglobin to 5.8 g/dL. Haptoglobin <1, . MICHELL Anti-IgG negative, MICHELL complement C3 positive.  12/17/21: Prednisone 60mg daily started in response to hemolytic anemia.  12/22/21: Initial visit at [facility] with Dr. Watson. WBC 96k, Hgb 8.9, Plts 288. MICHELL positive. , Hapto <10. Continued prednisone 60mg daily.   12/27/21: Initial visit with me. Prednisone decreased to 50mg daily.   1/4/22: Hemoglobin stable (~8.4 g/dL) with persistent hemolysis. Prednisone weaned to 40mg daily.   1/11/22: Cycle 1 day 1 of obinutuzumab plus venetoclax (starting day 22). Prednisone weaned to 30mg daily.   1/18/22: Hemoglobin improved. Prednisone weaned to 20mg daily.  1/31/22: Prednisone  weaned to 15mg daily. Tapered to 10mg one week later.   2/15/22: Prednisone weaned to 5mg daily.   4/4/22: Prednisone to 5mg every other day x2 weeks then stop.  4/27/22: Cold agglutinin titer positive (>1:512). Venetoclax stopped as hemolysis was attributed to cold agglutinins. Hemolysis improved.  6/1/22: She completed 6 cycles of obinutuzumab.      History of Present Ilness:   Cassidy Bryan) is a pleasant 79 y.o.female with a past medical history of hypertension and chronic lymphocytic leukemia who presents for follow up visit regarding CLL and cold agglutinin hemolytic anemia. She has been doing very well. She is hydrating appropriately. No recent infections or hospitalizations.     PAST MEDICAL HISTORY:   Past Medical History:   Diagnosis Date    CAD (coronary artery disease)     CLL (chronic lymphocytic leukemia) 12/22/2021    HLD (hyperlipidemia)     Hypertension     Hypertensive heart disease     Hypothyroidism 04/26/2022    Thyroid disease        PAST SURGICAL HISTORY:   Past Surgical History:   Procedure Laterality Date    ADENOIDECTOMY      COLONOSCOPY      HYSTERECTOMY      INTRACAPSULAR CATARACT EXTRACTION      TONSILLECTOMY         PAST SOCIAL HISTORY:  Social History     Tobacco Use    Smoking status: Never    Smokeless tobacco: Never   Substance Use Topics    Alcohol use: Yes     Alcohol/week: 0.0 standard drinks of alcohol     Comment: rare       FAMILY HISTORY:  Family History   Problem Relation Name Age of Onset    Thyroid disease Mother      Osteoarthritis Mother      Hypertension Mother      Cancer Mother      Dementia Mother      Cancer Father          bladder    Heart disease Father      Diabetes Father      Leukemia Maternal Grandmother      Stroke Maternal Grandfather      Cancer Maternal Grandfather      Cancer Paternal Grandmother         CURRENT MEDICATIONS:   Current Outpatient Medications   Medication Sig    albuterol (ACCUNEB) 0.63 mg/3 mL Nebu Take 3 mLs (0.63 mg total) by  nebulization every 6 (six) hours as needed. Rescue    amLODIPine (NORVASC) 5 MG tablet Take 5 mg by mouth once daily.    aspirin (ECOTRIN) 81 MG EC tablet Take 81 mg by mouth.    atorvastatin (LIPITOR) 40 MG tablet TAKE ONE TABLET BY MOUTH once DAILY AT BEDTIME FOR cholesterol control    BENEFIBER, GUAR GUM, ORAL Take 1 Dose by mouth 2 (two) times daily.    Bifidobacterium infantis (ALIGN ORAL) Take by mouth. PRN    bismuth subsalicylate (BISMAREX) 262 mg Chew Take 4 tablets by mouth.    CARVEDILOL PHOSPHATE 20 MG ORAL CM24 (COREG CR) 20 mg 24 hr capsule Take 1 capsule by mouth once daily.    fluticasone (FLONASE) 50 mcg/actuation nasal spray 1 spray by Each Nare route once daily.    folic acid (FOLVITE) 1 MG tablet Take 1 tablet (1 mg total) by mouth once daily.    glucosamine-chondroitin 500-400 mg tablet Take 1 tablet by mouth 3 (three) times daily.    MAXITROL 3.5mg/mL-10,000 unit/mL-0.1 % DrpS Place 1 drop into the right eye 2 (two) times daily.    multivitamin (THERAGRAN) per tablet Take 1 tablet by mouth once daily.    sodium chloride (SALINE NASAL) 0.65 % nasal spray 1 spray by Nasal route as needed for Congestion.    sulfacetamide sodium, acne, 10 % Susp Apply 1 application topically 2 (two) times daily. Apply to affected area    SYNTHROID 75 mcg tablet Take 1 tablet (75 mcg total) by mouth daily    valsartan (DIOVAN) 160 MG tablet Take 160 mg by mouth once daily.    vitamin D (VITAMIN D3) 1000 units Tab Take 2,000 Units by mouth.    VIVELLE-DOT 0.025 mg/24 hr Place 1 patch onto the skin twice a week    CARVEDILOL PHOSPHATE 20 MG ORAL CM24 (COREG CR) 20 mg 24 hr capsule Take 1 capsule by mouth once daily. (Patient not taking: Reported on 8/2/2024)     No current facility-administered medications for this visit.       ALLERGIES:   Review of patient's allergies indicates:   Allergen Reactions    Sulfa (sulfonamide antibiotics) Rash     Other reaction(s): Other (See Comments)  ALLERGIC TO SULFA DRUGS oral     Tobramycin-dexamethasone      Other reaction(s): Other (See Comments)  dizziness    [employer] Diarrhea       Review of Systems:     Pertinent positives and negatives included in the HPI. Otherwise a complete review of systems is negative.    Physical Exam:     Vitals:    08/02/24 1031   BP: 131/63   Pulse: 65   Temp: 98.3 °F (36.8 °C)     General: Appears well, NAD  HEENT: MMM, no OP lesions  Pulmonary: CTAB, no increased work of breathing, no W/R/C  Cardiovascular: S1S2 normal, RRR, no M/R/G  Abdominal: Soft, NT, ND, BS+, no HSM  Extremities: No C/C/E  Neurological: AAOx4, grossly normal, no focal deficits  Dermatologic: No appreciable rashes or lesions  Lymphatic: No appreciable cervical, axillary, or inguinal lymphadenopathy     ECOG Performance Status: (foot note - ECOG PS provided by Eastern Cooperative Oncology Group) 0 - Asymptomatic    Karnofsky Performance Score:  100%- Normal, No Complaints, No Evidence of Disease    Labs:   Lab Results   Component Value Date    WBC 9.10 08/02/2024    HGB 14.3 08/02/2024    HCT 42.0 08/02/2024    MCV 90 08/02/2024     08/02/2024       Lab Results   Component Value Date     08/02/2024    K 4.3 08/02/2024     08/02/2024    CO2 28 08/02/2024    BUN 14 08/02/2024    CREATININE 0.7 08/02/2024    ALBUMIN 4.0 08/02/2024    BILITOT 0.8 08/02/2024    ALKPHOS 96 08/02/2024    AST 19 08/02/2024    ALT 20 08/02/2024       Imaging:   PET/CT 6/17/21  COMPARISON: PET/CT 8/22/2016.   HISTORY: Lymphoma.   FINDINGS:   Head and neck: There is symmetric and physiologic distribution of radiotracer throughout the included brain parenchyma. There is no hemorrhage, hydrocephalus, or midline shift. There are innumerable bilateral enlarged FDG avid cervical lymph nodes. These have worsened from the prior exam. A representative left lower cervical lymph node best visualized on image 84 measures 19 mm compared with 10 mm previously with an SUV max of 1.8. There are enlarged left  supraclavicular lymph nodes which were not present on the prior exam. A representative eran conglomerate measures 3.4 cm in diameter with an SUV max of 2.2.   CHEST: There are innumerable bilateral axillary and subpectoral lymph nodes which are not present on the prior exam. These demonstrate low-level FDG avidity. A representative left axillary nodule best visualized on image 114 measures 1.9 cm with an SUV max of 1.6. There are prominent paratracheal lymph nodes which are not pathologically enlarged by size criteria and demonstrate background FDG avidity, however were not present on the prior exam.   There is no FDG avid pulmonary nodule or mass. There is no pleural effusion. There is no pneumothorax.   Abdomen and pelvis: There is physiologic distribution of radiotracer throughout the liver, spleen, and collecting system. There are multiple enlarged bilateral iliac and periaortic retroperitoneal lymph nodes. A representative left periaortic lymph node best visualized on image 201 measures 2.0 cm in diameter with an SUV max of 2.0.   MUSCULOSKELETAL: There is no FDG avid lytic or blastic osseous lesion.     IMPRESSION:   Innumerable enlarged bilateral cervical, supraclavicular, axillary, retroperitoneal, and pelvic lymph nodes all which demonstrate low-level FDG avidity, however are enlarged from the prior exam and most consistent with recurrent disease.       Pathology:  Peripheral Blood Flow Cytometry (11/1/21):  Comment:      CD5+ B-CELL LYMPHOPROLIFERATIVE DISORDER (SEE COMMENT).     COMMENT: Clonal CD20+ B-cells are detected that coexpress   CD5, CD23, FMC7(dim) and moderate surface kappa light chain   and are negative for CD10, comprising 75% of all analyzed   cells.       Prognostication Tools:  CLL-IPI = 2, intermediate risk (79.4% survival after 5 years, 40% survival after 10 years, median  months)    Assessment and Plan:   Cassidy JENNIFER Bryan) is a pleasant 79 y.o.female with a past medical  history of hypertension and chronic lymphocytic leukemia who presents for follow up visit regarding CLL.     Chronic lymphocytic leukemia, Binet stage A, Duarte stage 0: She has had Duarte stage I disease since 2016. 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 She was initially treated with steroids. In January 2022, she was started on treatment for CLL due to steroid-refractory hemolytic anemia with obinutuzumab plus venetoclax because of the benefit of anti-CD20 MAB with AIHA plus fixed duration treatment (patient preference).  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 Venetoclax was stopped and obinutuzumab was continued to complete 6 cycles. Since April 2022, her hemolytic anemia has resolved.   Continue to monitor clinically.    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  Initially treated with prednisone in December with stabilization of blood counts but persistent hemolysis.  Hemolytic anemia resolved in 4/2022. She completed 6 cycles of obinutuzumab as noted above. Consider sutimlimab if relapse of LAYTON.  She had another episode of hemolysis following a COVID booster, so theses may be related.   Continue to monitor LDH, haptoglobin, and retic.    Immunosuppression: She received 5 COVID vaccinations and 2 Evusheld infusions.     Hypertension: BP slightly elevated today. She is asymptomatic. Continue to monitor.    Hypothyroidism: TFTs unremarkable today. Management per PCP.      Orders Placed:             Route Chart for Scheduling    BMT Chart Routing      Follow up with physician 6 months.   Follow up with ERIKA    Provider visit type    Infusion scheduling  note    Injection scheduling note    Labs CBC, CMP, phosphorus, magnesium, LDH, reticulocytes and hemolysis panel   Scheduling:  Preferred lab:  Lab interval:  prior to visit   Imaging    Pharmacy appointment    Other referrals                         Total time of this visit was 35 minutes, including time spent face to face with patient and/or via video/audio, and also in preparing for today's visit for MDM and documentation. (Medical Decision Making, including consideration of possible diagnoses, management options, complex medical record review, review of diagnostic tests and information, consideration and discussion of significant complications based on comorbidities, and discussion with providers involved with the care of the patient). Greater than 50% was spent face to face with the patient counseling and coordinating care.      Bubba Wright MD  Hematology, Oncology, and Stem Cell Transplantation  [facility] and [facility]

## 2024-08-07 ENCOUNTER — CLINICAL SUPPORT (OUTPATIENT)
Dept: REHABILITATION | Facility: OTHER | Age: 79
End: 2024-08-07
Payer: MEDICARE

## 2024-08-07 DIAGNOSIS — M62.89 PELVIC FLOOR DYSFUNCTION: ICD-10-CM

## 2024-08-07 DIAGNOSIS — R15.1 FECAL SMEARING: ICD-10-CM

## 2024-08-07 DIAGNOSIS — N39.41 URGE INCONTINENCE: Primary | ICD-10-CM

## 2024-08-07 PROBLEM — R53.1 WEAKNESS: Status: RESOLVED | Noted: 2022-03-31 | Resolved: 2022-10-27

## 2024-08-07 PROBLEM — R27.9 LACK OF COORDINATION: Status: RESOLVED | Noted: 2022-03-31 | Resolved: 2022-10-27

## 2024-08-07 PROCEDURE — 97530 THERAPEUTIC ACTIVITIES: CPT | Mod: PN | Performed by: PHYSICAL THERAPIST

## 2024-08-07 PROCEDURE — 97161 PT EVAL LOW COMPLEX 20 MIN: CPT | Mod: PN | Performed by: PHYSICAL THERAPIST

## 2024-08-23 NOTE — PROGRESS NOTES
Pelvic Health Physical Therapy   Treatment Note     Name: Cassidy Aguilar  Clinic Number: [id number]    Therapy Diagnosis:   Encounter Diagnoses   Name Primary?    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 Reduced urgency with transferring to stand if she thinks about not holding her breath. Standing with double voiding helps. 2-3 BM per day, which is reduced from earlier this month. 1 episode of FI with sneezing. She does not want to use a toilet stool.    She was compliant with home exercise program.  Response to previous treatment: no adverse effect  Functional change: reduced incontinence    Pain: none reported    Objective     Cassidy received the following interventions during the treatment session:   (TrA = transverse abdominis, PFM = pelvic floor muscle, sEMG = surface electromyography, RUSI = Rehabilitative Ultrasound Imaging)  Pt consented to pelvic floor muscle assessment and treatment on 8/7/24. See EMR.    Therapeutic activities to improve functional performance for 15 minutes -  [x] Education on pelvic floor anatomy & function  [x] Education on double void technique  [] Education on normal void intervals and fluid intake  [x] Review of urge suppression techniques  [x] Instruction on pressure management to protect pelvic  organ prolapse/prevent incontinence - particularly with sit to stand transfer  [] Education on voiding optimization - seated on toilet, no pushing  [x] Education on bowel movement optimization - toilet stool  [x] HEP building/HEP review    Neuromuscular re-education activities to develop balance, coordination, kinesthetic sense, motor control, proprioception, and posture for 28 minutes -   [x] TrA brace + straight leg raise (R&L), x10  [x] Side-lying clam with focus on pelvic girdle stability (R&L), x15 with 5-second hold  [x] PFM squeeze + seated hip adduction isometric with ball between knees, x20 with 5-second hold  [x] PFM squeeze + bridging with belt around knees, 2x10 with 5-second hold  [x] TrA brace + PFM squeeze + wall push-up, x10  [x] TrA brace + PFM squeeze + countertop push-up, x10  [x] PFM squeeze + sit to stand from chair on exhale, x10    Home Exercises and Patient Education     Patient Education: progression of plan of care, plan for next session, pelvic floor anatomy & function, relationship between TrA & PFM, relationship between hips & PFM, urge suppression, etiology of urinary urgency, timeline for muscle changes with consistent strength training, proper body mechanics for bowel movement, pressure management for support of pelvic organ prolapse, and abdominal wall anatomy    Home Exercise Program (8/7/24): urge suppression, toilet stool, increased fiber  Home Exercise Program Updates (8/26/24): clams, straight leg raise, countertop push-up    Exercises were reviewed, and Cassidy was able to demonstrate them prior to the end of the session, as needed. Cassidy demonstrated good understanding of the education provided.   See 'Patient Instructions' for exercises/instructions provided.    Assessment     Pt tolerated treatment session well, with good understanding of education provided and no increased symptoms with exercise. Pt requires verbal/manual cues to synch breath, TrA contraction, PFM  contraction, and movement during exercises, but she improved with practice. Pt's functional mobility and ability to perform ADLs still limited by pelvic floor dysfunction. She requires skilled therapy for continued patient education and coordination retraining.      Cassidy is progressing well towards her goals.   Pt prognosis: good    Pt will continue to benefit from skilled outpatient physical therapy to address the deficits listed in the problem list box on initial evaluation, provide pt/family education and to maximize pt's level of independence in the home and community environment.     Pt's spiritual, cultural and educational needs considered and pt agreeable to plan of care and goals.  Anticipated barriers to physical therapy: none      Short Term Goals: 6 weeks   Pt to report >50% improvement in urine leakage - MET  Pt to verbalize urge suppression strategies for improved control over urgency and urge urinary incontinence - MET  Pt to be independent with introductory home exercise program - MET     Long Term Goals: 12 weeks   Pt to report >90% improvement in urine leakage - NOT MET  Pt to report bowel movement frequency of no more than 3x/day to demonstrate improved emptying of rectum with bowel movements - NOT MET  Pt to report complete bladder emptying >90% of the time - NOT MET  Pt to score no more than 28% impairment on the Urinary Problem FOTO survey to demonstrate reduced impairment due to pelvic floor dysfunction - NOT MET   Pt to be independent with advanced home exercise program - NOT MET    Plan     Continue per Plan of Care      Charmaine Barber, PT, DPT    Board-Certified Clinical Specialist in Women's Health Physical Therapy

## 2024-08-26 ENCOUNTER — CLINICAL SUPPORT (OUTPATIENT)
Dept: REHABILITATION | Facility: OTHER | Age: 79
End: 2024-08-26
Payer: MEDICARE

## 2024-08-26 DIAGNOSIS — M62.89 PELVIC FLOOR DYSFUNCTION: Primary | ICD-10-CM

## 2024-08-26 DIAGNOSIS — R15.1 FECAL SMEARING: ICD-10-CM

## 2024-08-26 DIAGNOSIS — N39.41 URGE INCONTINENCE: ICD-10-CM

## 2024-08-26 PROCEDURE — 97530 THERAPEUTIC ACTIVITIES: CPT | Mod: PN | Performed by: PHYSICAL THERAPIST

## 2024-08-26 PROCEDURE — 97112 NEUROMUSCULAR REEDUCATION: CPT | Mod: PN | Performed by: PHYSICAL THERAPIST

## 2024-08-26 NOTE — PATIENT INSTRUCTIONS
Straight Leg Raise, 2-3 sets of 10  - Inhale to prepare, relax the belly and pelvic floor  - As you exhale, gently engage the transverse abdominis by drawing the belly button up and in to the spine  - Holding the hips level and the belly button down, gently lift one leg a few inches  - Inhale again to rest  *Don't hold your breath     Side-lying clam, 2-3 sets of 10 with 5-second hold  - Holding the hips level, lift the top leg a few inches.   *Hold the hips stacked on top of each other, not rolling back with movement  *Don't hold your breath  *Roll the top hip more forward than you think       Countertop push-up, 2-3 sets of 10  - Stand 4-5 feet away from a kitchen counter, with hands shoulder-width apart. Inhale to prepare, relax the belly and pelvic floor  - As you exhale, gently engage the transverse abdominis by drawing the belly button up and in to the spine.  - Keeping your body straight like a plank of wood, lean in towards the wall and push away  - Inhale again to rest  *Don't hold your breath

## 2024-09-16 ENCOUNTER — OFFICE VISIT (OUTPATIENT)
Dept: INTERNAL MEDICINE | Facility: CLINIC | Age: 79
End: 2024-09-16
Payer: MEDICARE

## 2024-09-16 VITALS
DIASTOLIC BLOOD PRESSURE: 77 MMHG | OXYGEN SATURATION: 97 % | BODY MASS INDEX: 18.75 KG/M2 | HEART RATE: 72 BPM | HEIGHT: 69 IN | SYSTOLIC BLOOD PRESSURE: 145 MMHG | WEIGHT: 126.56 LBS

## 2024-09-16 DIAGNOSIS — Z00.00 PREVENTATIVE HEALTH CARE: ICD-10-CM

## 2024-09-16 DIAGNOSIS — Z87.09 HISTORY OF PARANASAL SINUS CONGESTION: Primary | ICD-10-CM

## 2024-09-16 PROCEDURE — 99214 OFFICE O/P EST MOD 30 MIN: CPT | Mod: PBBFAC | Performed by: EMERGENCY MEDICINE

## 2024-09-16 PROCEDURE — 99999 PR PBB SHADOW E&M-EST. PATIENT-LVL IV: CPT | Mod: PBBFAC,,, | Performed by: EMERGENCY MEDICINE

## 2024-09-16 PROCEDURE — 99214 OFFICE O/P EST MOD 30 MIN: CPT | Mod: S$PBB,,, | Performed by: EMERGENCY MEDICINE

## 2024-09-16 NOTE — PROGRESS NOTES
Sept 5 sinus congestion.   Gave zpakc, and steroid shot.       Nasal rinses before when having diarrhea because of all antibioitics.  Improvement. With rinse.  Mouth feels dry in the mouth. Mouthing breathign at night at night. But wnow eake up because she is dry. Isf shee needs to go to the bathorrom will stip of water.     Now coughing when she  talks. Mucouse comeing from nose then throat.      Decraswed appetite on antibioitc.  Losser but not water bowels.   Went to gsatreonterlology. Acidophilus. Started it but then has not been able to gert off of it because it will intoclerance to get off.  2 more days of antibiotics.     Too much urine. Prolapsed vagina.

## 2024-09-16 NOTE — PROGRESS NOTES
Subjective     Patient ID: Cassidy Aguilar is a 79 y.o. female.    Chief Complaint: Follow-up (Has a sinus infection being treated bu ENT)      79F PMHx CAD, CLL , HLD , Hypertension,  Hypothyroidism presents for just a normal follow up.  She reports since September 5th she has had sinus congestion.  She has had sinus allergies and infections for a number of years especially with season changes.  She saw ENT and was given a steroid shot as well as a Z-Omar.  She started using nasal rinses that has since improved her symptoms.  She does feel like her mouth is dry and she believes that is because she is sleeping with her mouth open at night.  She wakes up now to drink water instead of to go to the bathroom.  She intermittently has coughs but that has improved using with the mucus coming out of her nose.  She has noticed previously that she has had a decreased appetite while being on antibiotics and this has not changed from being on this 1.  She has noticed that her bowels are looser but not watery.  She has seen Gastroenterology in the past and given probiotics which helped her symptoms.  Patient reports having 2 more days of antibiotics.  Family patient reports that your blank her blood pressure at home is always normal or on the low end.  She shows a log that indicates systolic blood pressure from .  Review of old records shows that patient follows with cardiology who states that patient should drink fluids and electrolytes on times when her blood pressure is on the low end        Review of Systems   Constitutional:  Positive for appetite change. Negative for activity change.   HENT:  Positive for mouth dryness, postnasal drip, rhinorrhea and sinus pressure/congestion. Negative for sore throat.    Respiratory:  Negative for apnea, cough, choking and chest tightness.    Gastrointestinal:  Positive for change in bowel habit. Negative for abdominal pain, constipation, nausea and vomiting.   Neurological:  Negative  "for dizziness, vertigo, tremors, seizures, syncope, facial asymmetry, speech difficulty, weakness, light-headedness, numbness, headaches, memory loss and coordination difficulties.   Hematological: Negative.    All other systems reviewed and are negative.         Objective   Vitals:    09/16/24 0952   BP: (!) 145/77   Pulse: 72   SpO2: 97%   Weight: 57.4 kg (126 lb 8.7 oz)   Height: 5' 9" (1.753 m)     Vital signs and nursing assessment noted:  Elevated blood pressure    GEN:   NAD, A & Ox3, atraumatic, well appearing, nontoxic appearing  HEENT:  PERRLA, EOMI, moist membranes, nl conjunctiva, no scleral icterus, no nystagmus, no nodes/nodules, soft, supple, FROM, no trachial deviation, nexus negative, normal TMs bilaterally, normal pharynx   Physical Exam  HENT:      Head:      Jaw: There is normal jaw occlusion.      Salivary Glands: Right salivary gland is not diffusely enlarged or tender. Left salivary gland is not diffusely enlarged or tender.      Nose: Congestion present. No nasal deformity, septal deviation, signs of injury, laceration, nasal tenderness, mucosal edema or rhinorrhea.      Right Sinus: Maxillary sinus tenderness present. No frontal sinus tenderness.      Left Sinus: Maxillary sinus tenderness present. No frontal sinus tenderness.           CV:   RRR no m/r/g, 2+ radial pulses, <2sec cap refill, no obvious JVD  RESP:  CTA B, no w/r/r, equal and bilateral chest rise, no respiratory distress  ABD:   soft, Nontender, Nondistended, +BS, no guarding/rebound  :   Deferred  BACK:  FROM, no midline tenderness, no paraspinal tenderness  EXT:   FROM, MCCOLLUM x 4, no swelling, no edema, no calf tenderness, no bony tenderness, no warmth or redness, no crepitus, no obvious deformity  LYMPH:  no gross adenopathy  NEURO:  GCS 15, CN II-XII grossly intact, no obvious motor/sensory deficit, no tremor  PSYCH:   Cooperative, no SI/HI, no anxiety, nl mood/affect, nl judgement/thought process  SKIN:  no rashes/lesions " or masses, nl color, no pallor, warm, dry, intact       Assessment and Plan     1. History of paranasal sinus congestion    2. Preventative health care  -     TSH w/reflex to FT4; Future; Expected date: 09/16/2024    Old records reviewed:   Cardiology - Angelique David MD   Hematology and Oncology - Adonis Wright MD  UroGyn -Kanchan, Marie Ervin MD     The results of physical exam findings were reviewed with the patient. Management of above assessments/visit diagnoses was discussed with patient. Precautions for return discussed at length. Patient was given ample time for questions. All questions asked and answered to the satisfaction of the patient. Patient is in agreement with the above and verbalized understanding. Total time spent caring for the patient today was 30 minutes. This includes time spent before the visit reviewing the chart, time spent during the visit, and time spent after the visit on documentation.    An Noriega MD  [facility]    Disclaimer: This document was created using voice recognition software (M*Modal Fluency Direct). Although it may be edited, this document may contain errors related to incorrect recognition of the spoken word. Please contact the physician if clarification is needed.           No follow-ups on file.

## 2024-09-19 ENCOUNTER — CLINICAL SUPPORT (OUTPATIENT)
Dept: REHABILITATION | Facility: OTHER | Age: 79
End: 2024-09-19
Payer: MEDICARE

## 2024-09-19 ENCOUNTER — PATIENT MESSAGE (OUTPATIENT)
Dept: INTERNAL MEDICINE | Facility: CLINIC | Age: 79
End: 2024-09-19
Payer: MEDICARE

## 2024-09-19 DIAGNOSIS — R15.1 FECAL SMEARING: ICD-10-CM

## 2024-09-19 DIAGNOSIS — R05.9 COUGH, UNSPECIFIED TYPE: Primary | ICD-10-CM

## 2024-09-19 DIAGNOSIS — M62.89 PELVIC FLOOR DYSFUNCTION: Primary | ICD-10-CM

## 2024-09-19 DIAGNOSIS — N39.41 URGE INCONTINENCE: ICD-10-CM

## 2024-09-19 PROCEDURE — 97112 NEUROMUSCULAR REEDUCATION: CPT | Mod: PN | Performed by: PHYSICAL THERAPIST

## 2024-09-19 PROCEDURE — 97530 THERAPEUTIC ACTIVITIES: CPT | Mod: PN | Performed by: PHYSICAL THERAPIST

## 2024-09-19 NOTE — PROGRESS NOTES
Pelvic Health Physical Therapy   Treatment Note     Name: Cassidy Aguilar  Clinic Number: [id number]    Therapy Diagnosis:   Encounter Diagnoses   Name Primary?    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 Loose stools with being on antibiotic, occasionally experiencing some FI as a result.    She was somewhat compliant with home exercise program.  Response to previous treatment: no adverse effect  Functional change: fluctuating improvements in incontinence    Pain: none reported    Objective     Cassidy received the following interventions during the treatment session:   (TrA = transverse abdominis, PFM = pelvic floor muscle, sEMG = surface electromyography, RUSI = Rehabilitative Ultrasound Imaging)  Pt consented to pelvic floor muscle assessment and treatment on 8/7/24. See EMR.    Therapeutic activities to improve functional performance for 15 minutes -  [x] Education on pelvic floor anatomy & function  [] Education on double void technique  [] Education on normal void intervals and fluid intake  [] Review of urge suppression techniques  [x] Instruction on the Knack for stress urinary incontinence   [] Instruction on pressure management to protect pelvic organ prolapse/prevent incontinence - particularly with sit to stand transfer  [] Education on  voiding optimization - seated on toilet, no pushing  [] Education on bowel movement optimization - toilet stool  [x] HEP building/HEP review    Neuromuscular re-education activities to develop balance, coordination, kinesthetic sense, motor control, proprioception, and posture for 23 minutes -   [] TrA brace + straight leg raise (R&L), x10  [] Side-lying clam with focus on pelvic girdle stability (R&L), x15 with 5-second hold  [] PFM squeeze + seated hip adduction isometric with ball between knees, x20 with 5-second hold  [] PFM squeeze + bridging with belt around knees, 2x10 with 5-second hold  [] TrA brace + PFM squeeze + wall push-up, x10  [] TrA brace + PFM squeeze + countertop push-up, x10  [] PFM squeeze + sit to stand from chair on exhale, x10  [x] Pelvic floor muscle coordination training with sEMG feedback: squeeze + fake cough (x3), long holds (4x5 with 10-second hold), and quick flicks (6x4)    Home Exercises and Patient Education     Patient Education: progression of plan of care, plan for next session, pelvic floor anatomy & function, relationship between TrA & PFM, relationship between hips & PFM, urge suppression, etiology of urinary urgency, timeline for muscle changes with consistent strength training, proper body mechanics for bowel movement, pressure management for support of pelvic organ prolapse, and abdominal wall anatomy    Home Exercise Program (8/7/24): urge suppression, toilet stool, increased fiber  Home Exercise Program Updates (8/26/24): clams, straight leg raise, countertop push-up  HEP update (9/19/24): PFM long holds and quick flicks    Exercises were reviewed, and Cassiyd was able to demonstrate them prior to the end of the session, as needed. Cassidy demonstrated good understanding of the education provided.   See 'Patient Instructions' for exercises/instructions provided.    Assessment     Pt tolerated treatment session well, with good understanding of education provided and no  increased symptoms with exercise. Increased stress urinary incontinence consistent with PFM fatigue and increased prolapse pressure from repeated coughing. Pt presents with normal resting tone and appropriate PFM contraction on command (with verbal cues) with sEMG; Endurance deficits visible with long contraction and after several reps. Pt's functional mobility and ability to perform ADLs still limited by pelvic floor dysfunction. She requires skilled therapy for continued patient education and coordination retraining.      Cassidy is progressing well towards her goals.   Pt prognosis: good    Pt will continue to benefit from skilled outpatient physical therapy to address the deficits listed in the problem list box on initial evaluation, provide pt/family education and to maximize pt's level of independence in the home and community environment.     Pt's spiritual, cultural and educational needs considered and pt agreeable to plan of care and goals.  Anticipated barriers to physical therapy: none      Short Term Goals: 6 weeks   Pt to report >50% improvement in urine leakage - MET  Pt to verbalize urge suppression strategies for improved control over urgency and urge urinary incontinence - MET  Pt to be independent with introductory home exercise program - MET     Long Term Goals: 12 weeks   Pt to report >90% improvement in urine leakage - NOT MET  Pt to report bowel movement frequency of no more than 3x/day to demonstrate improved emptying of rectum with bowel movements - NOT MET  Pt to report complete bladder emptying >90% of the time - NOT MET  Pt to score no more than 28% impairment on the Urinary Problem FOTO survey to demonstrate reduced impairment due to pelvic floor dysfunction - NOT MET   Pt to be independent with advanced home exercise program - NOT MET    Plan     Continue per Plan of Care      Charmaine Barber, PT, DPT    Board-Certified Clinical Specialist in Women's Health Physical Therapy

## 2024-09-19 NOTE — PROGRESS NOTES
Pelvic Health Physical Therapy   Treatment Note     Name: Cassidy Aguilar  Clinic Number: [id number]    Therapy Diagnosis:   Encounter Diagnoses   Name Primary?    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 No fecal incontinence. She reports continued reduced urinary urgency and urge urinary incontinence.     She was somewhat compliant with home exercise program - PFM squeezes but not other core/hip exercises  Response to previous treatment: no adverse effect  Functional change: fluctuating improvements in incontinence    Pain: none reported    Objective     Cassidy received the following interventions during the treatment session:   (TrA = transverse abdominis, PFM = pelvic floor muscle, sEMG = surface electromyography, RUSI = Rehabilitative Ultrasound Imaging)  Pt consented to pelvic floor muscle assessment and treatment on 8/7/24. See EMR.    Therapeutic activities to improve functional performance for 15 minutes -  [x] FOTO survey  [x] Education on pelvic floor anatomy & function  [] Education on double void technique  [] Education on normal void intervals and fluid intake  [] Review of urge suppression techniques  [x] Instruction on the Knack for stress urinary incontinence   [] Instruction on pressure management to protect pelvic organ prolapse/prevent incontinence -  particularly with sit to stand transfer  [] Education on voiding optimization - seated on toilet, no pushing  [] Education on bowel movement optimization - toilet stool  [x] HEP building/HEP review    Neuromuscular re-education activities to develop balance, coordination, kinesthetic sense, motor control, proprioception, and posture for 23 minutes -   [x] TrA brace + straight leg raise (R&L), x10  [x] Side-lying clam with focus on pelvic girdle stability (R&L), x10 with 5-second hold  [x] PFM squeeze + hooklying hip adduction isometric with ball between knees, x10 with 5-second hold  [x] PFM squeeze + bridging, x10 with 5-second hold  [] TrA brace + PFM squeeze + wall push-up, x10  [x] TrA brace + PFM squeeze + countertop push-up, x10  [x] PFM squeeze + sit to stand from chair on exhale, x10  [x] TrA brace + seated clam with blue band, x10  [x] TrA brace + PFM squeeze + shoulder extension onto ball on opposite knee (R&L), x10  [] Pelvic floor muscle coordination training with sEMG feedback: squeeze + fake cough (x3), long holds (4x5 with 10-second hold), and quick flicks (6x4)          Home Exercises and Patient Education     Patient Education: progression of plan of care, plan for next session, pelvic floor anatomy & function, relationship between TrA & PFM, relationship between hips & PFM, urge suppression, etiology of urinary urgency, timeline for muscle changes with consistent strength training, proper body mechanics for bowel movement, pressure management for support of pelvic organ prolapse, and abdominal wall anatomy    Home Exercise Program (8/7/24): urge suppression, toilet stool, increased fiber  Home Exercise Program Updates (8/26/24): clams, straight leg raise, countertop push-up  HEP update (9/19/24): PFM long holds and quick flicks  HEP update (9/19/24): SLR, ball squeeze between knees, side-lying clam, PFM squeeze + sit to stand, countertop push-up, seated clam    Exercises were reviewed, and Cassidy  was able to demonstrate them prior to the end of the session, as needed. Cassidy demonstrated good understanding of the education provided.   See 'Patient Instructions' for exercises/instructions provided.    Assessment     Pt tolerated treatment session well, with good understanding of education provided and no increased symptoms with exercise. Core and PFM exercises reviewed today for updated HEP. Pt's functional mobility and ability to perform ADLs still limited by pelvic floor dysfunction. She requires skilled therapy for continued patient education and coordination retraining.      Cassidy is progressing well towards her goals.   Pt prognosis: good    Pt will continue to benefit from skilled outpatient physical therapy to address the deficits listed in the problem list box on initial evaluation, provide pt/family education and to maximize pt's level of independence in the home and community environment.     Pt's spiritual, cultural and educational needs considered and pt agreeable to plan of care and goals.  Anticipated barriers to physical therapy: none      Short Term Goals: 6 weeks   Pt to report >50% improvement in urine leakage - MET  Pt to verbalize urge suppression strategies for improved control over urgency and urge urinary incontinence - MET  Pt to be independent with introductory home exercise program - MET     Long Term Goals: 12 weeks   Pt to report >90% improvement in urine leakage - NOT MET  Pt to report bowel movement frequency of no more than 3x/day to demonstrate improved emptying of rectum with bowel movements - NOT MET  Pt to report complete bladder emptying >90% of the time - NOT MET  Pt to score no more than 28% impairment on the Urinary Problem FOTO survey to demonstrate reduced impairment due to pelvic floor dysfunction - NOT MET   Pt to be independent with advanced home exercise program - NOT MET    Plan     Continue per Plan of Care      Charmaine Barber, PT, DPT    Board-Certified Clinical  Specialist in Women's Health Physical Therapy

## 2024-09-19 NOTE — PATIENT INSTRUCTIONS
Pelvic floor muscle long holds, 3 sets of 8 with 10-second hold  Pelvic floor muscle quick flicks, 3 sets of 8

## 2024-09-20 RX ORDER — GUAIFENESIN AND DEXTROMETHORPHAN HYDROBROMIDE 1200; 60 MG/1; MG/1
1 TABLET, EXTENDED RELEASE ORAL 2 TIMES DAILY
Qty: 20 TABLET | Refills: 0 | Status: SHIPPED | OUTPATIENT
Start: 2024-09-20

## 2024-09-20 RX ORDER — BENZONATATE 100 MG/1
100 CAPSULE ORAL 3 TIMES DAILY PRN
Qty: 20 CAPSULE | Refills: 0 | Status: SHIPPED | OUTPATIENT
Start: 2024-09-20

## 2024-09-20 RX ORDER — CETIRIZINE HYDROCHLORIDE 10 MG/1
10 TABLET ORAL DAILY
Qty: 7 TABLET | Refills: 0 | Status: SHIPPED | OUTPATIENT
Start: 2024-09-20 | End: 2024-09-27

## 2024-09-20 RX ORDER — VITAMIN A 3000 MCG
1 CAPSULE ORAL
Qty: 30 ML | Refills: 0 | Status: SHIPPED | OUTPATIENT
Start: 2024-09-20

## 2024-09-26 ENCOUNTER — CLINICAL SUPPORT (OUTPATIENT)
Dept: REHABILITATION | Facility: OTHER | Age: 79
End: 2024-09-26
Payer: MEDICARE

## 2024-09-26 DIAGNOSIS — M62.89 PELVIC FLOOR DYSFUNCTION: Primary | ICD-10-CM

## 2024-09-26 DIAGNOSIS — R15.1 FECAL SMEARING: ICD-10-CM

## 2024-09-26 DIAGNOSIS — N39.41 URGE INCONTINENCE: ICD-10-CM

## 2024-09-26 PROCEDURE — 97112 NEUROMUSCULAR REEDUCATION: CPT | Mod: PN | Performed by: PHYSICAL THERAPIST

## 2024-09-26 PROCEDURE — 97530 THERAPEUTIC ACTIVITIES: CPT | Mod: PN | Performed by: PHYSICAL THERAPIST

## 2024-09-26 NOTE — PATIENT INSTRUCTIONS
Straight Leg Raise, 2-3 sets of 10  - Inhale to prepare, relax the belly and pelvic floor  - As you exhale, gently engage the transverse abdominis by drawing the belly button up and in to the spine  - Holding the hips level and the belly button down, gently lift one leg a few inches  - Inhale again to rest  *Don't hold your breath     Side-lying clam, 2-3 sets of 10 with 5-second hold  - Holding the hips level, lift the top leg a few inches.   *Hold the hips stacked on top of each other, not rolling back with movement  *Don't hold your breath  *Roll the top hip more forward than you think        Countertop push-up, 2-3 sets of 10  - Stand 4-5 feet away from a kitchen counter, with hands shoulder-width apart. Inhale to prepare, relax the belly and pelvic floor  - As you exhale, gently engage the transverse abdominis by drawing the belly button up and in to the spine.  - Keeping your body straight like a plank of wood, lean in towards the wall and push away  - Inhale again to rest  *Don't hold your breath       Sit to stand + kegel, 2-3 sets of 10  - Inhale to prepare, relax the belly and pelvic floor  - As you exhale, gently squeeze the pelvic floor  - Hold the pelvic floor in as you lift out of the chair (use arms if needed)  *Don't hold your breath      Seated ball squeeze + kegel, 2-3 sets of 10 with 5 second hold  - Inhale to prepare, relax the belly and pelvic floor  - As you exhale, gently squeeze the pelvic floor (Kegel) while also squeezing the ball (or you can use a pillow)  - Inhale again to rest  *Don't hold your breath       Seated clam, x20-30 with black band  - Sit up tall and push the knees open against the band  *Don't hold your breath

## 2024-10-02 NOTE — PROGRESS NOTES
Pelvic Health Physical Therapy   Treatment Note     Name: Cassidy Aguilar  Clinic Number: [id number]    Therapy Diagnosis:   Encounter Diagnoses   Name Primary?    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 She notes some urinary urgency and urge urinary incontinence (just a small amount, rarely problematic), as well as increased urinary frequency in the mornings after drinking a lot of fluid.    She was compliant with home exercise program  Response to previous treatment: no adverse effect  Functional change: fluctuating improvements in incontinence    Pain: none reported    Objective     Cassidy received the following interventions during the treatment session:   (TrA = transverse abdominis, PFM = pelvic floor muscle, sEMG = surface electromyography, RUSI = Rehabilitative Ultrasound Imaging)  Pt consented to pelvic floor muscle assessment and treatment on 8/7/24. See EMR.    Therapeutic activities to improve functional performance for 8 minutes -  [x] FOTO survey  [x] Education on pelvic floor anatomy & function  [] Education on double void technique  [] Education on normal void intervals and fluid intake  [x] Review of urge suppression techniques  [x] Instruction on the Knack for stress urinary incontinence   [] Instruction on pressure management to protect pelvic organ prolapse/prevent  incontinence - particularly with sit to stand transfer  [] Education on voiding optimization - seated on toilet, no pushing  [] Education on bowel movement optimization - toilet stool  [x] HEP building/HEP review    Neuromuscular re-education activities to develop balance, coordination, kinesthetic sense, motor control, proprioception, and posture for 30 minutes -   [x] TrA brace + straight leg raise (R&L), x10  [x] Side-lying clam with focus on pelvic girdle stability (R&L), x10 with 5-second hold  [x] PFM squeeze + seated hip adduction isometric with ball between knees, x10 with 5-second hold  [x] PFM squeeze + bridging, x10 with 5-second hold  [] TrA brace + PFM squeeze + wall push-up, x10  [x] TrA brace + PFM squeeze + countertop push-up, x10  [x] PFM squeeze + sit to stand from chair on exhale + ball squeeze between hands, x10  [] TrA brace + seated clam with blue band, x10  [x] TrA brace + PFM squeeze + shoulder extension onto ball on opposite knee (R&L), x10 with 5-second hold  [x] TrA brace + PFM squeeze + standing shoulder extension with pink tubing, 2x10  [] Pelvic floor muscle coordination training with sEMG feedback: squeeze + fake cough (x3), long holds (4x5 with 10-second hold), and quick flicks (6x4)    Home Exercises and Patient Education     Patient Education: progression of plan of care, plan for next session, pelvic floor anatomy & function, relationship between TrA & PFM, relationship between hips & PFM, urge suppression, etiology of urinary urgency, timeline for muscle changes with consistent strength training, proper body mechanics for bowel movement, pressure management for support of pelvic organ prolapse, and abdominal wall anatomy    Home Exercise Program (8/7/24): urge suppression, toilet stool, increased fiber  Home Exercise Program Updates (8/26/24): clams, straight leg raise, countertop push-up  HEP update (9/19/24): PFM long holds and quick flicks  HEP update (9/26/24): straight leg  raise, ball squeeze between knees, side-lying clam, PFM squeeze + sit to stand, countertop push-up, seated clam  HEP update (10/3/24): none    Exercises were reviewed, and Cassidy was able to demonstrate them prior to the end of the session, as needed. Cassidy demonstrated good understanding of the education provided.   See 'Patient Instructions' for exercises/instructions provided.    Assessment     Pt tolerated treatment session well, with good understanding of education provided and no increased symptoms with exercise. Core and PFM exercises reviewed today for updated HEP from last visit. Urinary incontinence has improved but is still present, so additional bladder retraining and pelvic floor/core strengthening indicated. Pt's functional mobility and ability to perform ADLs still limited by pelvic floor dysfunction. She requires skilled therapy for continued patient education and coordination retraining.      aCssidy is progressing well towards her goals.   Pt prognosis: good    Pt will continue to benefit from skilled outpatient physical therapy to address the deficits listed in the problem list box on initial evaluation, provide pt/family education and to maximize pt's level of independence in the home and community environment.     Pt's spiritual, cultural and educational needs considered and pt agreeable to plan of care and goals.  Anticipated barriers to physical therapy: none      Short Term Goals: 6 weeks   Pt to report >50% improvement in urine leakage - MET  Pt to verbalize urge suppression strategies for improved control over urgency and urge urinary incontinence - MET  Pt to be independent with introductory home exercise program - MET     Long Term Goals: 12 weeks   Pt to report >90% improvement in urine leakage - NOT MET  Pt to report bowel movement frequency of no more than 3x/day to demonstrate improved emptying of rectum with bowel movements - NOT MET  Pt to report complete bladder emptying >90% of the  time - NOT MET  Pt to score no more than 28% impairment on the Urinary Problem FOTO survey to demonstrate reduced impairment due to pelvic floor dysfunction - NOT MET   Pt to be independent with advanced home exercise program - NOT MET    Plan     Continue per Plan of Care  Planning to discharge in December      Charmaine Barber, PT, DPT    Board-Certified Clinical Specialist in Women's Health Physical Therapy

## 2024-10-03 ENCOUNTER — CLINICAL SUPPORT (OUTPATIENT)
Dept: REHABILITATION | Facility: OTHER | Age: 79
End: 2024-10-03
Payer: MEDICARE

## 2024-10-03 DIAGNOSIS — M62.89 PELVIC FLOOR DYSFUNCTION: Primary | ICD-10-CM

## 2024-10-03 DIAGNOSIS — R15.1 FECAL SMEARING: ICD-10-CM

## 2024-10-03 DIAGNOSIS — N39.41 URGE INCONTINENCE: ICD-10-CM

## 2024-10-03 PROCEDURE — 97112 NEUROMUSCULAR REEDUCATION: CPT | Mod: PN | Performed by: PHYSICAL THERAPIST

## 2024-10-03 PROCEDURE — 97530 THERAPEUTIC ACTIVITIES: CPT | Mod: PN | Performed by: PHYSICAL THERAPIST

## 2024-10-05 NOTE — PROGRESS NOTES
Pelvic Health Physical Therapy   Treatment Note     Name: Cassidy Aguilar  Clinic Number: [id number]    Therapy Diagnosis:   Encounter Diagnoses   Name Primary?    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    She was compliant with home exercise program  Response to previous treatment: no adverse effect  Functional change: fluctuating improvements in incontinence    Pain: none reported    Objective     Cassidy received the following interventions during the treatment session:   (TrA = transverse abdominis, PFM = pelvic floor muscle, sEMG = surface electromyography, RUSI = Rehabilitative Ultrasound Imaging)  Pt consented to pelvic floor muscle assessment and treatment on 8/7/24. See EMR.    Therapeutic activities to improve functional performance for 8 minutes -  [] FOTO survey  [x] Education on pelvic floor anatomy & function  [] Education on double void technique  [] Education on normal void intervals and fluid intake  [x] Review of urge suppression techniques  [] Instruction on the Knack for stress urinary incontinence   [x] Instruction on pressure management to protect pelvic organ prolapse/prevent incontinence - particularly with sit to stand transfer  [] Education on voiding optimization - seated on toilet, no pushing  [] Education on bowel movement optimization - toilet stool  [x] HEP  building/HEP review    Neuromuscular re-education activities to develop balance, coordination, kinesthetic sense, motor control, proprioception, and posture for 30 minutes -   [x] TrA brace + straight leg raise (R&L), x15 with 3-second hold  [x] Side-lying clam with focus on pelvic girdle stability (R&L), x10 with 5-second hold  [x] PFM squeeze + seated hip adduction isometric with ball between knees, x10 with 5-second hold  [x] PFM squeeze + bridging with belt around knees, x20 with 3-second hold  [x] Side-lying hip abduction + arm press down onto ball (R&L), x10 with 8-second hold  [] TrA brace + PFM squeeze + wall push-up, x10  [] TrA brace + PFM squeeze + countertop push-up, x10  [] PFM squeeze + sit to stand from chair on exhale + holding 7# weight x10  [] TrA brace + seated clam with blue band, x10  [x] TrA brace + PFM squeeze + shoulder extension onto ball on opposite knee (R&L), x10 with 5-second hold  [] TrA brace + PFM squeeze + standing shoulder extension with pink tubing, 2x10  [] Pelvic floor muscle coordination training with sEMG feedback: squeeze + fake cough (x3), long holds (4x5 with 10-second hold), and quick flicks (6x4)    Home Exercises and Patient Education     Patient Education: progression of plan of care, plan for next session, pelvic floor anatomy & function, relationship between TrA & PFM, relationship between hips & PFM, urge suppression, etiology of urinary urgency, timeline for muscle changes with consistent strength training, proper body mechanics for bowel movement, pressure management for support of pelvic organ prolapse, and abdominal wall anatomy    Home Exercise Program (8/7/24): urge suppression, toilet stool, increased fiber  Home Exercise Program Updates (8/26/24): clams, straight leg raise, countertop push-up  HEP update (9/19/24): PFM long holds and quick flicks  HEP update (9/26/24): straight leg raise, ball squeeze between knees, side-lying clam, PFM squeeze + sit to stand,  countertop push-up, seated clam  HEP update (10/3/24): none  HEP update (10/7/24): none    Exercises were reviewed, and Cassidy was able to demonstrate them prior to the end of the session, as needed. Cassidy demonstrated good understanding of the education provided.   See 'Patient Instructions' for exercises/instructions provided.    Assessment     Pt tolerated treatment session well, with good understanding of education provided and no increased symptoms with exercise. Core and PFM exercises reviewed today for updated HEP from last visit. Urinary incontinence has improved but is still present, so additional bladder retraining and pelvic floor/core strengthening indicated. Pt's functional mobility and ability to perform ADLs still limited by pelvic floor dysfunction. She requires skilled therapy for continued patient education and coordination retraining.      Cassidy is progressing well towards her goals.   Pt prognosis: good    Pt will continue to benefit from skilled outpatient physical therapy to address the deficits listed in the problem list box on initial evaluation, provide pt/family education and to maximize pt's level of independence in the home and community environment.     Pt's spiritual, cultural and educational needs considered and pt agreeable to plan of care and goals.  Anticipated barriers to physical therapy: none      Short Term Goals: 6 weeks   Pt to report >50% improvement in urine leakage - MET  Pt to verbalize urge suppression strategies for improved control over urgency and urge urinary incontinence - MET  Pt to be independent with introductory home exercise program - MET     Long Term Goals: 12 weeks   Pt to report >90% improvement in urine leakage - NOT MET  Pt to report bowel movement frequency of no more than 3x/day to demonstrate improved emptying of rectum with bowel movements - NOT MET  Pt to report complete bladder emptying >90% of the time - NOT MET  Pt to score no more than 28%  impairment on the Urinary Problem FOTO survey to demonstrate reduced impairment due to pelvic floor dysfunction - NOT MET   Pt to be independent with advanced home exercise program - NOT MET    Plan     Continue per Plan of Care  Planning to discharge in December      Charmaine Barber, PT, DPT    Board-Certified Clinical Specialist in Women's Health Physical Therapy

## 2024-10-07 ENCOUNTER — CLINICAL SUPPORT (OUTPATIENT)
Dept: REHABILITATION | Facility: OTHER | Age: 79
End: 2024-10-07
Payer: MEDICARE

## 2024-10-07 DIAGNOSIS — R15.1 FECAL SMEARING: ICD-10-CM

## 2024-10-07 DIAGNOSIS — N39.41 URGE INCONTINENCE: ICD-10-CM

## 2024-10-07 DIAGNOSIS — M62.89 PELVIC FLOOR DYSFUNCTION: Primary | ICD-10-CM

## 2024-10-07 PROCEDURE — 97530 THERAPEUTIC ACTIVITIES: CPT | Mod: PN | Performed by: PHYSICAL THERAPIST

## 2024-10-07 PROCEDURE — 97112 NEUROMUSCULAR REEDUCATION: CPT | Mod: PN | Performed by: PHYSICAL THERAPIST

## 2024-10-15 NOTE — PROGRESS NOTES
"Pelvic Health Physical Therapy   Treatment Note     Name: Cassidy Aguilar  Clinic Number: [id number]    Therapy Diagnosis:   Encounter Diagnoses   Name Primary?    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 She doesn't always squeeze the pelvic floor when transferring to stand with urgency (forgets or too sleepy).  She is meeting with Dr. Hemphill this month.    She was compliant with home exercise program - sit to stands, kegels, countertop push-ups, seated clam, ball squeeze  Response to previous treatment: no adverse effect  Functional change: fluctuating improvements in incontinence    Pain: none reported    Objective     Cassidy received the following interventions during the treatment session:   (TrA = transverse abdominis, PFM = pelvic floor muscle, sEMG = surface electromyography, RUSI = Rehabilitative Ultrasound Imaging)  Pt consented to pelvic floor muscle assessment and treatment on 8/7/24. See EMR.    Therapeutic activities to improve functional performance for 30 minutes -  [] FOTO survey  [x] Pelvic floor assessment - MMT = 3/5 (improved to 4/5 with verbal cues for "suctioning") with endurance of at least 8 seconds, itchy sensation in bilateral layer 3  [x] Education on pelvic floor anatomy & function  [] Education " on double void technique  [] Education on normal void intervals and fluid intake  [x] Review of urge suppression techniques  [] Instruction on the Knack for stress urinary incontinence   [x] Instruction on pressure management to protect pelvic organ prolapse/prevent incontinence - particularly with sit to stand transfer  [] Education on voiding optimization - seated on toilet, no pushing  [] Education on bowel movement optimization - toilet stool  [x] HEP building/HEP review    Neuromuscular re-education activities to develop balance, coordination, kinesthetic sense, motor control, proprioception, and posture for 8 minutes -   [] TrA brace + straight leg raise (R&L), x15 with 3-second hold  [] Side-lying clam with focus on pelvic girdle stability (R&L), x10 with 5-second hold  [x] Seated TrA brace + PFM squeeze, x5 with 10-second hold  [x] TrA brace + PFM squeeze + seated hip adduction isometric with ball between knees, x10 with 10-second hold  [] PFM squeeze + bridging with belt around knees, x20 with 3-second hold  [] Side-lying hip abduction + arm press down onto ball (R&L), x10 with 8-second hold  [] TrA brace + PFM squeeze + wall push-up, x10  [] TrA brace + PFM squeeze + countertop push-up, x10  [] PFM squeeze + sit to stand from chair on exhale + holding 7# weight x10  [] TrA brace + seated clam with blue band, x10  [x] TrA brace + PFM squeeze + shoulder extension onto ball on opposite knee (R&L), x5 with 10-second hold  [] TrA brace + PFM squeeze + standing shoulder extension with pink tubing, 2x10  [] Pelvic floor muscle coordination training with sEMG feedback: squeeze + fake cough (x3), long holds (4x5 with 10-second hold), and quick flicks (6x4)    Manual therapy techniques: to develop flexibility, desensitization, and extensibility for 4 minutes -  [x] Manual release to pelvic floor muscles internally/vaginally: levator ani, superficial transverse perineal, perineal body      Home Exercises and Patient  "Education     Patient Education: progression of plan of care, plan for next session, pelvic floor anatomy & function, relationship between TrA & PFM, relationship between hips & PFM, urge suppression, etiology of urinary urgency, timeline for muscle changes with consistent strength training, proper body mechanics for bowel movement, pressure management for support of pelvic organ prolapse, and abdominal wall anatomy    Home Exercise Program (8/7/24): urge suppression, toilet stool, increased fiber  Home Exercise Program Updates (8/26/24): clams, straight leg raise, countertop push-up  HEP update (9/19/24): PFM long holds and quick flicks  HEP update (9/26/24): straight leg raise, ball squeeze between knees, side-lying clam, PFM squeeze + sit to stand, countertop push-up, seated clam  HEP update (10/3/24): none  HEP update (10/7/24): none  HEP update (10/17/24): add "suctioning" action to PFM squeezes    Exercises were reviewed, and Cassidy was able to demonstrate them prior to the end of the session, as needed. Cassidy demonstrated good understanding of the education provided.   See 'Patient Instructions' for exercises/instructions provided.    Assessment     Pt tolerated treatment session well, with good understanding of education provided and no increased symptoms with exercise. Pelvic floor assessment reveals improved strength, particularly with verbal/tactile cues for more layer 3 activation. Core and PFM exercises reviewed today for updated HEP from last visit. Urinary incontinence has improved but is still present, so additional bladder retraining and pelvic floor/core strengthening indicated. Pt's functional mobility and ability to perform ADLs still limited by pelvic floor dysfunction. She requires skilled therapy for continued patient education and coordination retraining.      Cassidy is progressing well towards her goals.   Pt prognosis: good    Pt will continue to benefit from skilled outpatient physical " therapy to address the deficits listed in the problem list box on initial evaluation, provide pt/family education and to maximize pt's level of independence in the home and community environment.     Pt's spiritual, cultural and educational needs considered and pt agreeable to plan of care and goals.  Anticipated barriers to physical therapy: none      Short Term Goals: 6 weeks   Pt to report >50% improvement in urine leakage - MET  Pt to verbalize urge suppression strategies for improved control over urgency and urge urinary incontinence - MET  Pt to be independent with introductory home exercise program - MET     Long Term Goals: 12 weeks   Pt to report >90% improvement in urine leakage - NOT MET  Pt to report bowel movement frequency of no more than 3x/day to demonstrate improved emptying of rectum with bowel movements - NOT MET  Pt to report complete bladder emptying >90% of the time - NOT MET  Pt to score no more than 28% impairment on the Urinary Problem FOTO survey to demonstrate reduced impairment due to pelvic floor dysfunction - NOT MET   Pt to be independent with advanced home exercise program - NOT MET    Plan     Continue per Plan of Care  Reduce frequency following next visit  Planning to discharge in December      Charmaine Barber, PT, DPT    Board-Certified Clinical Specialist in Women's Health Physical Therapy

## 2024-10-17 ENCOUNTER — CLINICAL SUPPORT (OUTPATIENT)
Dept: REHABILITATION | Facility: OTHER | Age: 79
End: 2024-10-17
Payer: MEDICARE

## 2024-10-17 DIAGNOSIS — N39.41 URGE INCONTINENCE: ICD-10-CM

## 2024-10-17 DIAGNOSIS — M62.89 PELVIC FLOOR DYSFUNCTION: Primary | ICD-10-CM

## 2024-10-17 DIAGNOSIS — R15.1 FECAL SMEARING: ICD-10-CM

## 2024-10-17 PROCEDURE — 97112 NEUROMUSCULAR REEDUCATION: CPT | Mod: KX,PN | Performed by: PHYSICAL THERAPIST

## 2024-10-17 PROCEDURE — 97530 THERAPEUTIC ACTIVITIES: CPT | Mod: KX,PN | Performed by: PHYSICAL THERAPIST

## 2024-10-21 ENCOUNTER — CLINICAL SUPPORT (OUTPATIENT)
Dept: INTERNAL MEDICINE | Facility: CLINIC | Age: 79
End: 2024-10-21
Payer: MEDICARE

## 2024-10-21 ENCOUNTER — OFFICE VISIT (OUTPATIENT)
Dept: INTERNAL MEDICINE | Facility: CLINIC | Age: 79
End: 2024-10-21
Payer: MEDICARE

## 2024-10-21 VITALS
DIASTOLIC BLOOD PRESSURE: 72 MMHG | BODY MASS INDEX: 18.51 KG/M2 | HEIGHT: 69 IN | SYSTOLIC BLOOD PRESSURE: 136 MMHG | OXYGEN SATURATION: 98 % | HEART RATE: 70 BPM | WEIGHT: 125 LBS

## 2024-10-21 DIAGNOSIS — N81.9 FEMALE GENITAL PROLAPSE, UNSPECIFIED TYPE: ICD-10-CM

## 2024-10-21 DIAGNOSIS — Z00.00 ANNUAL PHYSICAL EXAM: Primary | ICD-10-CM

## 2024-10-21 DIAGNOSIS — G25.81 RESTLESS LEG: ICD-10-CM

## 2024-10-21 DIAGNOSIS — R63.6 UNDERWEIGHT (BMI < 18.5): ICD-10-CM

## 2024-10-21 LAB
ANION GAP SERPL CALC-SCNC: 4 MMOL/L (ref 8–16)
BASOPHILS # BLD AUTO: 0.07 K/UL (ref 0–0.2)
BASOPHILS NFR BLD: 0.7 % (ref 0–1.9)
BUN SERPL-MCNC: 14 MG/DL (ref 8–23)
CALCIUM SERPL-MCNC: 9.3 MG/DL (ref 8.7–10.5)
CHLORIDE SERPL-SCNC: 103 MMOL/L (ref 95–110)
CO2 SERPL-SCNC: 28 MMOL/L (ref 23–29)
CREAT SERPL-MCNC: 0.7 MG/DL (ref 0.5–1.4)
DIFFERENTIAL METHOD BLD: ABNORMAL
EOSINOPHIL # BLD AUTO: 0.2 K/UL (ref 0–0.5)
EOSINOPHIL NFR BLD: 1.9 % (ref 0–8)
ERYTHROCYTE [DISTWIDTH] IN BLOOD BY AUTOMATED COUNT: 13.2 % (ref 11.5–14.5)
EST. GFR  (NO RACE VARIABLE): >60 ML/MIN/1.73 M^2
GLUCOSE SERPL-MCNC: 97 MG/DL (ref 70–110)
HCT VFR BLD AUTO: 41.1 % (ref 37–48.5)
HGB BLD-MCNC: 13.9 G/DL (ref 12–16)
IMM GRANULOCYTES # BLD AUTO: 0.03 K/UL (ref 0–0.04)
IMM GRANULOCYTES NFR BLD AUTO: 0.3 % (ref 0–0.5)
LYMPHOCYTES # BLD AUTO: 1.5 K/UL (ref 1–4.8)
LYMPHOCYTES NFR BLD: 15.4 % (ref 18–48)
MAGNESIUM SERPL-MCNC: 1.9 MG/DL (ref 1.6–2.6)
MCH RBC QN AUTO: 30.5 PG (ref 27–31)
MCHC RBC AUTO-ENTMCNC: 33.8 G/DL (ref 32–36)
MCV RBC AUTO: 90 FL (ref 82–98)
MONOCYTES # BLD AUTO: 0.9 K/UL (ref 0.3–1)
MONOCYTES NFR BLD: 9.6 % (ref 4–15)
NEUTROPHILS # BLD AUTO: 6.8 K/UL (ref 1.8–7.7)
NEUTROPHILS NFR BLD: 72.1 % (ref 38–73)
NRBC BLD-RTO: 0 /100 WBC
PHOSPHATE SERPL-MCNC: 3.5 MG/DL (ref 2.7–4.5)
PLATELET # BLD AUTO: 244 K/UL (ref 150–450)
PMV BLD AUTO: 9.6 FL (ref 9.2–12.9)
POTASSIUM SERPL-SCNC: 4 MMOL/L (ref 3.5–5.1)
RBC # BLD AUTO: 4.56 M/UL (ref 4–5.4)
SODIUM SERPL-SCNC: 135 MMOL/L (ref 136–145)
WBC # BLD AUTO: 9.44 K/UL (ref 3.9–12.7)

## 2024-10-21 PROCEDURE — 99999 PR PBB SHADOW E&M-EST. PATIENT-LVL II: CPT | Mod: PBBFAC,,, | Performed by: EMERGENCY MEDICINE

## 2024-10-21 PROCEDURE — 36415 COLL VENOUS BLD VENIPUNCTURE: CPT | Performed by: EMERGENCY MEDICINE

## 2024-10-21 PROCEDURE — 99215 OFFICE O/P EST HI 40 MIN: CPT | Mod: S$PBB,,, | Performed by: EMERGENCY MEDICINE

## 2024-10-21 PROCEDURE — 99212 OFFICE O/P EST SF 10 MIN: CPT | Mod: PBBFAC | Performed by: EMERGENCY MEDICINE

## 2024-10-21 PROCEDURE — 83735 ASSAY OF MAGNESIUM: CPT | Performed by: EMERGENCY MEDICINE

## 2024-10-21 PROCEDURE — 84100 ASSAY OF PHOSPHORUS: CPT | Performed by: EMERGENCY MEDICINE

## 2024-10-21 PROCEDURE — 80048 BASIC METABOLIC PNL TOTAL CA: CPT | Performed by: EMERGENCY MEDICINE

## 2024-10-21 PROCEDURE — 85025 COMPLETE CBC W/AUTO DIFF WBC: CPT | Performed by: EMERGENCY MEDICINE

## 2024-10-21 NOTE — PROGRESS NOTES
[facility]      TODAY'S Date 10/21/2024  Patient ID: Cassidy Aguilar is a 79 y.o. female   MRN: [id number]  Primary Care Physician (PCP):  An Noriega MD    SUBJECTIVE         Chief Complaint:   Chief Complaint   Patient presents with    Annual Exam     Still has sinus drip, using saline spray, feels better, but still there      HPI:   Reviewed medical, surgical, social and family history, medications, appropriate preventive health screenings, as well as vaccination history. Updates as noted below or in assessment and plan.    This is a very pleasant 79 y.o. nonsmoking RHD, female with PMHx coronary artery disease, CLL, HLD, hypertension, hypothyroidism.  The patient presents for her annual exam and is generally in good health, except for sinus issues being managed with intranasal antibiotics prescribed by an ENT. She is also seeing Dr. Hemphill for prolapse, which worsens with coughing, and plans for further treatment. The patient is active, playing tennis 3-4 times a week, but does not currently follow a formal resistance training or flexibility routine. She has been advised by a hand specialist to incorporate stretching, particularly as she occasionally uses Advil while playing tennis. Socially, she enjoys bridge and spending time with friends. Her diet includes a mix of breakfast staples like orange juice, yogurt, and bagels, varied lunches often featuring salads or leftovers, and home-cooked dinners with options such as chicken, fish, shrimp, and oysters, accompanied by vegetables and sides like potatoes or pasta. She drinks coffee in the morning, water throughout the day, and one glass of wine almost daily.  She reports going to bed about midnight and waking up at around 6 or 7:00 a.m..  That being said she intermittently will wake up in the middle of the night for an episode of nocturia or because her legs are restless.  She sometimes has trouble falling back  asleep thereafter.    SCREENING:  Fall risk    Fall in the last 12 months: No       Difficulty walking or maintaining balance: No  Eye Exam:                                         UTD, wears corrective lenses  Foot Exam:    Wearing safe shoes.  Dental Exam:    Routine q 6 months   Ear Exam:    No complaints of hearing loss, hearing aids, tinnitus, noise exposure, or vertigo   Depression:                                       Denies   Anxiety:                              Denies   Medications:    Reviewed and Reconciled   Cognition:    No complaints   Mammogram:    UTD, last March 2024 Impression No mammographic evidence for breast malignancy.  Cervical cancer:   S/p hysterectomy  Colonoscopy:    UTD as of July 2023, patient does not want further testing  DEXA:     UTD, last March 2024 Impression  1. Normal bone mineral density in the mean of the measured levels of the L1-L4 vertebra.  2. Normal bone mineral density in the left femoral neck.  3. Normal bone mineral density in the right femoral neck.   Depression:    Denies  Anxiety:    Denies  Skin:     No new concerning moles or lesions   Routinely uses sunscreen.   Negative for recent sunburn, moderate sun exposure in the past, several blistering sunburns, mole removal.  No history of tanning bed use or melanoma in first degree relatives.  Pt has had moles removed in the past: yes, benign (abdomen)   Sex:     Not currently in a relationship or sexually active  Transportation safety:   Uses restraint consistently, does not drink alcohol before or while driving  Lung cancer risk/Tobacco use:   Tobacco Use: Low Risk  (8/5/2024)    Received from [facility]    Patient History     Smoking Tobacco Use: Never     Smokeless Tobacco Use: Never     Passive Exposure: Not on file        A review of medical records indicates patient has seen the following specialists:              Hematology and Oncology - Adonis Wright MD for CLL (chronic lymphocytic  leukemia)  Cardiology -  Angelique David MD, [facility] for HTN  Obstetrics and Gynecology - Rafael Pedro MD  UroGyn -  Marie Hemphill MD    Immunization History   Administered Date(s) Administered    COVID-19 Vaccine 01/07/2021, 01/29/2021    COVID-19, MRNA, LN-S, PF (Pfizer) (Gray Cap) 04/13/2022    COVID-19, MRNA, LN-S, PF (Pfizer) (Purple Cap) 01/07/2021, 01/29/2021, 08/17/2021    COVID-19, mRNA, LNP-S, PF, oracio-sucrose, 30 mcg/0.3 mL (Pfizer Ages 12+) 10/15/2023    COVID-19, mRNA, LNP-S, bivalent booster, PF (PFIZER OMICRON) 10/10/2022    Hepatitis A, Adult 09/26/2016    Influenza (FLUAD) - Quadrivalent - Adjuvanted - PF *Preferred* (65+) 09/10/2022, 09/15/2023    Influenza - Quadrivalent - High Dose - PF (65 years and older) 08/20/2020    Influenza - Trivalent - Fluzone High Dose - PF (65 years and older) 09/23/2014, 09/17/2016, 09/26/2017, 09/11/2018, 09/18/2019    Influenza A (H1N1) 2009 Monovalent - IM 02/17/2010, 02/17/2010    Pneumococcal Conjugate - 13 Valent 03/28/2017    Pneumococcal Polysaccharide - 23 Valent 10/06/2004, 10/06/2004, 10/06/2009, 10/06/2009    Tdap 03/05/2020    Typhoid - ViCPs 09/26/2016    Yellow Fever 02/10/2017    Zoster 06/23/2011, 12/29/2018, 04/03/2019    Zoster Recombinant 12/29/2018, 04/03/2019     Past Medical History:   Diagnosis Date    CAD (coronary artery disease)     CLL (chronic lymphocytic leukemia) 12/22/2021    HLD (hyperlipidemia)     Hypertension     Hypertensive heart disease     Hypothyroidism 04/26/2022    Thyroid disease      Past Surgical History:   Procedure Laterality Date    ADENOIDECTOMY      COLONOSCOPY      HYSTERECTOMY      INTRACAPSULAR CATARACT EXTRACTION      TONSILLECTOMY       Family History   Problem Relation Name Age of Onset    Thyroid disease Mother      Osteoarthritis Mother      Hypertension Mother      Cancer Mother      Dementia Mother      Cancer Father          bladder    Heart disease Father      Diabetes Father      Leukemia  Maternal Grandmother      Stroke Maternal Grandfather      Cancer Maternal Grandfather      Cancer Paternal Grandmother       Social History     Tobacco Use    Smoking status: Never    Smokeless tobacco: Never   Substance Use Topics    Alcohol use: Yes     Alcohol/week: 0.0 standard drinks of alcohol     Comment: rare    Drug use: Not Currently     Past Medical, Surgical and Social history reviewed and verified by me.     Review of patient's allergies indicates:   Allergen Reactions    Sulfa (sulfonamide antibiotics) Rash     Other reaction(s): Other (See Comments)  ALLERGIC TO SULFA DRUGS oral    Thiazides Other (See Comments)     hyponatremia    [place] Diarrhea     Current Outpatient Medications on File Prior to Visit   Medication Sig Dispense Refill    albuterol (ACCUNEB) 0.63 mg/3 mL Nebu Take 3 mLs (0.63 mg total) by nebulization every 6 (six) hours as needed. Rescue 75 mL 0    amLODIPine (NORVASC) 5 MG tablet Take 5 mg by mouth once daily.      aspirin (ECOTRIN) 81 MG EC tablet Take 81 mg by mouth.      atorvastatin (LIPITOR) 40 MG tablet TAKE ONE TABLET BY MOUTH once DAILY AT BEDTIME FOR cholesterol control      BENEFIBER, GUAR GUM, ORAL Take 1 Dose by mouth 2 (two) times daily.      Bifidobacterium infantis (ALIGN ORAL) Take by mouth. PRN      bismuth subsalicylate (BISMAREX) 262 mg Chew Take 4 tablets by mouth.      CARVEDILOL PHOSPHATE 20 MG ORAL CM24 (COREG CR) 20 mg 24 hr capsule Take 1 capsule by mouth once daily.      fluticasone (FLONASE) 50 mcg/actuation nasal spray 1 spray by Each Nare route once daily.      folic acid (FOLVITE) 1 MG tablet Take 1 tablet (1 mg total) by mouth once daily. 90 tablet 3    glucosamine-chondroitin 500-400 mg tablet Take 1 tablet by mouth 3 (three) times daily.      MAXITROL 3.5mg/mL-10,000 unit/mL-0.1 % DrpS Place 1 drop into the right eye 2 (two) times daily.      multivitamin (THERAGRAN) per tablet Take 1 tablet by mouth once daily.      sulfacetamide sodium, acne, 10  "% Susp Apply 1 application topically 2 (two) times daily. Apply to affected area      SYNTHROID 75 mcg tablet Take 1 tablet (75 mcg total) by mouth daily 90 tablet 3    valsartan (DIOVAN) 160 MG tablet Take 160 mg by mouth once daily.      vitamin D (VITAMIN D3) 1000 units Tab Take 2,000 Units by mouth.      VIVELLE-DOT 0.025 mg/24 hr Place 1 patch onto the skin twice a week      [DISCONTINUED] benzonatate (TESSALON) 100 MG capsule Take 1 capsule (100 mg total) by mouth 3 (three) times daily as needed for Cough. (Patient not taking: Reported on 10/21/2024) 20 capsule 0    [DISCONTINUED] cetirizine (ZYRTEC) 10 MG tablet Take 1 tablet (10 mg total) by mouth once daily. for 7 days 7 tablet 0    [DISCONTINUED] dextromethorphan-guaiFENesin (MUCINEX DM) 60-1,200 mg per 12 hr tablet Take 1 tablet by mouth 2 (two) times a day. 20 tablet 0    [DISCONTINUED] sodium chloride (SALINE NASAL) 0.65 % nasal spray 1 spray by Nasal route as needed for Congestion. 30 mL 0     No current facility-administered medications on file prior to visit.       Review of Systems as per HPI  ROS  Constitutional: Negative for activity change, appetite change, fatigue, diaphoresis, chills and fever.   Respiratory: Negative for apnea, choking, chest tightness and wheezing.  Genitourinary: Negative for hematuria, flank pain, frequency and dysuria.   Skin: Negative for color change, pallor, rash and wound. Patient denies concerning moles or lesions.  Psychiatric/Behavioral: Negative for agitation, behavioral problems, confusion, decreased concentration and dysphoric mood.     All other systems reviewed and are negative.    OBJECTIVE     PHYSICAL EXAM  Vitals:    10/21/24 1010   BP: 136/72   BP Location: Right arm   Patient Position: Sitting   Pulse: 70   SpO2: 98%   Weight: 56.7 kg (125 lb)   Height: 5' 9" (1.753 m)     Vital Signs (Most Recent):  Pulse: 70 (10/21/24 1010)  BP: 136/72 (10/21/24 1010)  SpO2: 98 % (10/21/24 1010)   Weight:   Wt Readings " from Last 1 Encounters:   10/21/24 56.7 kg (125 lb)     Body mass index is 18.46 kg/m².    Tests      Results for orders placed or performed during the hospital encounter of 01/08/24   EKG 12-lead    Collection Time: 01/08/24  6:25 PM    Narrative    Test Reason : E87.1,    Vent. Rate : 058 BPM     Atrial Rate : 058 BPM     P-R Int : 188 ms          QRS Dur : 082 ms      QT Int : 420 ms       P-R-T Axes : 073 062 073 degrees     QTc Int : 412 ms    Sinus bradycardia  Cannot rule out Anterior infarct ,age undetermined  Nonspecific ST abnormality  Abnormal ECG  When compared with ECG of 26-APR-2022 17:06,  Nonspecific T wave abnormality now evident in Lateral leads  Confirmed by Carmen MARMOLEJO, Larry ([id number]) on 1/9/2024 12:52:17 PM    Referred By: [email or web address]   SELF           Confirmed By:Larry Morales MD      Labs reviewed and independently interpreted. Imaging studies reviewed.   Recent Results (from the past 12 weeks)   CBC W/ AUTO DIFFERENTIAL    Collection Time: 08/02/24 10:04 AM   Result Value Ref Range    WBC 9.10 3.90 - 12.70 K/uL    RBC 4.65 4.00 - 5.40 M/uL    Hemoglobin 14.3 12.0 - 16.0 g/dL    Hematocrit 42.0 37.0 - 48.5 %    MCV 90 82 - 98 fL    MCH 30.8 27.0 - 31.0 pg    MCHC 34.0 32.0 - 36.0 g/dL    RDW 14.1 11.5 - 14.5 %    Platelets 200 150 - 450 K/uL    MPV 10.5 9.2 - 12.9 fL    Immature Granulocytes 0.2 0.0 - 0.5 %    Gran # (ANC) 6.9 1.8 - 7.7 K/uL    Immature Grans (Abs) 0.02 0.00 - 0.04 K/uL    Lymph # 1.1 1.0 - 4.8 K/uL    Mono # 0.9 0.3 - 1.0 K/uL    Eos # 0.2 0.0 - 0.5 K/uL    Baso # 0.06 0.00 - 0.20 K/uL    nRBC 0 0 /100 WBC    Gran % 75.6 (H) 38.0 - 73.0 %    Lymph % 12.3 (L) 18.0 - 48.0 %    Mono % 9.3 4.0 - 15.0 %    Eosinophil % 1.9 0.0 - 8.0 %    Basophil % 0.7 0.0 - 1.9 %    Differential Method Automated    CMP    Collection Time: 08/02/24 10:04 AM   Result Value Ref Range    Sodium 137 136 - 145 mmol/L    Potassium 4.3 3.5 - 5.1 mmol/L    Chloride 103 95 - 110 mmol/L    CO2 28 23 - 29 mmol/L     Glucose 73 70 - 110 mg/dL    BUN 14 8 - 23 mg/dL    Creatinine 0.7 0.5 - 1.4 mg/dL    Calcium 9.6 8.7 - 10.5 mg/dL    Total Protein 7.1 6.0 - 8.4 g/dL    Albumin 4.0 3.5 - 5.2 g/dL    Total Bilirubin 0.8 0.1 - 1.0 mg/dL    Alkaline Phosphatase 96 55 - 135 U/L    AST 19 10 - 40 U/L    ALT 20 10 - 44 U/L    eGFR >60.0 >60 mL/min/1.73 m^2    Anion Gap 6 (L) 8 - 16 mmol/L   PHOSPHORUS    Collection Time: 08/02/24 10:04 AM   Result Value Ref Range    Phosphorus 3.5 2.7 - 4.5 mg/dL   Magnesium    Collection Time: 08/02/24 10:04 AM   Result Value Ref Range    Magnesium 2.0 1.6 - 2.6 mg/dL   LDH    Collection Time: 08/02/24 10:04 AM   Result Value Ref Range     110 - 260 U/L   RETICULOCYTES    Collection Time: 08/02/24 10:04 AM   Result Value Ref Range    Retic 1.4 0.5 - 2.5 %   DIRECT ANTIGLOBULIN TEST    Collection Time: 08/02/24 10:04 AM   Result Value Ref Range    Direct [place] (MIHCELL) POS    HAPTOGLOBIN    Collection Time: 08/02/24 10:04 AM   Result Value Ref Range    Haptoglobin 105 30 - 250 mg/dL   TSH w/reflex to FT4    Collection Time: 10/14/24  8:29 AM   Result Value Ref Range    TSH w/reflex to FT4 2.71 0.40 - 4.50 mIU/L   BASIC METABOLIC PANEL    Collection Time: 10/21/24 11:21 AM   Result Value Ref Range    Sodium 135 (L) 136 - 145 mmol/L    Potassium 4.0 3.5 - 5.1 mmol/L    Chloride 103 95 - 110 mmol/L    CO2 28 23 - 29 mmol/L    Glucose 97 70 - 110 mg/dL    BUN 14 8 - 23 mg/dL    Creatinine 0.7 0.5 - 1.4 mg/dL    Calcium 9.3 8.7 - 10.5 mg/dL    Anion Gap 4 (L) 8 - 16 mmol/L    eGFR >60.0 >60 mL/min/1.73 m^2   Magnesium    Collection Time: 10/21/24 11:21 AM   Result Value Ref Range    Magnesium 1.9 1.6 - 2.6 mg/dL   PHOSPHORUS    Collection Time: 10/21/24 11:21 AM   Result Value Ref Range    Phosphorus 3.5 2.7 - 4.5 mg/dL   CBC W/ AUTO DIFFERENTIAL    Collection Time: 10/21/24 11:21 AM   Result Value Ref Range    WBC 9.44 3.90 - 12.70 K/uL    RBC 4.56 4.00 - 5.40 M/uL    Hemoglobin 13.9 12.0 - 16.0 g/dL     Hematocrit 41.1 37.0 - 48.5 %    MCV 90 82 - 98 fL    MCH 30.5 27.0 - 31.0 pg    MCHC 33.8 32.0 - 36.0 g/dL    RDW 13.2 11.5 - 14.5 %    Platelets 244 150 - 450 K/uL    MPV 9.6 9.2 - 12.9 fL    Immature Granulocytes 0.3 0.0 - 0.5 %    Gran # (ANC) 6.8 1.8 - 7.7 K/uL    Immature Grans (Abs) 0.03 0.00 - 0.04 K/uL    Lymph # 1.5 1.0 - 4.8 K/uL    Mono # 0.9 0.3 - 1.0 K/uL    Eos # 0.2 0.0 - 0.5 K/uL    Baso # 0.07 0.00 - 0.20 K/uL    nRBC 0 0 /100 WBC    Gran % 72.1 38.0 - 73.0 %    Lymph % 15.4 (L) 18.0 - 48.0 %    Mono % 9.6 4.0 - 15.0 %    Eosinophil % 1.9 0.0 - 8.0 %    Basophil % 0.7 0.0 - 1.9 %    Differential Method Automated        Latest Reference Range & Units 01/03/24 09:32   Cholesterol Total 120 - 199 mg/dL 106 (L)   HDL 40 - 75 mg/dL 44   HDL/Cholesterol Ratio 20.0 - 50.0 % 41.5   Non-HDL Cholesterol mg/dL 62   Total Cholesterol/HDL Ratio 2.0 - 5.0  2.4   Triglycerides 30 - 150 mg/dL 69   LDL Cholesterol 63.0 - 159.0 mg/dL 48.2 (L)      Latest Reference Range & Units 04/26/22 17:13 04/26/22 22:03 12/08/23 10:36 01/09/24 01:20   TSH 0.400 - 4.000 uIU/mL  0.889 1.976 2.559   T4 Total 4.5 - 11.5 ug/dL    9.3   Free T4 0.71 - 1.51 ng/dL   1.27    Procalcitonin <0.25 ng/mL 0.15         Latest Reference Range & Units 01/03/24 09:32   Hemoglobin A1C External 4.0 - 5.6 % 5.4     CTA CHEST NON CORONARY January 2024 Impression:   No evidence of acute pulmonary embolus to the proximal segmental level, allowing for significant motion limitations.  Patchy ground-glass and airspace opacities in the right lower and middle lobes suggestive of aspiration or pneumonia.  Minimal tree-in-bud nodules in the right upper lobe also likely related to infectious/inflammatory process.  Probable subacute left posterior 8th rib fracture.  Suggest correlation with point tenderness and mechanism of injury.  Other findings discussed in the body of the report.    CT CHEST ABDOMEN PELVIS WITH IV CONTRAST January 2024 Impression:   Mucous  plugging in the right lower lobe, patchy ground-glass and consolidative opacities in the right middle and lower lobes, and scattered tree-in-bud nodules in both lungs, concerning for aspiration or pneumonia.   Hepatomegaly and borderline splenomegaly.  Bandlike regions of hypoenhancement in the right kidney, which could represent infarcts or pyelonephritis.  Correlate with urinalysis.  Pelvic organ prolapse.  No lymphadenopathy.  Large colonic stool burden.  No bowel inflammation or obstruction.  This report was flagged in Epic as abnormal.     X-Ray Chest 1 View 01/01/24 Impression 1.   Right infrahilar consolidation/airspace disease.     CT Head Without Contrast Dec 2023 Impression  1. No acute intracranial process is identified.  2. Minimal scattered white matter microvascular ischemic changes.  3. Diffuse involutional changes.  4. Prominent perivascular space versus punctate chronic lacunar infarct in the posterior left caudate nucleus head.     MAMMO DIGITAL DIAGNOSTIC LEFT WITH MICHAEL March 2023 Impression              No mammographic or sonographic evidence of malignancy.  Breast density category: The breasts are heterogeneously dense, which may obscure small masses. (c)  BI-RADS:  1-Negative    NM PET CU64 DOTATATE, SKULL TO MID THIGH June 2021 Impression  Innumerable enlarged bilateral cervical, supraclavicular, axillary, retroperitoneal, and pelvic lymph nodes all which demonstrate low-level FDG avidity, however are enlarged from the prior exam and most consistent with recurrent disease.     NUCLEAR STRESS TEST (CUPID ONLY) May 2021 Impression:  1. Exercise Myoview SPECT perfusion stress study is positive for reversible ischemia of the inferolateral and anteroseptal segments.  2. Good left ventricular systolic function with calculated ejection fraction 72%.  3. Functional capacity is good for age.     CT CALCIUM SCORING CARDIAC April 2021 Impression  1.  Coronary artery calcium score is  478.8 involving the  left anterior descending artery, corresponding to the 90th percentile for age and sex.     DXA BONE DENSITY AXIAL SKELETON 1 OR MORE SITES Oct 2018 Impression  1. Normal bone mineral density in the mean of the measured levels of the lumbar spine. The mean bone mineral density has increased since the previous exam.  2. Normal bone mineral density in the measured mean of both femoral necks.  The mean bone mineral density has increased since the previous exam.  FRAX 10 year probability of fracture: Major osteoporotic fracture: 5.7%. Hip fracture: 0.3%      CT MAXILLOFACIAL WITHOUT CONTRAST July 2018 Impression  1. Stable appearance to a nonspecific 0.65 x 0.8 cm, nonspecific soft tissue nodular density in the subcutaneous fat just anterior to the inferior aspect of the right maxillary antrum.  2. Innumerable soft tissue nodular densities centimeters on the neck bilaterally is consistent with abnormally prominent and numerous lymph nodes in this patient with non-Hodgkin's lymphoma. Unfortunately, in the absence of intravenous contrast, it is impossible to differentiate most of these nodules from unopacified vessels. There does not appear to have been any significant change in the number and size of the multiple nodules.  3. There is partial opacification of the right frontal sinus that, in retrospect, is unchanged from the prior exam and consistent with right frontal facial sinusitis.    ASSESSMENT:     1. Annual physical exam    2. Restless leg    3. Underweight (BMI < 18.5)    4. Female genital prolapse, unspecified type         79 y.o. nonsmoking female PMHx coronary artery disease on aspirin, hypertension on valsartan, amlodipine and carvedilol, hypothyroidism on Synthroid, hyperlipidemia on atorvastatin, CLL presents for annual exam.  he patient is in good health, aside from sinus issues and prolapse, which are currently being managed by specialists. She stays active with regular tennis and aims to improve her game  while preventing illness in the coming year. Her lifestyle includes a balanced diet, social engagement, and regular fluid intake, with occasional use of Advil for tennis-related discomfort.  Home medications including albuterol, Benefiber, bismuth, probiotics, folic acid, glucosamine , multivitamin, vitamin-D, and MAXITROL reviewed.  Except for low BMI, exam is relatively benign. No obvious skin lesions suspicious for malignancy noted.  Health maintenance reviewed.    Topic Date Due    Pneumococcal Vaccines (Age 65+) (4 of 4 - PPSV23 or PCV20) 03/28/2018    RSV Vaccine (Age 60+ and Pregnant patients) (1 - 1-dose 75+ series) Never done    COVID-19 Vaccine (9 - 2024-25 season) 09/01/2024     Health Maintenance   Topic Date Due    Aspirin/Antiplatelet Therapy  08/02/2025    DEXA Scan  03/25/2027    TETANUS VACCINE  03/05/2030    Hepatitis C Screening  Completed    Shingles Vaccine  Completed     MDM  Reviewed: previous chart, nursing note and vitals  Reviewed previous: labs  Interpretation: labs (Low sodium otherwise relatively unremarkable metabolic panel with magnesium, phosphorus and CBC)        PLAN:     Periodic health maintenance visits annually or sooner with concerns.   Orders Placed This Encounter   Procedures    BASIC METABOLIC PANEL    Magnesium    PHOSPHORUS    CBC W/ AUTO DIFFERENTIAL   Routine annual labs CMP, CBC, Lipid, HgA1C, TSH in Jan 2024.  Future vaccinations to be obtained at local pharmacy.  Continue current medications.  Consider supplementing with magnesium and/or melatonin.  Encouraged healthy eating, weight bearing exercise,  and weight management. Patient plans on meeting with dietician and health .  Healthy eating to include 1200 mg of elemental calcium daily (total diet plus supplement) and 2000 international units of vitamin D daily  Recommend a high fiber, lean protein diet that is high in complex carbohydrates such as non-starchy vegetables and whole grains.   Recommend 150 minutes  of moderate-intensity physical activity and 2 days of muscle strengthening activity weekly.  Recommend hearing protection when in noisy environments.   Recommend daily sun protection/avoidance, use of at least SPF 30, broad spectrum sunscreen (OTC drug), and routine physician surveillance to optimize early detection.  Follow up Dermatology  Follow-up gynecology      The results of physical exam findings, labs, and imaging were reviewed with the patient. Management of above assessments/visit diagnoses was discussed with patient. Precautions for return discussed at length. Patient was given ample time for questions. All questions asked and answered to the satisfaction of the patient. Patient is in agreement with the above and verbalized understanding. Total time spent caring for the patient today was 76 minutes. This includes time spent before the visit reviewing the chart, time spent during the visit, and time spent after the visit on documentation.    An Noriega MD  [facility]    Disclaimer: This document was created using voice recognition software ([employer]). Although it may be edited, this document may contain errors related to incorrect recognition of the spoken word. Please contact the physician if clarification is needed.

## 2024-10-22 NOTE — PROGRESS NOTES
"Pelvic Health Physical Therapy   Treatment Note     Name: Cassidy Aguilar  Clinic Number: [id number]    Therapy Diagnosis:   Encounter Diagnoses   Name Primary?    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   She is meeting with Dr. Hemphill this month.    She was compliant with home exercise program - sit to stands, kegels, countertop push-ups, seated clam, ball squeeze  Response to previous treatment: no adverse effect  Functional change: fluctuating improvements in incontinence    Pain: none reported    Objective     Cassidy received the following interventions during the treatment session:   (TrA = transverse abdominis, PFM = pelvic floor muscle, sEMG = surface electromyography, RUSI = Rehabilitative Ultrasound Imaging)  Pt consented to pelvic floor muscle assessment and treatment on 8/7/24. See EMR.    Therapeutic activities to improve functional performance for 23 minutes -  [] FOTO survey  [] Pelvic floor assessment - MMT = 3/5 (improved to 4/5 with verbal cues for "suctioning") with endurance of at least 8 seconds, itchy sensation in bilateral layer 3  [x] Education on pelvic floor anatomy & function  [] Education on double void technique  [] Education on normal void intervals and fluid intake  [x] Review of urge suppression techniques  [] Instruction on the Knack for stress urinary incontinence   [x] " Instruction on pressure management to protect pelvic organ prolapse/prevent incontinence - particularly with sit to stand transfer  [] Education on voiding optimization - seated on toilet, no pushing  [] Education on bowel movement optimization - toilet stool  [x] HEP building/HEP review    Neuromuscular re-education activities to develop balance, coordination, kinesthetic sense, motor control, proprioception, and posture for 15 minutes -   [x] TrA brace + PFM squeeze + straight leg raise (R&L), x10 with 5-second hold  [x] Side-lying clam with focus on pelvic girdle stability with yellow band around knees (R&L), x15  [] Seated TrA brace + PFM squeeze, x5 with 10-second hold  [] TrA brace + PFM squeeze + seated hip adduction isometric with ball between knees, x10 with 10-second hold  [x] PFM squeeze + bridging with belt around knees, 2x10 with 3-second hold  [] Side-lying hip abduction + arm press down onto ball (R&L), x10 with 8-second hold  [] TrA brace + PFM squeeze + wall push-up, x10  [] TrA brace + PFM squeeze + countertop push-up, x10  [] PFM squeeze + sit to stand from chair on exhale + holding 7# weight x10  [] TrA brace + seated clam with blue band, x10  [] TrA brace + PFM squeeze + shoulder extension onto ball on opposite knee (R&L), x5 with 10-second hold  [x] TrA brace + PFM squeeze + standing shoulder extension with green band, 2x10  [] Pelvic floor muscle coordination training with sEMG feedback: squeeze + fake cough (x3), long holds (4x5 with 10-second hold), and quick flicks (6x4)    Manual therapy techniques: to develop flexibility, desensitization, and extensibility for 0 minutes -  [] Manual release to pelvic floor muscles internally/vaginally: levator ani, superficial transverse perineal, perineal body      Home Exercises and Patient Education     Patient Education: progression of plan of care, plan for next session, pelvic floor anatomy & function, relationship between TrA & PFM, relationship  "between hips & PFM, urge suppression, etiology of urinary urgency, timeline for muscle changes with consistent strength training, proper body mechanics for bowel movement, pressure management for support of pelvic organ prolapse, and abdominal wall anatomy    Home Exercise Program (8/7/24): urge suppression, toilet stool, increased fiber  Home Exercise Program Updates (8/26/24): clams, straight leg raise, countertop push-up  HEP update (9/19/24): PFM long holds and quick flicks  HEP update (9/26/24): straight leg raise, ball squeeze between knees, side-lying clam, PFM squeeze + sit to stand, countertop push-up, seated clam  HEP update (10/3/24): none  HEP update (10/7/24): none  HEP update (10/17/24): add "suctioning" action to PFM squeezes  HEP update (10/24/24): none    Exercises were reviewed, and Cassidy was able to demonstrate them prior to the end of the session, as needed. Cassidy demonstrated good understanding of the education provided.   See 'Patient Instructions' for exercises/instructions provided.    Assessment     Pt tolerated treatment session well, with good understanding of education provided and no increased symptoms with exercise. Core and PFM exercises reviewed today for updated HEP. Urinary incontinence has improved but is still present, so additional bladder retraining and pelvic floor/core strengthening indicated. Pt's functional mobility and ability to perform ADLs still limited by pelvic floor dysfunction. She requires skilled therapy for continued patient education and coordination retraining. One more visit indicated to ensure HEP is appropriate and being performed correctly.    Cassidy is progressing well towards her goals.   Pt prognosis: good    Pt will continue to benefit from skilled outpatient physical therapy to address the deficits listed in the problem list box on initial evaluation, provide pt/family education and to maximize pt's level of independence in the home and community " environment.     Pt's spiritual, cultural and educational needs considered and pt agreeable to plan of care and goals.  Anticipated barriers to physical therapy: none      Short Term Goals: 6 weeks   Pt to report >50% improvement in urine leakage - MET  Pt to verbalize urge suppression strategies for improved control over urgency and urge urinary incontinence - MET  Pt to be independent with introductory home exercise program - MET     Long Term Goals: 12 weeks   Pt to report >90% improvement in urine leakage - MET  Pt to report bowel movement frequency of no more than 3x/day to demonstrate improved emptying of rectum with bowel movements - PARTIALLY MET  Pt to report complete bladder emptying >90% of the time - PARTIALLY MET  Pt to score no more than 28% impairment on the Urinary Problem FOTO survey to demonstrate reduced impairment due to pelvic floor dysfunction - NOT MET   Pt to be independent with advanced home exercise program - PARTIALLY MET    Plan     Continue per Plan of Care  One more visit to ensure independence with HEP  Planning to discharge in December      Charmaine Barber, PT, DPT    Board-Certified Clinical Specialist in Women's Health Physical Therapy

## 2024-10-24 ENCOUNTER — TELEPHONE (OUTPATIENT)
Dept: HEMATOLOGY/ONCOLOGY | Facility: CLINIC | Age: 79
End: 2024-10-24
Payer: MEDICARE

## 2024-10-24 ENCOUNTER — TELEPHONE (OUTPATIENT)
Dept: INTERNAL MEDICINE | Facility: CLINIC | Age: 79
End: 2024-10-24
Payer: MEDICARE

## 2024-10-24 ENCOUNTER — CLINICAL SUPPORT (OUTPATIENT)
Dept: REHABILITATION | Facility: OTHER | Age: 79
End: 2024-10-24
Payer: MEDICARE

## 2024-10-24 DIAGNOSIS — M62.89 PELVIC FLOOR DYSFUNCTION: Primary | ICD-10-CM

## 2024-10-24 DIAGNOSIS — R15.1 FECAL SMEARING: ICD-10-CM

## 2024-10-24 DIAGNOSIS — N39.41 URGE INCONTINENCE: ICD-10-CM

## 2024-10-24 PROCEDURE — 97530 THERAPEUTIC ACTIVITIES: CPT | Mod: PN | Performed by: PHYSICAL THERAPIST

## 2024-10-24 PROCEDURE — 97112 NEUROMUSCULAR REEDUCATION: CPT | Mod: PN | Performed by: PHYSICAL THERAPIST

## 2024-10-24 NOTE — TELEPHONE ENCOUNTER
----- Message from Cora sent at 10/24/2024  9:22 AM CDT -----  Regarding: Scheduling Request  Contact: pt @ [phone] (home)  Scheduling Request      Appt Type:  EP     Date/Time Preference: first available     Treating Provider: Osorio     Caller Name:Cassidy Aguilar     Contact Preference: [phone] (home)      Comments/notes: pt is calling to get a 6 months follow up. Asking for a call back

## 2024-10-28 ENCOUNTER — PATIENT MESSAGE (OUTPATIENT)
Dept: INTERNAL MEDICINE | Facility: CLINIC | Age: 79
End: 2024-10-28
Payer: MEDICARE

## 2024-10-28 ENCOUNTER — PATIENT MESSAGE (OUTPATIENT)
Dept: HEMATOLOGY/ONCOLOGY | Facility: CLINIC | Age: 79
End: 2024-10-28
Payer: MEDICARE

## 2024-11-14 ENCOUNTER — OFFICE VISIT (OUTPATIENT)
Dept: INTERNAL MEDICINE | Facility: CLINIC | Age: 79
End: 2024-11-14
Payer: MEDICARE

## 2024-11-14 DIAGNOSIS — Z71.85 VACCINE COUNSELING: Primary | ICD-10-CM

## 2024-11-14 NOTE — PROGRESS NOTES
Established Patient - Audio Only Telehealth Visit     The patient location is: home  The chief complaint leading to consultation is: vaccines  Visit type: Virtual visit with audio only (telephone)  Total time spent with patient: 10       The reason for the audio only service rather than synchronous audio and video virtual visit was related to technical difficulties or patient preference/necessity.     Each patient to whom I provide medical services by telemedicine is:  (1) informed of the relationship between the physician and patient and the respective role of any other health care provider with respect to management of the patient; and (2) notified that they may decline to receive medical services by telemedicine and may withdraw from such care at any time. Patient verbally consented to receive this service via voice-only telephone call.       HPI:    Wants to know if can switch between pfizer and moderna.    Assessment and plan:       1. Vaccine counseling        Patient may use either Covid vaccine. She will use ibuprofen and acetaminophen before and after getting the vaccine as needed.                 This service was not originating from a related E/M service provided within the previous 7 days nor will  to an E/M service or procedure within the next 24 hours or my soonest available appointment.  Prevailing standard of care was able to be met in this audio-only visit.

## 2024-11-14 NOTE — PROGRESS NOTES
"  Pelvic Health Physical Therapy   Treatment Note     Name: Cassidy Aguilar  Clinic Number: [id number]    Therapy Diagnosis:   Encounter Diagnoses   Name Primary?    Weakness Yes    Lack of coordination      Physician: Adonis Wright MD    Visit Date: 5/26/2022    Physician Orders: PT Eval and Treat   Medical Diagnosis from Referral: M62.89 (ICD-10-CM) - Pelvic floor instability   Evaluation Date: 3/31/2022  Authorization Period Expiration: 6/1/2022  Plan of Care Expiration: 6/23/2022  Visit # / Visits authorized: 6/ 20  FOTO: 2/3    Time In: 1402  Time Out: 1450  Total Billable Time: 48 minutes    Precautions: Standard and cancer    Subjective     Pt reports: one incident of getting an unexpected urge to urinate and leaking while she was sitting on the toilet. She states that she has noticed increased urinary frequency today which she attributes to her diet.     She was compliant with home exercise program.  Response to previous treatment: no adverse response reported  Functional change: none     Pain: 0/10  Location: pelvic floor    Objective     Cassidy received therapeutic exercises to develop strength for 48 minutes including:   Bridges w/ blue theraband - 2x15  SLR 2x15 B  Seated hip ADD ball squeeze - 3x10   Seated hip ABD w/ green Theraband - 2x10  Sit to stand w/ kegel and 3.3 lb ball - 2x15   Side squat w/ kegel - 2x10  Wall squat w/ kegel - 2x10  Toe taps w/ kegel - 5x10    (not performed this visit)  Hip adduction ball squeeze w/ kegel 2x10 5"  Hip abduction w/ GTB 2x10 5"  Clamshells 2x10 B    Cassidy participated in neuromuscular re-education activities to develop Coordination for 00 minutes including:   SEMG EVALUATION:   PF Electrode placement: external perineal  Pt. Position: supine, sitting and standing    SEMG Finding: poor holding ability and slow return to baseline  No skin irritation, erythema, or other adverse effects observed from electrode placement.    Treatment: Worked on pelvic floor " Body Location Override (Optional - Billing Will Still Be Based On Selected Body Map Location If Applicable): right anterior shoulder Referring Physician (Optional): Dr. Valenzuela muscle endurance and strength training and coordination using sEMG assist.    BASELINE  Position: supine  Resting tone: 2.2 uV  Max contraction: 25 uV   Max hold time: 4 sec    Position: standing  Resting tone: 5 uV  Max contraction: 15-20 uV    20 TRIALS  Position: seated  Average contraction: 10.1 uV  Average rest: 3.3 uV    Cassidy participated in dynamic functional therapeutic activities to improve functional performance for 00 minutes, including:  Reviewed urge suppression strategies with pt demonstrating in clinic    Home Exercises Provided and Patient Education Provided     Education provided:   - posture/body mechanices, diaphragmatic breathing and kegels  Discussed progression of plan of care with patient; educated pt in activity modification; reviewed HEP with pt. Pt demonstrated and verbalized understanding of all instruction and was provided with a handout of HEP (see Patient Instructions).  - updated HEP    Written Home Exercises Provided: Patient instructed to cont prior HEP.  Exercises were reviewed and Cassidy was able to demonstrate them prior to the end of the session.  Cassidy demonstrated good  understanding of the education provided.     See EMR under Patient Instructions for exercises provided prior visit.    Assessment     Pt reports she had one incident of UUI while sitting down on the toilet. She states that otherwise urgency and frequency has improved significantly. Progressed exercise program this visit with good pt tolerance.     Cassidy Is progressing well towards her goals.   Pt prognosis is Good.     Pt will continue to benefit from skilled outpatient physical therapy to address the deficits listed in the problem list box on initial evaluation, provide pt/family education and to maximize pt's level of independence in the home and community environment.     Pt's spiritual, cultural and educational needs considered and pt agreeable to plan of care and goals.     Anticipated barriers to physical  "therapy: none    Goals:   Short Term Goals: 6 weeks   Pt to perform "the knack" prior to coughing, laughing or sneezing to decrease risk of incontinence. MET 5/12/22  Pt to report a decrease in pad usage to no more than 1 a day to demonstrate improving pelvic floor function needed for continence. (progressing, not met)  Pt to demonstrate being able to correctly and consistently perform a kegel which is needed  to increase pelvic floor muscle coordination and strength needed for continence. MET 5/5/22  Pt to report being able to transfer from sitting to standing without leakage of urine. MET 5/26/22  Pt to report being able to sneeze without incontinence demonstrating improved pelvic floor strength and coordination to improve confidence in social situations. MET 5/12/22     Long Term Goals: 12 weeks   Pt to be discharged with home plan for carry over after discharge. (progressing, not met)    Pt to be able to perform a 10 second kegel x 10 reps to demonstrate improving strength and endurance needed for continence. (progressing, not met)  Pt to report a decrease in pad usage to 0 pads a day to demonstrate improving pelvic floor muscle controls as evidenced by decreased episodes of incontinence needed to improve confidence in social situations. (progressing, not met)  Pt to report elimination of incontinence with ADLs to demonstrate improved pelvic floor muscle strength and coordination. (progressing, not met)  Pt to be able to delay the urge to urinate at least 30 minutes with a strong urge to urinate in order to make it to the bathroom without leaking. (progressing, not met)  Pt to report no longer feeling the need to urinate just in case when shopping or participating in social activities to demonstrate improving pelvic floor and bladder control. (progressing, not met)    Plan   Plan of care Certification: 3/31/2022 to 6/23/2022.     Outpatient Physical Therapy 1 time(s) every week(s) for 12 weeks to include the " Surgeon Performing The Repair (Optional): Maco Galicia M.D. Biopsy Photograph Reviewed: Yes following interventions: Manual Therapy, Moist Heat/ Ice, Neuromuscular Re-ed, Patient Education, Self Care, Therapeutic Activities, Therapeutic Exercise, Ultrasound and Biofeedback.    Progress standing exercises and PFM endurance.     Anna Kimura, PT      Previous Accession (Optional): [id number] Date Of Previous Biopsy (Optional): 9- Size Of Lesion In Cm: 0.6 X Size Of Lesion In Cm (Optional): 0.9 Size Of Margin In Cm: 0.4 Anesthesia Volume In Cc: 0 Was An Eye Clamp Used?: No Eye Clamp Note Details: An eye clamp was used during the procedure. Excision Method: Fusiform Saucerization Depth: dermis and superficial adipose tissue Repair Type: Complex Intermediate / Complex Repair - Final Wound Length In Cm: 4 Suturegard Retention Suture: 2-0 Nylon Retention Suture Bite Size: 3 mm Length To Time In Minutes Device Was In Place: 10 Number Of Hemigard Strips Per Side: 1 Width Of Defect Perpendicular To Closure In Cm (Required): 1.4 Distance Of Undermining In Cm (Required): 1.5 Undermining Type: Entire Wound Debridement Text: The wound edges were debrided prior to proceeding with the closure to facilitate wound healing. Helical Rim Text: The closure involved the helical rim. Vermilion Border Text: The closure involved the vermilion border. Nostril Rim Text: The closure involved the nostril rim. Retention Suture Text: Retention sutures were placed to support the closure and prevent dehiscence. Lab: [id number] Lab Facility: [id number] Graft Donor Site Bandage (Optional-Leave Blank If You Don't Want In Note): A pressure bandage were applied to the donor site. Epidermal Closure Graft Donor Site (Optional): simple interrupted Billing Type: Third-Party Bill Excision Depth: adipose tissue Scalpel Size: 15 blade Anesthesia Type: 1% lidocaine with 1:100,000 epinephrine and a 1:10 solution of 8.4% sodium bicarbonate Hemostasis: Electrocautery Estimated Blood Loss (Cc): minimal Detail Level: Detailed Repair Depth: use same depth as excision depth Anesthesia Type: 1% lidocaine with epinephrine and a 1:10 solution of 8.4% sodium bicarbonate Anesthesia Volume In Cc: 8 Deep Sutures: 4-0 Vicryl Dermal Closure: buried vertical mattress Epidermal Sutures: 5-0 Fast Absorbing Gut Epidermal Closure: running Wound Care: Petrolatum Dressing: pressure dressing with telfa Suturegard Intro: Intraoperative tissue expansion was performed, utilizing the SUTUREGARD device, in order to reduce wound tension. Suturegard Body: The suture ends were repeatedly re-tightened and re-clamped to achieve the desired tissue expansion. Hemigard Intro: Due to skin fragility and wound tension, it was decided to use HEMIGARD adhesive retention suture devices to permit a linear closure. The skin was cleaned and dried for a 6cm distance away from the wound. Excessive hair, if present, was removed to allow for adhesion. Hemigard Postcare Instructions: The HEMIGARD strips are to remain completely dry for at least 5-7 days. Positioning (Leave Blank If You Do Not Want): The patient was placed in a comfortable position exposing the surgical site. Pre-Excision Curettage Text (Leave Blank If You Do Not Want): Prior to drawing the surgical margin the visible lesion was removed with electrodesiccation and curettage to clearly define the lesion size. Complex Repair Preamble Text (Leave Blank If You Do Not Want): Extensive wide undermining was performed. Intermediate Repair Preamble Text (Leave Blank If You Do Not Want): Undermining was performed with blunt dissection. Curvilinear Excision Additional Text (Leave Blank If You Do Not Want): The margin was drawn around the clinically apparent lesion.  A curvilinear shape was then drawn on the skin incorporating the lesion and margins.  Incisions were then made along these lines to the appropriate tissue plane and the lesion was extirpated. Fusiform Excision Additional Text (Leave Blank If You Do Not Want): The margin was drawn around the clinically apparent lesion.  A fusiform shape was then drawn on the skin incorporating the lesion and margins.  Incisions were then made along these lines to the appropriate tissue plane and the lesion was extirpated. Elliptical Excision Additional Text (Leave Blank If You Do Not Want): The margin was drawn around the clinically apparent lesion.  An elliptical shape was then drawn on the skin incorporating the lesion and margins.  Incisions were then made along these lines to the appropriate tissue plane and the lesion was extirpated. Saucerization Excision Additional Text (Leave Blank If You Do Not Want): The margin was drawn around the clinically apparent lesion.  Incisions were then made along these lines, in a tangential fashion, to the appropriate tissue plane and the lesion was extirpated. Slit Excision Additional Text (Leave Blank If You Do Not Want): A linear line was drawn on the skin overlying the lesion. An incision was made slowly until the lesion was visualized.  Once visualized, the lesion was removed with blunt dissection. Excisional Biopsy Additional Text (Leave Blank If You Do Not Want): The margin was drawn around the clinically apparent lesion. An elliptical shape was then drawn on the skin incorporating the lesion and margins.  Incisions were then made along these lines to the appropriate tissue plane and the lesion was extirpated. Perilesional Excision Additional Text (Leave Blank If You Do Not Want): The margin was drawn around the clinically apparent lesion. Incisions were then made along these lines to the appropriate tissue plane and the lesion was extirpated. Repair Performed By Another Provider Text (Leave Blank If You Do Not Want): After the tissue was excised the defect was repaired by another provider. No Repair - Repaired With Adjacent Surgical Defect Text (Leave Blank If You Do Not Want): After the excision the defect was repaired concurrently with another surgical defect which was in close approximation. Adjacent Tissue Transfer Text: The defect edges were debeveled with a #15 scalpel blade.  Given the location of the defect and the proximity to free margins an adjacent tissue transfer was deemed most appropriate.  Using a sterile surgical marker, an appropriate flap was drawn incorporating the defect and placing the expected incisions within the relaxed skin tension lines where possible.    The area thus outlined was incised deep to adipose tissue with a #15 scalpel blade.  The skin margins were undermined to an appropriate distance in all directions utilizing iris scissors. Advancement Flap (Single) Text: The defect edges were debeveled with a #15 scalpel blade.  Given the location of the defect and the proximity to free margins a single advancement flap was deemed most appropriate.  Using a sterile surgical marker, an appropriate advancement flap was drawn incorporating the defect and placing the expected incisions within the relaxed skin tension lines where possible.    The area thus outlined was incised deep to adipose tissue with a #15 scalpel blade.  The skin margins were undermined to an appropriate distance in all directions utilizing iris scissors. Advancement Flap (Double) Text: The defect edges were debeveled with a #15 scalpel blade.  Given the location of the defect and the proximity to free margins a double advancement flap was deemed most appropriate.  Using a sterile surgical marker, the appropriate advancement flaps were drawn incorporating the defect and placing the expected incisions within the relaxed skin tension lines where possible.    The area thus outlined was incised deep to adipose tissue with a #15 scalpel blade.  The skin margins were undermined to an appropriate distance in all directions utilizing iris scissors. Burow's Advancement Flap Text: The defect edges were debeveled with a #15 scalpel blade.  Given the location of the defect and the proximity to free margins a Burow's advancement flap was deemed most appropriate.  Using a sterile surgical marker, the appropriate advancement flap was drawn incorporating the defect and placing the expected incisions within the relaxed skin tension lines where possible.    The area thus outlined was incised deep to adipose tissue with a #15 scalpel blade.  The skin margins were undermined to an appropriate distance in all directions utilizing iris scissors. Chonodrocutaneous Helical Advancement Flap Text: The defect edges were debeveled with a #15 scalpel blade.  Given the location of the defect and the proximity to free margins a chondrocutaneous helical advancement flap was deemed most appropriate.  Using a sterile surgical marker, the appropriate advancement flap was drawn incorporating the defect and placing the expected incisions within the relaxed skin tension lines where possible.    The area thus outlined was incised deep to adipose tissue with a #15 scalpel blade.  The skin margins were undermined to an appropriate distance in all directions utilizing iris scissors. Crescentic Advancement Flap Text: The defect edges were debeveled with a #15 scalpel blade.  Given the location of the defect and the proximity to free margins a crescentic advancement flap was deemed most appropriate.  Using a sterile surgical marker, the appropriate advancement flap was drawn incorporating the defect and placing the expected incisions within the relaxed skin tension lines where possible.    The area thus outlined was incised deep to adipose tissue with a #15 scalpel blade.  The skin margins were undermined to an appropriate distance in all directions utilizing iris scissors. A-T Advancement Flap Text: The defect edges were debeveled with a #15 scalpel blade.  Given the location of the defect, shape of the defect and the proximity to free margins an A-T advancement flap was deemed most appropriate.  Using a sterile surgical marker, an appropriate advancement flap was drawn incorporating the defect and placing the expected incisions within the relaxed skin tension lines where possible.    The area thus outlined was incised deep to adipose tissue with a #15 scalpel blade.  The skin margins were undermined to an appropriate distance in all directions utilizing iris scissors. O-T Advancement Flap Text: The defect edges were debeveled with a #15 scalpel blade.  Given the location of the defect, shape of the defect and the proximity to free margins an O-T advancement flap was deemed most appropriate.  Using a sterile surgical marker, an appropriate advancement flap was drawn incorporating the defect and placing the expected incisions within the relaxed skin tension lines where possible.    The area thus outlined was incised deep to adipose tissue with a #15 scalpel blade.  The skin margins were undermined to an appropriate distance in all directions utilizing iris scissors. O-L Flap Text: The defect edges were debeveled with a #15 scalpel blade.  Given the location of the defect, shape of the defect and the proximity to free margins an O-L flap was deemed most appropriate.  Using a sterile surgical marker, an appropriate advancement flap was drawn incorporating the defect and placing the expected incisions within the relaxed skin tension lines where possible.    The area thus outlined was incised deep to adipose tissue with a #15 scalpel blade.  The skin margins were undermined to an appropriate distance in all directions utilizing iris scissors. O-Z Flap Text: The defect edges were debeveled with a #15 scalpel blade.  Given the location of the defect, shape of the defect and the proximity to free margins an O-Z flap was deemed most appropriate.  Using a sterile surgical marker, an appropriate transposition flap was drawn incorporating the defect and placing the expected incisions within the relaxed skin tension lines where possible. The area thus outlined was incised deep to adipose tissue with a #15 scalpel blade.  The skin margins were undermined to an appropriate distance in all directions utilizing iris scissors. Double O-Z Flap Text: The defect edges were debeveled with a #15 scalpel blade.  Given the location of the defect, shape of the defect and the proximity to free margins a Double O-Z flap was deemed most appropriate.  Using a sterile surgical marker, an appropriate transposition flap was drawn incorporating the defect and placing the expected incisions within the relaxed skin tension lines where possible. The area thus outlined was incised deep to adipose tissue with a #15 scalpel blade.  The skin margins were undermined to an appropriate distance in all directions utilizing iris scissors. V-Y Flap Text: The defect edges were debeveled with a #15 scalpel blade.  Given the location of the defect, shape of the defect and the proximity to free margins a V-Y flap was deemed most appropriate.  Using a sterile surgical marker, an appropriate advancement flap was drawn incorporating the defect and placing the expected incisions within the relaxed skin tension lines where possible.    The area thus outlined was incised deep to adipose tissue with a #15 scalpel blade.  The skin margins were undermined to an appropriate distance in all directions utilizing iris scissors. Advancement-Rotation Flap Text: The defect edges were debeveled with a #15 scalpel blade.  Given the location of the defect, shape of the defect and the proximity to free margins an advancement-rotation flap was deemed most appropriate.  Using a sterile surgical marker, an appropriate flap was drawn incorporating the defect and placing the expected incisions within the relaxed skin tension lines where possible. The area thus outlined was incised deep to adipose tissue with a #15 scalpel blade.  The skin margins were undermined to an appropriate distance in all directions utilizing iris scissors. Mercedes Flap Text: The defect edges were debeveled with a #15 scalpel blade.  Given the location of the defect, shape of the defect and the proximity to free margins a Mercedes flap was deemed most appropriate.  Using a sterile surgical marker, an appropriate advancement flap was drawn incorporating the defect and placing the expected incisions within the relaxed skin tension lines where possible. The area thus outlined was incised deep to adipose tissue with a #15 scalpel blade.  The skin margins were undermined to an appropriate distance in all directions utilizing iris scissors. Modified Advancement Flap Text: The defect edges were debeveled with a #15 scalpel blade.  Given the location of the defect, shape of the defect and the proximity to free margins a modified advancement flap was deemed most appropriate.  Using a sterile surgical marker, an appropriate advancement flap was drawn incorporating the defect and placing the expected incisions within the relaxed skin tension lines where possible.    The area thus outlined was incised deep to adipose tissue with a #15 scalpel blade.  The skin margins were undermined to an appropriate distance in all directions utilizing iris scissors. Mucosal Advancement Flap Text: Given the location of the defect, shape of the defect and the proximity to free margins a mucosal advancement flap was deemed most appropriate. Incisions were made with a 15 blade scalpel in the appropriate fashion along the cutaneous vermilion border and the mucosal lip. The remaining actinically damaged mucosal tissue was excised.  The mucosal advancement flap was then elevated to the gingival sulcus with care taken to preserve the neurovascular structures and advanced into the primary defect. Care was taken to ensure that precise realignment of the vermilion border was achieved. Peng Advancement Flap Text: The defect edges were debeveled with a #15 scalpel blade.  Given the location of the defect, shape of the defect and the proximity to free margins a Peng advancement flap was deemed most appropriate.  Using a sterile surgical marker, an appropriate advancement flap was drawn incorporating the defect and placing the expected incisions within the relaxed skin tension lines where possible. The area thus outlined was incised deep to adipose tissue with a #15 scalpel blade.  The skin margins were undermined to an appropriate distance in all directions utilizing iris scissors. Hatchet Flap Text: The defect edges were debeveled with a #15 scalpel blade.  Given the location of the defect, shape of the defect and the proximity to free margins a hatchet flap was deemed most appropriate.  Using a sterile surgical marker, an appropriate hatchet flap was drawn incorporating the defect and placing the expected incisions within the relaxed skin tension lines where possible.    The area thus outlined was incised deep to adipose tissue with a #15 scalpel blade.  The skin margins were undermined to an appropriate distance in all directions utilizing iris scissors. Rotation Flap Text: The defect edges were debeveled with a #15 scalpel blade.  Given the location of the defect, shape of the defect and the proximity to free margins a rotation flap was deemed most appropriate.  Using a sterile surgical marker, an appropriate rotation flap was drawn incorporating the defect and placing the expected incisions within the relaxed skin tension lines where possible.    The area thus outlined was incised deep to adipose tissue with a #15 scalpel blade.  The skin margins were undermined to an appropriate distance in all directions utilizing iris scissors. Bilateral Rotation Flap Text: The defect edges were debeveled with a #15 scalpel blade. Given the location of the defect, shape of the defect and the proximity to free margins a bilateral rotation flap was deemed most appropriate. Using a sterile surgical marker, an appropriate rotation flap was drawn incorporating the defect and placing the expected incisions within the relaxed skin tension lines where possible. The area thus outlined was incised deep to adipose tissue with a #15 scalpel blade. The skin margins were undermined to an appropriate distance in all directions utilizing iris scissors. Following this, the designed flap was carried over into the primary defect and sutured into place. Spiral Flap Text: The defect edges were debeveled with a #15 scalpel blade.  Given the location of the defect, shape of the defect and the proximity to free margins a spiral flap was deemed most appropriate.  Using a sterile surgical marker, an appropriate rotation flap was drawn incorporating the defect and placing the expected incisions within the relaxed skin tension lines where possible. The area thus outlined was incised deep to adipose tissue with a #15 scalpel blade.  The skin margins were undermined to an appropriate distance in all directions utilizing iris scissors. Staged Advancement Flap Text: The defect edges were debeveled with a #15 scalpel blade.  Given the location of the defect, shape of the defect and the proximity to free margins a staged advancement flap was deemed most appropriate.  Using a sterile surgical marker, an appropriate advancement flap was drawn incorporating the defect and placing the expected incisions within the relaxed skin tension lines where possible. The area thus outlined was incised deep to adipose tissue with a #15 scalpel blade.  The skin margins were undermined to an appropriate distance in all directions utilizing iris scissors. Star Wedge Flap Text: The defect edges were debeveled with a #15 scalpel blade.  Given the location of the defect, shape of the defect and the proximity to free margins a star wedge flap was deemed most appropriate.  Using a sterile surgical marker, an appropriate rotation flap was drawn incorporating the defect and placing the expected incisions within the relaxed skin tension lines where possible. The area thus outlined was incised deep to adipose tissue with a #15 scalpel blade.  The skin margins were undermined to an appropriate distance in all directions utilizing iris scissors. Transposition Flap Text: The defect edges were debeveled with a #15 scalpel blade.  Given the location of the defect and the proximity to free margins a transposition flap was deemed most appropriate.  Using a sterile surgical marker, an appropriate transposition flap was drawn incorporating the defect.    The area thus outlined was incised deep to adipose tissue with a #15 scalpel blade.  The skin margins were undermined to an appropriate distance in all directions utilizing iris scissors. Muscle Hinge Flap Text: The defect edges were debeveled with a #15 scalpel blade.  Given the size, depth and location of the defect and the proximity to free margins a muscle hinge flap was deemed most appropriate.  Using a sterile surgical marker, an appropriate hinge flap was drawn incorporating the defect. The area thus outlined was incised with a #15 scalpel blade.  The skin margins were undermined to an appropriate distance in all directions utilizing iris scissors. Mustarde Flap Text: The defect edges were debeveled with a #15 scalpel blade.  Given the size, depth and location of the defect and the proximity to free margins a Mustarde flap was deemed most appropriate.  Using a sterile surgical marker, an appropriate flap was drawn incorporating the defect. The area thus outlined was incised with a #15 scalpel blade.  The skin margins were undermined to an appropriate distance in all directions utilizing iris scissors. Nasal Turnover Hinge Flap Text: The defect edges were debeveled with a #15 scalpel blade.  Given the size, depth, location of the defect and the defect being full thickness a nasal turnover hinge flap was deemed most appropriate.  Using a sterile surgical marker, an appropriate hinge flap was drawn incorporating the defect. The area thus outlined was incised with a #15 scalpel blade. The flap was designed to recreate the nasal mucosal lining and the alar rim. The skin margins were undermined to an appropriate distance in all directions utilizing iris scissors. Nasalis-Muscle-Based Myocutaneous Island Pedicle Flap Text: Using a #15 blade, an incision was made around the donor flap to the level of the nasalis muscle. Wide lateral undermining was then performed in both the subcutaneous plane above the nasalis muscle, and in a submuscular plane just above periosteum. This allowed the formation of a free nasalis muscle axial pedicle (based on the angular artery) which was still attached to the actual cutaneous flap, increasing its mobility and vascular viability. Hemostasis was obtained with pinpoint electrocoagulation. The flap was mobilized into position and the pivotal anchor points positioned and stabilized with buried interrupted sutures. Subcutaneous and dermal tissues were closed in a multilayered fashion with sutures. Tissue redundancies were excised, and the epidermal edges were apposed without significant tension and sutured with sutures. Nasalis Myocutaneous Flap Text: Using a #15 blade, an incision was made around the donor flap to the level of the nasalis muscle. Wide lateral undermining was then performed in both the subcutaneous plane above the nasalis muscle, and in a submuscular plane just above periosteum. This allowed the formation of a free nasalis muscle axial pedicle which was still attached to the actual cutaneous flap, increasing its mobility and vascular viability. Hemostasis was obtained with pinpoint electrocoagulation. The flap was mobilized into position and the pivotal anchor points positioned and stabilized with buried interrupted sutures. Subcutaneous and dermal tissues were closed in a multilayered fashion with sutures. Tissue redundancies were excised, and the epidermal edges were apposed without significant tension and sutured with sutures. Nasolabial Transposition Flap Text: The defect edges were debeveled with a #15 scalpel blade.  Given the size, depth and location of the defect and the proximity to free margins a nasolabial transposition flap was deemed most appropriate. Using a sterile surgical marker, an appropriate flap was drawn incorporating the defect. The area thus outlined was incised with a #15 scalpel blade. The skin margins were undermined to an appropriate distance in all directions utilizing iris scissors. Following this, the designed flap was carried into the primary defect and sutured into place. Orbicularis Oris Muscle Flap Text: The defect edges were debeveled with a #15 scalpel blade.  Given that the defect affected the competency of the oral sphincter an orbicularis oris muscle flap was deemed most appropriate to restore this competency and normal muscle function.  Using a sterile surgical marker, an appropriate flap was drawn incorporating the defect. The area thus outlined was incised with a #15 scalpel blade. Melolabial Transposition Flap Text: The defect edges were debeveled with a #15 scalpel blade.  Given the location of the defect and the proximity to free margins a melolabial flap was deemed most appropriate.  Using a sterile surgical marker, an appropriate melolabial transposition flap was drawn incorporating the defect.    The area thus outlined was incised deep to adipose tissue with a #15 scalpel blade.  The skin margins were undermined to an appropriate distance in all directions utilizing iris scissors. Rectangular Flap Text: The defect edges were debeveled with a #15 scalpel blade. Given the location of the defect and the proximity to free margins a rectangular flap was deemed most appropriate. Using a sterile surgical marker, an appropriate rectangular flap was drawn incorporating the defect. The area thus outlined was incised deep to adipose tissue with a #15 scalpel blade. The skin margins were undermined to an appropriate distance in all directions utilizing iris scissors. Following this, the designed flap was carried over into the primary defect and sutured into place. Rhombic Flap Text: The defect edges were debeveled with a #15 scalpel blade.  Given the location of the defect and the proximity to free margins a rhombic flap was deemed most appropriate.  Using a sterile surgical marker, an appropriate rhombic flap was drawn incorporating the defect.    The area thus outlined was incised deep to adipose tissue with a #15 scalpel blade.  The skin margins were undermined to an appropriate distance in all directions utilizing iris scissors. Rhomboid Transposition Flap Text: The defect edges were debeveled with a #15 scalpel blade.  Given the location of the defect and the proximity to free margins a rhomboid transposition flap was deemed most appropriate.  Using a sterile surgical marker, an appropriate rhomboid flap was drawn incorporating the defect.    The area thus outlined was incised deep to adipose tissue with a #15 scalpel blade.  The skin margins were undermined to an appropriate distance in all directions utilizing iris scissors. Bi-Rhombic Flap Text: The defect edges were debeveled with a #15 scalpel blade.  Given the location of the defect and the proximity to free margins a bi-rhombic flap was deemed most appropriate.  Using a sterile surgical marker, an appropriate rhombic flap was drawn incorporating the defect. The area thus outlined was incised deep to adipose tissue with a #15 scalpel blade.  The skin margins were undermined to an appropriate distance in all directions utilizing iris scissors. Helical Rim Advancement Flap Text: The defect edges were debeveled with a #15 blade scalpel.  Given the location of the defect and the proximity to free margins (helical rim) a double helical rim advancement flap was deemed most appropriate.  Using a sterile surgical marker, the appropriate advancement flaps were drawn incorporating the defect and placing the expected incisions between the helical rim and antihelix where possible.  The area thus outlined was incised through and through with a #15 scalpel blade.  With a skin hook and iris scissors, the flaps were gently and sharply undermined and freed up. Bilateral Helical Rim Advancement Flap Text: The defect edges were debeveled with a #15 blade scalpel.  Given the location of the defect and the proximity to free margins (helical rim) a bilateral helical rim advancement flap was deemed most appropriate.  Using a sterile surgical marker, the appropriate advancement flaps were drawn incorporating the defect and placing the expected incisions between the helical rim and antihelix where possible.  The area thus outlined was incised through and through with a #15 scalpel blade.  With a skin hook and iris scissors, the flaps were gently and sharply undermined and freed up. Ear Star Wedge Flap Text: The defect edges were debeveled with a #15 blade scalpel.  Given the location of the defect and the proximity to free margins (helical rim) an ear star wedge flap was deemed most appropriate.  Using a sterile surgical marker, the appropriate flap was drawn incorporating the defect and placing the expected incisions between the helical rim and antihelix where possible.  The area thus outlined was incised through and through with a #15 scalpel blade. Flip-Flop Flap Text: The defect edges were debeveled with a #15 blade scalpel.  Given the location of the defect and the proximity to free margins a flip-flop flap was deemed most appropriate. Using a sterile surgical marker, the appropriate flap was drawn incorporating the defect and placing the expected incisions between the helical rim and antihelix where possible.  The area thus outlined was incised through and through with a #15 scalpel blade. Following this, the designed flap was carried over into the primary defect and sutured into place. Banner Transposition Flap Text: The defect edges were debeveled with a #15 scalpel blade.  Given the location of the defect and the proximity to free margins a Banner transposition flap was deemed most appropriate.  Using a sterile surgical marker, an appropriate flap drawn around the defect. The area thus outlined was incised deep to adipose tissue with a #15 scalpel blade.  The skin margins were undermined to an appropriate distance in all directions utilizing iris scissors. Bilobed Flap Text: The defect edges were debeveled with a #15 scalpel blade.  Given the location of the defect and the proximity to free margins a bilobe flap was deemed most appropriate.  Using a sterile surgical marker, an appropriate bilobe flap drawn around the defect.    The area thus outlined was incised deep to adipose tissue with a #15 scalpel blade.  The skin margins were undermined to an appropriate distance in all directions utilizing iris scissors. Bilobed Transposition Flap Text: The defect edges were debeveled with a #15 scalpel blade.  Given the location of the defect and the proximity to free margins a bilobed transposition flap was deemed most appropriate.  Using a sterile surgical marker, an appropriate bilobe flap drawn around the defect.    The area thus outlined was incised deep to adipose tissue with a #15 scalpel blade.  The skin margins were undermined to an appropriate distance in all directions utilizing iris scissors. Trilobed Flap Text: The defect edges were debeveled with a #15 scalpel blade.  Given the location of the defect and the proximity to free margins a trilobed flap was deemed most appropriate.  Using a sterile surgical marker, an appropriate trilobed flap drawn around the defect.    The area thus outlined was incised deep to adipose tissue with a #15 scalpel blade.  The skin margins were undermined to an appropriate distance in all directions utilizing iris scissors. Dorsal Nasal Flap Text: The defect edges were debeveled with a #15 scalpel blade.  Given the location of the defect and the proximity to free margins a dorsal nasal flap was deemed most appropriate.  Using a sterile surgical marker, an appropriate dorsal nasal flap was drawn around the defect.    The area thus outlined was incised deep to adipose tissue with a #15 scalpel blade.  The skin margins were undermined to an appropriate distance in all directions utilizing iris scissors. Island Pedicle Flap Text: The defect edges were debeveled with a #15 scalpel blade.  Given the location of the defect, shape of the defect and the proximity to free margins an island pedicle advancement flap was deemed most appropriate.  Using a sterile surgical marker, an appropriate advancement flap was drawn incorporating the defect, outlining the appropriate donor tissue and placing the expected incisions within the relaxed skin tension lines where possible.    The area thus outlined was incised deep to adipose tissue with a #15 scalpel blade.  The skin margins were undermined to an appropriate distance in all directions around the primary defect and laterally outward around the island pedicle utilizing iris scissors.  There was minimal undermining beneath the pedicle flap. Island Pedicle Flap With Canthal Suspension Text: The defect edges were debeveled with a #15 scalpel blade.  Given the location of the defect, shape of the defect and the proximity to free margins an island pedicle advancement flap was deemed most appropriate.  Using a sterile surgical marker, an appropriate advancement flap was drawn incorporating the defect, outlining the appropriate donor tissue and placing the expected incisions within the relaxed skin tension lines where possible. The area thus outlined was incised deep to adipose tissue with a #15 scalpel blade.  The skin margins were undermined to an appropriate distance in all directions around the primary defect and laterally outward around the island pedicle utilizing iris scissors.  There was minimal undermining beneath the pedicle flap. A suspension suture was placed in the canthal tendon to prevent tension and prevent ectropion. Alar Island Pedicle Flap Text: The defect edges were debeveled with a #15 scalpel blade.  Given the location of the defect, shape of the defect and the proximity to the alar rim an island pedicle advancement flap was deemed most appropriate.  Using a sterile surgical marker, an appropriate advancement flap was drawn incorporating the defect, outlining the appropriate donor tissue and placing the expected incisions within the nasal ala running parallel to the alar rim. The area thus outlined was incised with a #15 scalpel blade.  The skin margins were undermined minimally to an appropriate distance in all directions around the primary defect and laterally outward around the island pedicle utilizing iris scissors.  There was minimal undermining beneath the pedicle flap. Double Island Pedicle Flap Text: The defect edges were debeveled with a #15 scalpel blade.  Given the location of the defect, shape of the defect and the proximity to free margins a double island pedicle advancement flap was deemed most appropriate.  Using a sterile surgical marker, an appropriate advancement flap was drawn incorporating the defect, outlining the appropriate donor tissue and placing the expected incisions within the relaxed skin tension lines where possible.    The area thus outlined was incised deep to adipose tissue with a #15 scalpel blade.  The skin margins were undermined to an appropriate distance in all directions around the primary defect and laterally outward around the island pedicle utilizing iris scissors.  There was minimal undermining beneath the pedicle flap. Island Pedicle Flap-Requiring Vessel Identification Text: The defect edges were debeveled with a #15 scalpel blade.  Given the location of the defect, shape of the defect and the proximity to free margins an island pedicle advancement flap was deemed most appropriate.  Using a sterile surgical marker, an appropriate advancement flap was drawn, based on the axial vessel mentioned above, incorporating the defect, outlining the appropriate donor tissue and placing the expected incisions within the relaxed skin tension lines where possible.    The area thus outlined was incised deep to adipose tissue with a #15 scalpel blade.  The skin margins were undermined to an appropriate distance in all directions around the primary defect and laterally outward around the island pedicle utilizing iris scissors.  There was minimal undermining beneath the pedicle flap. Keystone Flap Text: The defect edges were debeveled with a #15 scalpel blade.  Given the location of the defect, shape of the defect a keystone flap was deemed most appropriate.  Using a sterile surgical marker, an appropriate keystone flap was drawn incorporating the defect, outlining the appropriate donor tissue and placing the expected incisions within the relaxed skin tension lines where possible. The area thus outlined was incised deep to adipose tissue with a #15 scalpel blade.  The skin margins were undermined to an appropriate distance in all directions around the primary defect and laterally outward around the flap utilizing iris scissors. O-T Plasty Text: The defect edges were debeveled with a #15 scalpel blade.  Given the location of the defect, shape of the defect and the proximity to free margins an O-T plasty was deemed most appropriate.  Using a sterile surgical marker, an appropriate O-T plasty was drawn incorporating the defect and placing the expected incisions within the relaxed skin tension lines where possible.    The area thus outlined was incised deep to adipose tissue with a #15 scalpel blade.  The skin margins were undermined to an appropriate distance in all directions utilizing iris scissors. O-Z Plasty Text: The defect edges were debeveled with a #15 scalpel blade.  Given the location of the defect, shape of the defect and the proximity to free margins an O-Z plasty (double transposition flap) was deemed most appropriate.  Using a sterile surgical marker, the appropriate transposition flaps were drawn incorporating the defect and placing the expected incisions within the relaxed skin tension lines where possible.    The area thus outlined was incised deep to adipose tissue with a #15 scalpel blade.  The skin margins were undermined to an appropriate distance in all directions utilizing iris scissors.  Hemostasis was achieved with electrocautery.  The flaps were then transposed into place, one clockwise and the other counterclockwise, and anchored with interrupted buried subcutaneous sutures. Double O-Z Plasty Text: The defect edges were debeveled with a #15 scalpel blade.  Given the location of the defect, shape of the defect and the proximity to free margins a Double O-Z plasty (double transposition flap) was deemed most appropriate.  Using a sterile surgical marker, the appropriate transposition flaps were drawn incorporating the defect and placing the expected incisions within the relaxed skin tension lines where possible. The area thus outlined was incised deep to adipose tissue with a #15 scalpel blade.  The skin margins were undermined to an appropriate distance in all directions utilizing iris scissors.  Hemostasis was achieved with electrocautery.  The flaps were then transposed into place, one clockwise and the other counterclockwise, and anchored with interrupted buried subcutaneous sutures. V-Y Plasty Text: The defect edges were debeveled with a #15 scalpel blade.  Given the location of the defect, shape of the defect and the proximity to free margins an V-Y advancement flap was deemed most appropriate.  Using a sterile surgical marker, an appropriate advancement flap was drawn incorporating the defect and placing the expected incisions within the relaxed skin tension lines where possible.    The area thus outlined was incised deep to adipose tissue with a #15 scalpel blade.  The skin margins were undermined to an appropriate distance in all directions utilizing iris scissors. H Plasty Text: Given the location of the defect, shape of the defect and the proximity to free margins a H-plasty was deemed most appropriate for repair.  Using a sterile surgical marker, the appropriate advancement arms of the H-plasty were drawn incorporating the defect and placing the expected incisions within the relaxed skin tension lines where possible. The area thus outlined was incised deep to adipose tissue with a #15 scalpel blade. The skin margins were undermined to an appropriate distance in all directions utilizing iris scissors.  The opposing advancement arms were then advanced into place in opposite direction and anchored with interrupted buried subcutaneous sutures. W Plasty Text: The lesion was extirpated to the level of the fat with a #15 scalpel blade.  Given the location of the defect, shape of the defect and the proximity to free margins a W-plasty was deemed most appropriate for repair.  Using a sterile surgical marker, the appropriate transposition arms of the W-plasty were drawn incorporating the defect and placing the expected incisions within the relaxed skin tension lines where possible.    The area thus outlined was incised deep to adipose tissue with a #15 scalpel blade.  The skin margins were undermined to an appropriate distance in all directions utilizing iris scissors.  The opposing transposition arms were then transposed into place in opposite direction and anchored with interrupted buried subcutaneous sutures. Z Plasty Text: The lesion was extirpated to the level of the fat with a #15 scalpel blade.  Given the location of the defect, shape of the defect and the proximity to free margins a Z-plasty was deemed most appropriate for repair.  Using a sterile surgical marker, the appropriate transposition arms of the Z-plasty were drawn incorporating the defect and placing the expected incisions within the relaxed skin tension lines where possible.    The area thus outlined was incised deep to adipose tissue with a #15 scalpel blade.  The skin margins were undermined to an appropriate distance in all directions utilizing iris scissors.  The opposing transposition arms were then transposed into place in opposite direction and anchored with interrupted buried subcutaneous sutures. Double Z Plasty Text: The lesion was extirpated to the level of the fat with a #15 scalpel blade. Given the location of the defect, shape of the defect and the proximity to free margins a double Z-plasty was deemed most appropriate for repair. Using a sterile surgical marker, the appropriate transposition arms of the double Z-plasty were drawn incorporating the defect and placing the expected incisions within the relaxed skin tension lines where possible. The area thus outlined was incised deep to adipose tissue with a #15 scalpel blade. The skin margins were undermined to an appropriate distance in all directions utilizing iris scissors. The opposing transposition arms were then transposed and carried over into place in opposite direction and anchored with interrupted buried subcutaneous sutures. Zygomaticofacial Flap Text: Given the location of the defect, shape of the defect and the proximity to free margins a zygomaticofacial flap was deemed most appropriate for repair.  Using a sterile surgical marker, the appropriate flap was drawn incorporating the defect and placing the expected incisions within the relaxed skin tension lines where possible. The area thus outlined was incised deep to adipose tissue with a #15 scalpel blade with preservation of a vascular pedicle.  The skin margins were undermined to an appropriate distance in all directions utilizing iris scissors.  The flap was then placed into the defect and anchored with interrupted buried subcutaneous sutures. Cheek Interpolation Flap Text: A decision was made to reconstruct the defect utilizing an interpolation axial flap and a staged reconstruction.  A telfa template was made of the defect.  This telfa template was then used to outline the Cheek Interpolation flap.  The donor area for the pedicle flap was then injected with anesthesia.  The flap was excised through the skin and subcutaneous tissue down to the layer of the underlying musculature.  The interpolation flap was carefully excised within this deep plane to maintain its blood supply.  The edges of the donor site were undermined.   The donor site was closed in a primary fashion.  The pedicle was then rotated into position and sutured.  Once the tube was sutured into place, adequate blood supply was confirmed with blanching and refill.  The pedicle was then wrapped with xeroform gauze and dressed appropriately with a telfa and gauze bandage to ensure continued blood supply and protect the attached pedicle. Cheek-To-Nose Interpolation Flap Text: A decision was made to reconstruct the defect utilizing an interpolation axial flap and a staged reconstruction.  A telfa template was made of the defect.  This telfa template was then used to outline the Cheek-To-Nose Interpolation flap.  The donor area for the pedicle flap was then injected with anesthesia.  The flap was excised through the skin and subcutaneous tissue down to the layer of the underlying musculature.  The interpolation flap was carefully excised within this deep plane to maintain its blood supply.  The edges of the donor site were undermined.   The donor site was closed in a primary fashion.  The pedicle was then rotated into position and sutured.  Once the tube was sutured into place, adequate blood supply was confirmed with blanching and refill.  The pedicle was then wrapped with xeroform gauze and dressed appropriately with a telfa and gauze bandage to ensure continued blood supply and protect the attached pedicle. Interpolation Flap Text: A decision was made to reconstruct the defect utilizing an interpolation axial flap and a staged reconstruction.  A telfa template was made of the defect.  This telfa template was then used to outline the interpolation flap.  The donor area for the pedicle flap was then injected with anesthesia.  The flap was excised through the skin and subcutaneous tissue down to the layer of the underlying musculature.  The interpolation flap was carefully excised within this deep plane to maintain its blood supply.  The edges of the donor site were undermined.   The donor site was closed in a primary fashion.  The pedicle was then rotated into position and sutured.  Once the tube was sutured into place, adequate blood supply was confirmed with blanching and refill.  The pedicle was then wrapped with xeroform gauze and dressed appropriately with a telfa and gauze bandage to ensure continued blood supply and protect the attached pedicle. Melolabial Interpolation Flap Text: A decision was made to reconstruct the defect utilizing an interpolation axial flap and a staged reconstruction.  A telfa template was made of the defect.  This telfa template was then used to outline the melolabial interpolation flap.  The donor area for the pedicle flap was then injected with anesthesia.  The flap was excised through the skin and subcutaneous tissue down to the layer of the underlying musculature.  The pedicle flap was carefully excised within this deep plane to maintain its blood supply.  The edges of the donor site were undermined.   The donor site was closed in a primary fashion.  The pedicle was then rotated into position and sutured.  Once the tube was sutured into place, adequate blood supply was confirmed with blanching and refill.  The pedicle was then wrapped with xeroform gauze and dressed appropriately with a telfa and gauze bandage to ensure continued blood supply and protect the attached pedicle. Mastoid Interpolation Flap Text: A decision was made to reconstruct the defect utilizing an interpolation axial flap and a staged reconstruction.  A telfa template was made of the defect.  This telfa template was then used to outline the mastoid interpolation flap.  The donor area for the pedicle flap was then injected with anesthesia.  The flap was excised through the skin and subcutaneous tissue down to the layer of the underlying musculature.  The pedicle flap was carefully excised within this deep plane to maintain its blood supply.  The edges of the donor site were undermined.   The donor site was closed in a primary fashion.  The pedicle was then rotated into position and sutured.  Once the tube was sutured into place, adequate blood supply was confirmed with blanching and refill.  The pedicle was then wrapped with xeroform gauze and dressed appropriately with a telfa and gauze bandage to ensure continued blood supply and protect the attached pedicle. Posterior Auricular Interpolation Flap Text: A decision was made to reconstruct the defect utilizing an interpolation axial flap and a staged reconstruction.  A telfa template was made of the defect.  This telfa template was then used to outline the posterior auricular interpolation flap.  The donor area for the pedicle flap was then injected with anesthesia.  The flap was excised through the skin and subcutaneous tissue down to the layer of the underlying musculature.  The pedicle flap was carefully excised within this deep plane to maintain its blood supply.  The edges of the donor site were undermined.   The donor site was closed in a primary fashion.  The pedicle was then rotated into position and sutured.  Once the tube was sutured into place, adequate blood supply was confirmed with blanching and refill.  The pedicle was then wrapped with xeroform gauze and dressed appropriately with a telfa and gauze bandage to ensure continued blood supply and protect the attached pedicle. Paramedian Forehead Flap Text: A decision was made to reconstruct the defect utilizing an interpolation axial flap and a staged reconstruction.  A telfa template was made of the defect.  This telfa template was then used to outline the paramedian forehead pedicle flap.  The donor area for the pedicle flap was then injected with anesthesia.  The flap was excised through the skin and subcutaneous tissue down to the layer of the underlying musculature.  The pedicle flap was carefully excised within this deep plane to maintain its blood supply.  The edges of the donor site were undermined.   The donor site was closed in a primary fashion.  The pedicle was then rotated into position and sutured.  Once the tube was sutured into place, adequate blood supply was confirmed with blanching and refill.  The pedicle was then wrapped with xeroform gauze and dressed appropriately with a telfa and gauze bandage to ensure continued blood supply and protect the attached pedicle. Abbe Flap (Upper To Lower Lip) Text: The defect of the lower lip was assessed and measured.  Given the location and size of the defect, an Abbe flap was deemed most appropriate.  Using a sterile surgical marker, an appropriate Abbe flap was measured and drawn on the upper lip. Local anesthesia was then infiltrated.  A scalpel was then used to incise the upper lip through and through the skin, vermilion, muscle and mucosa, leaving the flap pedicled on the opposite side.  The flap was then rotated and transferred to the lower lip defect.  The flap was then sutured into place with a three layer technique, closing the orbicularis oris muscle layer with subcutaneous buried sutures, followed by a mucosal layer and an epidermal layer. Abbe Flap (Lower To Upper Lip) Text: The defect of the upper lip was assessed and measured.  Given the location and size of the defect, an Abbe flap was deemed most appropriate.  Using a sterile surgical marker, an appropriate Abbe flap was measured and drawn on the lower lip. Local anesthesia was then infiltrated. A scalpel was then used to incise the upper lip through and through the skin, vermilion, muscle and mucosa, leaving the flap pedicled on the opposite side.  The flap was then rotated and transferred to the lower lip defect.  The flap was then sutured into place with a three layer technique, closing the orbicularis oris muscle layer with subcutaneous buried sutures, followed by a mucosal layer and an epidermal layer. Estlander Flap (Upper To Lower Lip) Text: The defect of the lower lip was assessed and measured.  Given the location and size of the defect, an Estlander flap was deemed most appropriate.  Using a sterile surgical marker, an appropriate Estlander flap was measured and drawn on the upper lip. Local anesthesia was then infiltrated. A scalpel was then used to incise the lateral aspect of the flap, through skin, muscle and mucosa, leaving the flap pedicled medially.  The flap was then rotated and positioned to fill the lower lip defect.  The flap was then sutured into place with a three layer technique, closing the orbicularis oris muscle layer with subcutaneous buried sutures, followed by a mucosal layer and an epidermal layer. Lip Wedge Excision Repair Text: Given the location of the defect and the proximity to free margins a full thickness wedge repair was deemed most appropriate.  Using a sterile surgical marker, the appropriate repair was drawn incorporating the defect and placing the expected incisions perpendicular to the vermilion border.  The vermilion border was also meticulously outlined to ensure appropriate reapproximation during the repair.  The area thus outlined was incised through and through with a #15 scalpel blade.  The muscularis and dermis were reaproximated with deep sutures following hemostasis. Care was taken to realign the vermilion border before proceeding with the superficial closure.  Once the vermilion was realigned the superfical and mucosal closure was finished. Ftsg Text: The defect edges were debeveled with a #15 scalpel blade.  Given the location of the defect, shape of the defect and the proximity to free margins a full thickness skin graft was deemed most appropriate.  Using a sterile surgical marker, the primary defect shape was transferred to the donor site. The area thus outlined was incised deep to adipose tissue with a #15 scalpel blade.  The harvested graft was then trimmed of adipose tissue until only dermis and epidermis was left.  The skin margins of the secondary defect were undermined to an appropriate distance in all directions utilizing iris scissors.  The secondary defect was closed with interrupted buried subcutaneous sutures.  The skin edges were then re-apposed with running  sutures.  The skin graft was then placed in the primary defect and oriented appropriately. Split-Thickness Skin Graft Text: The defect edges were debeveled with a #15 scalpel blade.  Given the location of the defect, shape of the defect and the proximity to free margins a split thickness skin graft was deemed most appropriate.  Using a sterile surgical marker, the primary defect shape was transferred to the donor site. The split thickness graft was then harvested.  The skin graft was then placed in the primary defect and oriented appropriately. Pinch Graft Text: The defect edges were debeveled with a #15 scalpel blade. Given the location of the defect, shape of the defect and the proximity to free margins a pinch graft was deemed most appropriate. Using a sterile surgical marker, the primary defect shape was transferred to the donor site. The area thus outlined was incised deep to adipose tissue with a #15 scalpel blade.  The harvested graft was then trimmed of adipose tissue until only dermis and epidermis was left. The skin graft was then placed in the primary defect and oriented appropriately. Burow's Graft Text: The defect edges were debeveled with a #15 scalpel blade.  Given the location of the defect, shape of the defect, the proximity to free margins and the presence of a standing cone deformity a Burow's skin graft was deemed most appropriate. The standing cone was removed and this tissue was then trimmed to the shape of the primary defect. The adipose tissue was also removed until only dermis and epidermis were left.  The skin margins of the secondary defect were undermined to an appropriate distance in all directions utilizing iris scissors.  The secondary defect was closed with interrupted buried subcutaneous sutures.  The skin edges were then re-apposed with running  sutures.  The skin graft was then placed in the primary defect and oriented appropriately. Cartilage Graft Text: The defect edges were debeveled with a #15 scalpel blade.  Given the location of the defect, shape of the defect, the fact the defect involved a full thickness cartilage defect a cartilage graft was deemed most appropriate.  An appropriate donor site was identified, cleansed, and anesthetized. The cartilage graft was then harvested and transferred to the recipient site, oriented appropriately and then sutured into place.  The secondary defect was then repaired using a primary closure. Composite Graft Text: The defect edges were debeveled with a #15 scalpel blade.  Given the location of the defect, shape of the defect, the proximity to free margins and the fact the defect was full thickness a composite graft was deemed most appropriate.  The defect was outline and then transferred to the donor site.  A full thickness graft was then excised from the donor site. The graft was then placed in the primary defect, oriented appropriately and then sutured into place.  The secondary defect was then repaired using a primary closure. Epidermal Autograft Text: The defect edges were debeveled with a #15 scalpel blade.  Given the location of the defect, shape of the defect and the proximity to free margins an epidermal autograft was deemed most appropriate.  Using a sterile surgical marker, the primary defect shape was transferred to the donor site. The epidermal graft was then harvested.  The skin graft was then placed in the primary defect and oriented appropriately. Dermal Autograft Text: The defect edges were debeveled with a #15 scalpel blade.  Given the location of the defect, shape of the defect and the proximity to free margins a dermal autograft was deemed most appropriate.  Using a sterile surgical marker, the primary defect shape was transferred to the donor site. The area thus outlined was incised deep to adipose tissue with a #15 scalpel blade.  The harvested graft was then trimmed of adipose and epidermal tissue until only dermis was left.  The skin graft was then placed in the primary defect and oriented appropriately. Skin Substitute Text: The defect edges were debeveled with a #15 scalpel blade.  Given the location of the defect, shape of the defect and the proximity to free margins a skin substitute graft was deemed most appropriate.  The graft material was trimmed to fit the size of the defect. The graft was then placed in the primary defect and oriented appropriately. Tissue Cultured Epidermal Autograft Text: The defect edges were debeveled with a #15 scalpel blade.  Given the location of the defect, shape of the defect and the proximity to free margins a tissue cultured epidermal autograft was deemed most appropriate.  The graft was then trimmed to fit the size of the defect.  The graft was then placed in the primary defect and oriented appropriately. Xenograft Text: The defect edges were debeveled with a #15 scalpel blade.  Given the location of the defect, shape of the defect and the proximity to free margins a xenograft was deemed most appropriate.  The graft was then trimmed to fit the size of the defect.  The graft was then placed in the primary defect and oriented appropriately. Purse String (Intermediate) Text: Given the location of the defect and the characteristics of the surrounding skin a purse string intermediate closure was deemed most appropriate.  Undermining was performed circumfirentially around the surgical defect.  A purse string suture was then placed and tightened. Purse String (Simple) Text: Given the location of the defect and the characteristics of the surrounding skin a purse string simple closure was deemed most appropriate.  Undermining was performed circumferentially around the surgical defect.  A purse string suture was then placed and tightened. Partial Purse String (Intermediate) Text: Given the location of the defect and the characteristics of the surrounding skin an intermediate purse string closure was deemed most appropriate.  Undermining was performed circumferentially around the surgical defect.  A purse string suture was then placed and tightened. Wound tension of the circular defect prevented complete closure of the wound. Partial Purse String (Simple) Text: Given the location of the defect and the characteristics of the surrounding skin a simple purse string closure was deemed most appropriate.  Undermining was performed circumferentially around the surgical defect.  A purse string suture was then placed and tightened. Wound tension of the circular defect prevented complete closure of the wound. Complex Repair And Single Advancement Flap Text: The defect edges were debeveled with a #15 scalpel blade.  The primary defect was closed partially with a complex linear closure.  Given the location of the remaining defect, shape of the defect and the proximity to free margins a single advancement flap was deemed most appropriate for complete closure of the defect.  Using a sterile surgical marker, an appropriate advancement flap was drawn incorporating the defect and placing the expected incisions within the relaxed skin tension lines where possible.    The area thus outlined was incised deep to adipose tissue with a #15 scalpel blade.  The skin margins were undermined to an appropriate distance in all directions utilizing iris scissors. Complex Repair And Double Advancement Flap Text: The defect edges were debeveled with a #15 scalpel blade.  The primary defect was closed partially with a complex linear closure.  Given the location of the remaining defect, shape of the defect and the proximity to free margins a double advancement flap was deemed most appropriate for complete closure of the defect.  Using a sterile surgical marker, an appropriate advancement flap was drawn incorporating the defect and placing the expected incisions within the relaxed skin tension lines where possible.    The area thus outlined was incised deep to adipose tissue with a #15 scalpel blade.  The skin margins were undermined to an appropriate distance in all directions utilizing iris scissors. Complex Repair And Modified Advancement Flap Text: The defect edges were debeveled with a #15 scalpel blade.  The primary defect was closed partially with a complex linear closure.  Given the location of the remaining defect, shape of the defect and the proximity to free margins a modified advancement flap was deemed most appropriate for complete closure of the defect.  Using a sterile surgical marker, an appropriate advancement flap was drawn incorporating the defect and placing the expected incisions within the relaxed skin tension lines where possible.    The area thus outlined was incised deep to adipose tissue with a #15 scalpel blade.  The skin margins were undermined to an appropriate distance in all directions utilizing iris scissors. Complex Repair And A-T Advancement Flap Text: The defect edges were debeveled with a #15 scalpel blade.  The primary defect was closed partially with a complex linear closure.  Given the location of the remaining defect, shape of the defect and the proximity to free margins an A-T advancement flap was deemed most appropriate for complete closure of the defect.  Using a sterile surgical marker, an appropriate advancement flap was drawn incorporating the defect and placing the expected incisions within the relaxed skin tension lines where possible.    The area thus outlined was incised deep to adipose tissue with a #15 scalpel blade.  The skin margins were undermined to an appropriate distance in all directions utilizing iris scissors. Complex Repair And O-T Advancement Flap Text: The defect edges were debeveled with a #15 scalpel blade.  The primary defect was closed partially with a complex linear closure.  Given the location of the remaining defect, shape of the defect and the proximity to free margins an O-T advancement flap was deemed most appropriate for complete closure of the defect.  Using a sterile surgical marker, an appropriate advancement flap was drawn incorporating the defect and placing the expected incisions within the relaxed skin tension lines where possible.    The area thus outlined was incised deep to adipose tissue with a #15 scalpel blade.  The skin margins were undermined to an appropriate distance in all directions utilizing iris scissors. Complex Repair And O-L Flap Text: The defect edges were debeveled with a #15 scalpel blade.  The primary defect was closed partially with a complex linear closure.  Given the location of the remaining defect, shape of the defect and the proximity to free margins an O-L flap was deemed most appropriate for complete closure of the defect.  Using a sterile surgical marker, an appropriate flap was drawn incorporating the defect and placing the expected incisions within the relaxed skin tension lines where possible.    The area thus outlined was incised deep to adipose tissue with a #15 scalpel blade.  The skin margins were undermined to an appropriate distance in all directions utilizing iris scissors. Complex Repair And Bilobe Flap Text: The defect edges were debeveled with a #15 scalpel blade.  The primary defect was closed partially with a complex linear closure.  Given the location of the remaining defect, shape of the defect and the proximity to free margins a bilobe flap was deemed most appropriate for complete closure of the defect.  Using a sterile surgical marker, an appropriate advancement flap was drawn incorporating the defect and placing the expected incisions within the relaxed skin tension lines where possible.    The area thus outlined was incised deep to adipose tissue with a #15 scalpel blade.  The skin margins were undermined to an appropriate distance in all directions utilizing iris scissors. Complex Repair And Melolabial Flap Text: The defect edges were debeveled with a #15 scalpel blade.  The primary defect was closed partially with a complex linear closure.  Given the location of the remaining defect, shape of the defect and the proximity to free margins a melolabial flap was deemed most appropriate for complete closure of the defect.  Using a sterile surgical marker, an appropriate advancement flap was drawn incorporating the defect and placing the expected incisions within the relaxed skin tension lines where possible.    The area thus outlined was incised deep to adipose tissue with a #15 scalpel blade.  The skin margins were undermined to an appropriate distance in all directions utilizing iris scissors. Complex Repair And Rotation Flap Text: The defect edges were debeveled with a #15 scalpel blade.  The primary defect was closed partially with a complex linear closure.  Given the location of the remaining defect, shape of the defect and the proximity to free margins a rotation flap was deemed most appropriate for complete closure of the defect.  Using a sterile surgical marker, an appropriate advancement flap was drawn incorporating the defect and placing the expected incisions within the relaxed skin tension lines where possible.    The area thus outlined was incised deep to adipose tissue with a #15 scalpel blade.  The skin margins were undermined to an appropriate distance in all directions utilizing iris scissors. Complex Repair And Rhombic Flap Text: The defect edges were debeveled with a #15 scalpel blade.  The primary defect was closed partially with a complex linear closure.  Given the location of the remaining defect, shape of the defect and the proximity to free margins a rhombic flap was deemed most appropriate for complete closure of the defect.  Using a sterile surgical marker, an appropriate advancement flap was drawn incorporating the defect and placing the expected incisions within the relaxed skin tension lines where possible.    The area thus outlined was incised deep to adipose tissue with a #15 scalpel blade.  The skin margins were undermined to an appropriate distance in all directions utilizing iris scissors. Complex Repair And Transposition Flap Text: The defect edges were debeveled with a #15 scalpel blade.  The primary defect was closed partially with a complex linear closure.  Given the location of the remaining defect, shape of the defect and the proximity to free margins a transposition flap was deemed most appropriate for complete closure of the defect.  Using a sterile surgical marker, an appropriate advancement flap was drawn incorporating the defect and placing the expected incisions within the relaxed skin tension lines where possible.    The area thus outlined was incised deep to adipose tissue with a #15 scalpel blade.  The skin margins were undermined to an appropriate distance in all directions utilizing iris scissors. Complex Repair And V-Y Plasty Text: The defect edges were debeveled with a #15 scalpel blade.  The primary defect was closed partially with a complex linear closure.  Given the location of the remaining defect, shape of the defect and the proximity to free margins a V-Y plasty was deemed most appropriate for complete closure of the defect.  Using a sterile surgical marker, an appropriate advancement flap was drawn incorporating the defect and placing the expected incisions within the relaxed skin tension lines where possible.    The area thus outlined was incised deep to adipose tissue with a #15 scalpel blade.  The skin margins were undermined to an appropriate distance in all directions utilizing iris scissors. Complex Repair And M Plasty Text: The defect edges were debeveled with a #15 scalpel blade.  The primary defect was closed partially with a complex linear closure.  Given the location of the remaining defect, shape of the defect and the proximity to free margins an M plasty was deemed most appropriate for complete closure of the defect.  Using a sterile surgical marker, an appropriate advancement flap was drawn incorporating the defect and placing the expected incisions within the relaxed skin tension lines where possible.    The area thus outlined was incised deep to adipose tissue with a #15 scalpel blade.  The skin margins were undermined to an appropriate distance in all directions utilizing iris scissors. Complex Repair And Double M Plasty Text: The defect edges were debeveled with a #15 scalpel blade.  The primary defect was closed partially with a complex linear closure.  Given the location of the remaining defect, shape of the defect and the proximity to free margins a double M plasty was deemed most appropriate for complete closure of the defect.  Using a sterile surgical marker, an appropriate advancement flap was drawn incorporating the defect and placing the expected incisions within the relaxed skin tension lines where possible.    The area thus outlined was incised deep to adipose tissue with a #15 scalpel blade.  The skin margins were undermined to an appropriate distance in all directions utilizing iris scissors. Complex Repair And W Plasty Text: The defect edges were debeveled with a #15 scalpel blade.  The primary defect was closed partially with a complex linear closure.  Given the location of the remaining defect, shape of the defect and the proximity to free margins a W plasty was deemed most appropriate for complete closure of the defect.  Using a sterile surgical marker, an appropriate advancement flap was drawn incorporating the defect and placing the expected incisions within the relaxed skin tension lines where possible.    The area thus outlined was incised deep to adipose tissue with a #15 scalpel blade.  The skin margins were undermined to an appropriate distance in all directions utilizing iris scissors. Complex Repair And Z Plasty Text: The defect edges were debeveled with a #15 scalpel blade.  The primary defect was closed partially with a complex linear closure.  Given the location of the remaining defect, shape of the defect and the proximity to free margins a Z plasty was deemed most appropriate for complete closure of the defect.  Using a sterile surgical marker, an appropriate advancement flap was drawn incorporating the defect and placing the expected incisions within the relaxed skin tension lines where possible.    The area thus outlined was incised deep to adipose tissue with a #15 scalpel blade.  The skin margins were undermined to an appropriate distance in all directions utilizing iris scissors. Complex Repair And Dorsal Nasal Flap Text: The defect edges were debeveled with a #15 scalpel blade.  The primary defect was closed partially with a complex linear closure.  Given the location of the remaining defect, shape of the defect and the proximity to free margins a dorsal nasal flap was deemed most appropriate for complete closure of the defect.  Using a sterile surgical marker, an appropriate flap was drawn incorporating the defect and placing the expected incisions within the relaxed skin tension lines where possible.    The area thus outlined was incised deep to adipose tissue with a #15 scalpel blade.  The skin margins were undermined to an appropriate distance in all directions utilizing iris scissors. Complex Repair And Ftsg Text: The defect edges were debeveled with a #15 scalpel blade.  The primary defect was closed partially with a complex linear closure.  Given the location of the defect, shape of the defect and the proximity to free margins a full thickness skin graft was deemed most appropriate to repair the remaining defect.  The graft was trimmed to fit the size of the remaining defect.  The graft was then placed in the primary defect, oriented appropriately, and sutured into place. Complex Repair And Burow's Graft Text: The defect edges were debeveled with a #15 scalpel blade.  The primary defect was closed partially with a complex linear closure.  Given the location of the defect, shape of the defect, the proximity to free margins and the presence of a standing cone deformity a Burow's graft was deemed most appropriate to repair the remaining defect.  The graft was trimmed to fit the size of the remaining defect.  The graft was then placed in the primary defect, oriented appropriately, and sutured into place. Complex Repair And Split-Thickness Skin Graft Text: The defect edges were debeveled with a #15 scalpel blade.  The primary defect was closed partially with a complex linear closure.  Given the location of the defect, shape of the defect and the proximity to free margins a split thickness skin graft was deemed most appropriate to repair the remaining defect.  The graft was trimmed to fit the size of the remaining defect.  The graft was then placed in the primary defect, oriented appropriately, and sutured into place. Complex Repair And Epidermal Autograft Text: The defect edges were debeveled with a #15 scalpel blade.  The primary defect was closed partially with a complex linear closure.  Given the location of the defect, shape of the defect and the proximity to free margins an epidermal autograft was deemed most appropriate to repair the remaining defect.  The graft was trimmed to fit the size of the remaining defect.  The graft was then placed in the primary defect, oriented appropriately, and sutured into place. Complex Repair And Dermal Autograft Text: The defect edges were debeveled with a #15 scalpel blade.  The primary defect was closed partially with a complex linear closure.  Given the location of the defect, shape of the defect and the proximity to free margins an dermal autograft was deemed most appropriate to repair the remaining defect.  The graft was trimmed to fit the size of the remaining defect.  The graft was then placed in the primary defect, oriented appropriately, and sutured into place. Complex Repair And Tissue Cultured Epidermal Autograft Text: The defect edges were debeveled with a #15 scalpel blade.  The primary defect was closed partially with a complex linear closure.  Given the location of the defect, shape of the defect and the proximity to free margins an tissue cultured epidermal autograft was deemed most appropriate to repair the remaining defect.  The graft was trimmed to fit the size of the remaining defect.  The graft was then placed in the primary defect, oriented appropriately, and sutured into place. Complex Repair And Xenograft Text: The defect edges were debeveled with a #15 scalpel blade.  The primary defect was closed partially with a complex linear closure.  Given the location of the defect, shape of the defect and the proximity to free margins a xenograft was deemed most appropriate to repair the remaining defect.  The graft was trimmed to fit the size of the remaining defect.  The graft was then placed in the primary defect, oriented appropriately, and sutured into place. Complex Repair And Skin Substitute Graft Text: The defect edges were debeveled with a #15 scalpel blade.  The primary defect was closed partially with a complex linear closure.  Given the location of the remaining defect, shape of the defect and the proximity to free margins a skin substitute graft was deemed most appropriate to repair the remaining defect.  The graft was trimmed to fit the size of the remaining defect.  The graft was then placed in the primary defect, oriented appropriately, and sutured into place. Include Anticoagulation In Mohs Note?: Please Select the Appropriate Response Path Notes (To The Dermatopathologist): Please check margins. Consent was obtained from the patient. The risks and benefits to therapy were discussed in detail. Specifically, the risks of infection, scarring, bleeding, prolonged wound healing, incomplete removal, allergy to anesthesia, nerve injury and recurrence were addressed. Prior to the procedure, the treatment site was clearly identified and confirmed by the patient. All components of Universal Protocol/PAUSE Rule completed. Post-Care Instructions: I reviewed with the patient in detail post-care instructions. Patient is not to engage in any heavy lifting over 20 lbs, exercise, or swimming for the next 14 days. Should the patient develop any fevers, chills, bleeding, severe pain patient will contact the office immediately. Home Suture Removal Text: Patient was provided a home suture removal kit and will remove their sutures at home.  If they have any questions or difficulties they will call the office. Where Do You Want The Question To Include Opioid Counseling Located?: Case Summary Tab Information: Selecting Yes will display possible errors in your note based on the variables you have selected. This validation is only offered as a suggestion for you. PLEASE NOTE THAT THE VALIDATION TEXT WILL BE REMOVED WHEN YOU FINALIZE YOUR NOTE. IF YOU WANT TO FAX A PRELIMINARY NOTE YOU WILL NEED TO TOGGLE THIS TO 'NO' IF YOU DO NOT WANT IT IN YOUR FAXED NOTE.

## 2024-11-29 NOTE — PROGRESS NOTES
"Pelvic Health Physical Therapy   Treatment Note     Name: Cassidy Aguilar  Clinic Number: [id number]    Therapy Diagnosis:   Encounter Diagnoses   Name Primary?    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 She is scheduled for colpocleisis on 1/8/25.    She was compliant with home exercise program - sit to stands, kegels, countertop push-ups, seated clam, ball squeeze  Response to previous treatment: no adverse effect  Functional change: overall improved continence    Pain: none reported    Objective     Cassidy received the following interventions during the treatment session:   (TrA = transverse abdominis, PFM = pelvic floor muscle, sEMG = surface electromyography, RUSI = Rehabilitative Ultrasound Imaging)  Pt consented to pelvic floor muscle assessment and treatment on 8/7/24. See EMR.    Therapeutic activities to improve functional performance for 38 minutes -  [] FOTO survey  [x] Pelvic floor assessment - MMT = 3-4/5 (improved with verbal cues for "suctioning") with endurance of at least 8 seconds, endurance drops after 3 reps  [x] Education on pelvic floor anatomy & function  [x] Education on rehab following pelvic surgery  [] Education on double void technique  [] Education on normal void intervals and fluid intake  [x] Review of urge suppression techniques  [] " Instruction on the Knack for stress urinary incontinence   [x] Instruction on pressure management to protect pelvic organ prolapse/prevent incontinence  [] Education on voiding optimization - seated on toilet, no pushing  [] Education on bowel movement optimization - toilet stool  [x] HEP building/HEP review    Neuromuscular re-education activities to develop balance, coordination, kinesthetic sense, motor control, proprioception, and posture for 2 minutes -   [] TrA brace + PFM squeeze + straight leg raise (R&L), x10 with 5-second hold  [] Side-lying clam with focus on pelvic girdle stability with yellow band around knees (R&L), x15  [] Seated TrA brace + PFM squeeze, x5 with 10-second hold  [] TrA brace + PFM squeeze + seated hip adduction isometric with ball between knees, x10 with 10-second hold  [] PFM squeeze + bridging with belt around knees, 2x10 with 3-second hold  [] Side-lying hip abduction + arm press down onto ball (R&L), x10 with 8-second hold  [] TrA brace + PFM squeeze + wall push-up, x10  [] TrA brace + PFM squeeze + countertop push-up, x10  [] PFM squeeze + sit to stand from chair on exhale + holding 7# weight x10  [] TrA brace + seated clam with blue band, x10  [x] TrA brace + PFM squeeze + shoulder extension onto ball on opposite knee (R&L), x10 with 10-second hold  [] TrA brace + PFM squeeze + standing shoulder extension with green band, 2x10  [] Pelvic floor muscle coordination training with sEMG feedback: squeeze + fake cough (x3), long holds (4x5 with 10-second hold), and quick flicks (6x4)    Manual therapy techniques: to develop flexibility, desensitization, and extensibility for 0 minutes -  [] Manual release to pelvic floor muscles internally/vaginally: levator ani, superficial transverse perineal, perineal body      Home Exercises and Patient Education     Patient Education: progression of plan of care, plan for next session, pelvic floor anatomy & function, relationship between TrA &  "PFM, relationship between hips & PFM, urge suppression, etiology of urinary urgency, timeline for muscle changes with consistent strength training, proper body mechanics for bowel movement, pressure management for support of pelvic organ prolapse, and abdominal wall anatomy    Home Exercise Program (8/7/24): urge suppression, toilet stool, increased fiber  Home Exercise Program Updates (8/26/24): clams, straight leg raise, countertop push-up  HEP update (9/19/24): PFM long holds and quick flicks  HEP update (9/26/24): straight leg raise, ball squeeze between knees, side-lying clam, PFM squeeze + sit to stand, countertop push-up, seated clam  HEP update (10/3/24): none  HEP update (10/7/24): none  HEP update (10/17/24): add "suctioning" action to PFM squeezes  HEP update (10/24/24): none  HEP update (12/5/24): none    Exercises were reviewed, and Cassidy was able to demonstrate them prior to the end of the session, as needed. Cassidy demonstrated good understanding of the education provided.   See 'Patient Instructions' for exercises/instructions provided.    Assessment     Pt tolerated treatment session well, with good understanding of education provided and no increased symptoms with exercise. Core and PFM exercises reviewed today for updated HEP. Urinary incontinence has improved but is still present, and pt will likely benefit from surgery. Pt demonstrates improved pelvic floor strength/endurance/coordination and will benefit from reassessment and HEP update following surgery.     Cassidy is progressing well towards her goals.   Pt prognosis: good    Pt will continue to benefit from skilled outpatient physical therapy to address the deficits listed in the problem list box on initial evaluation, provide pt/family education and to maximize pt's level of independence in the home and community environment.     Pt's spiritual, cultural and educational needs considered and pt agreeable to plan of care and goals.  Anticipated " barriers to physical therapy: none      Short Term Goals: 6 weeks   Pt to report >50% improvement in urine leakage - MET  Pt to verbalize urge suppression strategies for improved control over urgency and urge urinary incontinence - MET  Pt to be independent with introductory home exercise program - MET     Long Term Goals: 12 weeks   Pt to report >90% improvement in urine leakage - MET  Pt to report bowel movement frequency of no more than 3x/day to demonstrate improved emptying of rectum with bowel movements - MET  Pt to report complete bladder emptying >90% of the time - MET  Pt to score no more than 28% impairment on the Urinary Problem FOTO survey to demonstrate reduced impairment due to pelvic floor dysfunction - NOT MET   Pt to be independent with advanced home exercise program - PARTIALLY MET    Plan     Continue per Plan of Care  Pt will return for post-op reassessment in March, pending surgeon approval      Charmaine Barber, PT, DPT    Board-Certified Clinical Specialist in Women's Health Physical Therapy

## 2024-12-02 ENCOUNTER — PATIENT MESSAGE (OUTPATIENT)
Dept: HEMATOLOGY/ONCOLOGY | Facility: CLINIC | Age: 79
End: 2024-12-02
Payer: MEDICARE

## 2024-12-05 ENCOUNTER — CLINICAL SUPPORT (OUTPATIENT)
Dept: REHABILITATION | Facility: OTHER | Age: 79
End: 2024-12-05
Payer: MEDICARE

## 2024-12-05 DIAGNOSIS — R15.1 FECAL SMEARING: ICD-10-CM

## 2024-12-05 DIAGNOSIS — N39.41 URGE INCONTINENCE: ICD-10-CM

## 2024-12-05 DIAGNOSIS — M62.89 PELVIC FLOOR DYSFUNCTION: Primary | ICD-10-CM

## 2024-12-05 PROCEDURE — 97530 THERAPEUTIC ACTIVITIES: CPT | Mod: PN | Performed by: PHYSICAL THERAPIST

## 2024-12-05 NOTE — PLAN OF CARE
"[facility]   Pelvic Health Physical Therapy Re-Evaluation     Date: 12/5/2024   Name: Cassidy Aguilar  Clinic Number: [id number]    Therapy Diagnosis:   Encounter Diagnoses   Name Primary?    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 She is scheduled for colpocleisis on 1/8/25.    OBJECTIVE     Pelvic floor assessment: MMT = 3-4/5 (improved to 4/5 with verbal cues for "suctioning") with endurance of at least 8 seconds, endurance drops after 3 reps        ASSESSMENT     Pt has progressed well with physical therapy, demonstrating improvements in urinary incontinence, pelvic floor strength/coordination. Urinary incontinence has improved but is still present, and pt will likely benefit from surgery. Pt demonstrates improved pelvic floor strength/endurance/coordination and will benefit from reassessment and HEP update following surgery.     Short Term Goals: 6 weeks   Pt to report >50% improvement in urine leakage - MET  Pt to verbalize urge suppression strategies for improved control over urgency and urge urinary incontinence - MET  Pt to be independent with introductory home exercise program - MET     Long Term Goals: 12 weeks   Pt to report >90% improvement in urine leakage - MET  Pt to report bowel movement frequency of no more than 3x/day to demonstrate improved emptying of rectum with bowel movements - MET  Pt to report complete bladder emptying >90% of the time - MET  Pt to score no more " than 28% impairment on the Urinary Problem FOTO survey to demonstrate reduced impairment due to pelvic floor dysfunction - NOT MET   Pt to be independent with advanced home exercise program - PARTIALLY MET    PLAN     Plan of Care certification: 12/5/2024 to 3/5/2025    Outpatient Physical Therapy - 1-2 times per week for 12 weeks to include the following interventions: Therapeutic Exercise, Manual Therapy, Therapeutic Activity, Neuromuscular Re-education, Electrical Stimulation Unattended, Self-Care, Patient Education    *Pt will return for post-op reassessment in March, pending surgeon approval      Charmaine Barber, PT, DPT  Board-Certified Clinical Specialist in Women's Health Physical Therapy          I CERTIFY THE NEED FOR THESE SERVICES FURNISHED UNDER THIS PLAN OF TREATMENT AND WHILE UNDER MY CARE   Physician's comments:     Physician's Signature: ___________________________________________________

## 2024-12-09 DIAGNOSIS — C91.10 CLL (CHRONIC LYMPHOCYTIC LEUKEMIA): Primary | ICD-10-CM

## 2024-12-12 DIAGNOSIS — I25.10 CORONARY ARTERY DISEASE, UNSPECIFIED VESSEL OR LESION TYPE, UNSPECIFIED WHETHER ANGINA PRESENT, UNSPECIFIED WHETHER NATIVE OR TRANSPLANTED HEART: Primary | ICD-10-CM

## 2024-12-12 NOTE — PROGRESS NOTES
Patient Active Problem List    Diagnosis Date Noted    Underweight (BMI < 18.5) 10/21/2024    Urge incontinence 08/07/2024    Pelvic floor dysfunction 08/07/2024    Fecal smearing 08/07/2024    Vaginal vault prolapse, posthysterectomy 10/26/2023    Coronary artery disease 07/31/2023    Atrophic vaginitis 06/22/2023    Pelvic organ prolapse quantification stage 3 cystocele 12/08/2022    Fever 04/26/2022    Elevated bilirubin 04/26/2022    Hypothyroidism 04/26/2022    Pelvic floor instability 03/07/2022    Immunosuppression 12/28/2021    Hypertension 12/28/2021    Hyponatremia 12/28/2021    At high risk of tumor lysis syndrome 12/28/2021    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

## 2024-12-13 ENCOUNTER — HOSPITAL ENCOUNTER (OUTPATIENT)
Dept: CARDIOLOGY | Facility: CLINIC | Age: 79
Discharge: HOME | End: 2024-12-13
Payer: MEDICARE

## 2024-12-13 ENCOUNTER — HOSPITAL ENCOUNTER (OUTPATIENT)
Dept: RADIOLOGY | Facility: HOSPITAL | Age: 79
Discharge: HOME | End: 2024-12-13
Attending: EMERGENCY MEDICINE
Payer: MEDICARE

## 2024-12-13 ENCOUNTER — CLINICAL SUPPORT (OUTPATIENT)
Dept: INTERNAL MEDICINE | Facility: CLINIC | Age: 79
End: 2024-12-13
Payer: MEDICARE

## 2024-12-13 ENCOUNTER — OFFICE VISIT (OUTPATIENT)
Dept: INTERNAL MEDICINE | Facility: CLINIC | Age: 79
End: 2024-12-13
Payer: MEDICARE

## 2024-12-13 VITALS
SYSTOLIC BLOOD PRESSURE: 138 MMHG | HEIGHT: 69 IN | OXYGEN SATURATION: 97 % | BODY MASS INDEX: 18.66 KG/M2 | WEIGHT: 126 LBS | HEART RATE: 67 BPM | DIASTOLIC BLOOD PRESSURE: 72 MMHG

## 2024-12-13 DIAGNOSIS — Z01.818 PREOPERATIVE CLEARANCE: Primary | ICD-10-CM

## 2024-12-13 DIAGNOSIS — I10 WHITE COAT SYNDROME WITH DIAGNOSIS OF HYPERTENSION: ICD-10-CM

## 2024-12-13 DIAGNOSIS — Z01.818 PREOPERATIVE CLEARANCE: ICD-10-CM

## 2024-12-13 DIAGNOSIS — Z88.2 ALLERGY TO SULFA DRUGS: ICD-10-CM

## 2024-12-13 DIAGNOSIS — I25.10 CORONARY ARTERY DISEASE, UNSPECIFIED VESSEL OR LESION TYPE, UNSPECIFIED WHETHER ANGINA PRESENT, UNSPECIFIED WHETHER NATIVE OR TRANSPLANTED HEART: ICD-10-CM

## 2024-12-13 PROBLEM — D64.9 ANEMIA, UNSPECIFIED: Status: RESOLVED | Noted: 2021-12-22 | Resolved: 2024-12-13

## 2024-12-13 PROBLEM — R50.9 FEVER: Status: RESOLVED | Noted: 2022-04-26 | Resolved: 2024-12-13

## 2024-12-13 PROBLEM — E87.1 HYPONATREMIA: Status: RESOLVED | Noted: 2021-12-28 | Resolved: 2024-12-13

## 2024-12-13 PROBLEM — I25.6 SILENT MYOCARDIAL ISCHEMIA: Status: ACTIVE | Noted: 2024-02-19

## 2024-12-13 PROBLEM — N39.3 SUI (STRESS URINARY INCONTINENCE, FEMALE): Status: ACTIVE | Noted: 2024-04-25

## 2024-12-13 PROBLEM — R63.6 UNDERWEIGHT (BMI < 18.5): Status: RESOLVED | Noted: 2024-10-21 | Resolved: 2024-12-13

## 2024-12-13 PROBLEM — Z91.89 AT HIGH RISK OF TUMOR LYSIS SYNDROME: Status: RESOLVED | Noted: 2021-12-28 | Resolved: 2024-12-13

## 2024-12-13 PROBLEM — D59.12 COLD AUTOIMMUNE HEMOLYTIC ANEMIA: Status: RESOLVED | Noted: 2021-12-27 | Resolved: 2024-12-13

## 2024-12-13 PROBLEM — Z79.52 LONG TERM SYSTEMIC STEROID USER: Status: RESOLVED | Noted: 2021-12-27 | Resolved: 2024-12-13

## 2024-12-13 LAB
ANION GAP SERPL CALC-SCNC: 4 MMOL/L (ref 8–16)
BUN SERPL-MCNC: 14 MG/DL (ref 8–23)
CALCIUM SERPL-MCNC: 9.2 MG/DL (ref 8.7–10.5)
CHLORIDE SERPL-SCNC: 102 MMOL/L (ref 95–110)
CO2 SERPL-SCNC: 28 MMOL/L (ref 23–29)
CREAT SERPL-MCNC: 0.7 MG/DL (ref 0.5–1.4)
ERYTHROCYTE [DISTWIDTH] IN BLOOD BY AUTOMATED COUNT: 12.9 % (ref 11.5–14.5)
EST. GFR  (NO RACE VARIABLE): >60 ML/MIN/1.73 M^2
GLUCOSE SERPL-MCNC: 88 MG/DL (ref 70–110)
HCT VFR BLD AUTO: 40.4 % (ref 37–48.5)
HGB BLD-MCNC: 13.4 G/DL (ref 12–16)
MCH RBC QN AUTO: 31.1 PG (ref 27–31)
MCHC RBC AUTO-ENTMCNC: 33.2 G/DL (ref 32–36)
MCV RBC AUTO: 94 FL (ref 82–98)
OHS QRS DURATION: 88 MS
OHS QTC CALCULATION: 418 MS
PLATELET # BLD AUTO: 215 K/UL (ref 150–450)
PMV BLD AUTO: 10.3 FL (ref 9.2–12.9)
POTASSIUM SERPL-SCNC: 4.2 MMOL/L (ref 3.5–5.1)
RBC # BLD AUTO: 4.31 M/UL (ref 4–5.4)
SODIUM SERPL-SCNC: 134 MMOL/L (ref 136–145)
WBC # BLD AUTO: 8.26 K/UL (ref 3.9–12.7)

## 2024-12-13 PROCEDURE — 85027 COMPLETE CBC AUTOMATED: CPT | Performed by: EMERGENCY MEDICINE

## 2024-12-13 PROCEDURE — 93010 ELECTROCARDIOGRAM REPORT: CPT | Mod: S$PBB,,, | Performed by: INTERNAL MEDICINE

## 2024-12-13 PROCEDURE — 93005 ELECTROCARDIOGRAM TRACING: CPT | Mod: PBBFAC | Performed by: INTERNAL MEDICINE

## 2024-12-13 PROCEDURE — 80048 BASIC METABOLIC PNL TOTAL CA: CPT | Performed by: EMERGENCY MEDICINE

## 2024-12-13 PROCEDURE — 71046 X-RAY EXAM CHEST 2 VIEWS: CPT | Mod: 26,,, | Performed by: RADIOLOGY

## 2024-12-13 PROCEDURE — 71046 X-RAY EXAM CHEST 2 VIEWS: CPT | Mod: TC,FY

## 2024-12-13 RX ORDER — SODIUM CHLORIDE/SODIUM BICARB
1 PACKET (EA) NASAL DAILY
COMMUNITY
Start: 2024-10-16

## 2024-12-13 RX ORDER — VALSARTAN 160 MG/1
160 TABLET ORAL DAILY
COMMUNITY
Start: 2024-09-30

## 2024-12-13 NOTE — PROGRESS NOTES
MEDICAL CLEARANCE NOTE          [facility]      TODAY'S Date 12/13/2024  Patient ID: Cassidy Aguilar is a 79 y.o. female   MRN: [id number]  Primary Physician: An Noriega MD      Cassidy Aguilar is a very pleasant 79 y.o. female with PMHx   Past Medical History:   Diagnosis Date    CAD (coronary artery disease)     CLL (chronic lymphocytic leukemia) 12/22/2021    HLD (hyperlipidemia)     Hypertension     Hypertensive heart disease     Hypothyroidism 04/26/2022    Thyroid disease     who presents today to be assessed for preop clearance for COLPOCLEISIS scheduled on January 8, 2025. This will require general anesthesia.  Patient has never had a severe reaction to general anesthesia but reports that she can be easily sedated.  She has had multiple EKGs since that has shown normal sinus rhythm, nonspecific findings and most recently LVH.  I reviewed medical, surgical, social and family history, medications, appropriate preventive health screenings, as well as vaccination history.  Patient is at increased risk for but not prohibitive risk from a cardiac standpoint.  To minimize risk, I recommend continuing aspirin until day prior to procedure, cardiac monitoring, avoiding anesthesia with prolonged half life. Risk and benefits dicussed with the patient who demonstrates understanding and is amenable to proceeding.  The patient is cleared for the procedure and anesthesia as indicated.     Sincerely,     An Noriega MD  [facility]     Disclaimer: This document was created using voice recognition software ([employer] Fluency Direct). Although it may be edited, this document may contain errors related to incorrect recognition of the spoken word. Please contact the physician if clarification is needed.

## 2024-12-13 NOTE — PROGRESS NOTES
Subjective     Patient ID: Cassidy Aguilar is a 79 y.o. female.    Chief Complaint: Follow-up (Pre op visit)    History of Present Illness    CHIEF COMPLAINT:  Ms. Aguilar presents today for pre-operative evaluation and discussion of urinary leakage.    URINARY SYMPTOMS:  She reports worsening urinary leakage associated with post nasal drip and coughing episodes. Family history is positive for pelvic organ prolapse.    MEDICAL HISTORY:  She has a history of pneumonia in January and sensitivity to anesthesia.    CARDIOVASCULAR:  She denies current symptoms of shortness of breath, dizziness, chest pain, and leg swelling. She has white coat syndrome with elevated blood pressure readings at medical appointments, which improve with breathing exercises, demonstrating a 15-point reduction during visits.    ALLERGIES:  She reports possible allergy to oral sulfur, noting chest redness while taking Keflex. She also reports possible allergy to walnuts resulting in diarrhea.    CURRENT MEDICATIONS:  She takes:  - Valsartan 160 mg daily  - Amlodipine 5 mg daily  - Nasal steroid rinse for congestion  - Meropenem  - Aspirin  - Lipitor  - Vitamin D  - Estrogen patch  - Synthroid 75 mcg  - Multivitamin  - Folic acid  - Glucosamine  - Benefiber  - Probiotic (as needed)  - Bismuth for indigestion (as needed)      ROS:  General: -fever, -chills, -fatigue, -weight gain, -weight loss  Eyes: -vision changes, -redness, -discharge  ENT: -ear pain, -nasal congestion, -sore throat  Cardiovascular: -chest pain, -palpitations, -lower extremity edema  Respiratory: +cough, -shortness of breath  Gastrointestinal: -abdominal pain, -nausea, -vomiting, +diarrhea, -constipation, -blood in stool  Genitourinary: -dysuria, -hematuria, -frequency  Musculoskeletal: -joint pain, -muscle pain  Skin: -rash, -lesion  Neurological: -headache, -dizziness, -numbness, -tingling  Psychiatric: -anxiety, -depression, -sleep difficulty             Objective   Vitals:     "12/13/24 1011   BP: 138/72   BP Location: Right arm   Patient Position: Sitting   Pulse: 67   SpO2: 97%   Weight: 57.2 kg (126 lb)   Height: 5' 9" (1.753 m)     Body mass index is 18.61 kg/m².    Physical Exam    Vital signs and nursing assessment noted: relatively normal vitals. Vitals: Height: 5'9". Weight: 126 lbs. Blood pressure: 126/64. Heart rate: 66.    GEN:   NAD, A & Ox3, atraumatic, well appearing, nontoxic appearing  HEENT:  PERRLA, EOMI, moist membranes, nl conjunctiva, no scleral icterus, no nystagmus, no nodes/nodules, soft, supple, FROM, no trachial deviation, nexus negative, normal TMs bilaterally, normal pharynx  CV:   2+ radial pulses, <2sec cap refill, no obvious JVD  RESP:  No obvious wheezing, equal and bilateral chest rise, no respiratory distress  ABD:   soft, Nontender, Nondistended, no guarding/rebound  :   Deferred  BACK:  FROM, no midline tenderness, no paraspinal tenderness  EXT:   FROM, MCCOLLUM x 4, no swelling, no edema, no calf tenderness, no bony tenderness, no warmth or redness, no crepitus, no obvious deformity  LYMPH:  no gross adenopathy  NEURO:  GCS 15, CN II-XII grossly intact, no obvious motor/sensory deficit, no tremor, negative Romberg,  nl gait/coordination  PSYCH:   Cooperative, no SI/HI, no anxiety, nl mood/affect, nl judgement/thought process  SKIN:  no rashes/lesions or masses, nl color, no pallor, warm, dry, intact    TESTS:  Results for orders placed or performed during the hospital encounter of 12/13/24   EKG 12-lead    Collection Time: 12/13/24  9:18 AM   Result Value Ref Range    QRS Duration 88 ms    [facility] QTC Calculation 418 ms    Narrative    Test Reason : I25.10,    Vent. Rate :  68 BPM     Atrial Rate :  68 BPM     P-R Int : 178 ms          QRS Dur :  88 ms      QT Int : 394 ms       P-R-T Axes :  68  56  55 degrees    QTcB Int : 418 ms    Normal sinus rhythm  LVH  Abnormal ECG  When compared with ECG of 08-Jan-2024 18:25,  Minimal criteria for Anterior infarct are " no longer Present  Confirmed by Madisyn Wilson ([id number]) on 12/13/2024 9:57:12 AM    Referred By: MARIO GILL           Confirmed By: Madisyn Wilson        Latest Reference Range & Units 12/13/24 10:50   WBC 3.90 - 12.70 K/uL 8.26   RBC 4.00 - 5.40 M/uL 4.31   Hemoglobin 12.0 - 16.0 g/dL 13.4   Hematocrit 37.0 - 48.5 % 40.4   MCV 82 - 98 fL 94   MCH 27.0 - 31.0 pg 31.1 (H)   MCHC 32.0 - 36.0 g/dL 33.2   RDW 11.5 - 14.5 % 12.9   Platelet Count 150 - 450 K/uL 215   MPV 9.2 - 12.9 fL 10.3   Sodium 136 - 145 mmol/L 134 (L)   Potassium 3.5 - 5.1 mmol/L 4.2   Chloride 95 - 110 mmol/L 102   CO2 23 - 29 mmol/L 28   Anion Gap 8 - 16 mmol/L 4 (L)   BUN 8 - 23 mg/dL 14   Creatinine 0.5 - 1.4 mg/dL 0.7   eGFR >60 mL/min/1.73 m^2 >60.0   Glucose 70 - 110 mg/dL 88   Calcium 8.7 - 10.5 mg/dL 9.2   XR CHEST PA AND LATERAL Dec 2024 Impression: No acute abnormality.     MDM  Reviewed: previous chart, nursing note and vitals  Reviewed previous: labs, ECG and x-ray  Interpretation: ECG, labs and x-ray (mild hyponatremia otherwise normal bmp and CBC. EKG with evidence of LVH. CXR NAD)        Assessment and Plan     1. Preoperative clearance  -     X-Ray Chest PA And Lateral; Future; Expected date: 12/13/2024  -     BASIC METABOLIC PANEL; Future; Expected date: 12/13/2024  -     CBC Without Differential; Future; Expected date: 12/13/2024    2. Allergy to sulfa drugs  -     Ambulatory referral/consult to Allergy; Future; Expected date: 12/20/2024      Assessment & Plan    IMPRESSION:  - Assessed patient's surgical readiness for upcoming pelvic organ prolapse repair on January 8th  - Reviewed EKG finding of left ventricular hypertrophy (LVH), likely due to hypertension  - Evaluated blood pressure management, noting white coat syndrome and effectiveness of relaxation techniques  - Considered recent pneumonia history when deciding on pre-operative chest XR  - Reviewed medication list, including recent statin dose reduction by Dr. David for  management of leg cramps  - Assessed ongoing nasal congestion treatment and planned to optimize management  - Removed resolved conditions from problem list: anemia, hemolytic anemia, underweight status    - Explained LVH as thickening of heart chamber due to increased workload, commonly from hypertension  - Discussed impact of relaxation techniques on blood pressure management    - Ms. Aguilar to practice relaxation breathing techniques to help manage blood pressure    - Continued Valsartan 160 mg daily  - Continued Amlodipine 5 mg daily  - Continued [place] law  - Continued Lipitor  - Continued estrogen patch  - Continued folic acid  - Continued glucosamine  - Continued Synthroid 75 mcg  - Continued aspirin  - Continued Vitamin D  - Continued probiotic  - Continued multivitamin  - Continued nasal rinse with Meropenem and steroid (dosage 0.8) daily until resolves or travel  - Discontinued Albuterol inhaler  - Discontinued regular use of bismuth for indigestion (use as needed)  - Discontinued regular use of eye drops (use only for infections)  - Discontinued regular use of acne/rosacea medication (use as needed)    - Blood work ordered  - Chest XR ordered          The results of physical exam findings, labs, and imaging were reviewed with the patient. Management of above assessments/visit diagnoses was discussed with patient. Precautions for return discussed at length. Patient was given ample time for questions. All questions asked and answered to the satisfaction of the patient. Patient is in agreement with the above and verbalized understanding. Total time spent caring for the patient today was 30 minutes. This includes time spent before the visit reviewing the chart, time spent during the visit, and time spent after the visit on documentation.     An Noriega MD  [facility]     Disclaimer: This document was created using voice recognition software (M*Modal Fluency  Direct). Although it may be edited, this document may contain errors related to incorrect recognition of the spoken word. Please contact the physician if clarification is needed.           No follow-ups on file.

## 2025-01-08 ENCOUNTER — TELEPHONE (OUTPATIENT)
Dept: HEMATOLOGY/ONCOLOGY | Facility: CLINIC | Age: 80
End: 2025-01-08
Payer: MEDICARE

## 2025-01-08 NOTE — TELEPHONE ENCOUNTER
"----- Message from Mickey sent at 1/7/2025  4:54 PM CST -----  Reschedule Existing Appointment    Appt Date: 1/14    Type of appt: [facility] FOLLOWUP/OFFICE VISIT [[id number]]    Physician: Adriana    Reason for rescheduling?  Appt times no longer works; Requesting to change times to morning    Caller: Self    Contact Preference: [phone] (home)       Additional Information: Stating if unable to reach back today, then to please call her on 1/9    "Thank you for all that you do for our patients"  "

## 2025-01-09 ENCOUNTER — TELEPHONE (OUTPATIENT)
Dept: HEMATOLOGY/ONCOLOGY | Facility: CLINIC | Age: 80
End: 2025-01-09
Payer: MEDICARE

## 2025-01-09 NOTE — TELEPHONE ENCOUNTER
"----- Message from Mickey sent at 1/9/2025  9:41 AM CST -----  Consult/Advisory    Name Of Caller: Self    Contact Preference?:  [phone]     Provider Name: Adriana    Does patient feel the need to be seen today? No    What is the nature of the call?: Returning call to Gil     Additional Notes:  "Thank you for all that you do for our patients"  "

## 2025-01-13 NOTE — PROGRESS NOTES
Section of Hematology and Stem Cell Transplantation  Follow Up Visit     Visit date: 1/14/25  Visit diagnosis: CLL (chronic lymphocytic leukemia) [C91.10]  Referred by:  Shiv Watson MD and Sony Carnes MD    Oncologic History:     Primary Oncologic Diagnosis: Chronic lymphocytic leukemia, Binet stage A, Duarte stage 0; cold agglutinin disease     2016: First noted to have cervical lymphadenopathy. Excisional biopsy confirmed chronic lymphocytic leukemia. She did not have an indication for treatment; therefore, observation recommended by Dr. Kelley.   6/17/21: PET/CT noted enlarged bilateral cervical, supraclavicular, axillary, retroperitoneal, and pelvic lymph nodes. All nodes enlarged from the prior exam.  7/28/21: First noted to have mild anemia (Hgb ~11.5 to 10.8 g/dL). Continued monitoring.  8/3/21: Established care with Dr. Carnes.   11/1/21: On follow up with Dr. Carnes, she was noted to have worsening anemia - hemoglobin decreased to 8.6 g/dL. 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 Beta-2 microglobulin 3.19. CLL FISH revealed trisomy 12. Peripheral blood karyotype - 47,XX,+12[8]/47,idem,del(4)(p14p16)[3]/46,XX[9]. ZAP70 17% of cells.  12/14/21: She noted increased fatigue. Hemoglobin decreased to 6.2. Repeat labs confirmed decreased hemoglobin to 5.8 g/dL. Haptoglobin <1, . MICHELL Anti-IgG negative, MICHELL complement C3 positive.  12/17/21: Prednisone 60mg daily started in response to hemolytic anemia.  12/22/21: Initial visit at [facility] with Dr. Watson. WBC 96k, Hgb 8.9, Plts 288. MICHELL positive. , Hapto <10. Continued prednisone 60mg daily.   12/27/21: Initial visit with me. Prednisone decreased to 50mg daily.   1/4/22: Hemoglobin stable (~8.4 g/dL) with persistent hemolysis. Prednisone weaned to 40mg daily.   1/11/22: Cycle 1 day 1 of obinutuzumab plus venetoclax (starting day 22). Prednisone weaned to 30mg daily.   1/18/22: Hemoglobin improved. Prednisone weaned to 20mg daily.  1/31/22: Prednisone  weaned to 15mg daily. Tapered to 10mg one week later.   2/15/22: Prednisone weaned to 5mg daily.   4/4/22: Prednisone to 5mg every other day x2 weeks then stop.  4/27/22: Cold agglutinin titer positive (>1:512). Venetoclax stopped as hemolysis was attributed to cold agglutinins. Hemolysis improved.  6/1/22: She completed 6 cycles of obinutuzumab.      History of Present Ilness:   Cassidy Bryan) is a pleasant 79 y.o.female with a past medical history of hypertension and chronic lymphocytic leukemia who presents for follow up visit regarding CLL and cold agglutinin hemolytic anemia. She has been more fatigued lately since her recent surgery. Hemoglobin decreased as a result. No new B symptoms.     PAST MEDICAL HISTORY:   Past Medical History:   Diagnosis Date    CAD (coronary artery disease)     CLL (chronic lymphocytic leukemia) 12/22/2021    HLD (hyperlipidemia)     Hypertension     Hypertensive heart disease     Hypothyroidism 04/26/2022    Thyroid disease        PAST SURGICAL HISTORY:   Past Surgical History:   Procedure Laterality Date    ADENOIDECTOMY      COLONOSCOPY      HYSTERECTOMY      INTRACAPSULAR CATARACT EXTRACTION      TONSILLECTOMY         PAST SOCIAL HISTORY:  Social History     Tobacco Use    Smoking status: Never    Smokeless tobacco: Never   Substance Use Topics    Alcohol use: Yes     Alcohol/week: 0.0 standard drinks of alcohol     Comment: rare    Drug use: Not Currently       FAMILY HISTORY:  Family History   Problem Relation Name Age of Onset    Thyroid disease Mother      Osteoarthritis Mother      Hypertension Mother      Cancer Mother      Dementia Mother      Cancer Father          bladder    Heart disease Father      Diabetes Father      Leukemia Maternal Grandmother      Stroke Maternal Grandfather      Cancer Maternal Grandfather      Cancer Paternal Grandmother         CURRENT MEDICATIONS:   Current Outpatient Medications   Medication Sig    acetaminophen (TYLENOL) 325 MG  tablet Take 650 mg by mouth every 6 (six) hours as needed.    amLODIPine (NORVASC) 5 MG tablet Take 5 mg by mouth once daily.    aspirin (ECOTRIN) 81 MG EC tablet Take 81 mg by mouth.    atorvastatin (LIPITOR) 40 MG tablet TAKE ONE TABLET BY MOUTH once DAILY AT BEDTIME FOR cholesterol control    BENEFIBER, GUAR GUM, ORAL Take 1 Dose by mouth 2 (two) times daily.    Bifidobacterium infantis (ALIGN ORAL) Take by mouth. PRN    bismuth subsalicylate (BISMAREX) 262 mg Chew Take 4 tablets by mouth.    BUDESONIDE 1 MG/NORMAL SALINE 50 ML NASAL IRRIGATION 0.8 Doses by Nasal route once daily.    CARVEDILOL PHOSPHATE 20 MG ORAL CM24 (COREG CR) 20 mg 24 hr capsule Take 1 capsule by mouth once daily.    folic acid (FOLVITE) 1 MG tablet Take 1 tablet (1 mg total) by mouth once daily.    glucosamine-chondroitin 500-400 mg tablet Take 1 tablet by mouth 3 (three) times daily.    ibuprofen (ADVIL,MOTRIN) 600 MG tablet Take 600 mg by mouth every 6 (six) hours as needed.    multivitamin (THERAGRAN) per tablet Take 1 tablet by mouth once daily.    NEILMED SINUS RINSE REFILL Pack 1 Dose by Nasal route Daily. use as directed    sod chlor,sod bicarb/neti pot (SINUS WASH NETI POT ZAYNAB) 1 Device by Nasal route continuous prn (congestino).    SYNTHROID 75 mcg tablet Take 1 tablet (75 mcg total) by mouth daily    valsartan (DIOVAN) 160 MG tablet Take 160 mg by mouth once daily.    vitamin D (VITAMIN D3) 1000 units Tab Take 2,000 Units by mouth.    VIVELLE-DOT 0.025 mg/24 hr Place 1 patch onto the skin twice a week     No current facility-administered medications for this visit.       ALLERGIES:   Review of patient's allergies indicates:   Allergen Reactions    Sulfa (sulfonamide antibiotics) Rash     Other reaction(s): Other (See Comments)  ALLERGIC TO SULFA DRUGS oral    Thiazides Other (See Comments)     hyponatremia    [place] Diarrhea       Review of Systems:     Pertinent positives and negatives included in the HPI. Otherwise a complete  review of systems is negative.    Physical Exam:     Vitals:    01/14/25 1444   BP: 132/61   Pulse: 68   Temp: 99 °F (37.2 °C)       General: Appears well, NAD  HEENT: MMM, no OP lesions  Pulmonary: CTAB, no increased work of breathing, no W/R/C  Cardiovascular: S1S2 normal, RRR, no M/R/G  Abdominal: Soft, NT, ND, BS+, no HSM  Extremities: No C/C/E  Neurological: AAOx4, grossly normal, no focal deficits  Dermatologic: No appreciable rashes or lesions  Lymphatic: No appreciable cervical, axillary, or inguinal lymphadenopathy     ECOG Performance Status: (foot note - ECOG PS provided by Eastern Cooperative Oncology Group) 0 - Asymptomatic    Karnofsky Performance Score:  100%- Normal, No Complaints, No Evidence of Disease    Labs:   Lab Results   Component Value Date    WBC 9.30 01/14/2025    HGB 11.0 (L) 01/14/2025    HCT 32.0 (L) 01/14/2025    MCV 91 01/14/2025     01/14/2025       Lab Results   Component Value Date     (L) 01/14/2025    K 4.5 01/14/2025     01/14/2025    CO2 26 01/14/2025    BUN 17 01/14/2025    CREATININE 0.7 01/14/2025    ALBUMIN 3.7 01/14/2025    BILITOT 0.9 01/14/2025    ALKPHOS 63 01/14/2025    AST 19 01/14/2025    ALT 21 01/14/2025       Imaging:   PET/CT 6/17/21  COMPARISON: PET/CT 8/22/2016.   HISTORY: Lymphoma.   FINDINGS:   Head and neck: There is symmetric and physiologic distribution of radiotracer throughout the included brain parenchyma. There is no hemorrhage, hydrocephalus, or midline shift. There are innumerable bilateral enlarged FDG avid cervical lymph nodes. These have worsened from the prior exam. A representative left lower cervical lymph node best visualized on image 84 measures 19 mm compared with 10 mm previously with an SUV max of 1.8. There are enlarged left supraclavicular lymph nodes which were not present on the prior exam. A representative eran conglomerate measures 3.4 cm in diameter with an SUV max of 2.2.   CHEST: There are innumerable bilateral  axillary and subpectoral lymph nodes which are not present on the prior exam. These demonstrate low-level FDG avidity. A representative left axillary nodule best visualized on image 114 measures 1.9 cm with an SUV max of 1.6. There are prominent paratracheal lymph nodes which are not pathologically enlarged by size criteria and demonstrate background FDG avidity, however were not present on the prior exam.   There is no FDG avid pulmonary nodule or mass. There is no pleural effusion. There is no pneumothorax.   Abdomen and pelvis: There is physiologic distribution of radiotracer throughout the liver, spleen, and collecting system. There are multiple enlarged bilateral iliac and periaortic retroperitoneal lymph nodes. A representative left periaortic lymph node best visualized on image 201 measures 2.0 cm in diameter with an SUV max of 2.0.   MUSCULOSKELETAL: There is no FDG avid lytic or blastic osseous lesion.     IMPRESSION:   Innumerable enlarged bilateral cervical, supraclavicular, axillary, retroperitoneal, and pelvic lymph nodes all which demonstrate low-level FDG avidity, however are enlarged from the prior exam and most consistent with recurrent disease.       Pathology:  Peripheral Blood Flow Cytometry (11/1/21):  Comment:      CD5+ B-CELL LYMPHOPROLIFERATIVE DISORDER (SEE COMMENT).     COMMENT: Clonal CD20+ B-cells are detected that coexpress   CD5, CD23, FMC7(dim) and moderate surface kappa light chain   and are negative for CD10, comprising 75% of all analyzed   cells.       Prognostication Tools:  CLL-IPI = 2, intermediate risk (79.4% survival after 5 years, 40% survival after 10 years, median  months)    Assessment and Plan:   Cassidy JENNIFER Aguilar (Cassidy) is a pleasant 79 y.o.female with a past medical history of hypertension and chronic lymphocytic leukemia who presents for follow up visit regarding CLL.     Chronic lymphocytic leukemia, Binet stage A, Duarte stage 0: She has had Duarte stage I disease since  2016. 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 She was initially treated with steroids. In January 2022, she was started on treatment for CLL due to steroid-refractory hemolytic anemia with obinutuzumab plus venetoclax because of the benefit of anti-CD20 MAB with AIHA plus fixed duration treatment (patient preference).  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 Venetoclax was stopped and obinutuzumab was continued to complete 6 cycles. Since April 2022, her hemolytic anemia has resolved.   Continue to monitor clinically.  Will repeat labs in 1 month.     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  Initially treated with prednisone in December with stabilization of blood counts but persistent hemolysis.  Hemolytic anemia resolved in 4/2022. She completed 6 cycles of obinutuzumab as noted above. Consider sutimlimab if relapse of LAYTON.  She had another episode of hemolysis following a COVID booster, so theses may be related.   Continue to monitor LDH, haptoglobin, and retic.    Hypothyroidism: TFTs unremarkable today. Management per PCP.    Orders Placed:      Orders Placed This Encounter    CBC Auto Differential    Comprehensive Metabolic Panel    Iron and TIBC    Ferritin    Folate    Comprehensive Metabolic Panel    Rapid BMT CBC with Diff    Phosphorus    Lactate Dehydrogenase    Magnesium    Folate    Haptoglobin    Reticulocytes         Route Chart for Scheduling    BMT Chart Routing      Follow up with physician 6 months.   Follow up with ERIKA    Provider visit type    Infusion scheduling note    Injection scheduling note    Labs CBC, CMP, phosphorus,  magnesium, folate and hemolysis panel   Scheduling:  Preferred lab:  Lab interval:  labs prior to visit (CBC, CMP, Mg, Phos, LDH, hapto, folate)   Imaging    Pharmacy appointment    Other referrals                         Total time of this visit was 35 minutes, including time spent face to face with patient and/or via video/audio, and also in preparing for today's visit for MDM and documentation. (Medical Decision Making, including consideration of possible diagnoses, management options, complex medical record review, review of diagnostic tests and information, consideration and discussion of significant complications based on comorbidities, and discussion with providers involved with the care of the patient). Greater than 50% was spent face to face with the patient counseling and coordinating care.      Bubba Wright MD  Hematology, Oncology, and Stem Cell Transplantation  Merged with [facility]

## 2025-01-14 ENCOUNTER — LAB VISIT (OUTPATIENT)
Dept: LAB | Facility: HOSPITAL | Age: 80
End: 2025-01-14
Payer: MEDICARE

## 2025-01-14 ENCOUNTER — OFFICE VISIT (OUTPATIENT)
Dept: HEMATOLOGY/ONCOLOGY | Facility: CLINIC | Age: 80
End: 2025-01-14
Payer: MEDICARE

## 2025-01-14 VITALS
DIASTOLIC BLOOD PRESSURE: 61 MMHG | OXYGEN SATURATION: 97 % | HEIGHT: 69 IN | SYSTOLIC BLOOD PRESSURE: 132 MMHG | WEIGHT: 129.06 LBS | HEART RATE: 68 BPM | TEMPERATURE: 99 F | BODY MASS INDEX: 19.12 KG/M2

## 2025-01-14 DIAGNOSIS — C91.10 CLL (CHRONIC LYMPHOCYTIC LEUKEMIA): Primary | ICD-10-CM

## 2025-01-14 DIAGNOSIS — D52.8 OTHER FOLATE DEFICIENCY ANEMIAS: ICD-10-CM

## 2025-01-14 DIAGNOSIS — D59.12 COLD AGGLUTININ DISEASE: ICD-10-CM

## 2025-01-14 DIAGNOSIS — C91.10 CLL (CHRONIC LYMPHOCYTIC LEUKEMIA): ICD-10-CM

## 2025-01-14 DIAGNOSIS — D52.0 DIETARY FOLATE DEFICIENCY ANEMIA: ICD-10-CM

## 2025-01-14 LAB
ALBUMIN SERPL BCP-MCNC: 3.7 G/DL (ref 3.5–5.2)
ALP SERPL-CCNC: 63 U/L (ref 40–150)
ALT SERPL W/O P-5'-P-CCNC: 21 U/L (ref 10–44)
ANION GAP SERPL CALC-SCNC: 7 MMOL/L (ref 8–16)
AST SERPL-CCNC: 19 U/L (ref 10–40)
BASOPHILS # BLD AUTO: 0.09 K/UL (ref 0–0.2)
BASOPHILS NFR BLD: 1 % (ref 0–1.9)
BILIRUB SERPL-MCNC: 0.9 MG/DL (ref 0.1–1)
BUN SERPL-MCNC: 17 MG/DL (ref 8–23)
CALCIUM SERPL-MCNC: 9.2 MG/DL (ref 8.7–10.5)
CHLORIDE SERPL-SCNC: 101 MMOL/L (ref 95–110)
CO2 SERPL-SCNC: 26 MMOL/L (ref 23–29)
CREAT SERPL-MCNC: 0.7 MG/DL (ref 0.5–1.4)
DIFFERENTIAL METHOD BLD: ABNORMAL
EOSINOPHIL # BLD AUTO: 0.3 K/UL (ref 0–0.5)
EOSINOPHIL NFR BLD: 3 % (ref 0–8)
ERYTHROCYTE [DISTWIDTH] IN BLOOD BY AUTOMATED COUNT: 13.1 % (ref 11.5–14.5)
EST. GFR  (NO RACE VARIABLE): >60 ML/MIN/1.73 M^2
GLUCOSE SERPL-MCNC: 101 MG/DL (ref 70–110)
HAPTOGLOB SERPL-MCNC: 113 MG/DL (ref 30–250)
HCT VFR BLD AUTO: 32 % (ref 37–48.5)
HGB BLD-MCNC: 11 G/DL (ref 12–16)
IMM GRANULOCYTES # BLD AUTO: 0.04 K/UL (ref 0–0.04)
IMM GRANULOCYTES NFR BLD AUTO: 0.4 % (ref 0–0.5)
LDH SERPL L TO P-CCNC: 201 U/L (ref 110–260)
LYMPHOCYTES # BLD AUTO: 1.8 K/UL (ref 1–4.8)
LYMPHOCYTES NFR BLD: 19 % (ref 18–48)
MAGNESIUM SERPL-MCNC: 1.9 MG/DL (ref 1.6–2.6)
MCH RBC QN AUTO: 31.4 PG (ref 27–31)
MCHC RBC AUTO-ENTMCNC: 34.4 G/DL (ref 32–36)
MCV RBC AUTO: 91 FL (ref 82–98)
MONOCYTES # BLD AUTO: 0.8 K/UL (ref 0.3–1)
MONOCYTES NFR BLD: 8.9 % (ref 4–15)
NEUTROPHILS # BLD AUTO: 6.3 K/UL (ref 1.8–7.7)
NEUTROPHILS NFR BLD: 67.7 % (ref 38–73)
NRBC BLD-RTO: 0 /100 WBC
PHOSPHATE SERPL-MCNC: 4.2 MG/DL (ref 2.7–4.5)
PLATELET # BLD AUTO: 243 K/UL (ref 150–450)
PMV BLD AUTO: 10.5 FL (ref 9.2–12.9)
POTASSIUM SERPL-SCNC: 4.5 MMOL/L (ref 3.5–5.1)
PROT SERPL-MCNC: 7 G/DL (ref 6–8.4)
RBC # BLD AUTO: 3.5 M/UL (ref 4–5.4)
RETICS/RBC NFR AUTO: 2.5 % (ref 0.5–2.5)
SODIUM SERPL-SCNC: 134 MMOL/L (ref 136–145)
WBC # BLD AUTO: 9.3 K/UL (ref 3.9–12.7)

## 2025-01-14 PROCEDURE — 80053 COMPREHEN METABOLIC PANEL: CPT | Performed by: INTERNAL MEDICINE

## 2025-01-14 PROCEDURE — 85045 AUTOMATED RETICULOCYTE COUNT: CPT | Performed by: INTERNAL MEDICINE

## 2025-01-14 PROCEDURE — 83735 ASSAY OF MAGNESIUM: CPT | Performed by: INTERNAL MEDICINE

## 2025-01-14 PROCEDURE — 36415 COLL VENOUS BLD VENIPUNCTURE: CPT | Performed by: INTERNAL MEDICINE

## 2025-01-14 PROCEDURE — 99214 OFFICE O/P EST MOD 30 MIN: CPT | Mod: PBBFAC,25 | Performed by: INTERNAL MEDICINE

## 2025-01-14 PROCEDURE — 83010 ASSAY OF HAPTOGLOBIN QUANT: CPT | Performed by: INTERNAL MEDICINE

## 2025-01-14 PROCEDURE — 99999 PR PBB SHADOW E&M-EST. PATIENT-LVL IV: CPT | Mod: PBBFAC,,, | Performed by: INTERNAL MEDICINE

## 2025-01-14 PROCEDURE — 83615 LACTATE (LD) (LDH) ENZYME: CPT | Performed by: INTERNAL MEDICINE

## 2025-01-14 PROCEDURE — 99214 OFFICE O/P EST MOD 30 MIN: CPT | Mod: S$PBB,,, | Performed by: INTERNAL MEDICINE

## 2025-01-14 PROCEDURE — 86880 COOMBS TEST DIRECT: CPT | Performed by: INTERNAL MEDICINE

## 2025-01-14 PROCEDURE — 84100 ASSAY OF PHOSPHORUS: CPT | Performed by: INTERNAL MEDICINE

## 2025-01-14 PROCEDURE — 85025 COMPLETE CBC W/AUTO DIFF WBC: CPT | Performed by: INTERNAL MEDICINE

## 2025-01-14 RX ORDER — IBUPROFEN 600 MG/1
600 TABLET ORAL EVERY 6 HOURS PRN
COMMUNITY
Start: 2025-01-08 | End: 2025-01-23

## 2025-01-14 RX ORDER — ACETAMINOPHEN 325 MG/1
650 TABLET ORAL EVERY 6 HOURS PRN
COMMUNITY
Start: 2025-01-08 | End: 2025-01-22

## 2025-01-15 LAB — DAT IGG-SP REAG RBC-IMP: NORMAL

## 2025-02-04 ENCOUNTER — PATIENT MESSAGE (OUTPATIENT)
Dept: HEMATOLOGY/ONCOLOGY | Facility: CLINIC | Age: 80
End: 2025-02-04
Payer: MEDICARE

## 2025-02-04 DIAGNOSIS — C91.10 CLL (CHRONIC LYMPHOCYTIC LEUKEMIA): Primary | ICD-10-CM

## 2025-02-04 DIAGNOSIS — D69.6 THROMBOCYTOPENIA, UNSPECIFIED: ICD-10-CM

## 2025-02-13 ENCOUNTER — OFFICE VISIT (OUTPATIENT)
Dept: ALLERGY | Facility: CLINIC | Age: 80
End: 2025-02-13
Payer: MEDICARE

## 2025-02-13 VITALS — WEIGHT: 130.31 LBS | HEIGHT: 69 IN | BODY MASS INDEX: 19.3 KG/M2

## 2025-02-13 DIAGNOSIS — Z88.2 ALLERGY TO SULFA DRUGS: ICD-10-CM

## 2025-02-13 DIAGNOSIS — Z79.899 CURRENT USE OF BETA BLOCKER: ICD-10-CM

## 2025-02-13 DIAGNOSIS — Z91.09 OTHER ALLERGY STATUS, OTHER THAN TO DRUGS AND BIOLOGICAL SUBSTANCES: Primary | ICD-10-CM

## 2025-02-13 PROCEDURE — 99214 OFFICE O/P EST MOD 30 MIN: CPT | Mod: PBBFAC | Performed by: STUDENT IN AN ORGANIZED HEALTH CARE EDUCATION/TRAINING PROGRAM

## 2025-02-13 PROCEDURE — 95004 PERQ TESTS W/ALRGNC XTRCS: CPT | Mod: PBBFAC | Performed by: STUDENT IN AN ORGANIZED HEALTH CARE EDUCATION/TRAINING PROGRAM

## 2025-02-13 PROCEDURE — 99999 PR PBB SHADOW E&M-EST. PATIENT-LVL IV: CPT | Mod: PBBFAC,,, | Performed by: STUDENT IN AN ORGANIZED HEALTH CARE EDUCATION/TRAINING PROGRAM

## 2025-02-13 RX ORDER — NEOMYCIN SULFATE, POLYMYXIN B SULFATE AND DEXAMETHASONE 3.5; 10000; 1 MG/ML; [USP'U]/ML; MG/ML
1 SUSPENSION/ DROPS OPHTHALMIC 4 TIMES DAILY
COMMUNITY
Start: 2025-02-10

## 2025-02-13 RX ORDER — SULFAMETHOXAZOLE AND TRIMETHOPRIM 800; 160 MG/1; MG/1
1 TABLET ORAL 2 TIMES DAILY
Qty: 1 TABLET | Refills: 0 | Status: SHIPPED | OUTPATIENT
Start: 2025-02-13

## 2025-02-13 NOTE — PROGRESS NOTES
"Allergy Clinic Note  [facility]    This note was created by combination of typed  and dictation. Transcription errors are likely.  If there are any questions, please contact me.    HISTORY      Patient ID: Cassidy Aguilar is a 80 y.o. female.    Chief Complaint: No chief complaint on file.      Referring Provider: An Noriega    History of Present Illness: Cassidy Aguilar is a 80 y.o. female    Related medications and other interventions        2/13/2025:  At initial visit, rocio reported a rash on her chest after taking "keflex" for about 2 weeks.  Since then she has avoided "sulfa drugs" which are listed on her drug allergy list.  She would like to know if she can take sulfa drugs now, as other antibiotics have been associated with diarrhea.      Medication history indicates she has been treated with Augmentin and Levofloxacin in the past.    Client also requests testing for walnut allergy.  She reports that eating walnuts has been associated with rapid onset of diarrhea on 3 occasions.       Medical history      Significant past medical history: CAD, HTN, thyroid disease  Active problem list reviewed  Smoking Hx:  Client  reports that she has never smoked. She has never used smokeless tobacco.    Meds: MAR reviewed      Patient Active Problem List   Diagnosis    CLL (chronic lymphocytic leukemia)    Immunosuppression    Hypertension    Pelvic floor instability    Elevated bilirubin    Hypothyroidism    Atrophic vaginitis    Coronary artery disease    Heterozygous factor V Leiden mutation    Hypercholesterolemia    Hypertensive heart disease without heart failure    Non-Hodgkin's lymphoma of head    Pelvic organ prolapse quantification stage 3 cystocele    ACP (advance care planning)    Vaginal vault prolapse, posthysterectomy    Urge incontinence    Pelvic floor dysfunction    Fecal smearing    Silent myocardial ischemia    ADITHYA (stress urinary incontinence, female)     Medication List with " Changes/Refills   New Medications    SULFAMETHOXAZOLE-TRIMETHOPRIM 800-160MG (BACTRIM DS) 800-160 MG TAB    Take 1 tablet by mouth 2 (two) times daily.       Start Date: 2/13/2025 End Date: --   Current Medications    AMLODIPINE (NORVASC) 5 MG TABLET    Take 5 mg by mouth once daily.       Start Date: 12/26/2023End Date: --    ASPIRIN (ECOTRIN) 81 MG EC TABLET    Take 81 mg by mouth.       Start Date: --        End Date: --    ATORVASTATIN (LIPITOR) 40 MG TABLET    TAKE ONE TABLET BY MOUTH once DAILY AT BEDTIME FOR cholesterol control       Start Date: 11/2/2021 End Date: --    BENEFIBER, GUAR GUM, ORAL    Take 1 Dose by mouth 2 (two) times daily.       Start Date: --        End Date: --    BIFIDOBACTERIUM INFANTIS (ALIGN ORAL)    Take by mouth. PRN       Start Date: --        End Date: --    BISMUTH SUBSALICYLATE (BISMAREX) 262 MG CHEW    Take 4 tablets by mouth.       Start Date: --        End Date: --    BUDESONIDE 1 MG/NORMAL SALINE 50 ML NASAL IRRIGATION    0.8 Doses by Nasal route once daily.       Start Date: 11/15/2024End Date: --    CARVEDILOL PHOSPHATE 20 MG ORAL CM24 (COREG CR) 20 MG 24 HR CAPSULE    Take 1 capsule by mouth once daily.       Start Date: 6/6/2024  End Date: --    FOLIC ACID (FOLVITE) 1 MG TABLET    Take 1 tablet (1 mg total) by mouth once daily.       Start Date: 1/3/2024  End Date: 2/13/2025    GLUCOSAMINE-CHONDROITIN 500-400 MG TABLET    Take 1 tablet by mouth 3 (three) times daily.       Start Date: --        End Date: --    MULTIVITAMIN (THERAGRAN) PER TABLET    Take 1 tablet by mouth once daily.       Start Date: --        End Date: --    NEILMED SINUS RINSE REFILL PACK    1 Dose by Nasal route Daily. use as directed       Start Date: 10/16/2024End Date: --    NEOMYCIN-POLYMYXIN-DEXAMETHASONE (MAXITROL) 3.5MG/ML-10,000 UNIT/ML-0.1 % DRPS    Place 1 drop into the left eye 4 (four) times daily.       Start Date: 2/10/2025 End Date: --    SOD CHLOR,SOD BICARB/NETI POT (SINUS WASH NETI  "POT ZAYNAB)    1 Device by Nasal route continuous prn (congestino).       Start Date: 6/24/2024 End Date: --    SYNTHROID 75 MCG TABLET    Take 1 tablet (75 mcg total) by mouth daily       Start Date: 7/23/2024 End Date: --    VALSARTAN (DIOVAN) 160 MG TABLET    Take 160 mg by mouth once daily.       Start Date: 9/30/2024 End Date: --    VITAMIN D (VITAMIN D3) 1000 UNITS TAB    Take 2,000 Units by mouth.       Start Date: --        End Date: --    VIVELLE-DOT 0.025 MG/24 HR    Place 1 patch onto the skin twice a week       Start Date: 12/6/2021 End Date: --         Review of systems      CONST: no F/C/NS, no unintentional weight changes  NEURO:  no tremor, no weakness  EYES: no discharge, no erythema  EARS: no hearing loss, no sensation of fullness  PULM:  no SOB, no wheezing, no cough  CV: no CP, no palpitations  DERM: no rashes, no skin breaks    PHYSICAL EXAM     Ht 5' 9" (1.753 m)   Wt 59.1 kg (130 lb 4.7 oz)   BMI 19.24 kg/m²   GEN: Awake and alert, no distress  DERM: No flushing, No rashes  EYE:  No ocular discharge  HENT: No nasal discharge, no hoarseness  PULM: Normal work of breathing, no cough  NEURO:  No focal deficit, speech fluent and logical  PSYCH: appropriate affect, normal behavior    MEDICAL DECISION MAKING     Data reviewed:       (new entries in bold-face)      Allergy Testing      [place] testing by the modified prick method with appropriate positive and negative controls.        Lab results               Imaging and other diagnostics            Medical records review           Diagnoses:     Cassidy Aguilar is a 80 y.o. female. with  1. Food allergy status --not allergic to walnuts    2. Allergy to sulfa drugs    3. Current use of beta blocker          Assessment / Plan   Client may or may not have had delayed cutaneous symptoms while on Sulfa drug in the distant past.  (Alternatively, rash could have occurred while taking Keflex).  Recommend unblinded challenge to sulfa drug in the allergy " clinic.    Client has tolerated amoxicillin in the recent past.  No restrictions on any beta-lactam antibiotics.          Food allergy status --not allergic to walnuts    Vague/remote Allergy to sulfa drugs        -      RTC off coreg for challenge to oral Bactrim  -     Ambulatory referral/consult to Allergy  -     sulfamethoxazole-trimethoprim 800-160mg (BACTRIM DS) 800-160 mg Tab; Take 1 tablet by mouth 2 (two) times daily.  Dispense: 1 tablet; Refill: 0        Comorbidities  Current use of beta blocker -- hold for oral challenge  Coronary artery disase    PATIENT INSTRUCTIONS AND FOLLOW-UP     Patient Instructions     Not allergic to walnuts     No restrictions from allergy standpoint      Return for Sulfa drug challenge     Bring the sulf drug with you     Do not take Coreg on day of test     Also no antihistamines (Zyrtec, Benaryl, Theraflu, etc) for 5 days prior.     Expect to be here about 2 hours          Follow up for for sulfa antibiotic challenge.        Kathleen Schuler MD  Allergy, Asthma & Immunology        I spent a total of 35 minutes on the day of the visit, excluding time for skin testing. This includes face to face time and non-face to face time preparing to see the patient (eg, review of tests), obtaining and/or reviewing separately obtained history, documenting clinical information in the electronic or other health record, independently interpreting results and communicating results to the patient/family/caregiver, or care coordinator.

## 2025-02-13 NOTE — PATIENT INSTRUCTIONS
Not allergic to walnuts     No restrictions from allergy standpoint      Return for Sulfa drug challenge     Bring the sulf drug with you     Do not take Coreg on day of test     Also no antihistamines (Zyrtec, Benaryl, Theraflu, etc) for 5 days prior.     Expect to be here about 2 hours

## 2025-02-14 LAB
ALBUMIN SERPL-MCNC: 4.4 G/DL (ref 3.8–4.8)
ALP SERPL-CCNC: 94 IU/L (ref 44–121)
ALT SERPL-CCNC: 18 IU/L (ref 0–32)
AST SERPL-CCNC: 21 IU/L (ref 0–40)
BASOPHILS # BLD AUTO: 0.1 X10E3/UL (ref 0–0.2)
BASOPHILS NFR BLD AUTO: 2 %
BILIRUB SERPL-MCNC: 0.6 MG/DL (ref 0–1.2)
BUN SERPL-MCNC: 15 MG/DL (ref 8–27)
BUN/CREAT SERPL: 22 (ref 12–28)
CALCIUM SERPL-MCNC: 9.6 MG/DL (ref 8.7–10.3)
CHLORIDE SERPL-SCNC: 100 MMOL/L (ref 96–106)
CO2 SERPL-SCNC: 24 MMOL/L (ref 20–29)
CREAT SERPL-MCNC: 0.69 MG/DL (ref 0.57–1)
EOSINOPHIL # BLD AUTO: 0.2 X10E3/UL (ref 0–0.4)
EOSINOPHIL NFR BLD AUTO: 4 %
ERYTHROCYTE [DISTWIDTH] IN BLOOD BY AUTOMATED COUNT: 12.1 % (ref 11.7–15.4)
EST. GFR  (NO RACE VARIABLE): 89 ML/MIN/1.73
FERRITIN SERPL-MCNC: 40 NG/ML (ref 15–150)
FOLATE SERPL-MCNC: >20 NG/ML
GLOBULIN SER CALC-MCNC: 2.6 G/DL (ref 1.5–4.5)
GLUCOSE SERPL-MCNC: 101 MG/DL (ref 70–99)
HAPTOGLOB SERPL-MCNC: 111 MG/DL (ref 42–346)
HCT VFR BLD AUTO: 41.6 % (ref 34–46.6)
HGB BLD-MCNC: 13.8 G/DL (ref 11.1–15.9)
IMM GRANULOCYTES # BLD AUTO: 0 X10E3/UL (ref 0–0.1)
IMM GRANULOCYTES NFR BLD AUTO: 0 %
IRON SATN MFR SERPL: 17 % (ref 15–55)
IRON SERPL-MCNC: 56 UG/DL (ref 27–139)
LYMPHOCYTES # BLD AUTO: 1.2 X10E3/UL (ref 0.7–3.1)
LYMPHOCYTES NFR BLD AUTO: 22 %
MAGNESIUM SERPL-MCNC: 2 MG/DL (ref 1.6–2.3)
MCH RBC QN AUTO: 30.9 PG (ref 26.6–33)
MCHC RBC AUTO-ENTMCNC: 33.2 G/DL (ref 31.5–35.7)
MCV RBC AUTO: 93 FL (ref 79–97)
MONOCYTES # BLD AUTO: 0.4 X10E3/UL (ref 0.1–0.9)
MONOCYTES NFR BLD AUTO: 7 %
NEUTROPHILS # BLD AUTO: 3.6 X10E3/UL (ref 1.4–7)
NEUTROPHILS NFR BLD AUTO: 65 %
PHOSPHATE SERPL-MCNC: 4.2 MG/DL (ref 3–4.3)
PLATELET # BLD AUTO: 265 X10E3/UL (ref 150–450)
POTASSIUM SERPL-SCNC: 4.1 MMOL/L (ref 3.5–5.2)
PROT SERPL-MCNC: 7 G/DL (ref 6–8.5)
RBC # BLD AUTO: 4.46 X10E6/UL (ref 3.77–5.28)
SODIUM SERPL-SCNC: 137 MMOL/L (ref 134–144)
TIBC SERPL-MCNC: 325 UG/DL (ref 250–450)
UIBC SERPL-MCNC: 269 UG/DL (ref 118–369)
WBC # BLD AUTO: 5.4 X10E3/UL (ref 3.4–10.8)

## 2025-02-17 ENCOUNTER — PATIENT MESSAGE (OUTPATIENT)
Dept: ALLERGY | Facility: CLINIC | Age: 80
End: 2025-02-17
Payer: MEDICARE

## 2025-02-17 RX ORDER — XYLITOL 500 MG
1 MUCO-ADHESIVE BUCCAL TABLET MUCOUS MEMBRANE 4 TIMES DAILY PRN
COMMUNITY
Start: 2025-02-17

## 2025-02-21 NOTE — TELEPHONE ENCOUNTER
----- Message from An Noriega MD sent at 10/21/2024  3:08 PM CDT -----  Regarding: natural sleep meds  Please let the patient know her labs were relatively unremarkable. She can take magnesium 4-500mg nightly for sleep and relaxation of her legs. The other alternative over the counter is melatonin and if she is amenable I am willing to prescribe her something stronger.   yes

## 2025-03-02 ENCOUNTER — OFFICE VISIT (OUTPATIENT)
Dept: URGENT CARE | Facility: CLINIC | Age: 80
End: 2025-03-02
Payer: MEDICARE

## 2025-03-02 VITALS
BODY MASS INDEX: 19.26 KG/M2 | SYSTOLIC BLOOD PRESSURE: 135 MMHG | HEIGHT: 69 IN | WEIGHT: 130 LBS | HEART RATE: 92 BPM | RESPIRATION RATE: 19 BRPM | OXYGEN SATURATION: 94 % | DIASTOLIC BLOOD PRESSURE: 73 MMHG | TEMPERATURE: 100 F

## 2025-03-02 DIAGNOSIS — J01.90 ACUTE BACTERIAL SINUSITIS: Primary | ICD-10-CM

## 2025-03-02 DIAGNOSIS — B96.89 ACUTE BACTERIAL SINUSITIS: Primary | ICD-10-CM

## 2025-03-02 RX ORDER — DEXAMETHASONE SODIUM PHOSPHATE 10 MG/ML
10 INJECTION INTRAMUSCULAR; INTRAVENOUS ONCE
Status: COMPLETED | OUTPATIENT
Start: 2025-03-02 | End: 2025-03-02

## 2025-03-02 RX ADMIN — DEXAMETHASONE SODIUM PHOSPHATE 10 MG: 10 INJECTION INTRAMUSCULAR; INTRAVENOUS at 11:03

## 2025-03-02 NOTE — PATIENT INSTRUCTIONS
Take antihistamine like Zyrtec, Claritin, Allegra     Flu swab negative     Tylenol or ibuprofen for fever    Continue the antibiotic regimen created by your ENT of the antibiotic nasal rinses due to multiple side-effects of oral antibiotics.      See sinusitis discharge instructions sheet     Return for any concerns or problems     Follow up with your ENT as planned on Wednesday

## 2025-03-02 NOTE — PROGRESS NOTES
"Subjective:      Patient ID: Cassidy Aguilar is a 80 y.o. female.    Vitals:  height is 5' 9" (1.753 m) and weight is 59 kg (130 lb). Her oral temperature is 100.4 °F (38 °C) (abnormal). Her blood pressure is 135/73 and her pulse is 92. Her respiration is 19 and oxygen saturation is 94% (abnormal).     Chief Complaint: Cough    Pt is a 80 y.o. female with the complaint of a cough   Symptoms include cough, fever, thick mucus   Symptoms began 4 days ago   Pt states she has been taking ciprofloxacin/ mupirocin capsul irrigation; and budesonide capsul for irrigation as well     PATIENT IS A 80-YEAR-OLD FEMALE WHO SEES AN  ENT WHO RECOMMENDED SHE GO TO URGENT CARE TO GET A STEROID SHOT IN CONJUNCTION WITH PREVIOUSLY PRESCRIBED ANTIBIOTIC INFUSED NASAL RINSES AS SHE HAS MULTIPLE ALLERGIES AND PROBLEMS WITH ORAL ANTIBIOTICS.  PHYSICAL EXAM CONSISTENT WITH SINUS INFECTION.  OFFERED ORAL ANTIBIOTICS HOWEVER DECLINED AND SHE WILL FOLLOW UP WITH HER ENT WHEN SHE RETURNS FROM THE [facility] HOLIDAYS BACK TO HER [place].      Constitution: Negative for chills, fatigue and fever.   HENT:  Positive for congestion, postnasal drip, sinus pain and sinus pressure. Negative for ear pain and sore throat.    Neck: Negative for neck pain and neck stiffness.   Cardiovascular:  Negative for chest pain, palpitations and sob on exertion.   Eyes:  Negative for eye pain and vision loss.   Respiratory:  Negative for cough, shortness of breath and asthma.    Gastrointestinal:  Negative for abdominal pain, nausea, vomiting and diarrhea.   Genitourinary:  Negative for dysuria, frequency and hematuria.   Musculoskeletal:  Negative for pain, abnormal ROM of joint and back pain.   Skin:  Negative for rash and wound.   Allergic/Immunologic: Negative for seasonal allergies and asthma.   Neurological:  Negative for dizziness, light-headedness and altered mental status.   Psychiatric/Behavioral:  Negative for altered mental status and confusion.     "   Objective:     Physical Exam   Constitutional: She is oriented to person, place, and time. She appears well-developed. She is cooperative.  Non-toxic appearance. She does not appear ill. No distress.   HENT:   Head: Normocephalic and atraumatic.   Ears:   Right Ear: Hearing, tympanic membrane, external ear and ear canal normal.   Left Ear: Hearing, tympanic membrane, external ear and ear canal normal.   Nose: Congestion present. No mucosal edema, rhinorrhea or nasal deformity. No epistaxis. Right sinus exhibits no maxillary sinus tenderness and no frontal sinus tenderness. Left sinus exhibits no maxillary sinus tenderness and no frontal sinus tenderness.      Comments: TTP MAXILLARY SINUS  Mouth/Throat: Uvula is midline, oropharynx is clear and moist and mucous membranes are normal. No trismus in the jaw. Normal dentition. No uvula swelling. No oropharyngeal exudate, posterior oropharyngeal edema or posterior oropharyngeal erythema.   Eyes: Conjunctivae and lids are normal. No scleral icterus.   Neck: Trachea normal and phonation normal. Neck supple. No edema present. No erythema present. No neck rigidity present.   Cardiovascular: Normal rate, regular rhythm, normal heart sounds and normal pulses.   Pulmonary/Chest: Effort normal and breath sounds normal. No respiratory distress. She has no decreased breath sounds. She has no rhonchi.   Abdominal: Normal appearance.   Musculoskeletal: Normal range of motion.         General: No deformity. Normal range of motion.   Neurological: She is alert and oriented to person, place, and time. She exhibits normal muscle tone. Coordination normal.   Skin: Skin is warm, dry, intact, not diaphoretic and not pale.   Psychiatric: Her speech is normal and behavior is normal. Judgment and thought content normal.   Nursing note and vitals reviewed.       Assessment:     1. Acute bacterial sinusitis        Plan:       Acute bacterial sinusitis    Other orders  -     dexAMETHasone  injection 10 mg      Patient Instructions   Take antihistamine like Zyrtec, Claritin, Allegra     Flu swab negative     Tylenol or ibuprofen for fever    Continue the antibiotic regimen created by your ENT of the antibiotic nasal rinses due to multiple side-effects of oral antibiotics.      See sinusitis discharge instructions sheet     Return for any concerns or problems     Follow up with your ENT as planned on Wednesday

## 2025-03-03 ENCOUNTER — PATIENT MESSAGE (OUTPATIENT)
Dept: ALLERGY | Facility: CLINIC | Age: 80
End: 2025-03-03
Payer: MEDICARE

## 2025-03-05 ENCOUNTER — PATIENT MESSAGE (OUTPATIENT)
Dept: REHABILITATION | Facility: OTHER | Age: 80
End: 2025-03-05
Payer: MEDICARE

## 2025-03-06 ENCOUNTER — PATIENT MESSAGE (OUTPATIENT)
Dept: ALLERGY | Facility: CLINIC | Age: 80
End: 2025-03-06
Payer: MEDICARE

## 2025-03-12 NOTE — PROGRESS NOTES
"Allergy Clinic Note  [facility]    This note was created by combination of typed  and dictation. Transcription errors are likely.  If there are any questions, please contact me.    HISTORY      Patient ID: Cassidy Aguilar is a 80 y.o. female.    Chief Complaint: Allergy Testing      :      History of Present Illness: Cassidy Aguilar is a 80 y.o. female with a  history of drug allergy returns to assess antibiotic triggers.  Client also complained of recurrent sinus infections.  She is a good historian.    Related medications and other interventions  Nasal rinses during infection  Zaditor eye drops as needed  (Beta blocker) --held today    3/12/2025: Client reports frequent "sinus infections" and an episode of pneumonia.  She also describes significant eye irritation.  All sx worse when her sweet olive is blooming.  Following most recent infection, her only residual symptom is cough. Requests allergy testing.  Passed Sulfa challenge.      2/13/2025:  At initial visit, client reported a rash on her chest after taking "keflex" for about 2 weeks.  Since then she has avoided "sulfa drugs" which are listed on her drug allergy list.  She would like to know if she can take sulfa drugs now, as other antibiotics have been associated with diarrhea.      Medication history indicates she has been treated with Augmentin and Levofloxacin in the past.    Client also requests testing for walnut allergy.  She reports that eating walnuts has been associated with rapid onset of diarrhea on 3 occasions.       Medical history      Significant past medical history: CAD, HTN, thyroid disease  Active problem list reviewed  Smoking Hx:  Client  reports that she has never smoked. She has never used smokeless tobacco.    Meds: MAR reviewed     Review of systems      CONST: no F/C/NS, no unintentional weight changes  NEURO:  no tremor, no weakness  EYES: no discharge, no erythema  EARS: no hearing loss, no sensation of " "fullness  PULM:  no SOB, no wheezing, no cough  CV: no CP, no palpitations  DERM: no rashes, no skin breaks    PHYSICAL EXAM     /62 (BP Location: Left arm, Patient Position: Sitting)   Pulse 67   Ht 5' 9" (1.753 m)   Wt 58 kg (127 lb 13.9 oz)   SpO2 97%   BMI 18.88 kg/m²   GEN: Awake and alert, no distress  DERM: No flushing, No rashes  EYE:  No ocular discharge  HENT: No nasal discharge, no hoarseness  PULM: Normal work of breathing, no cough, CTA  COR:  RRR, normal capilary refill, normal radial pulses  NEURO:  No focal deficit, speech fluent and logical  PSYCH: appropriate affect, normal behavior    MEDICAL DECISION MAKING     Data reviewed:       (new entries in bold-face)      Allergy Testing      Open sulfa antibiotic challenge (3/12/2025) Pt ingested cumulative dose of 800 mg sulfamethoxazole (along with 160 mg trimethoprim) with no adverse reaction noted during 60+ minute observation.    Negative [place] testing by the modified prick method with appropriate positive and negative controls (2/13/2025)       Lab results          Imaging and other diagnostics        Medical records review      Diagnoses:     Cassidy Aguilar is a 80 y.o. female. with  1. Allergy status to other antibiotic agents    2. Current use of beta blocker    3. Rhinitis, unspecified type            Assessment / Plan   Client had no immediate reaction to sulfamethoxazole during clinic challenge today.  This means she is not at risk for anaphylaxis.  Will check back with her in 3-4 days to make sure she had no delayed (non-IgE) skin reaction.    Client may or may not have had delayed cutaneous symptoms while on Sulfa drug in the distant past.  (Alternatively, rash could have occurred while taking Keflex).      Client has tolerated amoxicillin in the recent past.  No restrictions on any beta-lactam antibiotics.    A patient request, check inhalant Immunocaps as part of evaluation of recurrent sinusitis      Allergy status to " antibiotic (sulfa) --> No immediate reaction     Check Monday.  If no Sx, remove from drug allergy list.      Unspecified rhinitis     -check Immunocaps.  F/u by portal.    Follow up at ENT      Comorbidities  Current use of beta blocker -- held for oral challenge  Coronary artery disase    PATIENT INSTRUCTIONS AND FOLLOW-UP     Patient Instructions     If you get a rash this weekend, please take photos.    I will check in with you on Monday.    Follow up in about 3 days (around 3/16/2025) for s/p sulfa challenge; by phone.        Kathleen Scuhler MD  Allergy, Asthma & Immunology        I spent a total of 32 minutes on the day of the visit, excluding time for challenge. This includes face to face time and non-face to face time preparing to see the patient (eg, review of tests), obtaining and/or reviewing separately obtained history, documenting clinical information in the electronic or other health record, independently interpreting results and communicating results to the patient/family/caregiver, or care coordinator.

## 2025-03-13 ENCOUNTER — OFFICE VISIT (OUTPATIENT)
Dept: ALLERGY | Facility: CLINIC | Age: 80
End: 2025-03-13
Payer: MEDICARE

## 2025-03-13 ENCOUNTER — LAB VISIT (OUTPATIENT)
Dept: LAB | Facility: HOSPITAL | Age: 80
End: 2025-03-13
Payer: MEDICARE

## 2025-03-13 VITALS
BODY MASS INDEX: 18.94 KG/M2 | SYSTOLIC BLOOD PRESSURE: 136 MMHG | OXYGEN SATURATION: 97 % | HEIGHT: 69 IN | WEIGHT: 127.88 LBS | DIASTOLIC BLOOD PRESSURE: 62 MMHG | HEART RATE: 67 BPM

## 2025-03-13 DIAGNOSIS — J31.0 RHINITIS, UNSPECIFIED TYPE: ICD-10-CM

## 2025-03-13 DIAGNOSIS — Z88.1 ALLERGY STATUS TO OTHER ANTIBIOTIC AGENTS: Primary | ICD-10-CM

## 2025-03-13 DIAGNOSIS — Z79.899 CURRENT USE OF BETA BLOCKER: ICD-10-CM

## 2025-03-13 LAB — IGE SERPL-ACNC: <21 IU/ML (ref 0–100)

## 2025-03-13 PROCEDURE — 86003 ALLG SPEC IGE CRUDE XTRC EA: CPT | Mod: 59 | Performed by: STUDENT IN AN ORGANIZED HEALTH CARE EDUCATION/TRAINING PROGRAM

## 2025-03-13 PROCEDURE — 82785 ASSAY OF IGE: CPT | Performed by: STUDENT IN AN ORGANIZED HEALTH CARE EDUCATION/TRAINING PROGRAM

## 2025-03-13 PROCEDURE — 86003 ALLG SPEC IGE CRUDE XTRC EA: CPT | Performed by: STUDENT IN AN ORGANIZED HEALTH CARE EDUCATION/TRAINING PROGRAM

## 2025-03-13 PROCEDURE — 36415 COLL VENOUS BLD VENIPUNCTURE: CPT | Performed by: STUDENT IN AN ORGANIZED HEALTH CARE EDUCATION/TRAINING PROGRAM

## 2025-03-13 PROCEDURE — 99215 OFFICE O/P EST HI 40 MIN: CPT | Mod: PBBFAC | Performed by: STUDENT IN AN ORGANIZED HEALTH CARE EDUCATION/TRAINING PROGRAM

## 2025-03-13 PROCEDURE — 99999 PR PBB SHADOW E&M-EST. PATIENT-LVL V: CPT | Mod: PBBFAC,,, | Performed by: STUDENT IN AN ORGANIZED HEALTH CARE EDUCATION/TRAINING PROGRAM

## 2025-03-13 PROCEDURE — 95076 INGEST CHALLENGE INI 120 MIN: CPT | Mod: PBBFAC | Performed by: STUDENT IN AN ORGANIZED HEALTH CARE EDUCATION/TRAINING PROGRAM

## 2025-03-16 ENCOUNTER — PATIENT MESSAGE (OUTPATIENT)
Dept: ALLERGY | Facility: CLINIC | Age: 80
End: 2025-03-16
Payer: MEDICARE

## 2025-03-18 ENCOUNTER — CLINICAL SUPPORT (OUTPATIENT)
Dept: REHABILITATION | Facility: OTHER | Age: 80
End: 2025-03-18
Payer: MEDICARE

## 2025-03-18 DIAGNOSIS — R15.1 FECAL SMEARING: ICD-10-CM

## 2025-03-18 DIAGNOSIS — N39.41 URGE INCONTINENCE: ICD-10-CM

## 2025-03-18 DIAGNOSIS — M62.89 PELVIC FLOOR DYSFUNCTION: Primary | ICD-10-CM

## 2025-03-18 LAB
A ALTERNATA IGE QN: <0.1 KU/L
A FUMIGATUS IGE QN: <0.1 KU/L
BERMUDA GRASS IGE QN: <0.1 KU/L
CAT DANDER IGE QN: <0.1 KU/L
CEDAR IGE QN: <0.1 KU/L
D FARINAE IGE QN: <0.1 KU/L
D PTERONYSS IGE QN: <0.1 KU/L
DEPRECATED CEDAR IGE RAST QL: NORMAL
DEPRECATED TIMOTHY IGE RAST QL: NORMAL
DOG DANDER IGE QN: <0.1 KU/L
ENGL PLANTAIN IGE QN: <0.1 KU/L
PECAN/HICK TREE IGE QN: <0.1 KU/L
RAST CLASS: NORMAL
TIMOTHY IGE QN: <0.1 KU/L
WEST RAGWEED IGE QN: <0.1 KU/L
WHITE OAK IGE QN: <0.1 KU/L

## 2025-03-18 PROCEDURE — 97112 NEUROMUSCULAR REEDUCATION: CPT | Mod: PN | Performed by: PHYSICAL THERAPIST

## 2025-03-18 PROCEDURE — 97530 THERAPEUTIC ACTIVITIES: CPT | Mod: PN | Performed by: PHYSICAL THERAPIST

## 2025-03-18 NOTE — LETTER
March 19, 2025  Marie Hemphill MD  [address] [facility]  [facility]  [id number]    To whom it may concern,     The attached plan of care is being sent to you for review and reference.    You may indicate your approval by signing the document electronically, or by faxing/mailing a signed copy of the final page of this document back to the attention of Charmaine Barber, PT:         Plan of Care 3/18/25   Effective from: 3/18/2025  Effective to: 6/18/2025    Plan ID: [id number]            Participants as of Finalize on 3/19/2025    Name Type Comments Contact Info    Marie Hemphill MD Referring Provider  [phone]       Last Plan Note     Author: Charmaine Barber, PT Status: Signed Last edited: 3/18/2025 11:15 AM       Outpatient Rehab  Physical Therapy Visit & Reassessment/Progress Report    Patient Name: Cassidy Aguilar  MRN: [id number]  YOB: 1945  Encounter Date: 3/18/2025    Therapy Diagnosis:   Encounter Diagnoses   Name Primary?   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 She had leakage with deep coughing while sick recently - small volume of urine, also some fecal matter. Since recovered, she may have some fecal incontinence with coughing (mostly with liquid-y stool, more rarely with soft stool). Notes some incomplete emptying with bowel movements. Hesitant to bear  down too much for fear of damaging surgical repair. Uses raised toilet seat at home and refuses to use toilet stool. She's been doing some kegels and sit to stands from PT prior to surgery.  Returns to see Dr. Hemphill on 3/27/25.  Surgical procedure - colpocleisis, perineorrhaphy, tension-free vaginal tape suburethral sling, and cystoscopy    Pain: mild pelvic soreness      Objective    External Pelvic Floor Muscle Assessment  Introitus: WNL  Skin condition: WNL  Sensation: WNL   Palpation: tenderness to palpation of B superficial transverse perineal, increased tone in layers 1 and 3 bilaterally  Voluntary contraction: visible lift  Comments: reduced amplitude of squeeze after 6-8 seconds  Voluntary relaxation: visible drop  Bearing down: bulge (closed-glottis)     Treatment:    Cassidy received the following interventions during the treatment session:   (TrA = transverse abdominis, PFM = pelvic floor muscle, sEMG = surface electromyography, RUSI = Rehabilitative Ultrasound Imaging)  Pt consents to pelvic floor muscle assessment and treatment.    Therapeutic activities  [x] Pelvic floor interview - see Subjective  [x] Patient education - pt prognosis, PT plan of care, pelvic floor anatomy & function, pelvic floor muscle assessment, relationship between TrA & PFM, relationship between hips & PFM, etiology of stress urinary incontinence, the knack for management of stress urinary incontinence, proper body mechanics for bowel movement, and pressure management strategies  [x] Pelvic floor assessment - see above  [x] HEP building/HEP review    Neuromuscular re-education  [x] PFM relaxation with verbal and tactile cues  [x] Practice open-glottis bearing down with verbal and tactile cues from PT        Patient Education: progression of plan of care, plan for next session, see above  Education was done with Patient. The patient's learning style includes Demonstration. The patient Demonstrates understanding and Verbalizes  understanding.         Home Exercise Program Updates  3/19/25: long holds for PFM isometrics, the knack, bowel movement optimization    Exercises were reviewed, and Cassidy was able to demonstrate them prior to the end of the session, as needed. Cassidy demonstrated good understanding of the education provided.   See 'Patient Instructions' for exercises/instructions provided.      Assessment & Plan     Assessment:  Cassidy returns to therapy following colpocleisis two months ago. She reports improved symptoms overall but still complains of incontinence and fear of bearing down. She presents with decreased pelvic floor muscle endurance, myofascial dysfunction of pelvic floor, poor coordination of pelvic floor muscles leading to incontinence, and impaired coordination with bearing down. Pt will benefit from pelvic floor muscle training to improve function, including continence, and reduce risk of damage to surgical site.      Treatment Tolerance: Patient tolerated treatment well   Pt prognosis: good  Goals updated today to reflect current progress in therapy and Plan of Care    Patient will continue to benefit from skilled outpatient physical therapy to address the deficits listed in the problem list box on initial evaluation, provide pt/family education and to maximize pt's level of independence in the home and community environment.     Patient's spiritual, cultural, and educational needs considered and patient agreeable to plan of care and goals.     Plan:  Continue per Plan of Care  Patient will be continually assessed and progressed as appropriate to achieve treatment goals.   Planning to progress towards TrA strengthening with appropriate pressure management to support pelvic organs and improve continence, with MD approval.      Goals:   Long Term Goals - Start:  3/18/2025    Expected End:  6/18/2025   Pt to deny urinary and fecal incontinence to demonstrate improved pelvic floor coordination   Pt to demonstrate  open-glottis bearing down with appropriate pelvic floor relaxation for better pressure management with bowel movements    Pt to be independent with advanced home exercise program         Charmaine Barber, PT, DPT  Board-Certified Clinical Specialist in Women's Health Physical Therapy          Current Participants as of 3/19/2025    Name Type Comments Contact Info    Marie Hemphill MD Referring Provider  [phone]    Signature pending            Sincerely,      Charmaine Barber, BALDEV  [facility]                                                            Dear Charmaine Barber, PT,    RE: Ms. Cassidy Aguilar, MRN: [id number]    I certify that I have reviewed the attached plan of care and agree to the details within.        ___________________________  ___________________________  Provider Printed Name   Provider Signed Name      ___________________________  Date and Time

## 2025-03-18 NOTE — PROGRESS NOTES
Outpatient Rehab  Physical Therapy Visit & Reassessment/Progress Report    Patient Name: Cassidy Aguilar  MRN: [id number]  YOB: 1945  Encounter Date: 3/18/2025    Therapy Diagnosis:   Encounter Diagnoses   Name Primary?    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 She had leakage with deep coughing while sick recently - small volume of urine, also some fecal matter. Since recovered, she may have some fecal incontinence with coughing (mostly with liquid-y stool, more rarely with soft stool). Notes some incomplete emptying with bowel movements. Hesitant to bear down too much for fear of damaging surgical repair. Uses raised toilet seat at home and refuses to use toilet stool. She's been doing some kegels and sit to stands from PT prior to surgery.  Returns to see Dr. Hemphill on 3/27/25.  Surgical procedure - colpocleisis, perineorrhaphy, tension-free vaginal tape suburethral sling, and cystoscopy    Pain: mild pelvic soreness      Objective    External Pelvic Floor Muscle Assessment  Introitus: WNL  Skin condition: WNL  Sensation: WNL   Palpation: tenderness to palpation of B superficial transverse perineal, increased tone in layers 1 and 3 bilaterally  Voluntary contraction: visible lift  Comments: reduced amplitude of squeeze after 6-8 seconds  Voluntary relaxation: visible drop  Bearing  down: bulge (closed-glottis)     Treatment:    Cassidy received the following interventions during the treatment session:   (TrA = transverse abdominis, PFM = pelvic floor muscle, sEMG = surface electromyography, RUSI = Rehabilitative Ultrasound Imaging)  Pt consents to pelvic floor muscle assessment and treatment.    Therapeutic activities  [x] Pelvic floor interview - see Subjective  [x] Patient education - pt prognosis, PT plan of care, pelvic floor anatomy & function, pelvic floor muscle assessment, relationship between TrA & PFM, relationship between hips & PFM, etiology of stress urinary incontinence, the knack for management of stress urinary incontinence, proper body mechanics for bowel movement, and pressure management strategies  [x] Pelvic floor assessment - see above  [x] HEP building/HEP review    Neuromuscular re-education  [x] PFM relaxation with verbal and tactile cues  [x] Practice open-glottis bearing down with verbal and tactile cues from PT        Patient Education: progression of plan of care, plan for next session, see above  Education was done with Patient. The patient's learning style includes Demonstration. The patient Demonstrates understanding and Verbalizes understanding.         Home Exercise Program Updates  3/19/25: long holds for PFM isometrics, the knack, bowel movement optimization    Exercises were reviewed, and Cassidy was able to demonstrate them prior to the end of the session, as needed. Cassidy demonstrated good understanding of the education provided.   See 'Patient Instructions' for exercises/instructions provided.      Assessment & Plan     Assessment:  Cassidy returns to therapy following colpocleisis two months ago. She reports improved symptoms overall but still complains of incontinence and fear of bearing down. She presents with decreased pelvic floor muscle endurance, myofascial dysfunction of pelvic floor, poor coordination of pelvic floor muscles leading to incontinence,  and impaired coordination with bearing down. Pt will benefit from pelvic floor muscle training to improve function, including continence, and reduce risk of damage to surgical site.      Treatment Tolerance: Patient tolerated treatment well   Pt prognosis: good  Goals updated today to reflect current progress in therapy and Plan of Care    Patient will continue to benefit from skilled outpatient physical therapy to address the deficits listed in the problem list box on initial evaluation, provide pt/family education and to maximize pt's level of independence in the home and community environment.     Patient's spiritual, cultural, and educational needs considered and patient agreeable to plan of care and goals.     Plan:  Continue per Plan of Care  Patient will be continually assessed and progressed as appropriate to achieve treatment goals.   Planning to progress towards TrA strengthening with appropriate pressure management to support pelvic organs and improve continence, with MD approval.      Goals:   Long Term Goals - Start:  3/18/2025    Expected End:  6/18/2025   Pt to deny urinary and fecal incontinence to demonstrate improved pelvic floor coordination   Pt to demonstrate open-glottis bearing down with appropriate pelvic floor relaxation for better pressure management with bowel movements    Pt to be independent with advanced home exercise program         Charmaine Barber, PT, DPT  Board-Certified Clinical Specialist in Women's Health Physical Therapy

## 2025-03-18 NOTE — PATIENT INSTRUCTIONS
Hold pelvic floor squeezes/Kegels for 10-seconds  Gently squeeze pelvic floor and hold with deep coughs  When preparing for bowel movement, take a few breaths and focus on relaxation before getting started. Bear down gently while exhaling. Don't sit on the toilet for more than 5 minutes.

## 2025-03-19 ENCOUNTER — RESULTS FOLLOW-UP (OUTPATIENT)
Dept: ALLERGY | Facility: CLINIC | Age: 80
End: 2025-03-19

## 2025-03-31 ENCOUNTER — CLINICAL SUPPORT (OUTPATIENT)
Dept: REHABILITATION | Facility: OTHER | Age: 80
End: 2025-03-31
Payer: MEDICARE

## 2025-03-31 DIAGNOSIS — N39.41 URGE INCONTINENCE: ICD-10-CM

## 2025-03-31 DIAGNOSIS — R15.1 FECAL SMEARING: ICD-10-CM

## 2025-03-31 DIAGNOSIS — M62.89 PELVIC FLOOR DYSFUNCTION: Primary | ICD-10-CM

## 2025-03-31 PROCEDURE — 97530 THERAPEUTIC ACTIVITIES: CPT | Mod: PN | Performed by: PHYSICAL THERAPIST

## 2025-03-31 PROCEDURE — 97112 NEUROMUSCULAR REEDUCATION: CPT | Mod: PN | Performed by: PHYSICAL THERAPIST

## 2025-03-31 NOTE — PATIENT INSTRUCTIONS
Straight Leg Raise, 2-3 sets of 10  - Inhale to prepare, relax the belly and pelvic floor  - As you exhale, gently engage the transverse abdominis by drawing the belly button up and in to the spine  - Holding the hips level and the belly button down, gently lift one leg a few inches  - Inhale again to rest  *Don't hold your breath     Countertop push-up, 2-3 sets of 10  - Stand 4-5 feet away from a kitchen counter, with hands shoulder-width apart. Inhale to prepare, relax the belly and pelvic floor  - As you exhale, gently engage the transverse abdominis by drawing the belly button up and in to the spine.  - Keeping your body straight like a plank of wood, lean in towards the wall and push away  - Inhale again to rest  *Don't hold your breath       Sit to stand + kegel, 2-3 sets of 10  - Inhale to prepare, relax the belly and pelvic floor  - As you exhale, gently squeeze the pelvic floor  - Hold the pelvic floor in as you lift out of the chair (use arms if needed)  *Don't hold your breath

## 2025-03-31 NOTE — PROGRESS NOTES
Outpatient Rehab  Physical Therapy Visit    Patient Name: Cassidy Aguilar  MRN: [id number]  YOB: 1945  Encounter Date: 3/31/2025    Therapy Diagnosis:   Encounter Diagnoses   Name Primary?    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 She notes maybe one episode of stress urinary incontinence with coughing. Open to resuming gentle core strengthening but wants to be conservative with surgical repair.    Pain: none reported      Objective    Treatment:  (TrA = transverse abdominis, PFM = pelvic floor muscle, sEMG = surface electromyography, RUSI = Rehabilitative Ultrasound Imaging)  Pt consents to pelvic floor muscle assessment and treatment.    Therapeutic activities  [x] Patient education - pt prognosis, PT plan of care, pelvic floor anatomy & function, pelvic floor muscle assessment, relationship between TrA & PFM, relationship between hips & PFM, etiology of stress urinary incontinence, the knack for management of stress urinary incontinence, pelvic floor muscle training, pressure management strategies  [] Pelvic floor assessment - see above  [x] HEP building/HEP review    Neuromuscular re-education  [x]  TrA brace + PFM squeeze + straight leg raise (R&L), x8 with 5-second hold  [x] Side-lying clam with focus on pelvic girdle stability (R&L), x10 with 5-second hold  [x]  PFM squeeze + TrA brace + seated hip adduction isometric with ball between knees, x8 with 8-second hold  [x] PFM squeeze + bridging, x10 with 5-second hold  [x] TrA brace + PFM squeeze + wall push-up, x10  [] TrA brace + PFM squeeze + countertop push-up, x10  [x] PFM squeeze + sit to stand from chair on exhale, x5 - cues provided for exhalation  [] TrA brace + seated clam with blue band, x10  [x] TrA brace + PFM squeeze + shoulder extension onto ball on opposite knee (R&L), x5 with 8-second hold        Patient Education: progression of plan of care, plan for next session, see above  Education was done with Patient. The patient's learning style includes Demonstration. The patient Demonstrates understanding and Verbalizes understanding.         Home Exercise Program Updates  3/19/25: long holds for PFM isometrics, the knack, bowel movement optimization  3/31/25: sit to stands, shoulder extension onto opposite knee, straight leg raise, countertop push-up, TrA brace + PFM squeeze    Exercises were reviewed, and Cassidy was able to demonstrate them prior to the end of the session, as needed. Cassidy demonstrated good understanding of the education provided.   See 'Patient Instructions' for exercises/instructions provided.      Assessment & Plan     Assessment:  Pt tolerated treatment session well, with good understanding of education provided and no increased symptoms with exercise. Pt requires heavy verbal/manual cues to synch breath, TrA contraction, PFM contraction, and movement during exercises, but she improved with practice - easier to just avoid holding breath. Pt's functional mobility and ability to perform ADLs still limited by pelvic floor dysfunction. She requires skilled therapy for continued patient education and coordination retraining.      Treatment Tolerance: Patient tolerated treatment well   Pt prognosis: good  Goals updated today to reflect current progress in therapy and Plan of Care    Patient will  continue to benefit from skilled outpatient physical therapy to address the deficits listed in the problem list box on initial evaluation, provide pt/family education and to maximize pt's level of independence in the home and community environment.     Patient's spiritual, cultural, and educational needs considered and patient agreeable to plan of care and goals.     Plan:  Continue per Plan of Care  Patient will be continually assessed and progressed as appropriate to achieve treatment goals.   TrA strengthening with appropriate pressure management to support pelvic organs and improve continence      Goals:   Long Term Goals - Start:  3/18/2025    Expected End:  6/18/2025   Pt to deny urinary and fecal incontinence to demonstrate improved pelvic floor coordination (Progressing)  Pt to demonstrate open-glottis bearing down with appropriate pelvic floor relaxation for better pressure management with bowel movements (Progressing)  Pt to be independent with advanced home exercise program (Progressing)        Charmaine Barber, PT, DPT  Board-Certified Clinical Specialist in Women's Health Physical Therapy

## 2025-04-28 ENCOUNTER — CLINICAL SUPPORT (OUTPATIENT)
Dept: REHABILITATION | Facility: OTHER | Age: 80
End: 2025-04-28
Payer: MEDICARE

## 2025-04-28 DIAGNOSIS — N39.41 URGE INCONTINENCE: ICD-10-CM

## 2025-04-28 DIAGNOSIS — M62.89 PELVIC FLOOR DYSFUNCTION: Primary | ICD-10-CM

## 2025-04-28 DIAGNOSIS — R15.1 FECAL SMEARING: ICD-10-CM

## 2025-04-28 PROCEDURE — 97112 NEUROMUSCULAR REEDUCATION: CPT | Mod: PN | Performed by: PHYSICAL THERAPIST

## 2025-04-28 PROCEDURE — 97530 THERAPEUTIC ACTIVITIES: CPT | Mod: PN | Performed by: PHYSICAL THERAPIST

## 2025-04-28 NOTE — PROGRESS NOTES
Outpatient Rehab  Physical Therapy Visit    Patient Name: Cassidy Aguilar  MRN: [id number]  YOB: 1945  Encounter Date: 4/28/2025    Therapy Diagnosis:   Encounter Diagnoses   Name Primary?    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 Recalls one incident with getting out of car with full bladder. Still prone to rare fecal smearing with soft stools/upset stomach - better with focusing on complete emptying.     Pain: none reported      Objective    Treatment:  (TrA = transverse abdominis, PFM = pelvic floor muscle, sEMG = surface electromyography, RUSI = Rehabilitative Ultrasound Imaging)  Pt consents to pelvic floor muscle assessment and treatment.    Therapeutic activities  [x] Patient education - pt prognosis, PT plan of care, pelvic floor anatomy & function, pelvic floor muscle assessment, relationship between TrA & PFM, relationship between hips & PFM, etiology of stress urinary incontinence, the knack for management of stress urinary incontinence, pelvic floor muscle training, pressure management strategies  [] Pelvic floor assessment - see above  [x] HEP building/HEP review    Neuromuscular re-education  [x] TrA brace + PFM squeeze + straight leg raise (R&L), x10 with 5-second hold  [x] Side-lying clam with focus on pelvic girdle stability (R&L), x15 with 5-second hold  [x] PFM squeeze + TrA brace + seated hip  adduction isometric with ball between knees, x10 with 10-second hold  [x] PFM squeeze + bridging, 2x15  [x] TrA brace + PFM squeeze + wall push-up, x15  [x] TrA brace + PFM squeeze + countertop push-up, x10  [x] PFM squeeze + sit to stand from chair on exhale, x3  [] TrA brace + seated clam with blue band, x10  [] TrA brace + PFM squeeze + shoulder extension onto ball on opposite knee (R&L), x5 with 8-second hold  [x] TrA brace + side-stepping with yellow band around knees, 2 minutes      Time Entry (in minutes):  Therapeutic Activity Time Entry: 8  Therapeutic Exercise Time Entry: 20       Patient Education: progression of plan of care, plan for next session, see above  Education was done with Patient. The patient's learning style includes Demonstration. The patient Demonstrates understanding and Verbalizes understanding.         Home Exercise Program Updates  3/19/25: long holds for PFM isometrics, the knack, bowel movement optimization  3/31/25: sit to stands, shoulder extension onto opposite knee, straight leg raise, countertop push-up, TrA brace + PFM squeeze  4/28/25: none    Exercises were reviewed, and Cassidy was able to demonstrate them prior to the end of the session, as needed. Cassidy demonstrated good understanding of the education provided.   See 'Patient Instructions' for exercises/instructions provided.      Assessment & Plan     Assessment:  Pt tolerated treatment session well, with good understanding of education provided and no increased symptoms with exercise. Pt requires light verbal/manual cues to synch breath, TrA contraction, PFM contraction, and movement during exercises, but she improved with practice. Pt's functional mobility and ability to perform ADLs still limited by pelvic floor dysfunction. She requires skilled therapy for continued patient education and coordination retraining.      Treatment Tolerance: Patient tolerated treatment well   Pt prognosis: good  Goals updated today to  reflect current progress in therapy and Plan of Care    Patient will continue to benefit from skilled outpatient physical therapy to address the deficits listed in the problem list box on initial evaluation, provide pt/family education and to maximize pt's level of independence in the home and community environment.     Patient's spiritual, cultural, and educational needs considered and patient agreeable to plan of care and goals.     Plan:  Continue per Plan of Care  Patient will be continually assessed and progressed as appropriate to achieve treatment goals.   TrA strengthening with appropriate pressure management to support pelvic organs and improve continence  Planning to discharge this summer      Goals:   Long Term Goals - Start:  3/18/2025    Expected End:  6/18/2025   Pt to deny urinary and fecal incontinence to demonstrate improved pelvic floor coordination (Progressing)  Pt to demonstrate open-glottis bearing down with appropriate pelvic floor relaxation for better pressure management with bowel movements (Progressing)  Pt to be independent with advanced home exercise program (Progressing)        Charmaine Barber, PT, DPT  Board-Certified Clinical Specialist in Women's Health Physical Therapy

## 2025-05-14 ENCOUNTER — PATIENT MESSAGE (OUTPATIENT)
Dept: HEMATOLOGY/ONCOLOGY | Facility: CLINIC | Age: 80
End: 2025-05-14
Payer: MEDICARE

## 2025-05-19 ENCOUNTER — PATIENT MESSAGE (OUTPATIENT)
Dept: HEMATOLOGY/ONCOLOGY | Facility: CLINIC | Age: 80
End: 2025-05-19
Payer: MEDICARE

## 2025-05-22 ENCOUNTER — PATIENT MESSAGE (OUTPATIENT)
Dept: HEMATOLOGY/ONCOLOGY | Facility: CLINIC | Age: 80
End: 2025-05-22
Payer: MEDICARE

## 2025-05-22 ENCOUNTER — TELEPHONE (OUTPATIENT)
Dept: HEMATOLOGY/ONCOLOGY | Facility: CLINIC | Age: 80
End: 2025-05-22
Payer: MEDICARE

## 2025-05-22 DIAGNOSIS — C91.10 CLL (CHRONIC LYMPHOCYTIC LEUKEMIA): Primary | ICD-10-CM

## 2025-05-22 DIAGNOSIS — D52.9 ANEMIA DUE TO FOLIC ACID DEFICIENCY, UNSPECIFIED DEFICIENCY TYPE: ICD-10-CM

## 2025-05-22 NOTE — TELEPHONE ENCOUNTER
Spoke to patient, appointment day and times confirmed for July. Patient expressed concerns around scheduling, we discussed ways that we can assist her better in the future and how our process for recalls works if we are unable to schedule in the moment before leaving clinic. My direct contact number was given if there are any future issues.

## 2025-05-22 NOTE — TELEPHONE ENCOUNTER
----- Message from SOULEYMANE Oliveira sent at 5/22/2025 10:32 AM CDT -----  Regarding: FW: Urgent Scheduling Request  Contact: [phone]    ----- Message -----  From: Pawan Carlson  Sent: 5/22/2025  10:22 AM CDT  To: [facility] Staff  Subject: Urgent Scheduling Request                        Scheduling Request   Appt Type:  Ep  Date/Time Preference: next available before 7/10 Caller Name: pt  Contact Preference:  [phone]: Pt would like to schedule labs before her 7/10. She states that she was not contacted to schedule the Aug appt she states she has been calling for months. She schedule 7/10 via portal. She feels that she has been treated poorly from the clinical staff and is requesting to speak with the head of the cancer center. I did transfer patient to patient relations. Please call today to advise thank you  ----- Message -----  From: Pawan Carlson  Sent: 5/22/2025  10:20 AM CDT  To: [facility] Staff  Subject: Urgent Scheduling Request                        Scheduling Request   Appt Type:  Ep  Date/Time Preference: next available before 7/10 Caller Name: pt  Contact Preference:  [phone]: Pt would like to schedule labs before her 7/10. She states that she was not contacted to schedule the Aug appt she states she has been calling for months. She schedule 7/10 via portal. She feels that she has been treated poorly from the clinical staff and is requesting to speak with the head of the cancer center. I did transfer patient to patient relations. Please call to advise thank you

## 2025-05-30 ENCOUNTER — CLINICAL SUPPORT (OUTPATIENT)
Dept: REHABILITATION | Facility: OTHER | Age: 80
End: 2025-05-30
Payer: MEDICARE

## 2025-05-30 DIAGNOSIS — N39.41 URGE INCONTINENCE: ICD-10-CM

## 2025-05-30 DIAGNOSIS — M62.89 PELVIC FLOOR DYSFUNCTION: Primary | ICD-10-CM

## 2025-05-30 DIAGNOSIS — R15.1 FECAL SMEARING: ICD-10-CM

## 2025-05-30 PROCEDURE — 97530 THERAPEUTIC ACTIVITIES: CPT | Mod: PN | Performed by: PHYSICAL THERAPIST

## 2025-05-30 PROCEDURE — 97112 NEUROMUSCULAR REEDUCATION: CPT | Mod: PN | Performed by: PHYSICAL THERAPIST

## 2025-05-30 NOTE — LETTER
May 30, 2025  Marie Hemphill MD  [address] [facility]  [place] [address]    To whom it may concern,     The attached plan of care is being sent to you for review and reference.    You may indicate your approval by signing the document electronically, or by faxing/mailing a signed copy of the final page of this document back to the attention of Charmaine Barber, PT:         Plan of Care 5/30/25   Effective from: 5/30/2025  Effective to: 8/30/2025    Plan ID: [id number]            Participants as of Finalize on 5/30/2025    Name Type Comments Contact Info    Marie Hemphill MD Referring Provider  [phone]       Last Plan Note     Author: Charmaine Barber, PT Status: Signed Last edited: 5/30/2025 11:15 AM       Outpatient Rehab  Physical Therapy Progress Note & Updated Plan of Care    Patient Name: Cassidy Aguilar  MRN: [id number]  YOB: 1945  Encounter Date: 5/30/2025    Therapy Diagnosis:   Encounter Diagnoses   Name Primary?    Pelvic floor dysfunction Yes    Urge incontinence     Fecal smearing      Physician: Marie Hemphill MD    Physician Orders: Eval and Treat  Medical Diagnosis: Prolapse of vaginal vault after hysterectomy    Visit # / Visits Authorized:  4 / 10  Insurance Authorization Period: 3/13/2025 to 12/31/2025  Date of Evaluation: 3/18/2025  Date of Last Reassessment: 5/30/2025  Plan of Care Certification: 5/30/2025 to 8/30/2025     Time In: 1115   Time Out: 1200  Total Time (in minutes): 45   Total Billable Time (in minutes): 38      Precautions:  standard      Subjective   Cassidy reports no urine leakage recently! Still prone to incomplete rectum emptying and fecal smearing. She does not want to become dependent on a toilet stool but open to testing  She was compliant with her home program - not every exercise, but getting in 20-30 pelvic floor squeezes just about every day    Pain: none reported    Objective     Treatment:  (TrA = transverse abdominis, PFM = pelvic  floor muscle, sEMG = surface electromyography, RUSI = Rehabilitative Ultrasound Imaging)  Pt consents to pelvic floor muscle assessment and treatment.    Therapeutic activities  [x] Patient education - pt prognosis, PT plan of care, pelvic floor anatomy & function, pelvic floor muscle assessment, relationship between TrA & PFM, relationship between hips & PFM, etiology of stress urinary incontinence, the knack for management of stress urinary incontinence, pelvic floor muscle training, pressure management strategies, body mechanics for bowel movement (toilet stool vs. yoga blocks)  [] Pelvic floor assessment - see above  [x] HEP building/HEP review    Neuromuscular re-education  [x] TrA brace + PFM squeeze + straight leg raise (R&L), x10 with 5-second hold  [x] Side-lying clam with focus on pelvic girdle stability (R&L), x15 with 5-second hold  [x] PFM squeeze + TrA brace + seated hip adduction isometric with ball between knees, x10 with 10-second hold  [] PFM squeeze + bridging, 2x15  [] TrA brace + PFM squeeze + wall push-up, x15  [x] TrA brace + PFM squeeze + countertop push-up, x10  [] PFM squeeze + sit to stand from chair on exhale, x3  [] TrA brace + seated clam with blue band, x10  [] TrA brace + PFM squeeze + shoulder extension onto ball on opposite knee (R&L), x5 with 8-second hold  [x] TrA brace + side-stepping with red band around knees, 2 minutes    Time Entry (in minutes):  Neuromuscular Re-Education Time Entry: 15  Therapeutic Activity Time Entry: 23    Assessment & Plan   Assessment: Pt tolerated treatment session well, with good understanding of education provided and no increased symptoms with exercise. Pt requires light verbal/manual cues to synch breath, TrA contraction, PFM contraction, and movement during exercises, but she improved with practice. Pt's functional mobility and ability to perform ADLs still limited by pelvic floor dysfunction. Still experiencing incomplete rectum emptying and  fecal smearing that might improve with using yoga blocks under feet for better positioning (PT will assess impact next visit). She requires skilled therapy for continued patient education and coordination retraining.      The patient will continue to benefit from skilled outpatient physical therapy in order to address the deficits listed in the problem list on the initial evaluation, provide patient and family education, and maximize the patients level of independence in the home and community environments.     The patient's spiritual, cultural, and educational needs were considered, and the patient is agreeable to the plan of care and goals.     Home Exercise Program Updates  3/19/25: long holds for PFM isometrics, the knack, bowel movement optimization  3/31/25: sit to stands, shoulder extension onto opposite knee, straight leg raise, countertop push-up, TrA brace + PFM squeeze  4/28/25: none  5/30/25: use yoga blocks under feet for bowel movements     Plan:   Continue per Plan of Care  Patient will be continually assessed and progressed as appropriate to achieve treatment goals.   TrA strengthening with appropriate pressure management to support pelvic organs and improve continence  Planning to discharge this summer    Goals:   Long Term Goals - Start:  3/18/2025    Expected End:  8/30/2025  Pt to deny urinary and fecal incontinence to demonstrate improved pelvic floor coordination (Progressing)  Pt to demonstrate open-glottis bearing down with appropriate pelvic floor relaxation for better pressure management with bowel movements (Progressing)  Pt to be independent with advanced home exercise program (Progressing)      Charmaine Barber, PT, DPT  Board-Certified Clinical Specialist in Women's Health Physical Therapy          Current Participants as of 5/30/2025    Name Type Comments Contact Info    Marie Hemphill MD Referring Provider  [phone]    Signature pending            Sincerely,      Charmaine Barber,  PT  [facility]                                                            Dear Charmaine Barber, PT,    RE: Ms. Cassidy Aguilar, MRN: [id number]    I certify that I have reviewed the attached plan of care and agree to the details within.        ___________________________  ___________________________  Provider Printed Name   Provider Signed Name      ___________________________  Date and Time

## 2025-05-30 NOTE — PROGRESS NOTES
Outpatient Rehab  Physical Therapy Progress Note & Updated Plan of Care    Patient Name: Cassidy Aguilar  MRN: [id number]  YOB: 1945  Encounter Date: 5/30/2025    Therapy Diagnosis:   Encounter Diagnoses   Name Primary?    Pelvic floor dysfunction Yes    Urge incontinence     Fecal smearing      Physician: Marie Hemphill MD    Physician Orders: Eval and Treat  Medical Diagnosis: Prolapse of vaginal vault after hysterectomy    Visit # / Visits Authorized:  4 / 10  Insurance Authorization Period: 3/13/2025 to 12/31/2025  Date of Evaluation: 3/18/2025  Date of Last Reassessment: 5/30/2025  Plan of Care Certification: 5/30/2025 to 8/30/2025     Time In: 1115   Time Out: 1200  Total Time (in minutes): 45   Total Billable Time (in minutes): 38      Precautions:  standard      Subjective   Cassidy reports no urine leakage recently! Still prone to incomplete rectum emptying and fecal smearing. She does not want to become dependent on a toilet stool but open to testing  She was compliant with her home program - not every exercise, but getting in 20-30 pelvic floor squeezes just about every day    Pain: none reported    Objective     Treatment:  (TrA = transverse abdominis, PFM = pelvic floor muscle, sEMG = surface electromyography, RUSI = Rehabilitative Ultrasound Imaging)  Pt consents to pelvic floor muscle assessment and treatment.    Therapeutic activities  [x] Patient education - pt prognosis, PT plan of care, pelvic floor anatomy & function, pelvic floor muscle assessment, relationship between TrA & PFM, relationship between hips & PFM, etiology of stress urinary incontinence, the knack for management of stress urinary incontinence, pelvic floor muscle training, pressure management strategies, body mechanics for bowel movement (toilet stool vs. yoga blocks)  [] Pelvic floor assessment - see above  [x] HEP building/HEP review    Neuromuscular re-education  [x] TrA brace + PFM squeeze + straight leg raise (R&L),  x10 with 5-second hold  [x] Side-lying clam with focus on pelvic girdle stability (R&L), x15 with 5-second hold  [x] PFM squeeze + TrA brace + seated hip adduction isometric with ball between knees, x10 with 10-second hold  [] PFM squeeze + bridging, 2x15  [] TrA brace + PFM squeeze + wall push-up, x15  [x] TrA brace + PFM squeeze + countertop push-up, x10  [] PFM squeeze + sit to stand from chair on exhale, x3  [] TrA brace + seated clam with blue band, x10  [] TrA brace + PFM squeeze + shoulder extension onto ball on opposite knee (R&L), x5 with 8-second hold  [x] TrA brace + side-stepping with red band around knees, 2 minutes    Time Entry (in minutes):  Neuromuscular Re-Education Time Entry: 15  Therapeutic Activity Time Entry: 23    Assessment & Plan   Assessment: Pt tolerated treatment session well, with good understanding of education provided and no increased symptoms with exercise. Pt requires light verbal/manual cues to synch breath, TrA contraction, PFM contraction, and movement during exercises, but she improved with practice. Pt's functional mobility and ability to perform ADLs still limited by pelvic floor dysfunction. Still experiencing incomplete rectum emptying and fecal smearing that might improve with using yoga blocks under feet for better positioning (PT will assess impact next visit). She requires skilled therapy for continued patient education and coordination retraining.      The patient will continue to benefit from skilled outpatient physical therapy in order to address the deficits listed in the problem list on the initial evaluation, provide patient and family education, and maximize the patients level of independence in the home and community environments.     The patient's spiritual, cultural, and educational needs were considered, and the patient is agreeable to the plan of care and goals.     Home Exercise Program Updates  3/19/25: long holds for PFM isometrics, the knack, bowel  movement optimization  3/31/25: sit to stands, shoulder extension onto opposite knee, straight leg raise, countertop push-up, TrA brace + PFM squeeze  4/28/25: none  5/30/25: use yoga blocks under feet for bowel movements     Plan:   Continue per Plan of Care  Patient will be continually assessed and progressed as appropriate to achieve treatment goals.   TrA strengthening with appropriate pressure management to support pelvic organs and improve continence  Planning to discharge this summer    Goals:   Long Term Goals - Start:  3/18/2025    Expected End:  8/30/2025  Pt to deny urinary and fecal incontinence to demonstrate improved pelvic floor coordination (Progressing)  Pt to demonstrate open-glottis bearing down with appropriate pelvic floor relaxation for better pressure management with bowel movements (Progressing)  Pt to be independent with advanced home exercise program (Progressing)      Charmaine Barber, PT, DPT  Board-Certified Clinical Specialist in Women's Health Physical Therapy

## 2025-07-07 DIAGNOSIS — E03.9 HYPOTHYROIDISM, UNSPECIFIED TYPE: ICD-10-CM

## 2025-07-07 RX ORDER — LEVOTHYROXINE SODIUM 75 UG/1
TABLET ORAL
Qty: 90 TABLET | Refills: 3 | Status: SHIPPED | OUTPATIENT
Start: 2025-07-07

## 2025-07-07 NOTE — TELEPHONE ENCOUNTER
No care due was identified.  [facility] Embedded Care Due Messages. Reference number: [id number].   7/07/2025 11:56:37 AM CDT

## 2025-07-10 ENCOUNTER — LAB VISIT (OUTPATIENT)
Dept: LAB | Facility: HOSPITAL | Age: 80
End: 2025-07-10
Attending: INTERNAL MEDICINE
Payer: MEDICARE

## 2025-07-10 ENCOUNTER — OFFICE VISIT (OUTPATIENT)
Dept: HEMATOLOGY/ONCOLOGY | Facility: CLINIC | Age: 80
End: 2025-07-10
Payer: MEDICARE

## 2025-07-10 VITALS
WEIGHT: 130.31 LBS | RESPIRATION RATE: 16 BRPM | OXYGEN SATURATION: 97 % | TEMPERATURE: 99 F | BODY MASS INDEX: 19.3 KG/M2 | SYSTOLIC BLOOD PRESSURE: 118 MMHG | HEIGHT: 69 IN | DIASTOLIC BLOOD PRESSURE: 58 MMHG | HEART RATE: 70 BPM

## 2025-07-10 DIAGNOSIS — C91.10 CLL (CHRONIC LYMPHOCYTIC LEUKEMIA): Primary | ICD-10-CM

## 2025-07-10 DIAGNOSIS — D52.9 ANEMIA DUE TO FOLIC ACID DEFICIENCY, UNSPECIFIED DEFICIENCY TYPE: ICD-10-CM

## 2025-07-10 DIAGNOSIS — C91.10 CLL (CHRONIC LYMPHOCYTIC LEUKEMIA): ICD-10-CM

## 2025-07-10 DIAGNOSIS — D59.12 COLD AGGLUTININ DISEASE: ICD-10-CM

## 2025-07-10 LAB
ABSOLUTE EOSINOPHIL (OHS): 0.15 K/UL
ABSOLUTE MONOCYTE (OHS): 0.84 K/UL (ref 0.3–1)
ABSOLUTE NEUTROPHIL COUNT (OHS): 10.33 K/UL (ref 1.8–7.7)
ALBUMIN SERPL BCP-MCNC: 4.4 G/DL (ref 3.5–5.2)
ALP SERPL-CCNC: 77 UNIT/L (ref 40–150)
ALT SERPL W/O P-5'-P-CCNC: 16 UNIT/L (ref 10–44)
ANION GAP (OHS): 7 MMOL/L (ref 8–16)
AST SERPL-CCNC: 18 UNIT/L (ref 11–45)
BASOPHILS # BLD AUTO: 0.08 K/UL
BASOPHILS NFR BLD AUTO: 0.6 %
BILIRUB SERPL-MCNC: 1 MG/DL (ref 0.1–1)
BUN SERPL-MCNC: 14 MG/DL (ref 8–23)
CALCIUM SERPL-MCNC: 9 MG/DL (ref 8.7–10.5)
CHLORIDE SERPL-SCNC: 101 MMOL/L (ref 95–110)
CO2 SERPL-SCNC: 29 MMOL/L (ref 23–29)
CREAT SERPL-MCNC: 0.6 MG/DL (ref 0.5–1.4)
ERYTHROCYTE [DISTWIDTH] IN BLOOD BY AUTOMATED COUNT: 12.9 % (ref 11.5–14.5)
FOLATE SERPL-MCNC: 29.9 NG/ML (ref 4–24)
GFR SERPLBLD CREATININE-BSD FMLA CKD-EPI: >60 ML/MIN/1.73/M2
GLUCOSE SERPL-MCNC: 98 MG/DL (ref 70–110)
HAPTOGLOB SERPL-MCNC: 116 MG/DL (ref 30–250)
HCT VFR BLD AUTO: 42.2 % (ref 37–48.5)
HGB BLD-MCNC: 14.1 GM/DL (ref 12–16)
IMM GRANULOCYTES # BLD AUTO: 0.04 K/UL (ref 0–0.04)
IMM GRANULOCYTES NFR BLD AUTO: 0.3 % (ref 0–0.5)
LDH SERPL-CCNC: 255 U/L (ref 110–260)
LYMPHOCYTES # BLD AUTO: 1.16 K/UL (ref 1–4.8)
MAGNESIUM SERPL-MCNC: 2 MG/DL (ref 1.6–2.6)
MCH RBC QN AUTO: 30.3 PG (ref 27–31)
MCHC RBC AUTO-ENTMCNC: 33.4 G/DL (ref 32–36)
MCV RBC AUTO: 91 FL (ref 82–98)
NUCLEATED RBC (/100WBC) (OHS): 0 /100 WBC
PHOSPHATE SERPL-MCNC: 3.3 MG/DL (ref 2.7–4.5)
PLATELET # BLD AUTO: 223 K/UL (ref 150–450)
PMV BLD AUTO: 10.4 FL (ref 9.2–12.9)
POTASSIUM SERPL-SCNC: 4.4 MMOL/L (ref 3.5–5.1)
PROT SERPL-MCNC: 7.2 GM/DL (ref 6–8.4)
RBC # BLD AUTO: 4.66 M/UL (ref 4–5.4)
RELATIVE EOSINOPHIL (OHS): 1.2 %
RELATIVE LYMPHOCYTE (OHS): 9.2 % (ref 18–48)
RELATIVE MONOCYTE (OHS): 6.7 % (ref 4–15)
RELATIVE NEUTROPHIL (OHS): 82 % (ref 38–73)
RETICS/RBC NFR AUTO: 1.7 % (ref 0.5–2.5)
SODIUM SERPL-SCNC: 137 MMOL/L (ref 136–145)
WBC # BLD AUTO: 12.6 K/UL (ref 3.9–12.7)

## 2025-07-10 PROCEDURE — 99999 PR PBB SHADOW E&M-EST. PATIENT-LVL IV: CPT | Mod: PBBFAC,,, | Performed by: INTERNAL MEDICINE

## 2025-07-10 PROCEDURE — 82746 ASSAY OF FOLIC ACID SERUM: CPT

## 2025-07-10 PROCEDURE — 85045 AUTOMATED RETICULOCYTE COUNT: CPT

## 2025-07-10 PROCEDURE — 36415 COLL VENOUS BLD VENIPUNCTURE: CPT

## 2025-07-10 PROCEDURE — 83735 ASSAY OF MAGNESIUM: CPT

## 2025-07-10 PROCEDURE — 83010 ASSAY OF HAPTOGLOBIN QUANT: CPT

## 2025-07-10 PROCEDURE — 85025 COMPLETE CBC W/AUTO DIFF WBC: CPT

## 2025-07-10 PROCEDURE — 99214 OFFICE O/P EST MOD 30 MIN: CPT | Mod: PBBFAC | Performed by: INTERNAL MEDICINE

## 2025-07-10 PROCEDURE — 82040 ASSAY OF SERUM ALBUMIN: CPT

## 2025-07-10 PROCEDURE — 99214 OFFICE O/P EST MOD 30 MIN: CPT | Mod: S$PBB,,, | Performed by: INTERNAL MEDICINE

## 2025-07-10 PROCEDURE — 83615 LACTATE (LD) (LDH) ENZYME: CPT

## 2025-07-10 PROCEDURE — 84100 ASSAY OF PHOSPHORUS: CPT

## 2025-07-10 RX ORDER — HYDROCORTISONE 25 MG/G
CREAM TOPICAL 2 TIMES DAILY
COMMUNITY
Start: 2025-06-27

## 2025-07-10 RX ORDER — MUPIROCIN 20 MG/G
OINTMENT TOPICAL 2 TIMES DAILY
COMMUNITY
Start: 2025-06-27

## 2025-07-10 NOTE — PROGRESS NOTES
Section of Hematology and Stem Cell Transplantation  Follow Up Visit     Visit date: 7/10/25  Visit diagnosis: CLL (chronic lymphocytic leukemia) [C91.10]  Referred by:  Shiv Watson MD and Sony Carnes MD    Oncologic History:     Primary Oncologic Diagnosis: Chronic lymphocytic leukemia, Binet stage A, Duarte stage 0; cold agglutinin disease     2016: First noted to have cervical lymphadenopathy. Excisional biopsy confirmed chronic lymphocytic leukemia. She did not have an indication for treatment; therefore, observation recommended by Dr. Kelley.   6/17/21: PET/CT noted enlarged bilateral cervical, supraclavicular, axillary, retroperitoneal, and pelvic lymph nodes. All nodes enlarged from the prior exam.  7/28/21: First noted to have mild anemia (Hgb ~11.5 to 10.8 g/dL). Continued monitoring.  8/3/21: Established care with Dr. Carnes.   11/1/21: On follow up with Dr. Carnes, she was noted to have worsening anemia - hemoglobin decreased to 8.6 g/dL. 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 Beta-2 microglobulin 3.19. CLL FISH revealed trisomy 12. Peripheral blood karyotype - 47,XX,+12[8]/47,idem,del(4)(p14p16)[3]/46,XX[9]. ZAP70 17% of cells.  12/14/21: She noted increased fatigue. Hemoglobin decreased to 6.2. Repeat labs confirmed decreased hemoglobin to 5.8 g/dL. Haptoglobin <1, . MICHELL Anti-IgG negative, MICHELL complement C3 positive.  12/17/21: Prednisone 60mg daily started in response to hemolytic anemia.  12/22/21: Initial visit at [facility] with Dr. Watson. WBC 96k, Hgb 8.9, Plts 288. MICHELL positive. , Hapto <10. Continued prednisone 60mg daily.   12/27/21: Initial visit with me. Prednisone decreased to 50mg daily.   1/4/22: Hemoglobin stable (~8.4 g/dL) with persistent hemolysis. Prednisone weaned to 40mg daily.   1/11/22: Cycle 1 day 1 of obinutuzumab plus venetoclax (starting day 22). Prednisone weaned to 30mg daily.   1/18/22: Hemoglobin improved. Prednisone weaned to 20mg daily.  1/31/22: Prednisone  weaned to 15mg daily. Tapered to 10mg one week later.   2/15/22: Prednisone weaned to 5mg daily.   4/4/22: Prednisone to 5mg every other day x2 weeks then stop.  4/27/22: Cold agglutinin titer positive (>1:512). Venetoclax stopped as hemolysis was attributed to cold agglutinins. Hemolysis improved.  6/1/22: She completed 6 cycles of obinutuzumab.      History of Present Ilness:   Cassidy Bryan) is a pleasant 80 y.o.female with a past medical history of hypertension and chronic lymphocytic leukemia who presents for follow up visit regarding CLL and cold agglutinin hemolytic anemia. She feels great. She has had more fatigue when playing tennis but believes it may be from the heat. No new B symptoms.     PAST MEDICAL HISTORY:   Past Medical History:   Diagnosis Date    CAD (coronary artery disease)     CLL (chronic lymphocytic leukemia) 12/22/2021    HLD (hyperlipidemia)     Hypertension     Hypertensive heart disease     Hypothyroidism 04/26/2022    Thyroid disease        PAST SURGICAL HISTORY:   Past Surgical History:   Procedure Laterality Date    ADENOIDECTOMY      COLONOSCOPY      HYSTERECTOMY      INTRACAPSULAR CATARACT EXTRACTION      TONSILLECTOMY         PAST SOCIAL HISTORY:  Social History     Tobacco Use    Smoking status: Never    Smokeless tobacco: Never   Substance Use Topics    Alcohol use: Yes     Alcohol/week: 0.0 standard drinks of alcohol     Comment: rare    Drug use: Not Currently       FAMILY HISTORY:  Family History   Problem Relation Name Age of Onset    Thyroid disease Mother 94     Osteoarthritis Mother 94     Hypertension Mother 94     Dementia Mother 94     Cancer Father 79/MI         bladder    Heart disease Father 79/MI     Diabetes Father 79/MI     Leukemia Maternal Grandmother      Stroke Maternal Grandfather      Cancer Maternal Grandfather      Cancer Paternal Grandmother      Cancer Maternal Cousin          breast       CURRENT MEDICATIONS:   Current Outpatient Medications    Medication Sig    amLODIPine (NORVASC) 5 MG tablet Take 5 mg by mouth once daily.    aspirin (ECOTRIN) 81 MG EC tablet Take 81 mg by mouth.    atorvastatin (LIPITOR) 40 MG tablet 20 mg.    BENEFIBER, GUAR GUM, ORAL Take 1 Dose by mouth 2 (two) times daily.    bismuth subsalicylate (BISMAREX) 262 mg Chew Take 4 tablets by mouth.    BUDESONIDE 1 MG/NORMAL SALINE 50 ML NASAL IRRIGATION 0.8 Doses by Nasal route once daily.    CARVEDILOL PHOSPHATE 20 MG ORAL CM24 (COREG CR) 20 mg 24 hr capsule Take 1 capsule by mouth once daily.    folic acid (FOLVITE) 1 MG tablet Take 1 tablet (1 mg total) by mouth once daily.    glucosamine-chondroitin 500-400 mg tablet Take 1 tablet by mouth 3 (three) times daily.    hydrocortisone 2.5 % cream Apply topically 2 (two) times daily.    multivitamin (THERAGRAN) per tablet Take 1 tablet by mouth once daily.    mupirocin (BACTROBAN) 2 % ointment Apply topically 2 (two) times daily.    NEILMED SINUS RINSE REFILL Pack 1 Dose by Nasal route Daily. use as directed    neomycin-polymyxin-dexamethasone (MAXITROL) 3.5mg/mL-10,000 unit/mL-0.1 % DrpS Place 1 drop into the left eye 4 (four) times daily.    sod chlor,sod bicarb/neti pot (SINUS WASH NETI POT ZAYNAB) 1 Device by Nasal route continuous prn (congestino).    SYNTHROID 75 mcg tablet Take 1 tablet (75 mcg total) by mouth daily    valsartan (DIOVAN) 160 MG tablet Take 160 mg by mouth once daily.    vitamin D (VITAMIN D3) 1000 units Tab Take 2,000 Units by mouth.    VIVELLE-DOT 0.025 mg/24 hr Place 1 patch onto the skin twice a week    Bifidobacterium infantis (ALIGN ORAL) Take by mouth. PRN (Patient not taking: Reported on 7/10/2025)    xylitoL (XYLIMELTS) 500 mg MaBT 1 application  by Mucous Membrane route 4 (four) times daily as needed.     No current facility-administered medications for this visit.       ALLERGIES:   Review of patient's allergies indicates:   Allergen Reactions    Thiazides Other (See Comments)     hyponatremia        Review of Systems:     Pertinent positives and negatives included in the HPI. Otherwise a complete review of systems is negative.    Physical Exam:     Vitals:    07/10/25 0916   BP: (!) 118/58   Pulse: 70   Resp: 16   Temp: 98.6 °F (37 °C)       General: Appears well, NAD  HEENT: MMM, no OP lesions  Pulmonary: CTAB, no increased work of breathing, no W/R/C  Cardiovascular: S1S2 normal, RRR, no M/R/G  Abdominal: Soft, NT, ND, BS+, no HSM  Extremities: No C/C/E  Neurological: AAOx4, grossly normal, no focal deficits  Dermatologic: No appreciable rashes or lesions  Lymphatic: No appreciable cervical, axillary, or inguinal lymphadenopathy     ECOG Performance Status: (foot note - ECOG PS provided by Eastern Cooperative Oncology Group) 0 - Asymptomatic    Karnofsky Performance Score:  100%- Normal, No Complaints, No Evidence of Disease    Labs:   Lab Results   Component Value Date    WBC 12.60 07/10/2025    HGB 14.1 07/10/2025    HCT 42.2 07/10/2025    MCV 91 07/10/2025     07/10/2025       Lab Results   Component Value Date     07/10/2025    K 4.4 07/10/2025     07/10/2025    CO2 29 07/10/2025    BUN 14 07/10/2025    CREATININE 0.6 07/10/2025    ALBUMIN 4.4 07/10/2025    BILITOT 1.0 07/10/2025    ALKPHOS 77 07/10/2025    AST 18 07/10/2025    ALT 16 07/10/2025       Imaging:   PET/CT 6/17/21  COMPARISON: PET/CT 8/22/2016.   HISTORY: Lymphoma.   FINDINGS:   Head and neck: There is symmetric and physiologic distribution of radiotracer throughout the included brain parenchyma. There is no hemorrhage, hydrocephalus, or midline shift. There are innumerable bilateral enlarged FDG avid cervical lymph nodes. These have worsened from the prior exam. A representative left lower cervical lymph node best visualized on image 84 measures 19 mm compared with 10 mm previously with an SUV max of 1.8. There are enlarged left supraclavicular lymph nodes which were not present on the prior exam. A representative  eran conglomerate measures 3.4 cm in diameter with an SUV max of 2.2.   CHEST: There are innumerable bilateral axillary and subpectoral lymph nodes which are not present on the prior exam. These demonstrate low-level FDG avidity. A representative left axillary nodule best visualized on image 114 measures 1.9 cm with an SUV max of 1.6. There are prominent paratracheal lymph nodes which are not pathologically enlarged by size criteria and demonstrate background FDG avidity, however were not present on the prior exam.   There is no FDG avid pulmonary nodule or mass. There is no pleural effusion. There is no pneumothorax.   Abdomen and pelvis: There is physiologic distribution of radiotracer throughout the liver, spleen, and collecting system. There are multiple enlarged bilateral iliac and periaortic retroperitoneal lymph nodes. A representative left periaortic lymph node best visualized on image 201 measures 2.0 cm in diameter with an SUV max of 2.0.   MUSCULOSKELETAL: There is no FDG avid lytic or blastic osseous lesion.     IMPRESSION:   Innumerable enlarged bilateral cervical, supraclavicular, axillary, retroperitoneal, and pelvic lymph nodes all which demonstrate low-level FDG avidity, however are enlarged from the prior exam and most consistent with recurrent disease.       Pathology:  Peripheral Blood Flow Cytometry (11/1/21):  Comment:      CD5+ B-CELL LYMPHOPROLIFERATIVE DISORDER (SEE COMMENT).     COMMENT: Clonal CD20+ B-cells are detected that coexpress   CD5, CD23, FMC7(dim) and moderate surface kappa light chain   and are negative for CD10, comprising 75% of all analyzed   cells.       Prognostication Tools:  CLL-IPI = 2, intermediate risk (79.4% survival after 5 years, 40% survival after 10 years, median  months)    Assessment and Plan:   Cassidy JENNIFER Aguilar (Cassidy) is a pleasant 80 y.o.female with a past medical history of hypertension and chronic lymphocytic leukemia who presents for follow up visit  regarding CLL.     Chronic lymphocytic leukemia, Binet stage A, Duarte stage 0: She has had Duarte stage I disease since 2016. 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 She was initially treated with steroids. In January 2022, she was started on treatment for CLL due to steroid-refractory hemolytic anemia with obinutuzumab plus venetoclax because of the benefit of anti-CD20 MAB with AIHA plus fixed duration treatment (patient preference).  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 Venetoclax was stopped and obinutuzumab was continued to complete 6 cycles. Since April 2022, her hemolytic anemia has resolved.   Continue observation.     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  Initially treated with prednisone in December with stabilization of blood counts but persistent hemolysis.  Hemolytic anemia resolved in 4/2022. She completed 6 cycles of obinutuzumab as noted above. Consider sutimlimab if relapse of LAYTON.  She had another episode of hemolysis following a COVID booster, so theses may be related.   Continue to monitor LDH, haptoglobin, and retic.    Orders Placed:      Orders Placed This Encounter    Uric Acid    Lactate Dehydrogenase    Phosphorus    Magnesium    Comprehensive Metabolic Panel    CBC Auto Differential    Lactate Dehydrogenase    Reticulocytes    Haptoglobin    Type & Screen           Route Chart for Scheduling    BMT Chart Routing      Follow up with physician 6 months. Bubba only   Follow up with ERIKA    Provider visit type    Infusion scheduling note    Injection scheduling note    Labs CBC, CMP, LDH, reticulocytes, magnesium, phosphorus  and other   Scheduling:  Preferred lab:  Lab interval:  labs prior to visit (CBC, CMP, Mg, Phos, LDH, Hapto, Retic)   Imaging    Pharmacy appointment    Other referrals                           Total time of this visit was 35 minutes, including time spent face to face with patient and/or via video/audio, and also in preparing for today's visit for MDM and documentation. (Medical Decision Making, including consideration of possible diagnoses, management options, complex medical record review, review of diagnostic tests and information, consideration and discussion of significant complications based on comorbidities, and discussion with providers involved with the care of the patient). Greater than 50% was spent face to face with the patient counseling and coordinating care.    Bubba Wright MD  Malignant Hematology, Stem Cell Transplant, and Cellular Therapy  [facility]  [facility]

## 2025-07-11 ENCOUNTER — PATIENT MESSAGE (OUTPATIENT)
Dept: HEMATOLOGY/ONCOLOGY | Facility: CLINIC | Age: 80
End: 2025-07-11
Payer: MEDICARE

## 2025-07-29 NOTE — PROGRESS NOTES
Outpatient Rehab  Physical Therapy Progress Note & Updated Plan of Care    Patient Name: Cassidy Aguilar  MRN: [id number]  YOB: 1945  Encounter Date: 7/31/2025    Therapy Diagnosis:   Encounter Diagnoses   Name Primary?    Pelvic floor dysfunction Yes    Urge incontinence     Fecal smearing      Physician: originally referred by Dr. Marie Hemphill, seeking continuance from Dr. Beba Simms    Physician Orders: Eval and Treat  Medical Diagnosis: Prolapse of vaginal vault after hysterectomy    Visit # / Visits Authorized: 5 / 10  Insurance Authorization Period: 3/13/2025 to 12/31/2025  Date of Evaluation: 3/18/2025  Date of Last Reassessment: 5/30/2025  Plan of Care Certification: 5/30/2025 to 8/30/2025 ; 7/31/2025 - 10/31/2025     Time In: 1030   Time Out: 1115  Total Time (in minutes): 45   Total Billable Time (in minutes): 38      Precautions:  Standard      Subjective   Cassidy reports some urinary/fecal leakage with repeated coughing while ill. Fecal incontinence only occurred with very loose stool, attributed to antibiotics.  She was somewhat compliant with her home program - not as much while traveling in [place] recently    Pain: none reported    Objective   External Pelvic Floor Muscle Assessment  Palpation: no tenderness to palpation  Muscle Bulk: normal tone  Voluntary contraction: visible lift - good squeeze with endurance deficits apparent after 4 seconds, but still able to produce some squeeze for at least 10 seconds  Voluntary relaxation: visible drop  Bearing down: bulge with palpable pelvic floor relaxation    Treatment:  (TrA = transverse abdominis, PFM = pelvic floor muscle, sEMG = surface electromyography, RUSI = Rehabilitative Ultrasound Imaging)  Pt consents to pelvic floor muscle assessment and treatment.    Therapeutic activities  [x] Patient education - pt prognosis, PT plan of care, pelvic floor anatomy & function, pelvic floor muscle assessment, relationship between TrA & PFM,  relationship between hips & PFM, etiology of stress urinary incontinence, the knack for management of stress urinary incontinence, pelvic floor muscle training, pressure management strategies, body mechanics for bowel movement (toilet stool vs. yoga blocks)  [x] Pelvic floor assessment - see above  [x] HEP building/HEP review    Neuromuscular re-education  [] TrA brace + PFM squeeze + straight leg raise (R&L), x10 with 5-second hold  [] Side-lying clam with focus on pelvic girdle stability (R&L), x15 with 5-second hold  [x] PFM squeeze + TrA brace + seated hip adduction isometric with ball between knees, x5 with 10-second hold  [] PFM squeeze + bridging, 2x15  [] TrA brace + PFM squeeze + wall push-up, x15  [] TrA brace + PFM squeeze + countertop push-up, x10  [] PFM squeeze + sit to stand from chair on exhale, x3  [] TrA brace + seated clam with blue band, x10  [x] TrA brace + PFM squeeze + shoulder extension onto ball on opposite knee (R&L), x5 with 8-second hold  [] TrA brace + side-stepping with red band around knees, 2 minutes    Time Entry (in minutes):  Neuromuscular Re-Education Time Entry: 8  Therapeutic Activity Time Entry: 30      Assessment & Plan   Assessment: Pt tolerated treatment session well, with good understanding of education provided and no increased symptoms with exercise. Pt requires light verbal/manual cues to synch breath, TrA contraction, PFM contraction, and movement during exercises, but she improved with practice. Pelvic floor muscle assessment reveals good coordination with squeezing and bearing down, though she has endurance deficits. Recent increased incontinence consistent with illness, travel, and less compliance with HEP. Pt's functional mobility and ability to perform ADLs still limited by pelvic floor dysfunction. Still experiencing incomplete rectum emptying and fecal smearing that might improve with using yoga blocks under feet for better positioning (PT will assess impact next  visit). She requires skilled therapy for continued patient education and coordination retraining.      The patient will continue to benefit from skilled outpatient physical therapy in order to address the deficits listed in the problem list on the initial evaluation, provide patient and family education, and maximize the patients level of independence in the home and community environments.     The patient's spiritual, cultural, and educational needs were considered, and the patient is agreeable to the plan of care and goals.     Home Exercise Program Updates  3/19/25: long holds for PFM isometrics, the knack, bowel movement optimization  3/31/25: sit to stands, shoulder extension onto opposite knee, straight leg raise, countertop push-up, TrA brace + PFM squeeze  4/28/25: none  5/30/25: use yoga blocks under feet for bowel movements   7/31/25: keep up with exercises and also focus on PFM squeezes with long holds    Plan:   Continue per Plan of Care  Patient will be continually assessed and progressed as appropriate to achieve treatment goals.   Continue treatment for a few more visits due to recent setback.    Goals:   Long Term Goals - Start:  3/18/2025    Expected End:  8/30/2025  Pt to deny urinary and fecal incontinence to demonstrate improved pelvic floor coordination (Progressing)  Pt to demonstrate open-glottis bearing down with appropriate pelvic floor relaxation for better pressure management with bowel movements (Progressing)  Pt to be independent with advanced home exercise program (Progressing)      Charmaine Barber, PT, DPT  Board-Certified Clinical Specialist in Women's Health Physical Therapy

## 2025-07-31 ENCOUNTER — CLINICAL SUPPORT (OUTPATIENT)
Dept: REHABILITATION | Facility: OTHER | Age: 80
End: 2025-07-31
Payer: MEDICARE

## 2025-07-31 DIAGNOSIS — N39.41 URGE INCONTINENCE: ICD-10-CM

## 2025-07-31 DIAGNOSIS — R15.1 FECAL SMEARING: ICD-10-CM

## 2025-07-31 DIAGNOSIS — M62.89 PELVIC FLOOR DYSFUNCTION: Primary | ICD-10-CM

## 2025-07-31 PROCEDURE — 97112 NEUROMUSCULAR REEDUCATION: CPT | Mod: PN | Performed by: PHYSICAL THERAPIST

## 2025-07-31 PROCEDURE — 97530 THERAPEUTIC ACTIVITIES: CPT | Mod: PN | Performed by: PHYSICAL THERAPIST

## 2025-07-31 NOTE — LETTER
July 31, 2025  Beba Simms MD  [address]  [id number]  [facility] [id number]      To whom it may concern,     The attached plan of care is being sent to you for review and reference.    You may indicate your approval by signing the document electronically, or by faxing/mailing a signed copy of the final page of this document back to the attention of Charmaine Barber, PT:         Plan of Care 7/31/25   Effective from: 7/31/2025  Effective to: 10/31/2025    Plan ID: [id number]            Participants as of Finalize on 7/31/2025    Name Type Comments Contact Info    Beba Simms MD Resident  [phone]       Last Plan Note     Author: Charmaine Barber, PT Status: Addendum Last edited: 7/31/2025 10:30 AM       Outpatient Rehab  Physical Therapy Progress Note & Updated Plan of Care    Patient Name: Cassidy Aguilar  MRN: [id number]  YOB: 1945  Encounter Date: 7/31/2025    Therapy Diagnosis:   Encounter Diagnoses   Name Primary?    Pelvic floor dysfunction Yes    Urge incontinence     Fecal smearing      Physician: originally referred by Dr. Marie Hemphill, seeking continuance from Dr. Beba Simms    Physician Orders: Eval and Treat  Medical Diagnosis: Prolapse of vaginal vault after hysterectomy    Visit # / Visits Authorized: 5 / 10  Insurance Authorization Period: 3/13/2025 to 12/31/2025  Date of Evaluation: 3/18/2025  Date of Last Reassessment: 5/30/2025  Plan of Care Certification: 5/30/2025 to 8/30/2025 ; 7/31/2025 - 10/31/2025     Time In: 1030   Time Out: 1115  Total Time (in minutes): 45   Total Billable Time (in minutes): 38      Precautions:  Standard      Subjective   Cassidy reports some urinary/fecal leakage with repeated coughing while ill. Fecal incontinence only occurred with very loose stool, attributed to antibiotics.  She was somewhat compliant with her home program - not as much while traveling in [place] recently    Pain: none reported    Objective   External Pelvic Floor  Muscle Assessment  Palpation: no tenderness to palpation  Muscle Bulk: normal tone  Voluntary contraction: visible lift - good squeeze with endurance deficits apparent after 4 seconds, but still able to produce some squeeze for at least 10 seconds  Voluntary relaxation: visible drop  Bearing down: bulge with palpable pelvic floor relaxation    Treatment:  (TrA = transverse abdominis, PFM = pelvic floor muscle, sEMG = surface electromyography, RUSI = Rehabilitative Ultrasound Imaging)  Pt consents to pelvic floor muscle assessment and treatment.    Therapeutic activities  [x] Patient education - pt prognosis, PT plan of care, pelvic floor anatomy & function, pelvic floor muscle assessment, relationship between TrA & PFM, relationship between hips & PFM, etiology of stress urinary incontinence, the knack for management of stress urinary incontinence, pelvic floor muscle training, pressure management strategies, body mechanics for bowel movement (toilet stool vs. yoga blocks)  [x] Pelvic floor assessment - see above  [x] HEP building/HEP review    Neuromuscular re-education  [] TrA brace + PFM squeeze + straight leg raise (R&L), x10 with 5-second hold  [] Side-lying clam with focus on pelvic girdle stability (R&L), x15 with 5-second hold  [x] PFM squeeze + TrA brace + seated hip adduction isometric with ball between knees, x5 with 10-second hold  [] PFM squeeze + bridging, 2x15  [] TrA brace + PFM squeeze + wall push-up, x15  [] TrA brace + PFM squeeze + countertop push-up, x10  [] PFM squeeze + sit to stand from chair on exhale, x3  [] TrA brace + seated clam with blue band, x10  [x] TrA brace + PFM squeeze + shoulder extension onto ball on opposite knee (R&L), x5 with 8-second hold  [] TrA brace + side-stepping with red band around knees, 2 minutes    Time Entry (in minutes):  Neuromuscular Re-Education Time Entry: 8  Therapeutic Activity Time Entry: 30      Assessment & Plan   Assessment: Pt tolerated treatment  session well, with good understanding of education provided and no increased symptoms with exercise. Pt requires light verbal/manual cues to synch breath, TrA contraction, PFM contraction, and movement during exercises, but she improved with practice. Pelvic floor muscle assessment reveals good coordination with squeezing and bearing down, though she has endurance deficits. Recent increased incontinence consistent with illness, travel, and less compliance with HEP. Pt's functional mobility and ability to perform ADLs still limited by pelvic floor dysfunction. Still experiencing incomplete rectum emptying and fecal smearing that might improve with using yoga blocks under feet for better positioning (PT will assess impact next visit). She requires skilled therapy for continued patient education and coordination retraining.      The patient will continue to benefit from skilled outpatient physical therapy in order to address the deficits listed in the problem list on the initial evaluation, provide patient and family education, and maximize the patients level of independence in the home and community environments.     The patient's spiritual, cultural, and educational needs were considered, and the patient is agreeable to the plan of care and goals.     Home Exercise Program Updates  3/19/25: long holds for PFM isometrics, the knack, bowel movement optimization  3/31/25: sit to stands, shoulder extension onto opposite knee, straight leg raise, countertop push-up, TrA brace + PFM squeeze  4/28/25: none  5/30/25: use yoga blocks under feet for bowel movements   7/31/25: keep up with exercises and also focus on PFM squeezes with long holds    Plan:   Continue per Plan of Care  Patient will be continually assessed and progressed as appropriate to achieve treatment goals.   Continue treatment for a few more visits due to recent setback.    Goals:   Long Term Goals - Start:  3/18/2025    Expected End:  8/30/2025  Pt to deny  urinary and fecal incontinence to demonstrate improved pelvic floor coordination (Progressing)  Pt to demonstrate open-glottis bearing down with appropriate pelvic floor relaxation for better pressure management with bowel movements (Progressing)  Pt to be independent with advanced home exercise program (Progressing)      Charmaine Barber, PT, DPT  Board-Certified Clinical Specialist in Women's Health Physical Therapy          Current Participants as of 7/31/2025    Name Type Comments Contact Info    Beba Simms MD Resident  [phone]    Signature pending            Sincerely,      Charmaine Barber, PT  [facility]                                                            Dear Charmaine Barber, PT,    RE: Ms. Cassidy Aguilar, MRN: [id number]    I certify that I have reviewed the attached plan of care and agree to the details within.        ___________________________  ___________________________  Provider Printed Name   Provider Signed Name      ___________________________  Date and Time

## 2025-08-03 DIAGNOSIS — N81.9 FEMALE GENITAL PROLAPSE, UNSPECIFIED TYPE: ICD-10-CM

## 2025-08-03 DIAGNOSIS — N39.41 URGE INCONTINENCE OF URINE DUE TO FEMALE GENITAL PROLAPSE: Primary | ICD-10-CM

## 2025-08-03 DIAGNOSIS — N81.9 URGE INCONTINENCE OF URINE DUE TO FEMALE GENITAL PROLAPSE: Primary | ICD-10-CM

## 2025-08-04 ENCOUNTER — CLINICAL SUPPORT (OUTPATIENT)
Dept: REHABILITATION | Facility: OTHER | Age: 80
End: 2025-08-04
Payer: MEDICARE

## 2025-08-04 DIAGNOSIS — M62.89 PELVIC FLOOR DYSFUNCTION: Primary | ICD-10-CM

## 2025-08-04 DIAGNOSIS — R15.1 FECAL SMEARING: ICD-10-CM

## 2025-08-04 DIAGNOSIS — N39.41 URGE INCONTINENCE: ICD-10-CM

## 2025-08-04 PROCEDURE — 97112 NEUROMUSCULAR REEDUCATION: CPT | Mod: PN | Performed by: PHYSICAL THERAPIST

## 2025-08-04 PROCEDURE — 97530 THERAPEUTIC ACTIVITIES: CPT | Mod: PN | Performed by: PHYSICAL THERAPIST

## 2025-08-04 NOTE — PROGRESS NOTES
Outpatient Rehab  Physical Therapy Visit    Patient Name: Cassidy Aguilar  MRN: [id number]  YOB: 1945  Encounter Date: 8/4/2025    Therapy Diagnosis:   Encounter Diagnoses   Name Primary?    Pelvic floor dysfunction Yes    Urge incontinence     Fecal smearing      Physician: Marie Hemphill MD    Physician Orders: Eval and Treat  Medical Diagnosis: Prolapse of vaginal vault after hysterectomy    Visit # / Visits Authorized: 6 / 10  Insurance Authorization Period: 3/13/2025 to 12/31/2025  Date of Evaluation: 3/18/2025  Plan of Care Certification: 5/30/2025 to 8/30/2025 ; 7/31/2025 - 10/31/2025     Time In: 1445   Time Out: 1518  Total Time (in minutes): 33   Total Billable Time (in minutes): 31      Precautions:  Standard      Subjective   Cassidy reports no urine leakage in the last week even with continued coughing (getting better but still coughing a few times an hour).  She was compliant with her home program    Pain: none reported    Objective     Treatment:  (TrA = transverse abdominis, PFM = pelvic floor muscle, sEMG = surface electromyography, RUSI = Rehabilitative Ultrasound Imaging)  Pt consents to pelvic floor muscle assessment and treatment.    Therapeutic activities  [x] Patient education - pt prognosis, PT plan of care, pelvic floor anatomy & function, pelvic floor muscle assessment, relationship between TrA & PFM, relationship between hips & PFM, etiology of stress urinary incontinence, the knack for management of stress urinary incontinence, pelvic floor muscle training, pressure management strategies  [] Pelvic floor assessment - see above  [x] HEP building/HEP review    Neuromuscular re-education  [x] TrA brace + PFM squeeze + straight leg raise (R&L), x10 with 5-second hold  [] Side-lying clam with focus on pelvic girdle stability (R&L), x15 with 5-second hold  [x] PFM squeeze + TrA brace + seated hip adduction isometric with ball between knees, x10 with 10-second hold  [x] TrA brace + PFM  squeeze + bridging with belt around knees, x10 with 5-second hold  [x] TrA brace + PFM squeeze + wall push-up, x10  [] TrA brace + PFM squeeze + countertop push-up, x10  [] PFM squeeze + sit to stand from chair on exhale, x3  [] TrA brace + seated clam with blue band, x10  [x] TrA brace + PFM squeeze + shoulder extension onto ball on opposite knee (R&L), x5 with 8-second hold  [x] TrA brace + side-stepping with red band around knees, 2 minutes    Time Entry (in minutes):  Neuromuscular Re-Education Time Entry: 21  Therapeutic Activity Time Entry: 8      Assessment & Plan   Assessment: Pt tolerated treatment session well, with good understanding of education provided and no increased symptoms with exercise. Pt requires light verbal/manual cues to synch breath, TrA contraction, PFM contraction, and movement during exercises, but she improved with practice. Longer isometric holds added to familiar exercises.    The patient will continue to benefit from skilled outpatient physical therapy in order to address the deficits listed in the problem list on the initial evaluation, provide patient and family education, and maximize the patients level of independence in the home and community environments.     The patient's spiritual, cultural, and educational needs were considered, and the patient is agreeable to the plan of care and goals.     Home Exercise Program Updates  3/19/25: long holds for PFM isometrics, the knack, bowel movement optimization  3/31/25: sit to stands, shoulder extension onto opposite knee, straight leg raise, countertop push-up, TrA brace + PFM squeeze  4/28/25: none  5/30/25: use yoga blocks under feet for bowel movements   7/31/25: keep up with exercises and also focus on PFM squeezes with long holds  8/4/25: none    Plan:   Continue per Plan of Care  Patient will be continually assessed and progressed as appropriate to achieve treatment goals.   Continue treatment for a few more visits due to  recent setback.    Goals:   Long Term Goals - Start:  3/18/2025    Expected End:  8/30/2025  Pt to deny urinary and fecal incontinence to demonstrate improved pelvic floor coordination (Progressing)  Pt to demonstrate open-glottis bearing down with appropriate pelvic floor relaxation for better pressure management with bowel movements (Progressing)  Pt to be independent with advanced home exercise program (Progressing)      Charmaine Barber, PT, DPT  Board-Certified Clinical Specialist in Women's Health Physical Therapy

## 2025-08-27 DIAGNOSIS — J32.9 RECURRENT SINUSITIS: Primary | ICD-10-CM

## 2025-08-27 RX ORDER — AMOXICILLIN AND CLAVULANATE POTASSIUM 875; 125 MG/1; MG/1
1 TABLET, FILM COATED ORAL 2 TIMES DAILY
Qty: 14 TABLET | Refills: 0 | Status: SHIPPED | OUTPATIENT
Start: 2025-08-27

## 2025-08-27 RX ORDER — CEFDINIR 300 MG/1
300 CAPSULE ORAL 2 TIMES DAILY
COMMUNITY
Start: 2025-07-28

## 2025-08-27 RX ORDER — AZITHROMYCIN 250 MG/1
TABLET, FILM COATED ORAL
COMMUNITY
Start: 2025-07-11

## 2025-08-28 ENCOUNTER — LAB VISIT (OUTPATIENT)
Dept: LAB | Facility: HOSPITAL | Age: 80
End: 2025-08-28
Attending: INTERNAL MEDICINE
Payer: MEDICARE

## 2025-08-28 ENCOUNTER — CLINICAL SUPPORT (OUTPATIENT)
Dept: REHABILITATION | Facility: OTHER | Age: 80
End: 2025-08-28
Payer: MEDICARE

## 2025-08-28 DIAGNOSIS — M62.89 PELVIC FLOOR DYSFUNCTION: Primary | ICD-10-CM

## 2025-08-28 DIAGNOSIS — R15.1 FECAL SMEARING: ICD-10-CM

## 2025-08-28 DIAGNOSIS — N39.41 URGE INCONTINENCE: ICD-10-CM

## 2025-08-28 DIAGNOSIS — J32.9 RECURRENT SINUSITIS: ICD-10-CM

## 2025-08-28 PROCEDURE — 97112 NEUROMUSCULAR REEDUCATION: CPT | Mod: PN | Performed by: PHYSICAL THERAPIST

## 2025-08-28 PROCEDURE — 87205 SMEAR GRAM STAIN: CPT

## 2025-08-28 PROCEDURE — 97530 THERAPEUTIC ACTIVITIES: CPT | Mod: PN | Performed by: PHYSICAL THERAPIST

## 2025-09-01 LAB
BACTERIA SPEC CULT: NORMAL
GRAM STN SPEC: NORMAL

## 2025-09-04 ENCOUNTER — TELEPHONE (OUTPATIENT)
Dept: INTERNAL MEDICINE | Facility: CLINIC | Age: 80
End: 2025-09-04
Payer: MEDICARE